# Patient Record
Sex: MALE | Race: WHITE | NOT HISPANIC OR LATINO | Employment: OTHER | URBAN - METROPOLITAN AREA
[De-identification: names, ages, dates, MRNs, and addresses within clinical notes are randomized per-mention and may not be internally consistent; named-entity substitution may affect disease eponyms.]

---

## 2017-04-12 ENCOUNTER — ANESTHESIA (OUTPATIENT)
Dept: SURGERY | Facility: HOSPITAL | Age: 62
End: 2017-04-12
Payer: OTHER GOVERNMENT

## 2017-04-12 ENCOUNTER — OFFICE VISIT (OUTPATIENT)
Dept: UROLOGY | Facility: CLINIC | Age: 62
End: 2017-04-12
Payer: OTHER GOVERNMENT

## 2017-04-12 ENCOUNTER — ANESTHESIA EVENT (OUTPATIENT)
Dept: SURGERY | Facility: HOSPITAL | Age: 62
End: 2017-04-12
Payer: OTHER GOVERNMENT

## 2017-04-12 ENCOUNTER — HOSPITAL ENCOUNTER (OUTPATIENT)
Facility: HOSPITAL | Age: 62
Discharge: HOME | End: 2017-04-12
Attending: UROLOGY | Admitting: UROLOGY
Payer: OTHER GOVERNMENT

## 2017-04-12 VITALS
HEART RATE: 53 BPM | TEMPERATURE: 97 F | RESPIRATION RATE: 18 BRPM | DIASTOLIC BLOOD PRESSURE: 84 MMHG | HEIGHT: 71 IN | BODY MASS INDEX: 31.08 KG/M2 | WEIGHT: 222 LBS | OXYGEN SATURATION: 96 % | SYSTOLIC BLOOD PRESSURE: 136 MMHG

## 2017-04-12 VITALS
DIASTOLIC BLOOD PRESSURE: 76 MMHG | BODY MASS INDEX: 31.08 KG/M2 | HEIGHT: 71 IN | WEIGHT: 222 LBS | SYSTOLIC BLOOD PRESSURE: 113 MMHG | HEART RATE: 71 BPM | TEMPERATURE: 98 F

## 2017-04-12 DIAGNOSIS — N13.2 URETERAL STONE WITH HYDRONEPHROSIS: Primary | ICD-10-CM

## 2017-04-12 DIAGNOSIS — N20.0 KIDNEY STONE: ICD-10-CM

## 2017-04-12 DIAGNOSIS — N13.2 URETERAL STONE WITH HYDRONEPHROSIS: ICD-10-CM

## 2017-04-12 LAB
BILIRUB SERPL-MCNC: ABNORMAL MG/DL
BLOOD URINE, POC: 250
COLOR, POC UA: ABNORMAL
GLUCOSE UR QL STRIP: 1000
KETONES UR QL STRIP: ABNORMAL
LEUKOCYTE ESTERASE URINE, POC: ABNORMAL
NITRITE, POC UA: ABNORMAL
PH, POC UA: 5
PROTEIN, POC: ABNORMAL
SPECIFIC GRAVITY, POC UA: 1030
UROBILINOGEN, POC UA: ABNORMAL

## 2017-04-12 PROCEDURE — 37000008 HC ANESTHESIA 1ST 15 MINUTES: Performed by: UROLOGY

## 2017-04-12 PROCEDURE — 81002 URINALYSIS NONAUTO W/O SCOPE: CPT | Mod: PBBFAC,PO | Performed by: UROLOGY

## 2017-04-12 PROCEDURE — 71000033 HC RECOVERY, INTIAL HOUR: Performed by: UROLOGY

## 2017-04-12 PROCEDURE — 71000039 HC RECOVERY, EACH ADD'L HOUR: Performed by: UROLOGY

## 2017-04-12 PROCEDURE — 99900103 DSU ONLY-NO CHARGE-INITIAL HR (STAT): Performed by: UROLOGY

## 2017-04-12 PROCEDURE — 99214 OFFICE O/P EST MOD 30 MIN: CPT | Mod: PBBFAC,PO | Performed by: UROLOGY

## 2017-04-12 PROCEDURE — C2617 STENT, NON-COR, TEM W/O DEL: HCPCS | Performed by: UROLOGY

## 2017-04-12 PROCEDURE — 74420 UROGRAPHY RTRGR +-KUB: CPT | Mod: 26,,, | Performed by: UROLOGY

## 2017-04-12 PROCEDURE — 99900104 DSU ONLY-NO CHARGE-EA ADD'L HR (STAT): Performed by: UROLOGY

## 2017-04-12 PROCEDURE — 37000009 HC ANESTHESIA EA ADD 15 MINS: Performed by: UROLOGY

## 2017-04-12 PROCEDURE — C1758 CATHETER, URETERAL: HCPCS | Performed by: UROLOGY

## 2017-04-12 PROCEDURE — 71000015 HC POSTOP RECOV 1ST HR: Performed by: UROLOGY

## 2017-04-12 PROCEDURE — 99204 OFFICE O/P NEW MOD 45 MIN: CPT | Mod: S$PBB,25,, | Performed by: UROLOGY

## 2017-04-12 PROCEDURE — 99999 PR PBB SHADOW E&M-EST. PATIENT-LVL IV: CPT | Mod: PBBFAC,,, | Performed by: UROLOGY

## 2017-04-12 PROCEDURE — 52332 CYSTOSCOPY AND TREATMENT: CPT | Mod: LT,,, | Performed by: UROLOGY

## 2017-04-12 PROCEDURE — 25000003 PHARM REV CODE 250: Performed by: ANESTHESIOLOGY

## 2017-04-12 PROCEDURE — 63600175 PHARM REV CODE 636 W HCPCS: Performed by: NURSE ANESTHETIST, CERTIFIED REGISTERED

## 2017-04-12 PROCEDURE — D9220A PRA ANESTHESIA: Mod: CRNA,,, | Performed by: NURSE ANESTHETIST, CERTIFIED REGISTERED

## 2017-04-12 PROCEDURE — 25000003 PHARM REV CODE 250: Performed by: UROLOGY

## 2017-04-12 PROCEDURE — 36000707: Performed by: UROLOGY

## 2017-04-12 PROCEDURE — C1769 GUIDE WIRE: HCPCS | Performed by: UROLOGY

## 2017-04-12 PROCEDURE — 36000706: Performed by: UROLOGY

## 2017-04-12 PROCEDURE — 25500020 PHARM REV CODE 255: Performed by: UROLOGY

## 2017-04-12 PROCEDURE — D9220A PRA ANESTHESIA: Mod: ANES,,, | Performed by: ANESTHESIOLOGY

## 2017-04-12 PROCEDURE — 25000003 PHARM REV CODE 250: Performed by: NURSE ANESTHETIST, CERTIFIED REGISTERED

## 2017-04-12 PROCEDURE — 27200651 HC AIRWAY, LMA: Performed by: NURSE ANESTHETIST, CERTIFIED REGISTERED

## 2017-04-12 DEVICE — STENT DBL PIGTAIL 5FR 22CM: Type: IMPLANTABLE DEVICE | Site: URETER | Status: FUNCTIONAL

## 2017-04-12 RX ORDER — IODIXANOL 320 MG/ML
INJECTION, SOLUTION INTRAVASCULAR
Status: DISCONTINUED | OUTPATIENT
Start: 2017-04-12 | End: 2017-04-12 | Stop reason: HOSPADM

## 2017-04-12 RX ORDER — CEPHALEXIN 500 MG/1
500 CAPSULE ORAL EVERY 12 HOURS
COMMUNITY
Start: 2017-04-12 | End: 2017-04-20

## 2017-04-12 RX ORDER — PROPOFOL 10 MG/ML
VIAL (ML) INTRAVENOUS
Status: DISCONTINUED | OUTPATIENT
Start: 2017-04-12 | End: 2017-04-12

## 2017-04-12 RX ORDER — SODIUM CHLORIDE, SODIUM LACTATE, POTASSIUM CHLORIDE, CALCIUM CHLORIDE 600; 310; 30; 20 MG/100ML; MG/100ML; MG/100ML; MG/100ML
INJECTION, SOLUTION INTRAVENOUS CONTINUOUS
Status: DISCONTINUED | OUTPATIENT
Start: 2017-04-12 | End: 2017-04-12 | Stop reason: HOSPADM

## 2017-04-12 RX ORDER — LIDOCAINE HYDROCHLORIDE 10 MG/ML
1 INJECTION, SOLUTION EPIDURAL; INFILTRATION; INTRACAUDAL; PERINEURAL ONCE
Status: COMPLETED | OUTPATIENT
Start: 2017-04-12 | End: 2017-04-12

## 2017-04-12 RX ORDER — FENTANYL CITRATE 50 UG/ML
25 INJECTION, SOLUTION INTRAMUSCULAR; INTRAVENOUS EVERY 5 MIN PRN
Status: DISCONTINUED | OUTPATIENT
Start: 2017-04-12 | End: 2017-04-12 | Stop reason: HOSPADM

## 2017-04-12 RX ORDER — PHENAZOPYRIDINE HYDROCHLORIDE 200 MG/1
200 TABLET, FILM COATED ORAL ONCE AS NEEDED
Status: COMPLETED | OUTPATIENT
Start: 2017-04-12 | End: 2017-04-12

## 2017-04-12 RX ORDER — TAMSULOSIN HYDROCHLORIDE 0.4 MG/1
0.4 CAPSULE ORAL DAILY
COMMUNITY
Start: 2017-04-12 | End: 2017-04-20

## 2017-04-12 RX ORDER — LIDOCAINE HYDROCHLORIDE 10 MG/ML
INJECTION, SOLUTION EPIDURAL; INFILTRATION; INTRACAUDAL; PERINEURAL
Status: DISCONTINUED
Start: 2017-04-12 | End: 2017-04-12 | Stop reason: HOSPADM

## 2017-04-12 RX ORDER — ONDANSETRON 2 MG/ML
4 INJECTION INTRAMUSCULAR; INTRAVENOUS DAILY PRN
Status: DISCONTINUED | OUTPATIENT
Start: 2017-04-12 | End: 2017-04-12 | Stop reason: HOSPADM

## 2017-04-12 RX ORDER — METOCLOPRAMIDE HYDROCHLORIDE 5 MG/ML
10 INJECTION INTRAMUSCULAR; INTRAVENOUS EVERY 10 MIN PRN
Status: DISCONTINUED | OUTPATIENT
Start: 2017-04-12 | End: 2017-04-12 | Stop reason: HOSPADM

## 2017-04-12 RX ORDER — LIDOCAINE HCL/PF 100 MG/5ML
SYRINGE (ML) INTRAVENOUS
Status: DISCONTINUED | OUTPATIENT
Start: 2017-04-12 | End: 2017-04-12

## 2017-04-12 RX ORDER — ONDANSETRON HYDROCHLORIDE 2 MG/ML
INJECTION, SOLUTION INTRAMUSCULAR; INTRAVENOUS
Status: DISCONTINUED | OUTPATIENT
Start: 2017-04-12 | End: 2017-04-12

## 2017-04-12 RX ORDER — PHENYLEPHRINE HYDROCHLORIDE 10 MG/ML
INJECTION INTRAVENOUS
Status: DISCONTINUED | OUTPATIENT
Start: 2017-04-12 | End: 2017-04-12

## 2017-04-12 RX ORDER — FENTANYL CITRATE 50 UG/ML
INJECTION, SOLUTION INTRAMUSCULAR; INTRAVENOUS
Status: DISCONTINUED | OUTPATIENT
Start: 2017-04-12 | End: 2017-04-12

## 2017-04-12 RX ORDER — LIDOCAINE HYDROCHLORIDE 20 MG/ML
JELLY TOPICAL
Status: DISCONTINUED | OUTPATIENT
Start: 2017-04-12 | End: 2017-04-12 | Stop reason: HOSPADM

## 2017-04-12 RX ORDER — HYDROCODONE BITARTRATE AND ACETAMINOPHEN 5; 325 MG/1; MG/1
1 TABLET ORAL
Status: DISCONTINUED | OUTPATIENT
Start: 2017-04-12 | End: 2017-04-12 | Stop reason: HOSPADM

## 2017-04-12 RX ORDER — ONDANSETRON 4 MG/1
4 TABLET, ORALLY DISINTEGRATING ORAL
COMMUNITY
Start: 2017-04-12 | End: 2017-04-19

## 2017-04-12 RX ORDER — OXYCODONE AND ACETAMINOPHEN 10; 325 MG/1; MG/1
1 TABLET ORAL
COMMUNITY
Start: 2017-04-12 | End: 2017-04-19

## 2017-04-12 RX ORDER — OXYBUTYNIN CHLORIDE 5 MG/1
10 TABLET, EXTENDED RELEASE ORAL ONCE
Status: COMPLETED | OUTPATIENT
Start: 2017-04-12 | End: 2017-04-12

## 2017-04-12 RX ORDER — KETOROLAC TROMETHAMINE 30 MG/ML
INJECTION, SOLUTION INTRAMUSCULAR; INTRAVENOUS
Status: DISCONTINUED | OUTPATIENT
Start: 2017-04-12 | End: 2017-04-12

## 2017-04-12 RX ORDER — CEFAZOLIN SODIUM 1 G/3ML
INJECTION, POWDER, FOR SOLUTION INTRAMUSCULAR; INTRAVENOUS
Status: DISCONTINUED | OUTPATIENT
Start: 2017-04-12 | End: 2017-04-12

## 2017-04-12 RX ORDER — CEFAZOLIN SODIUM 2 G/50ML
2 SOLUTION INTRAVENOUS
Status: DISCONTINUED | OUTPATIENT
Start: 2017-04-12 | End: 2017-04-12 | Stop reason: HOSPADM

## 2017-04-12 RX ORDER — MIDAZOLAM HYDROCHLORIDE 1 MG/ML
INJECTION, SOLUTION INTRAMUSCULAR; INTRAVENOUS
Status: DISCONTINUED | OUTPATIENT
Start: 2017-04-12 | End: 2017-04-12

## 2017-04-12 RX ADMIN — SODIUM CHLORIDE, SODIUM LACTATE, POTASSIUM CHLORIDE, AND CALCIUM CHLORIDE: .6; .31; .03; .02 INJECTION, SOLUTION INTRAVENOUS at 03:04

## 2017-04-12 RX ADMIN — CEFAZOLIN 2 G: 1 INJECTION, POWDER, FOR SOLUTION INTRAVENOUS at 03:04

## 2017-04-12 RX ADMIN — OXYBUTYNIN CHLORIDE 10 MG: 5 TABLET, FILM COATED, EXTENDED RELEASE ORAL at 04:04

## 2017-04-12 RX ADMIN — SODIUM CHLORIDE, SODIUM LACTATE, POTASSIUM CHLORIDE, AND CALCIUM CHLORIDE: .6; .31; .03; .02 INJECTION, SOLUTION INTRAVENOUS at 01:04

## 2017-04-12 RX ADMIN — FENTANYL CITRATE 50 MCG: 50 INJECTION INTRAMUSCULAR; INTRAVENOUS at 03:04

## 2017-04-12 RX ADMIN — PROPOFOL 150 MG: 10 INJECTION, EMULSION INTRAVENOUS at 03:04

## 2017-04-12 RX ADMIN — LIDOCAINE HYDROCHLORIDE 10 MG: 10 INJECTION, SOLUTION EPIDURAL; INFILTRATION; INTRACAUDAL; PERINEURAL at 01:04

## 2017-04-12 RX ADMIN — PHENAZOPYRIDINE HYDROCHLORIDE 200 MG: 200 TABLET ORAL at 04:04

## 2017-04-12 RX ADMIN — ONDANSETRON 4 MG: 2 INJECTION, SOLUTION INTRAMUSCULAR; INTRAVENOUS at 03:04

## 2017-04-12 RX ADMIN — MIDAZOLAM 2 MG: 1 INJECTION INTRAMUSCULAR; INTRAVENOUS at 02:04

## 2017-04-12 RX ADMIN — PHENYLEPHRINE HYDROCHLORIDE 200 MCG: 10 INJECTION INTRAVENOUS at 03:04

## 2017-04-12 RX ADMIN — KETOROLAC TROMETHAMINE 30 MG: 30 INJECTION, SOLUTION INTRAMUSCULAR; INTRAVENOUS at 03:04

## 2017-04-12 RX ADMIN — LIDOCAINE HYDROCHLORIDE 75 MG: 20 INJECTION, SOLUTION INTRAVENOUS at 03:04

## 2017-04-12 NOTE — TRANSFER OF CARE
"Anesthesia Transfer of Care Note    Patient: Toni Hyde    Procedure(s) Performed: Procedure(s) (LRB):  CYSTOSCOPY/ RETROGRADE PYELOGRAM/ STENT PLACEMENT/STENT EXCHANGE (Left)    Patient location: PACU    Anesthesia Type: general    Transport from OR: Transported from OR on room air with adequate spontaneous ventilation    Post pain: adequate analgesia    Post assessment: no apparent anesthetic complications    Post vital signs: stable    Level of consciousness: awake    Nausea/Vomiting: no nausea/vomiting    Complications: none          Last vitals:   Visit Vitals    BP (!) 148/83 (BP Location: Left arm, Patient Position: Lying, BP Method: Automatic)    Pulse 62    Temp 36.7 °C (98 °F) (Oral)    Resp 18    Ht 5' 11" (1.803 m)    Wt 100.7 kg (222 lb)    SpO2 96%    BMI 30.96 kg/m2     "

## 2017-04-12 NOTE — ANESTHESIA PREPROCEDURE EVALUATION
04/12/2017  Toni Hyde is a 61 y.o., male.    [facility] Anesthesia Evaluation    I have reviewed the Patient Summary Reports.    I have reviewed the Nursing Notes.   I have reviewed the Medications.     Review of Systems  Anesthesia Hx:  No problems with previous Anesthesia Denies Hx of Anesthetic complications    Social:  Former Smoker    Cardiovascular:   Hypertension Denies Valvular problems/Murmurs.  Denies CAD.    Denies Dysrhythmias.  hyperlipidemia    Pulmonary:   Denies COPD.  Denies Asthma.  Denies Recent URI.    Renal/:   Denies Chronic Renal Disease. renal calculi     Hepatic/GI:   Denies GERD. Denies Liver Disease.    Neurological:   Denies TIA. Denies Seizures.    Endocrine:   Diabetes, well controlled, type 2 Denies Hypothyroidism.        Physical Exam  General:  Well nourished, Obesity    Airway/Jaw/Neck:  Airway Findings: Mouth Opening: Normal Tongue: Normal  General Airway Assessment: Adult, Good  Mallampati: II  Improves to II with phonation.  TM Distance: 4-6 cm      Dental:  Dental Findings: In tact   Chest/Lungs:  Chest/Lungs Findings: Clear to auscultation, Normal Respiratory Rate     Heart/Vascular:  Heart Findings: Rate: Normal  Rhythm: Regular Rhythm  Sounds: Normal  Heart murmur: negative       Mental Status:  Mental Status Findings:  Cooperative, Alert and Oriented         Anesthesia Plan  Type of Anesthesia, risks & benefits discussed:  Anesthesia Type:  general  Patient's Preference:   Intra-op Monitoring Plan:   Intra-op Monitoring Plan Comments:   Post Op Pain Control Plan:   Post Op Pain Control Plan Comments:   Induction:   IV  Beta Blocker:  Patient is not currently on a Beta-Blocker (No further documentation required).       Informed Consent: Patient understands risks and agrees with Anesthesia plan.  Questions answered. Anesthesia consent signed with patient.  ASA  Score: 2     Day of Surgery Review of History & Physical:    H&P update referred to the surgeon.         Ready For Surgery From Anesthesia Perspective.

## 2017-04-12 NOTE — ANESTHESIA POSTPROCEDURE EVALUATION
"Anesthesia Post Evaluation    Patient: Toni Hyde    Procedure(s) Performed: Procedure(s) (LRB):  CYSTOSCOPY/ RETROGRADE PYELOGRAM/ STENT PLACEMENT/STENT EXCHANGE (Left)    Final Anesthesia Type: general  Patient location during evaluation: PACU  Patient participation: Yes- Able to Participate  Level of consciousness: awake and alert and oriented  Post-procedure vital signs: reviewed and stable  Pain management: adequate  Airway patency: patent  PONV status at discharge: No PONV  Anesthetic complications: no      Cardiovascular status: blood pressure returned to baseline  Respiratory status: unassisted, spontaneous ventilation and room air  Hydration status: euvolemic  Follow-up not needed.        Visit Vitals    /61    Pulse 62    Temp 36.7 °C (98.1 °F)    Resp 16    Ht 5' 11" (1.803 m)    Wt 100.7 kg (222 lb)    SpO2 96%    BMI 30.96 kg/m2       Pain/Mallika Score: Pain Assessment Performed: Yes (4/12/2017  3:30 PM)  Presence of Pain: non-verbal indicators absent (pt sedated, vss, no indicators of pain noted) (4/12/2017  3:30 PM)  Mallika Score: 4 (4/12/2017  4:00 PM)      "

## 2017-04-12 NOTE — BRIEF OP NOTE
Brief Operative Note     Surgery Date: 4/12/2017    Surgeon:   Cristobal Noel MD     Pre-op Diagnosis:  Kidney stone [N20.0]  Ureteral stone with hydronephrosis [N13.2]    Post-op Diagnosis:  Same    Procedure:  Procedure(s) (LRB):  CYSTOSCOPY/ RETROGRADE PYELOGRAM/ STENT PLACEMENT/STENT EXCHANGE (Left)    Anesthesia: Local MAC    Findings/Key Components:  See Op Report    Estimated Blood Loss: Minimal                                                                                                                           Discharge Summary    Admit Date: 4/12/2017     Attending Physician: Cristobal Noel MD     Discharge Physician: Cristobal Neol MD      Discharge Date: 4/12/2017    Treatments/Procedures: Procedure(s) (LRB):  CYSTOSCOPY/ RETROGRADE PYELOGRAM/ STENT PLACEMENT/STENT EXCHANGE (Left)  Final Diagnosis:left ureteral stone  Hospital Course: Cystoscopy, retrogrades and Stent placement done.  F. U. 1 week with Dr. Noel.    Disposition: Home or Self Care     Patient Instructions:     Current Discharge Medication List:         Toni Hyde   Home Medication Instructions [id number]    Printed on:04/12/17 2958   Medication Information                      amlodipine (NORVASC) 2.5 MG tablet  Take 1 tablet (2.5 mg total) by mouth once daily.             benazepril (LOTENSIN) 40 MG tablet  Take 1 tablet (40 mg total) by mouth once daily.             cephALEXin (KEFLEX) 500 MG capsule  Take 500 mg by mouth.             cetirizine (ZYRTEC) 10 MG tablet  Take 10 mg by mouth daily as needed. 1 Tablet(s) Oral PRN .             fluticasone (FLONASE) 50 mcg/actuation nasal spray  2 sprays by Each Nare route once daily.             ibuprofen (ADVIL) 200 MG tablet  Take 200 mg by mouth every 6 (six) hours as needed. 2 Tablet(s) Oral PRN Every day.             metformin (GLUCOPHAGE-XR) 750 MG 24 hr tablet  Take 1 tablet (750 mg total) by mouth daily with breakfast.             ondansetron  (ZOFRAN-ODT) 4 MG TbDL  Take 4 mg by mouth.             oxycodone-acetaminophen (PERCOCET)  mg per tablet  Take 1 tablet by mouth.             pravastatin (PRAVACHOL) 80 MG tablet  Take 1 tablet (80 mg total) by mouth once daily.             sulfacetamide sodium 10% (BLEPH-10) 10 % ophthalmic solution  Place 1 drop into the left eye 2 (two) times daily as needed.             tamsulosin (FLOMAX) 0.4 mg Cp24  Take 0.4 mg by mouth.             timolol 0.5 % ophthalmic solution  Place 1 drop into the left eye once daily.                     Current Discharge Medication List      CONTINUE these medications which have NOT CHANGED    Details   amlodipine (NORVASC) 2.5 MG tablet Take 1 tablet (2.5 mg total) by mouth once daily.  Qty: 90 tablet, Refills: 3    Associated Diagnoses: Essential hypertension      benazepril (LOTENSIN) 40 MG tablet Take 1 tablet (40 mg total) by mouth once daily.  Qty: 90 tablet, Refills: 3    Associated Diagnoses: Essential hypertension      cetirizine (ZYRTEC) 10 MG tablet Take 10 mg by mouth daily as needed. 1 Tablet(s) Oral PRN .      fluticasone (FLONASE) 50 mcg/actuation nasal spray 2 sprays by Each Nare route once daily.      ibuprofen (ADVIL) 200 MG tablet Take 200 mg by mouth every 6 (six) hours as needed. 2 Tablet(s) Oral PRN Every day.      metformin (GLUCOPHAGE-XR) 750 MG 24 hr tablet Take 1 tablet (750 mg total) by mouth daily with breakfast.  Qty: 90 tablet, Refills: 3    Associated Diagnoses: Diabetes type 2, controlled      pravastatin (PRAVACHOL) 80 MG tablet Take 1 tablet (80 mg total) by mouth once daily.  Qty: 90 tablet, Refills: 3    Associated Diagnoses: Hyperlipidemia      cephALEXin (KEFLEX) 500 MG capsule Take 500 mg by mouth.      ondansetron (ZOFRAN-ODT) 4 MG TbDL Take 4 mg by mouth.      oxycodone-acetaminophen (PERCOCET)  mg per tablet Take 1 tablet by mouth.      sulfacetamide sodium 10% (BLEPH-10) 10 % ophthalmic solution Place 1 drop into the left eye 2  (two) times daily as needed.      tamsulosin (FLOMAX) 0.4 mg Cp24 Take 0.4 mg by mouth.      timolol 0.5 % ophthalmic solution Place 1 drop into the left eye once daily.             Discharge Procedure Orders:      Discharge Procedure Orders  Diet general     Activity as tolerated

## 2017-04-12 NOTE — IP AVS SNAPSHOT
[address] [place] [address]  Phone: [phone]           Patient Discharge Instructions   Our goal is to set you up for success. This packet includes information on your condition, medications, and your home care.  It will help you care for yourself to prevent having to return to the hospital.     Please ask your nurse if you have any questions.      There are many details to remember when preparing to leave the hospital. Here is what you will need to do:    1. Take your medicine. If you are prescribed medications, review your Medication List on the following pages. You may have new medications to  at the pharmacy and others that you'll need to stop taking. Review the instructions for how and when to take your medications. Talk with your doctor or nurses if you are unsure of what to do.     2. Go to your follow-up appointments. Specific follow-up information is listed in the following pages. Your may be contacted by a nurse or clinical provider about future appointments. Be sure we have all of the phone numbers to reach you. Please contact your provider's office if you are unable to make an appointment.     3. Watch for warning signs. Your doctor or nurse will give you detailed warning signs to watch for and when to call for assistance. These instructions may also include educational information about your condition. If you experience any of warning signs to your health, call your doctor.           [facility]  Unless otherwise directed by your provider, please   contact [facility], our nurse care line   that is available for 24/7 assistance.     [phone] (toll-free)     Registered nurses in the [facility]   provide: appointment scheduling, clinical advisement, health education, and other advisory services.                  ** Verify the list of medication(s) below is accurate and up to date. Carry this with you in case of  emergency. If your medications have changed, please notify your healthcare provider.             Medication List      CONTINUE taking these medications        Additional Info                      ADVIL 200 MG tablet   Refills:  0   Dose:  200 mg   Generic drug:  ibuprofen    Instructions:  Take 200 mg by mouth every 6 (six) hours as needed. 2 Tablet(s) Oral PRN Every day.     Begin Date    AM    Noon    PM    Bedtime       amlodipine 2.5 MG tablet   Commonly known as:  NORVASC   Quantity:  90 tablet   Refills:  3   Dose:  2.5 mg   Indications:  Hypertension    Instructions:  Take 1 tablet (2.5 mg total) by mouth once daily.     Begin Date    AM    Noon    PM    Bedtime       benazepril 40 MG tablet   Commonly known as:  LOTENSIN   Quantity:  90 tablet   Refills:  3   Dose:  40 mg   Indications:  Hypertension    Instructions:  Take 1 tablet (40 mg total) by mouth once daily.     Begin Date    AM    Noon    PM    Bedtime       cephALEXin 500 MG capsule   Commonly known as:  KEFLEX   Refills:  0   Dose:  500 mg    Instructions:  Take 500 mg by mouth.     Begin Date    AM    Noon    PM    Bedtime       fluticasone 50 mcg/actuation nasal spray   Commonly known as:  FLONASE   Refills:  0   Dose:  2 spray    Instructions:  2 sprays by Each Nare route once daily.     Begin Date    AM    Noon    PM    Bedtime       metformin 750 MG 24 hr tablet   Commonly known as:  GLUCOPHAGE-XR   Quantity:  90 tablet   Refills:  3   Dose:  750 mg    Instructions:  Take 1 tablet (750 mg total) by mouth daily with breakfast.     Begin Date    AM    Noon    PM    Bedtime       ondansetron 4 MG Tbdl   Commonly known as:  ZOFRAN-ODT   Refills:  0   Dose:  4 mg    Instructions:  Take 4 mg by mouth.     Begin Date    AM    Noon    PM    Bedtime       oxycodone-acetaminophen  mg per tablet   Commonly known as:  PERCOCET   Refills:  0   Dose:  1 tablet    Instructions:  Take 1 tablet by mouth.     Begin Date    AM    Noon    PM    Bedtime        pravastatin 80 MG tablet   Commonly known as:  PRAVACHOL   Quantity:  90 tablet   Refills:  3   Dose:  80 mg    Instructions:  Take 1 tablet (80 mg total) by mouth once daily.     Begin Date    AM    Noon    PM    Bedtime       sulfacetamide sodium 10% 10 % ophthalmic solution   Commonly known as:  BLEPH-10   Refills:  0   Dose:  1 drop    Instructions:  Place 1 drop into the left eye 2 (two) times daily as needed.     Begin Date    AM    Noon    PM    Bedtime       tamsulosin 0.4 mg Cp24   Commonly known as:  FLOMAX   Refills:  0   Dose:  0.4 mg    Instructions:  Take 0.4 mg by mouth.     Begin Date    AM    Noon    PM    Bedtime       timolol 0.5 % ophthalmic solution   Refills:  0   Dose:  1 drop    Instructions:  Place 1 drop into the left eye once daily.     Begin Date    AM    Noon    PM    Bedtime       ZYRTEC 10 MG tablet   Refills:  0   Dose:  10 mg   Generic drug:  cetirizine    Instructions:  Take 10 mg by mouth daily as needed. 1 Tablet(s) Oral PRN .     Begin Date    AM    Noon    PM    Bedtime                  Please bring to all follow up appointments:    1. A copy of your discharge instructions.  2. All medicines you are currently taking in their original bottles.  3. Identification and insurance card.    Please arrive 15 minutes ahead of scheduled appointment time.    Please call 24 hours in advance if you must reschedule your appointment and/or time.        Your Scheduled Appointments     Apr 19, 2017  9:45 AM CDT   Established Patient Visit with MD Roberto Carlos Chaves - Urology ([facility])    [address]  [facility] [address]   [phone]              Follow-up Information     Follow up with Cristobal Noel MD In 1 week.    Specialty:  Urology    Contact information:    [address]  [place] [address]  [phone]          Discharge Instructions     Future Orders    Activity as tolerated     Diet general     Questions:    Total calories:       Fat restriction, if any:      Protein restriction, if any:      Na restriction, if any:      Fluid restriction:      Additional restrictions:          Discharge Instructions         Ureteral Stents  A ureteral stent is a soft plastic tube with holes in it. Its temporarily inserted into a ureter to help drain urine into the bladder. One end goes in the kidney. The other end goes in the bladder. A coil on each end holds the stent in place. The stent cant be seen from outside the body. It shouldnt interfere with your normal routine. Your stent will be put in by a urologist (doctor trained in treating the urinary tract) or another specialist. The procedure is done in a hospital or surgery center. Youll likely go home the same day.  When Is a Ureteral Stent Used?  A ureteral stent may be used:  · To bypass a blockage in a kidney or ureter.  · During kidney stone removal.  · To let a ureter heal after surgery.    Before the Procedure  Your doctor will give you instructions to prepare for the procedure. X-rays or other imaging tests of your kidneys and ureters may be done beforehand.  During the Procedure  · You receive medication to prevent pain and help you relax or sleep during the procedure. Once this takes effect, the procedure starts.  · The doctor inserts a cystoscope (lighted instrument) through the urethra and into the bladder. This shows the opening to the ureter.  · A thin wire is carefully threaded through the cystoscope, up the ureter, and into the kidney. The stent is inserted over the wire.  · A fluoroscope (special X-ray machine) is used to help position the stent. When the stent is in place, the wire and cystoscope are removed.  While You Have a Stent  · Some discomfort is normal. Certain movements may trigger pain or a feeling that you need to urinate. You may also feel mild soreness or pressure before or during urination. These symptoms will go away a few days after the stent is  removed.  · Medication to control pain or bladder spasms or to prevent infection may be prescribed. Take this as directed.  · Drink plenty of fluids to help flush out your urinary tract.  · Your urine may be slightly pink or red. This is due to bleeding caused by minor irritation from the stent. This may happen on and off while you have the stent.  · As with any synthetic device placed in the body, there is a risk of infection. The stent may have to be removed if this happens.   How Long Will You Need a Stent?  The stent is often taken out after the blockage in the ureter is treated or the ureter has healed. This may take 1 week to 2 weeks, or longer. If a stent is needed for a long time, it may need to be changed every few months.  Call Your Doctor  Contact your doctor right away if:  · Your urine contains blood clots or you see a large amount of blood-tinged urine.   · You have symptoms similar to those you had before the stent was placed.   · You constantly leak urine.  · You have a fever over 100.4°F (38°C), chills, nausea, or vomiting.  · Your pain is not relieved with medication.  · The end of the stent comes out of the urethra.   Date Last Reviewed: 11/26/2014  © 3451-3912 [employer]. [address] [place] [address], [place] [address]. All rights reserved. This information is not intended as a substitute for professional medical care. Always follow your healthcare professional's instructions.        General Information:    1.  Do not drink alcoholic beverages including beer for 24 hours or as long as you are on pain medication..  2.  Do not drive a motor vehicle, operate machinery or power tools, or signs legal papers for 24 hours or as long as you are on pain medication.   3.  You may experience light-headedness, dizziness, and sleepiness following surgery. Please do not stay alone. A responsible adult should be with you for this 24 hour period.  4.  Go home and rest.    5. Progress slowly to a normal  diet unless instructed.  Otherwise, begin with liquids such as soft drinks, then soup and crackers working up to solid foods. Drink plenty of nonalcoholic fluids.  6.  Certain anesthetics and pain medications produce nausea and vomiting in certain       individuals. If nausea becomes a problem at home, call you doctor.    7. A nurse will be calling you sometime after surgery. Do not be alarmed. This is our way of finding out how you are doing.    8. Several times every hour while you are awake, take 2-3 deep breaths and cough. If you had stomach surgery hold a pillow or rolled towel firmly against your stomach before you cough. This will help with any pain the cough might cause.  9. Several times every hour while you are awake, pump and flex your feet 5-6 times and do foot circles. This will help prevent blood clots.    10.Call your doctor for severe pain, bleeding, fever, or signs or symptoms of infection (pain, swelling, redness, foul odor, drainage).    Discharge Instructions: After Your Surgery/Procedure  Youve just had surgery. During surgery you were given medicine called anesthesia to keep you relaxed and free of pain. After surgery you may have some pain or nausea. This is common. Here are some tips for feeling better and getting well after surgery.     Stay on schedule with your medication.   Going home  Your doctor or nurse will show you how to take care of yourself when you go home. He or she will also answer your questions. Have an adult family member or friend drive you home.      For your safety we recommend these precaution for the first 24 hours after your procedure:  · Do not drive or use heavy equipment.  · Do not make important decisions or sign legal papers.  · Do not drink alcohol.  · Have someone stay with you, if needed. He or she can watch for problems and help keep you safe.  · Your concentration, balance, coordination, and judgement may be impaired for many hours after anesthesia.  Use  "caution when ambulating or standing up.     · You may feel weak and "washed out" after anesthesia and surgery.      Subtle residual effects of general anesthesia or sedation with regional / local anesthesia can last more than 24 hours.  Rest for the remainder of the day or longer if your Doctor/Surgeon has advised you to do so.  Although you may feel normal within the first 24 hours, your reflexes and mental ability may be impaired without you realizing it.  You may feel dizzy, lightheaded or sleepy for 24 hours or longer.      Be sure to go to all follow-up visits with your doctor. And rest after your surgery for as long as your doctor tells you to.  Coping with pain  If you have pain after surgery, pain medicine will help you feel better. Take it as told, before pain becomes severe. Also, ask your doctor or pharmacist about other ways to control pain. This might be with heat, ice, or relaxation. And follow any other instructions your surgeon or nurse gives you.  Tips for taking pain medicine  To get the best relief possible, remember these points:  · Pain medicines can upset your stomach. Taking them with a little food may help.  · Most pain relievers taken by mouth need at least 20 to 30 minutes to start to work.  · Taking medicine on a schedule can help you remember to take it. Try to time your medicine so that you can take it before starting an activity. This might be before you get dressed, go for a walk, or sit down for dinner.  · Constipation is a common side effect of pain medicines. Call your doctor before taking any medicines such as laxatives or stool softeners to help ease constipation. Also ask if you should skip any foods. Drinking lots of fluids and eating foods such as fruits and vegetables that are high in fiber can also help. Remember, do not take laxatives unless your surgeon has prescribed them.  · Drinking alcohol and taking pain medicine can cause dizziness and slow your breathing. It can even " be deadly. Do not drink alcohol while taking pain medicine.  · Pain medicine can make you react more slowly to things. Do not drive or run machinery while taking pain medicine.  Your health care provider may tell you to take acetaminophen to help ease your pain. Ask him or her how much you are supposed to take each day. Acetaminophen or other pain relievers may interact with your prescription medicines or other over-the-counter (OTC) drugs. Some prescription medicines have acetaminophen and other ingredients. Using both prescription and OTC acetaminophen for pain can cause you to overdose. Read the labels on your OTC medicines with care. This will help you to clearly know the list of ingredients, how much to take, and any warnings. It may also help you not take too much acetaminophen. If you have questions or do not understand the information, ask your pharmacist or health care provider to explain it to you before you take the OTC medicine.  Managing nausea  Some people have an upset stomach after surgery. This is often because of anesthesia, pain, or pain medicine, or the stress of surgery. These tips will help you handle nausea and eat healthy foods as you get better. If you were on a special food plan before surgery, ask your doctor if you should follow it while you get better. These tips may help:  · Do not push yourself to eat. Your body will tell you when to eat and how much.  · Start off with clear liquids and soup. They are easier to digest.  · Next try semi-solid foods, such as mashed potatoes, applesauce, and gelatin, as you feel ready.  · Slowly move to solid foods. Dont eat fatty, rich, or spicy foods at first.  · Do not force yourself to have 3 large meals a day. Instead eat smaller amounts more often.  · Take pain medicines with a small amount of solid food, such as crackers or toast, to avoid nausea.     Call your surgeon if  · You still have pain an hour after taking medicine. The medicine may not  be strong enough.  · You feel too sleepy, dizzy, or groggy. The medicine may be too strong.  · You have side effects like nausea, vomiting, or skin changes, such as rash, itching, or hives.       If you have obstructive sleep apnea  You were given anesthesia medicine during surgery to keep you comfortable and free of pain. After surgery, you may have more apnea spells because of this medicine and other medicines you were given. The spells may last longer than usual.   At home:  · Keep using the continuous positive airway pressure (CPAP) device when you sleep. Unless your health care provider tells you not to, use it when you sleep, day or night. CPAP is a common device used to treat obstructive sleep apnea.  · Talk with your provider before taking any pain medicine, muscle relaxants, or sedatives. Your provider will tell you about the possible dangers of taking these medicines.  © 2931-9910 [employer]. [address] [place] [address]. All rights reserved. This information is not intended as a substitute for professional medical care. Always follow your healthcare professional's instructions.  Using Opioids for Pain Management     Your doctor has given instructions for you to take an opioid.  This is a drug for bad pain.  It helps control pain without causing bleeding and kidney problems.  Common opioid names are morphine, hydromorphone, oxycodone, and methadone. These drugs are called narcotics.    There are several safety concerns you need to know.     · It is against the law to give or sell this drug to another person.  You must keep this medicine safely locked.    · You may have side effects from taking this medication.  These include nausea, itching, sweating, sleepiness, a change in your ability to breathe, and depression.  · Do not take alcohol or sleeping pills opioids.    · Long-term opoid use may no longer giver you relief from pain.  It can cause you stomach pain, mental anxiety,  and headaches.  Long-term opoid use can potentially lead to unlawful street drug abuse and reduce your ability to stay employed.    · Your body may become opioid tolerant if you need to take more to get relief.    · You must stop taking opioids if you begin having more pain as a result of the medicine.    · Opioid withdrawal occurs when you have to stop taking the drug.  It can cause you to have nausea, vomiting, diarrhea, stomach pain, anxiety, and dilated pupils in your eyes. This condition means you are opioid dependent.    · Addiction is a drug induced brain disease. It means there are changes in how your brain is working.  Children, teens, and young adults under 25 years old are more likely to get addicted to opioids.      · Addiction can happen with repeated opioid use.  It does not happen with short-term use of two weeks or less.       For more information, please speak with your doctor or pharmacist.    Cystoscopy    Cystoscopy is a procedure that lets your doctor look directly inside your urethra and bladder. It can be used to:  · Help diagnose a problem with your urethra, bladder, or kidneys.  · Take a sample (biopsy) of bladder or urethral tissue.  · Treat certain problems (such as removing kidney stones).  · Place a stent to bypass an obstruction.  · Take special X-rays of the kidneys.  Based on the findings, your doctor may recommend other tests or treatments.  What is a cystoscope?  A cystoscope is a telescope-like instrument that contains lenses and fiberoptics (small glass wires that make bright light). The cystoscope may be straight and rigid, or flexible to bend around curves in the urethra. The doctor may look directly into the cystoscope, or project the image onto a monitor.  Getting ready  · Ask your doctor if you should stop taking any medications prior to the procedure.  · Ask whether you should avoid eating or drinking anything after midnight before the procedure.  · Follow any other  instructions your doctor gives you.  Tell your doctor before the exam if you:  · Take any medications, such as aspirin or blood thinners  · Have allergies to any medications  · Are pregnant   The procedure  Cystoscopy is done in the doctors office or hospital. The doctor and a nurse are present during the procedure. It takes only a few minutes, longer if a biopsy, X-ray, or treatment needs to be done.  During the procedure:  · You lie on an exam table on your back, knees bent and legs apart. You are covered with a drape.  · Your urethra and the area around it are washed. Anesthetic jelly may be applied to numb the urethra. Other pain medication is usually not needed. In some cases, you may be offered a mild sedative to help you relax. If a more extensive procedure is to be done, such as a biopsy or kidney stone removal, general anesthesia may be needed.  · The cystoscope is inserted. A sterile fluid is put into the bladder to expand it. You may feel pressure from this fluid.  · When the procedure is done, the cystoscope is removed.  After the procedure  If you had a sedative, general anesthesia, or spinal anesthesia, you must have someone drive you home. Once youre home:  · Drink plenty of fluids.  · You may have burning or light bleeding when you urinate--this is normal.  · Medications may be prescribed to ease any discomfort or prevent infection. Take these as directed.  · Call your doctor if you have heavy bleeding or blood clots, burning that lasts more than a day, a fever over 100°F  (38° C), or trouble urinating.  Date Last Reviewed: 9/8/2014 © 2000-2016 [employer]. [address] [place] [address], [place] [address]. All rights reserved. This information is not intended as a substitute for professional medical care. Always follow your healthcare professional's instructions.            Primary Diagnosis     Your primary diagnosis was:  Ureteral Stone With Hydronephrosis      Admission Information      "Date & Time Provider Department CSN    4/12/2017 10:49 AM Cristobal Noel MD [facility] [id number]      Care Providers     Provider Role Specialty Primary office phone    Cristobal Noel MD Attending Provider Urology [phone]    Cristobal Noel MD Surgeon  Urology [phone]      Your Vitals Were     BP Pulse Temp Resp Height Weight    134/85 57 96.8 °F (36 °C) (Axillary) 16 5' 11" (1.803 m) 100.7 kg (222 lb)    SpO2 BMI             95% 30.96 kg/m2         Recent Lab Values        9/16/2011 4/28/2012 12/7/2012 4/24/2013 1/25/2014 9/5/2014 5/21/2015 6/15/2016      8:55 AM  8:24 AM  7:59 AM  4:46 PM  9:01 AM  2:19 PM  7:28 AM  9:26 AM    A1C 5.5 5.7 5.8 5.8 5.9 5.9 5.9 6.7 (H)      Allergies as of 4/12/2017        Reactions    No Known Drug Allergies       Advance Directives     An advance directive is a document which, in the event you are no longer able to make decisions for yourself, tells your healthcare team what kind of treatment you do or do not want to receive, or who you would like to make those decisions for you.  If you do not currently have an advance directive, [facility] encourages you to create one.  For more information call:  [phone]), [phone] ([phone]),  or log on to [email or web address].        Language Assistance Services     ATTENTION: Language assistance services are available, free of charge. Please call [phone].      ATENCIÓN: Si habla español, tiene a linton disposición servicios gratuitos de asistencia lingüística. Llame al [phone].     CHÚ Ý: N?u b?n nói Ti?ng Vi?t, có các d?ch v? h? tr? ngôn ng? mi?n phí dành cho b?n. G?i s? [phone].        Diabetes Discharge Instructions                                    [facility] complies with applicable Federal civil rights laws and does not discriminate on the basis of race, color, national origin, age, disability, or sex.        "

## 2017-04-12 NOTE — DISCHARGE INSTRUCTIONS
Ureteral Stents  A ureteral stent is a soft plastic tube with holes in it. Its temporarily inserted into a ureter to help drain urine into the bladder. One end goes in the kidney. The other end goes in the bladder. A coil on each end holds the stent in place. The stent cant be seen from outside the body. It shouldnt interfere with your normal routine. Your stent will be put in by a urologist (doctor trained in treating the urinary tract) or another specialist. The procedure is done in a hospital or surgery center. Youll likely go home the same day.  When Is a Ureteral Stent Used?  A ureteral stent may be used:  · To bypass a blockage in a kidney or ureter.  · During kidney stone removal.  · To let a ureter heal after surgery.    Before the Procedure  Your doctor will give you instructions to prepare for the procedure. X-rays or other imaging tests of your kidneys and ureters may be done beforehand.  During the Procedure  · You receive medication to prevent pain and help you relax or sleep during the procedure. Once this takes effect, the procedure starts.  · The doctor inserts a cystoscope (lighted instrument) through the urethra and into the bladder. This shows the opening to the ureter.  · A thin wire is carefully threaded through the cystoscope, up the ureter, and into the kidney. The stent is inserted over the wire.  · A fluoroscope (special X-ray machine) is used to help position the stent. When the stent is in place, the wire and cystoscope are removed.  While You Have a Stent  · Some discomfort is normal. Certain movements may trigger pain or a feeling that you need to urinate. You may also feel mild soreness or pressure before or during urination. These symptoms will go away a few days after the stent is removed.  · Medication to control pain or bladder spasms or to prevent infection may be prescribed. Take this as directed.  · Drink plenty of fluids to help flush out your urinary tract.  · Your urine  may be slightly pink or red. This is due to bleeding caused by minor irritation from the stent. This may happen on and off while you have the stent.  · As with any synthetic device placed in the body, there is a risk of infection. The stent may have to be removed if this happens.   How Long Will You Need a Stent?  The stent is often taken out after the blockage in the ureter is treated or the ureter has healed. This may take 1 week to 2 weeks, or longer. If a stent is needed for a long time, it may need to be changed every few months.  Call Your Doctor  Contact your doctor right away if:  · Your urine contains blood clots or you see a large amount of blood-tinged urine.   · You have symptoms similar to those you had before the stent was placed.   · You constantly leak urine.  · You have a fever over 100.4°F (38°C), chills, nausea, or vomiting.  · Your pain is not relieved with medication.  · The end of the stent comes out of the urethra.   Date Last Reviewed: 11/26/2014  © 6867-3320 [employer]. [address] [place] [address]. All rights reserved. This information is not intended as a substitute for professional medical care. Always follow your healthcare professional's instructions.        General Information:    1.  Do not drink alcoholic beverages including beer for 24 hours or as long as you are on pain medication..  2.  Do not drive a motor vehicle, operate machinery or power tools, or signs legal papers for 24 hours or as long as you are on pain medication.   3.  You may experience light-headedness, dizziness, and sleepiness following surgery. Please do not stay alone. A responsible adult should be with you for this 24 hour period.  4.  Go home and rest.    5. Progress slowly to a normal diet unless instructed.  Otherwise, begin with liquids such as soft drinks, then soup and crackers working up to solid foods. Drink plenty of nonalcoholic fluids.  6.  Certain anesthetics and pain  "medications produce nausea and vomiting in certain       individuals. If nausea becomes a problem at home, call you doctor.    7. A nurse will be calling you sometime after surgery. Do not be alarmed. This is our way of finding out how you are doing.    8. Several times every hour while you are awake, take 2-3 deep breaths and cough. If you had stomach surgery hold a pillow or rolled towel firmly against your stomach before you cough. This will help with any pain the cough might cause.  9. Several times every hour while you are awake, pump and flex your feet 5-6 times and do foot circles. This will help prevent blood clots.    10.Call your doctor for severe pain, bleeding, fever, or signs or symptoms of infection (pain, swelling, redness, foul odor, drainage).    Discharge Instructions: After Your Surgery/Procedure  Youve just had surgery. During surgery you were given medicine called anesthesia to keep you relaxed and free of pain. After surgery you may have some pain or nausea. This is common. Here are some tips for feeling better and getting well after surgery.     Stay on schedule with your medication.   Going home  Your doctor or nurse will show you how to take care of yourself when you go home. He or she will also answer your questions. Have an adult family member or friend drive you home.      For your safety we recommend these precaution for the first 24 hours after your procedure:  · Do not drive or use heavy equipment.  · Do not make important decisions or sign legal papers.  · Do not drink alcohol.  · Have someone stay with you, if needed. He or she can watch for problems and help keep you safe.  · Your concentration, balance, coordination, and judgement may be impaired for many hours after anesthesia.  Use caution when ambulating or standing up.     · You may feel weak and "washed out" after anesthesia and surgery.      Subtle residual effects of general anesthesia or sedation with regional / local " anesthesia can last more than 24 hours.  Rest for the remainder of the day or longer if your Doctor/Surgeon has advised you to do so.  Although you may feel normal within the first 24 hours, your reflexes and mental ability may be impaired without you realizing it.  You may feel dizzy, lightheaded or sleepy for 24 hours or longer.      Be sure to go to all follow-up visits with your doctor. And rest after your surgery for as long as your doctor tells you to.  Coping with pain  If you have pain after surgery, pain medicine will help you feel better. Take it as told, before pain becomes severe. Also, ask your doctor or pharmacist about other ways to control pain. This might be with heat, ice, or relaxation. And follow any other instructions your surgeon or nurse gives you.  Tips for taking pain medicine  To get the best relief possible, remember these points:  · Pain medicines can upset your stomach. Taking them with a little food may help.  · Most pain relievers taken by mouth need at least 20 to 30 minutes to start to work.  · Taking medicine on a schedule can help you remember to take it. Try to time your medicine so that you can take it before starting an activity. This might be before you get dressed, go for a walk, or sit down for dinner.  · Constipation is a common side effect of pain medicines. Call your doctor before taking any medicines such as laxatives or stool softeners to help ease constipation. Also ask if you should skip any foods. Drinking lots of fluids and eating foods such as fruits and vegetables that are high in fiber can also help. Remember, do not take laxatives unless your surgeon has prescribed them.  · Drinking alcohol and taking pain medicine can cause dizziness and slow your breathing. It can even be deadly. Do not drink alcohol while taking pain medicine.  · Pain medicine can make you react more slowly to things. Do not drive or run machinery while taking pain medicine.  Your health care  provider may tell you to take acetaminophen to help ease your pain. Ask him or her how much you are supposed to take each day. Acetaminophen or other pain relievers may interact with your prescription medicines or other over-the-counter (OTC) drugs. Some prescription medicines have acetaminophen and other ingredients. Using both prescription and OTC acetaminophen for pain can cause you to overdose. Read the labels on your OTC medicines with care. This will help you to clearly know the list of ingredients, how much to take, and any warnings. It may also help you not take too much acetaminophen. If you have questions or do not understand the information, ask your pharmacist or health care provider to explain it to you before you take the OTC medicine.  Managing nausea  Some people have an upset stomach after surgery. This is often because of anesthesia, pain, or pain medicine, or the stress of surgery. These tips will help you handle nausea and eat healthy foods as you get better. If you were on a special food plan before surgery, ask your doctor if you should follow it while you get better. These tips may help:  · Do not push yourself to eat. Your body will tell you when to eat and how much.  · Start off with clear liquids and soup. They are easier to digest.  · Next try semi-solid foods, such as mashed potatoes, applesauce, and gelatin, as you feel ready.  · Slowly move to solid foods. Dont eat fatty, rich, or spicy foods at first.  · Do not force yourself to have 3 large meals a day. Instead eat smaller amounts more often.  · Take pain medicines with a small amount of solid food, such as crackers or toast, to avoid nausea.     Call your surgeon if  · You still have pain an hour after taking medicine. The medicine may not be strong enough.  · You feel too sleepy, dizzy, or groggy. The medicine may be too strong.  · You have side effects like nausea, vomiting, or skin changes, such as rash, itching, or hives.        If you have obstructive sleep apnea  You were given anesthesia medicine during surgery to keep you comfortable and free of pain. After surgery, you may have more apnea spells because of this medicine and other medicines you were given. The spells may last longer than usual.   At home:  · Keep using the continuous positive airway pressure (CPAP) device when you sleep. Unless your health care provider tells you not to, use it when you sleep, day or night. CPAP is a common device used to treat obstructive sleep apnea.  · Talk with your provider before taking any pain medicine, muscle relaxants, or sedatives. Your provider will tell you about the possible dangers of taking these medicines.  © 0090-4098 [employer]. [address] [place] [address], [place] [address]. All rights reserved. This information is not intended as a substitute for professional medical care. Always follow your healthcare professional's instructions.  Using Opioids for Pain Management     Your doctor has given instructions for you to take an opioid.  This is a drug for bad pain.  It helps control pain without causing bleeding and kidney problems.  Common opioid names are morphine, hydromorphone, oxycodone, and methadone. These drugs are called narcotics.    There are several safety concerns you need to know.     · It is against the law to give or sell this drug to another person.  You must keep this medicine safely locked.    · You may have side effects from taking this medication.  These include nausea, itching, sweating, sleepiness, a change in your ability to breathe, and depression.  · Do not take alcohol or sleeping pills opioids.    · Long-term opoid use may no longer giver you relief from pain.  It can cause you stomach pain, mental anxiety, and headaches.  Long-term opoid use can potentially lead to unlawful street drug abuse and reduce your ability to stay employed.    · Your body may become opioid tolerant if you need to take  more to get relief.    · You must stop taking opioids if you begin having more pain as a result of the medicine.    · Opioid withdrawal occurs when you have to stop taking the drug.  It can cause you to have nausea, vomiting, diarrhea, stomach pain, anxiety, and dilated pupils in your eyes. This condition means you are opioid dependent.    · Addiction is a drug induced brain disease. It means there are changes in how your brain is working.  Children, teens, and young adults under 25 years old are more likely to get addicted to opioids.      · Addiction can happen with repeated opioid use.  It does not happen with short-term use of two weeks or less.       For more information, please speak with your doctor or pharmacist.    Cystoscopy    Cystoscopy is a procedure that lets your doctor look directly inside your urethra and bladder. It can be used to:  · Help diagnose a problem with your urethra, bladder, or kidneys.  · Take a sample (biopsy) of bladder or urethral tissue.  · Treat certain problems (such as removing kidney stones).  · Place a stent to bypass an obstruction.  · Take special X-rays of the kidneys.  Based on the findings, your doctor may recommend other tests or treatments.  What is a cystoscope?  A cystoscope is a telescope-like instrument that contains lenses and fiberoptics (small glass wires that make bright light). The cystoscope may be straight and rigid, or flexible to bend around curves in the urethra. The doctor may look directly into the cystoscope, or project the image onto a monitor.  Getting ready  · Ask your doctor if you should stop taking any medications prior to the procedure.  · Ask whether you should avoid eating or drinking anything after midnight before the procedure.  · Follow any other instructions your doctor gives you.  Tell your doctor before the exam if you:  · Take any medications, such as aspirin or blood thinners  · Have allergies to any medications  · Are pregnant   The  procedure  Cystoscopy is done in the doctors office or hospital. The doctor and a nurse are present during the procedure. It takes only a few minutes, longer if a biopsy, X-ray, or treatment needs to be done.  During the procedure:  · You lie on an exam table on your back, knees bent and legs apart. You are covered with a drape.  · Your urethra and the area around it are washed. Anesthetic jelly may be applied to numb the urethra. Other pain medication is usually not needed. In some cases, you may be offered a mild sedative to help you relax. If a more extensive procedure is to be done, such as a biopsy or kidney stone removal, general anesthesia may be needed.  · The cystoscope is inserted. A sterile fluid is put into the bladder to expand it. You may feel pressure from this fluid.  · When the procedure is done, the cystoscope is removed.  After the procedure  If you had a sedative, general anesthesia, or spinal anesthesia, you must have someone drive you home. Once youre home:  · Drink plenty of fluids.  · You may have burning or light bleeding when you urinate--this is normal.  · Medications may be prescribed to ease any discomfort or prevent infection. Take these as directed.  · Call your doctor if you have heavy bleeding or blood clots, burning that lasts more than a day, a fever over 100°F  (38° C), or trouble urinating.  Date Last Reviewed: 9/8/2014  © 1661-9423 [employer]. [address] [place] [address], [place] [address]. All rights reserved. This information is not intended as a substitute for professional medical care. Always follow your healthcare professional's instructions.

## 2017-04-12 NOTE — PROGRESS NOTES
Subjective:       Patient ID: Toni Hyde is a 61 y.o. male.    Chief Complaint:   OFFICE NOTE    CHIEF COMPLAINT:  Ureteral stone.    Mr. Hyde started experiencing pain in the left flank last night that was   associated with nausea and vomiting.  Since the pain persisted, he consulted the   Emergency Room at [facility] and made a diagnosis of 9-mm stone in   the mid left ureter.  The patient has been referred to our office for further   evaluation and possible treatment.    The patient refers that in the past, he has passed several stones, but the pain   was short-lived and this time, the pain was more severe with no relief in few   hours.  The patient also refers to have mild hematuria.    The patient is free of any other significant  symptoms.  Denies dysuria.    Denies nocturia and he refers to have mild gross hematuria.    FAMILY HISTORY:  Significant for kidney stones.  Negative for prostate cancer.    PAST MEDICAL AND SURGICAL HISTORY:  Shows that he is a diabetic, high blood   pressure and high cholesterol.    CURRENT MEDICATIONS:  Well documented in the medical record and they were   reviewed by me during this visit.    A CT scan performed at [place] was also reviewed by me and the CT scan shows   small stones in both kidneys with a large stone in the left ureter and   significant left hydronephrosis.      EOR/PN  dd: 04/12/2017 10:57:05 (CDT)  td: 04/13/2017 04:12:25 (CDT)  Doc ID   #[id number]  Job ID #[id number]    CC:       HPI  Review of Systems   Constitutional: Negative for activity change and appetite change.   HENT: Negative.    Eyes: Negative for discharge.   Respiratory: Negative for cough and shortness of breath.    Cardiovascular: Negative for chest pain and palpitations.   Gastrointestinal: Positive for nausea and vomiting. Negative for abdominal distention, abdominal pain and constipation.   Genitourinary: Positive for flank pain and hematuria. Negative for discharge,  dysuria, frequency, testicular pain and urgency.   Musculoskeletal: Negative for arthralgias.   Skin: Negative for rash.   Neurological: Negative for dizziness.   Psychiatric/Behavioral: The patient is not nervous/anxious.        Objective:      Physical Exam   Constitutional: He appears well-developed and well-nourished.   HENT:   Head: Normocephalic.   Eyes: Pupils are equal, round, and reactive to light.   Neck: Normal range of motion.   Cardiovascular: Normal rate.    Pulmonary/Chest: Effort normal.   Abdominal: Soft. He exhibits no distension and no mass. There is no tenderness.   Genitourinary: Penis normal. Rectal exam shows no external hemorrhoid, no mass and no tenderness. Prostate is not enlarged and not tender.   Musculoskeletal: Normal range of motion.   Neurological: He is alert.   Skin: Skin is warm.     Psychiatric: He has a normal mood and affect.       Assessment:       1. Ureteral stone with hydronephrosis    2. Kidney stone        Plan:       Ureteral stone with hydronephrosis    Kidney stone  -     POCT URINE DIPSTICK WITHOUT MICROSCOPE  -     Diet NPO; Standing  -     Case Request Operating Room: CYSTOSCOPY WITH STENT PLACEMENT  -     Place in Outpatient; Standing  -     Place sequential compression device; Standing    Other orders  -     Medium Risk of VTE; Standing  -     ceFAZolin (ANCEF) 2 g in dextrose 5 % 100 mL IVPB; Inject 2 g into the vein On call Procedure (Surgery).

## 2017-04-12 NOTE — MR AVS SNAPSHOT
[facility]  [address]  [place] [address]  Phone: [phone]                  Toni Hyde   2017 10:30 AM   Office Visit    Description:  Male : 1955   Provider:  Cristobal Noel MD   Department:  [facility]           Reason for Visit     Consult           Diagnoses this Visit        Comments    Kidney stone    -  Primary            To Do List           Future Appointments        Provider Department Dept Phone    2017 10:30 AM MD [facility] [phone]      Goals (5 Years of Data)     None      [facility]     [facility] On Call Nurse Care Line -  Assistance  Unless otherwise directed by your provider, please contact [facility], our nurse care line that is available for  assistance.     Registered nurses in the [facility] On Call Center provide: appointment scheduling, clinical advisement, health education, and other advisory services.  Call: [phone] (toll free)               Medications           Message regarding Medications     Verify the changes and/or additions to your medication regime listed below are the same as discussed with your clinician today.  If any of these changes or additions are incorrect, please notify your healthcare provider.             Verify that the below list of medications is an accurate representation of the medications you are currently taking.  If none reported, the list may be blank. If incorrect, please contact your healthcare provider. Carry this list with you in case of emergency.           Current Medications     amlodipine (NORVASC) 2.5 MG tablet Take 1 tablet (2.5 mg total) by mouth once daily.    benazepril (LOTENSIN) 40 MG tablet Take 1 tablet (40 mg total) by mouth once daily.    cephALEXin (KEFLEX) 500 MG capsule Take 500 mg by mouth.    metformin (GLUCOPHAGE-XR) 750 MG 24 hr tablet Take 1 tablet (750 mg total) by mouth daily with breakfast.     "ondansetron (ZOFRAN-ODT) 4 MG TbDL Take 4 mg by mouth.    oxycodone-acetaminophen (PERCOCET)  mg per tablet Take 1 tablet by mouth.    pravastatin (PRAVACHOL) 80 MG tablet Take 1 tablet (80 mg total) by mouth once daily.    tamsulosin (FLOMAX) 0.4 mg Cp24 Take 0.4 mg by mouth.    cetirizine (ZYRTEC) 10 MG tablet Take 10 mg by mouth daily as needed. 1 Tablet(s) Oral PRN .    fluticasone (FLONASE) 50 mcg/actuation nasal spray 2 sprays by Each Nare route once daily.    ibuprofen (ADVIL) 200 MG tablet Take 200 mg by mouth every 6 (six) hours as needed. 2 Tablet(s) Oral PRN Every day.    sulfacetamide sodium 10% (BLEPH-10) 10 % ophthalmic solution Place 1 drop into the left eye 2 (two) times daily as needed.    timolol 0.5 % ophthalmic solution Place 1 drop into the left eye once daily.           Clinical Reference Information           Your Vitals Were     BP Pulse Temp Height Weight BMI    113/76 71 98 °F (36.7 °C) 5' 11" (1.803 m) 100.7 kg (222 lb) 30.96 kg/m2      Blood Pressure          Most Recent Value    BP  113/76      Allergies as of 4/12/2017     No Known Drug Allergies      Immunizations Administered on Date of Encounter - 4/12/2017     None      Orders Placed During Today's Visit      Normal Orders This Visit    POCT URINE DIPSTICK WITHOUT MICROSCOPE       Language Assistance Services     ATTENTION: Language assistance services are available, free of charge. Please call [phone].      ATENCIÓN: Si habla español, tiene a linton disposición servicios gratuitos de asistencia lingüística. Llame al [phone].     [facility] Ý: N?u b?n nói Ti?ng Vi?t, có các d?ch v? h? tr? ngôn ng? mi?n phí dành cho b?n. G?i s? [phone].         [facility] complies with applicable Federal civil rights laws and does not discriminate on the basis of race, color, national origin, age, disability, or sex.        "

## 2017-04-12 NOTE — H&P
62 y/o w/m with left renal colic, [facility] work up showed a large stone in the Left middle ureter. Patient here to have a Stent placed and will receive definite treatment with a ESWL in the next 7 to 14 days. At that point I plan the stent removal.  The Past  Hx is positive for kidney stones, all previous passed spontaneously.  The PMH and PSH are well documented to be noted he is a DM on oral medicine.  The patient and wife were explained the rationale and risks of stent placement, he agree and consented.          Review of Systems   Constitutional: Negative for activity change and appetite change.   HENT: Negative.   Eyes: Negative for discharge.   Respiratory: Negative for cough and shortness of breath.   Cardiovascular: Negative for chest pain and palpitations.   Gastrointestinal: Positive for nausea and vomiting. Negative for abdominal distention, abdominal pain and constipation.   Genitourinary: Positive for flank pain and hematuria. Negative for discharge, dysuria, frequency, testicular pain and urgency.   Musculoskeletal: Negative for arthralgias.   Skin: Negative for rash.   Neurological: Negative for dizziness.   Psychiatric/Behavioral: The patient is not nervous/anxious.       Objective:      Physical Exam   Constitutional: He appears well-developed and well-nourished.   HENT:   Head: Normocephalic.   Eyes: Pupils are equal, round, and reactive to light.   Neck: Normal range of motion.   Cardiovascular: Normal rate.   Pulmonary/Chest: Effort normal.   Abdominal: Soft. He exhibits no distension and no mass. There is no tenderness.   Genitourinary: Penis normal. Rectal exam shows no external hemorrhoid, no mass and no tenderness. Prostate is not enlarged and not tender.   Musculoskeletal: Normal range of motion.   Neurological: He is alert.   Skin: Skin is warm.     Psychiatric: He has a normal mood and affect.       Assessment:       1. Ureteral stone with hydronephrosis    2. Kidney stone         Plan:       Ureteral stone with hydronephrosis

## 2017-04-13 NOTE — OP NOTE
DATE OF PROCEDURE:  04/12/2017    PREOPERATIVE DIAGNOSIS:  Upper third left ureteral stone.    POSTOPERATIVE DIAGNOSIS:  Upper third left ureteral stone.    OPERATION PERFORMED:  Cystoscopy.  Left retrograde pyelogram.  Placement of   double-J ureteral stent.    ANESTHESIA:  General.    PROCEDURE:  With the patient under satisfactory general anesthesia and placed in   lithotomy position, the genitalia were prepped and draped in the usual manner.    Fluoroscopy over the abdomen and pelvis shows that the patient has a   calcification in the true pelvis and another questionable calcification in the   upper left abdomen.    The #22 Nigerian Olympus cystoscope was placed through urethra into the bladder.    Inspection of the bladder shows a grade I trabeculation.  No lesions or stones   seen in the bladder.  Both ureteral orifices are within the trigone.  At this   point, a cone tip catheter was placed through the cystoscope into the trigone   into the left ureteral orifice.  Retrograde pyelogram was performed.  The   retrograde shows that the calcifications present in the pelvis are not   consistent with stones are outside the ureter, but at the level of the left   upper ureter, there is a filling defect with significant difficulty passing the   dye above the filling defect into the kidney.  Eventually some dye went up into   the kidney and the collecting system seems to be fairly dilated.  This is   consistent with the CT findings of a large stone in that area.    I went ahead and I placed a wire guide and tried to push the stone into the   kidney and  I am not sure that I was able to do that.  In any extent, the wire   went all the way up into the upper collecting system.  A 22 cm 5-Nigerian double-J   stent was placed over the wire guide and upon the proper placement of the   stent, the wire guide was removed and the stent was seen coiling in the renal   pelvis and also in the bladder.  A large amount of cloudy urine was  seen coming   through the stent at this point, the bladder was emptied, the cystoscope was   removed and the patient was transferred to Recovery Room in satisfactory   condition.    PLAN:  He is going to have an appointment in a week and at that point, we are   going to take a KUB film and determine what will be the best approach for this   patient if ureteroscopy or a lithotripsy.      VIJAY  dd: 04/12/2017 15:33:05 (CDT)  td: 04/12/2017 20:26:00 (CDT)  Doc ID   #[id number]  Job ID #[id number]    CC:

## 2017-04-17 DIAGNOSIS — N20.1 LEFT URETERAL STONE: Primary | ICD-10-CM

## 2017-04-19 ENCOUNTER — PATIENT MESSAGE (OUTPATIENT)
Dept: UROLOGY | Facility: CLINIC | Age: 62
End: 2017-04-19

## 2017-04-19 ENCOUNTER — HOSPITAL ENCOUNTER (OUTPATIENT)
Dept: RADIOLOGY | Facility: HOSPITAL | Age: 62
Discharge: HOME | End: 2017-04-19
Attending: UROLOGY
Payer: OTHER GOVERNMENT

## 2017-04-19 ENCOUNTER — OFFICE VISIT (OUTPATIENT)
Dept: UROLOGY | Facility: CLINIC | Age: 62
End: 2017-04-19
Payer: OTHER GOVERNMENT

## 2017-04-19 DIAGNOSIS — N20.1 LEFT URETERAL STONE: Primary | ICD-10-CM

## 2017-04-19 DIAGNOSIS — N20.1 LEFT URETERAL STONE: ICD-10-CM

## 2017-04-19 PROCEDURE — 99999 PR PBB SHADOW E&M-EST. PATIENT-LVL II: CPT | Mod: PBBFAC,,, | Performed by: UROLOGY

## 2017-04-19 PROCEDURE — 99212 OFFICE O/P EST SF 10 MIN: CPT | Mod: PBBFAC,PO | Performed by: UROLOGY

## 2017-04-19 PROCEDURE — 99214 OFFICE O/P EST MOD 30 MIN: CPT | Mod: S$PBB,,, | Performed by: UROLOGY

## 2017-04-19 PROCEDURE — 81002 URINALYSIS NONAUTO W/O SCOPE: CPT | Mod: PBBFAC,PO | Performed by: UROLOGY

## 2017-04-19 PROCEDURE — 74000 XR ABDOMEN AP 1 VIEW: CPT | Mod: 26,,, | Performed by: RADIOLOGY

## 2017-04-19 PROCEDURE — 74000 XR ABDOMEN AP 1 VIEW: CPT | Mod: TC

## 2017-04-19 NOTE — MR AVS SNAPSHOT
[facility]   [address]  [place] [address]  Phone: [phone]                  Toni Hyde   2017 9:45 AM   Appointment    Description:  Male : 1955   Provider:  Cristobal Noel MD   Department:  [facility]                To Do List           Future Appointments        Provider Department Dept Phone    5/10/2017 10:30 AM MD [facility] [phone]      Goals (5 Years of Data)     None      [facility]     [facility] -  Assistance  Unless otherwise directed by your provider, please contact [facility], our nurse care line that is available for  assistance.     Registered nurses in the [facility] provide: appointment scheduling, clinical advisement, health education, and other advisory services.  Call: [phone] (toll free)               Medications           Message regarding Medications     Verify the changes and/or additions to your medication regime listed below are the same as discussed with your clinician today.  If any of these changes or additions are incorrect, please notify your healthcare provider.        STOP taking these medications     ondansetron (ZOFRAN-ODT) 4 MG TbDL Take 4 mg by mouth.    oxycodone-acetaminophen (PERCOCET)  mg per tablet Take 1 tablet by mouth.    sulfacetamide sodium 10% (BLEPH-10) 10 % ophthalmic solution Place 1 drop into the left eye 2 (two) times daily as needed.    timolol 0.5 % ophthalmic solution Place 1 drop into the left eye once daily.           Verify that the below list of medications is an accurate representation of the medications you are currently taking.  If none reported, the list may be blank. If incorrect, please contact your healthcare provider. Carry this list with you in case of emergency.           Current Medications     amlodipine (NORVASC) 2.5 MG tablet Take 1 tablet (2.5 mg total) by mouth once  daily.    benazepril (LOTENSIN) 40 MG tablet Take 1 tablet (40 mg total) by mouth once daily.    cephALEXin (KEFLEX) 500 MG capsule Take 500 mg by mouth every 12 (twelve) hours.     cetirizine (ZYRTEC) 10 MG tablet Take 10 mg by mouth daily as needed. 1 Tablet(s) Oral PRN .    fluticasone (FLONASE) 50 mcg/actuation nasal spray 2 sprays by Each Nare route once daily.    ibuprofen (ADVIL) 200 MG tablet Take 200 mg by mouth every 6 (six) hours as needed. 2 Tablet(s) Oral PRN Every day.    metformin (GLUCOPHAGE-XR) 750 MG 24 hr tablet Take 1 tablet (750 mg total) by mouth daily with breakfast.    pravastatin (PRAVACHOL) 80 MG tablet Take 1 tablet (80 mg total) by mouth once daily.    tamsulosin (FLOMAX) 0.4 mg Cp24 Take 0.4 mg by mouth once daily.            Clinical Reference Information           Allergies as of 4/19/2017     No Known Drug Allergies      Immunizations Administered on Date of Encounter - 4/19/2017     None      Language Assistance Services     ATTENTION: Language assistance services are available, free of charge. Please call [phone].      ATENCIÓN: Si ej ann, tiene a linton disposición servicios gratuitos de asistencia lingüística. Llame al [phone].     AIDEN Ý: N?u b?n nói Ti?ng Vi?t, có các d?ch v? h? tr? ngôn ng? mi?n phí dành cho b?n. G?i s? [phone].         [facility] complies with applicable Federal civil rights laws and does not discriminate on the basis of race, color, national origin, age, disability, or sex.

## 2017-04-19 NOTE — PROGRESS NOTES
Subjective:       Patient ID: Toni Hyde is a 61 y.o. male.    Chief Complaint:   OFFICE NOTE    CHIEF COMPLAINT:  Left ureteral stone.    Mr. Hyde is a 61-year-old male who was found to have highly obstructed 1 cm   upper third left ureteral stone and underwent a cystoscopy with retrograde   pyelogram and stent placement on 04/12/2017.  The patient is here for further   disposition on how the stone will be treated.  It is to be noted that the   patient's KUB film today shows a faint calcification close to a transverse   process, just approximately an inch below the ureteropelvic junction.    I discussed with the patient the two options that we have, a ureteroscopy with   laser lithotripsy or an extracorporeal shockwave lithotripsy.  The patient after   discussing the pros and cons of each procedure, he elected to undergo   extracorporeal shockwave lithotripsy with the stent removal at the same time   with the hope that that will be the treatment needed.  I explained to the   patient that the success rate at that level is approximately 70%.  All the   questions were answered to his satisfaction.  He went ahead and scheduled   lithotripsy for May 25th on the left side with the stent removal.  The orders   were given, the consent was obtained and he left the office in satisfactory   condition.    Today, urinalysis shows a trace of leukocyte, negative nitrites, positive   protein, positive glucose and 2+ blood.  The remaining of the medical and   surgical history, the current medications and allergies are well documented in   the medical record and they were reviewed during this visit by me.      EOR/PN  dd: 04/20/2017 09:52:31 (CDT)  td: 04/20/2017 10:12:18 (CDT)  Doc ID   #[id number]  Job ID #[id number]    CC:       HPI  Review of Systems   Constitutional: Negative for activity change and appetite change.   HENT: Negative.    Eyes: Negative for discharge.   Respiratory: Negative for cough and shortness of breath.     Cardiovascular: Negative for chest pain and palpitations.   Gastrointestinal: Negative for abdominal distention, abdominal pain, constipation and vomiting.   Genitourinary: Positive for frequency and urgency. Negative for discharge, dysuria, flank pain, hematuria and testicular pain.   Musculoskeletal: Negative for arthralgias.   Skin: Negative for rash.   Neurological: Negative for dizziness.   Psychiatric/Behavioral: The patient is not nervous/anxious.        Objective:      Physical Exam   Constitutional: He appears well-developed and well-nourished.   HENT:   Head: Normocephalic.   Eyes: Pupils are equal, round, and reactive to light.   Neck: Normal range of motion.   Cardiovascular: Normal rate.    Pulmonary/Chest: Effort normal.   Abdominal: Soft. He exhibits no distension and no mass. There is no tenderness.   Genitourinary: Rectum normal, prostate normal and penis normal. Right testis shows no mass and no tenderness. Left testis shows no mass and no tenderness. No discharge found.   Musculoskeletal: Normal range of motion.   Neurological: He is alert.   Skin: Skin is warm.     Psychiatric: He has a normal mood and affect.       Assessment:       1. Left ureteral stone        Plan:       Left ureteral stone  -     Case Request Operating Room: LITHOTRIPSY-EXTRACORPOREAL SHOCK WAVE, CYSTOSCOPY WITH STENT REMOVAL  -     Place in Outpatient; Standing  -     Place sequential compression device; Standing  -     X-Ray Abdomen AP 1 View; Standing    Other orders  -     Medium Risk of VTE; Standing  -     ceFAZolin (ANCEF) 2 g in dextrose 5 % 100 mL IVPB; Inject 2 g into the vein On call Procedure (Surgery).

## 2017-04-20 ENCOUNTER — HOSPITAL ENCOUNTER (OUTPATIENT)
Dept: PREADMISSION TESTING | Facility: HOSPITAL | Age: 62
Discharge: HOME | End: 2017-04-20
Attending: UROLOGY
Payer: OTHER GOVERNMENT

## 2017-04-20 ENCOUNTER — PATIENT MESSAGE (OUTPATIENT)
Dept: UROLOGY | Facility: CLINIC | Age: 62
End: 2017-04-20

## 2017-04-20 VITALS — HEIGHT: 71 IN | BODY MASS INDEX: 30.8 KG/M2 | WEIGHT: 220 LBS

## 2017-04-20 LAB
BILIRUB SERPL-MCNC: ABNORMAL MG/DL
BLOOD URINE, POC: ABNORMAL
COLOR, POC UA: ABNORMAL
GLUCOSE UR QL STRIP: ABNORMAL
KETONES UR QL STRIP: ABNORMAL
LEUKOCYTE ESTERASE URINE, POC: ABNORMAL
NITRITE, POC UA: ABNORMAL
PH, POC UA: 5
PROTEIN, POC: ABNORMAL
SPECIFIC GRAVITY, POC UA: 1.02
UROBILINOGEN, POC UA: ABNORMAL

## 2017-04-20 PROCEDURE — 99900104 DSU ONLY-NO CHARGE-EA ADD'L HR (STAT)

## 2017-04-20 PROCEDURE — 99900103 DSU ONLY-NO CHARGE-INITIAL HR (STAT)

## 2017-04-21 DIAGNOSIS — N20.1 LEFT URETERAL STONE: Primary | ICD-10-CM

## 2017-04-24 ENCOUNTER — ANESTHESIA EVENT (OUTPATIENT)
Dept: SURGERY | Facility: HOSPITAL | Age: 62
End: 2017-04-24
Payer: OTHER GOVERNMENT

## 2017-04-25 ENCOUNTER — ANESTHESIA (OUTPATIENT)
Dept: SURGERY | Facility: HOSPITAL | Age: 62
End: 2017-04-25
Payer: OTHER GOVERNMENT

## 2017-04-25 ENCOUNTER — SURGERY (OUTPATIENT)
Age: 62
End: 2017-04-25

## 2017-04-25 ENCOUNTER — HOSPITAL ENCOUNTER (OUTPATIENT)
Facility: HOSPITAL | Age: 62
Discharge: HOME | End: 2017-04-25
Attending: UROLOGY | Admitting: UROLOGY
Payer: OTHER GOVERNMENT

## 2017-04-25 ENCOUNTER — HOSPITAL ENCOUNTER (OUTPATIENT)
Dept: RADIOLOGY | Facility: HOSPITAL | Age: 62
Discharge: HOME | End: 2017-04-25
Attending: UROLOGY | Admitting: UROLOGY
Payer: OTHER GOVERNMENT

## 2017-04-25 DIAGNOSIS — N20.1 LEFT URETERAL STONE: ICD-10-CM

## 2017-04-25 PROCEDURE — 37000008 HC ANESTHESIA 1ST 15 MINUTES: Performed by: UROLOGY

## 2017-04-25 PROCEDURE — 25500020 PHARM REV CODE 255: Performed by: UROLOGY

## 2017-04-25 PROCEDURE — 50590 FRAGMENTING OF KIDNEY STONE: CPT | Mod: LT,,, | Performed by: UROLOGY

## 2017-04-25 PROCEDURE — 25000003 PHARM REV CODE 250: Performed by: ANESTHESIOLOGY

## 2017-04-25 PROCEDURE — 99900103 DSU ONLY-NO CHARGE-INITIAL HR (STAT): Performed by: UROLOGY

## 2017-04-25 PROCEDURE — 71000015 HC POSTOP RECOV 1ST HR: Performed by: UROLOGY

## 2017-04-25 PROCEDURE — 36000706: Performed by: UROLOGY

## 2017-04-25 PROCEDURE — D9220A PRA ANESTHESIA: Mod: ANES,,, | Performed by: ANESTHESIOLOGY

## 2017-04-25 PROCEDURE — 63600175 PHARM REV CODE 636 W HCPCS: Performed by: NURSE ANESTHETIST, CERTIFIED REGISTERED

## 2017-04-25 PROCEDURE — 25000003 PHARM REV CODE 250: Performed by: NURSE ANESTHETIST, CERTIFIED REGISTERED

## 2017-04-25 PROCEDURE — 74000 XR ABDOMEN AP 1 VIEW: CPT | Mod: TC

## 2017-04-25 PROCEDURE — 63600175 PHARM REV CODE 636 W HCPCS: Performed by: UROLOGY

## 2017-04-25 PROCEDURE — 36000707: Performed by: UROLOGY

## 2017-04-25 PROCEDURE — 74000 XR ABDOMEN AP 1 VIEW: CPT | Mod: 26,,, | Performed by: RADIOLOGY

## 2017-04-25 PROCEDURE — 99900104 DSU ONLY-NO CHARGE-EA ADD'L HR (STAT): Performed by: UROLOGY

## 2017-04-25 PROCEDURE — 71000033 HC RECOVERY, INTIAL HOUR: Performed by: UROLOGY

## 2017-04-25 PROCEDURE — 52310 CYSTOSCOPY AND TREATMENT: CPT | Mod: 51,,, | Performed by: UROLOGY

## 2017-04-25 PROCEDURE — 37000009 HC ANESTHESIA EA ADD 15 MINS: Performed by: UROLOGY

## 2017-04-25 PROCEDURE — D9220A PRA ANESTHESIA: Mod: CRNA,,, | Performed by: NURSE ANESTHETIST, CERTIFIED REGISTERED

## 2017-04-25 PROCEDURE — 25000003 PHARM REV CODE 250: Performed by: UROLOGY

## 2017-04-25 RX ORDER — CEFAZOLIN SODIUM 2 G/50ML
2 SOLUTION INTRAVENOUS
Status: COMPLETED | OUTPATIENT
Start: 2017-04-25 | End: 2017-04-25

## 2017-04-25 RX ORDER — CEPHALEXIN 500 MG/1
500 CAPSULE ORAL EVERY 12 HOURS
Qty: 10 CAPSULE | Refills: 0 | Status: SHIPPED | OUTPATIENT
Start: 2017-04-25 | End: 2017-04-30

## 2017-04-25 RX ORDER — TRAMADOL HYDROCHLORIDE AND ACETAMINOPHEN 37.5; 325 MG/1; MG/1
1 TABLET, FILM COATED ORAL EVERY 6 HOURS PRN
Qty: 30 TABLET | Refills: 0 | Status: SHIPPED | OUTPATIENT
Start: 2017-04-25 | End: 2017-05-10

## 2017-04-25 RX ORDER — SODIUM CHLORIDE, SODIUM LACTATE, POTASSIUM CHLORIDE, CALCIUM CHLORIDE 600; 310; 30; 20 MG/100ML; MG/100ML; MG/100ML; MG/100ML
1000 INJECTION, SOLUTION INTRAVENOUS CONTINUOUS
Status: DISCONTINUED | OUTPATIENT
Start: 2017-04-25 | End: 2017-04-25 | Stop reason: HOSPADM

## 2017-04-25 RX ORDER — MIDAZOLAM HYDROCHLORIDE 1 MG/ML
INJECTION, SOLUTION INTRAMUSCULAR; INTRAVENOUS
Status: DISCONTINUED | OUTPATIENT
Start: 2017-04-25 | End: 2017-04-25

## 2017-04-25 RX ORDER — LIDOCAINE HYDROCHLORIDE 10 MG/ML
1 INJECTION, SOLUTION EPIDURAL; INFILTRATION; INTRACAUDAL; PERINEURAL ONCE
Status: COMPLETED | OUTPATIENT
Start: 2017-04-25 | End: 2017-04-25

## 2017-04-25 RX ORDER — ONDANSETRON HYDROCHLORIDE 2 MG/ML
INJECTION, SOLUTION INTRAMUSCULAR; INTRAVENOUS
Status: DISCONTINUED | OUTPATIENT
Start: 2017-04-25 | End: 2017-04-25

## 2017-04-25 RX ORDER — LIDOCAINE HYDROCHLORIDE 20 MG/ML
JELLY TOPICAL
Status: DISCONTINUED | OUTPATIENT
Start: 2017-04-25 | End: 2017-04-25 | Stop reason: HOSPADM

## 2017-04-25 RX ORDER — DEXAMETHASONE SODIUM PHOSPHATE 4 MG/ML
INJECTION, SOLUTION INTRA-ARTICULAR; INTRALESIONAL; INTRAMUSCULAR; INTRAVENOUS; SOFT TISSUE
Status: DISCONTINUED | OUTPATIENT
Start: 2017-04-25 | End: 2017-04-25

## 2017-04-25 RX ORDER — FENTANYL CITRATE 50 UG/ML
INJECTION, SOLUTION INTRAMUSCULAR; INTRAVENOUS
Status: DISCONTINUED | OUTPATIENT
Start: 2017-04-25 | End: 2017-04-25

## 2017-04-25 RX ORDER — FENTANYL CITRATE 50 UG/ML
25 INJECTION, SOLUTION INTRAMUSCULAR; INTRAVENOUS EVERY 5 MIN PRN
Status: DISCONTINUED | OUTPATIENT
Start: 2017-04-25 | End: 2017-04-25 | Stop reason: HOSPADM

## 2017-04-25 RX ORDER — LIDOCAINE HCL/PF 100 MG/5ML
SYRINGE (ML) INTRAVENOUS
Status: DISCONTINUED | OUTPATIENT
Start: 2017-04-25 | End: 2017-04-25

## 2017-04-25 RX ORDER — SODIUM CHLORIDE, SODIUM LACTATE, POTASSIUM CHLORIDE, CALCIUM CHLORIDE 600; 310; 30; 20 MG/100ML; MG/100ML; MG/100ML; MG/100ML
INJECTION, SOLUTION INTRAVENOUS CONTINUOUS
Status: DISCONTINUED | OUTPATIENT
Start: 2017-04-25 | End: 2017-04-25 | Stop reason: HOSPADM

## 2017-04-25 RX ORDER — PROPOFOL 10 MG/ML
VIAL (ML) INTRAVENOUS
Status: DISCONTINUED | OUTPATIENT
Start: 2017-04-25 | End: 2017-04-25

## 2017-04-25 RX ADMIN — LIDOCAINE HYDROCHLORIDE 10 ML: 20 JELLY TOPICAL at 12:04

## 2017-04-25 RX ADMIN — FENTANYL CITRATE 50 MCG: 50 INJECTION INTRAMUSCULAR; INTRAVENOUS at 12:04

## 2017-04-25 RX ADMIN — SODIUM CHLORIDE, SODIUM LACTATE, POTASSIUM CHLORIDE, AND CALCIUM CHLORIDE: .6; .31; .03; .02 INJECTION, SOLUTION INTRAVENOUS at 11:04

## 2017-04-25 RX ADMIN — CEFAZOLIN SODIUM 2 G: 2 SOLUTION INTRAVENOUS at 12:04

## 2017-04-25 RX ADMIN — PROPOFOL 120 MG: 10 INJECTION, EMULSION INTRAVENOUS at 12:04

## 2017-04-25 RX ADMIN — IOHEXOL 100 ML: 350 INJECTION, SOLUTION INTRAVENOUS at 12:04

## 2017-04-25 RX ADMIN — DEXAMETHASONE SODIUM PHOSPHATE 4 MG: 4 INJECTION, SOLUTION INTRAMUSCULAR; INTRAVENOUS at 12:04

## 2017-04-25 RX ADMIN — FENTANYL CITRATE 100 MCG: 50 INJECTION INTRAMUSCULAR; INTRAVENOUS at 12:04

## 2017-04-25 RX ADMIN — ONDANSETRON 4 MG: 2 INJECTION, SOLUTION INTRAMUSCULAR; INTRAVENOUS at 12:04

## 2017-04-25 RX ADMIN — LIDOCAINE HYDROCHLORIDE 100 MG: 20 INJECTION, SOLUTION INTRAVENOUS at 12:04

## 2017-04-25 RX ADMIN — LIDOCAINE HYDROCHLORIDE: 10 INJECTION, SOLUTION EPIDURAL; INFILTRATION; INTRACAUDAL; PERINEURAL at 11:04

## 2017-04-25 RX ADMIN — MIDAZOLAM 2 MG: 1 INJECTION INTRAMUSCULAR; INTRAVENOUS at 12:04

## 2017-04-25 NOTE — TRANSFER OF CARE
"Anesthesia Transfer of Care Note    Patient: Toni Hyde    Procedure(s) Performed: Procedure(s) (LRB):  LITHOTRIPSY-EXTRACORPOREAL SHOCK WAVE (Left)  CYSTOSCOPY WITH STENT REMOVAL (Left)    Patient location: PACU    Anesthesia Type: general    Transport from OR: Transported from OR on 2-3 L/min O2 by NC with adequate spontaneous ventilation    Post pain: adequate analgesia    Post assessment: no apparent anesthetic complications and tolerated procedure well    Post vital signs: stable    Level of consciousness: sedated    Nausea/Vomiting: no nausea/vomiting    Complications: none          Last vitals:   Visit Vitals    /88 (BP Location: Left arm, Patient Position: Lying, BP Method: Automatic)    Pulse 94    Temp 36.6 °C (97.9 °F) (Oral)    Resp 18    Ht 5' 10.5" (1.791 m)    Wt 99.8 kg (220 lb)    SpO2 96%    BMI 31.12 kg/m2     "

## 2017-04-25 NOTE — IP AVS SNAPSHOT
[address] [place] [address]  Phone: [phone]           Patient Discharge Instructions   Our goal is to set you up for success. This packet includes information on your condition, medications, and your home care.  It will help you care for yourself to prevent having to return to the hospital.     Please ask your nurse if you have any questions.      There are many details to remember when preparing to leave the hospital. Here is what you will need to do:    1. Take your medicine. If you are prescribed medications, review your Medication List on the following pages. You may have new medications to  at the pharmacy and others that you'll need to stop taking. Review the instructions for how and when to take your medications. Talk with your doctor or nurses if you are unsure of what to do.     2. Go to your follow-up appointments. Specific follow-up information is listed in the following pages. Your may be contacted by a nurse or clinical provider about future appointments. Be sure we have all of the phone numbers to reach you. Please contact your provider's office if you are unable to make an appointment.     3. Watch for warning signs. Your doctor or nurse will give you detailed warning signs to watch for and when to call for assistance. These instructions may also include educational information about your condition. If you experience any of warning signs to your health, call your doctor.           [facility]  Unless otherwise directed by your provider, please   contact [facility], our nurse care line   that is available for 24/7 assistance.     [phone] (toll-free)     Registered nurses in the [facility]   provide: appointment scheduling, clinical advisement, health education, and other advisory services.                  ** Verify the list of medication(s) below is accurate and up to date. Carry this with you in case of  emergency. If your medications have changed, please notify your healthcare provider.             Medication List      START taking these medications        Additional Info                      cephALEXin 500 MG capsule   Commonly known as:  KEFLEX   Quantity:  10 capsule   Refills:  0   Dose:  500 mg    Instructions:  Take 1 capsule (500 mg total) by mouth every 12 (twelve) hours.     Begin Date    AM    Noon    PM    Bedtime       tramadol-acetaminophen 37.5-325 mg 37.5-325 mg Tab   Commonly known as:  ULTRACET   Quantity:  30 tablet   Refills:  0   Dose:  1 tablet    Instructions:  Take 1 tablet by mouth every 6 (six) hours as needed.     Begin Date    AM    Noon    PM    Bedtime         CONTINUE taking these medications        Additional Info                      ADVIL 200 MG tablet   Refills:  0   Dose:  200 mg   Generic drug:  ibuprofen    Instructions:  Take 200 mg by mouth every 6 (six) hours as needed. 2 Tablet(s) Oral PRN Every day.     Begin Date    AM    Noon    PM    Bedtime       amlodipine 2.5 MG tablet   Commonly known as:  NORVASC   Quantity:  90 tablet   Refills:  3   Dose:  2.5 mg   Indications:  Hypertension    Instructions:  Take 1 tablet (2.5 mg total) by mouth once daily.     Begin Date    AM    Noon    PM    Bedtime       benazepril 40 MG tablet   Commonly known as:  LOTENSIN   Quantity:  90 tablet   Refills:  3   Dose:  40 mg   Indications:  Hypertension    Instructions:  Take 1 tablet (40 mg total) by mouth once daily.     Begin Date    AM    Noon    PM    Bedtime       fluticasone 50 mcg/actuation nasal spray   Commonly known as:  FLONASE   Refills:  0   Dose:  2 spray    Instructions:  2 sprays by Each Nare route once daily.     Begin Date    AM    Noon    PM    Bedtime       metformin 750 MG 24 hr tablet   Commonly known as:  GLUCOPHAGE-XR   Quantity:  90 tablet   Refills:  3   Dose:  750 mg    Instructions:  Take 1 tablet (750 mg total) by mouth daily with breakfast.     Begin Date    AM     Noon    PM    Bedtime       pravastatin 80 MG tablet   Commonly known as:  PRAVACHOL   Quantity:  90 tablet   Refills:  3   Dose:  80 mg    Instructions:  Take 1 tablet (80 mg total) by mouth once daily.     Begin Date    AM    Noon    PM    Bedtime       ZYRTEC 10 MG tablet   Refills:  0   Dose:  10 mg   Generic drug:  cetirizine    Instructions:  Take 10 mg by mouth daily as needed. 1 Tablet(s) Oral PRN .     Begin Date    AM    Noon    PM    Bedtime            Where to Get Your Medications      These medications were sent to [id number] - [place], [place] - [address] AT [facility] [address]  [address], [place] [address]     Phone:  [phone]     cephALEXin 500 MG capsule    tramadol-acetaminophen 37.5-325 mg 37.5-325 mg Tab                  Please bring to all follow up appointments:    1. A copy of your discharge instructions.  2. All medicines you are currently taking in their original bottles.  3. Identification and insurance card.    Please arrive 15 minutes ahead of scheduled appointment time.    Please call 24 hours in advance if you must reschedule your appointment and/or time.        Your Scheduled Appointments     May 10, 2017 10:30 AM CDT   Post OP with MD [facility] - Urology ([facility])    [address]  [facility] [address]   [phone]              Follow-up Information     Follow up with Cristobal Noel MD In 2 weeks.    Specialty:  Urology    Contact information:    [address]  [facility] [address]  [phone]          Discharge Instructions     Future Orders    Activity as tolerated     Diet general     Questions:    Total calories:      Fat restriction, if any:      Protein restriction, if any:      Na restriction, if any:      Fluid restriction:      Additional restrictions:          Discharge Instructions       Discharge Instructions: After Your Surgery/Procedure  Youve just had surgery.  "During surgery you were given medicine called anesthesia to keep you relaxed and free of pain. After surgery you may have some pain or nausea. This is common. Here are some tips for feeling better and getting well after surgery.     Stay on schedule with your medication.   Going home  Your doctor or nurse will show you how to take care of yourself when you go home. He or she will also answer your questions. Have an adult family member or friend drive you home.      For your safety we recommend these precaution for the first 24 hours after your procedure:  · Do not drive or use heavy equipment.  · Do not make important decisions or sign legal papers.  · Do not drink alcohol.  · Have someone stay with you, if needed. He or she can watch for problems and help keep you safe.  · Your concentration, balance, coordination, and judgement may be impaired for many hours after anesthesia.  Use caution when ambulating or standing up.     · You may feel weak and "washed out" after anesthesia and surgery.      Subtle residual effects of general anesthesia or sedation with regional / local anesthesia can last more than 24 hours.  Rest for the remainder of the day or longer if your Doctor/Surgeon has advised you to do so.  Although you may feel normal within the first 24 hours, your reflexes and mental ability may be impaired without you realizing it.  You may feel dizzy, lightheaded or sleepy for 24 hours or longer.      Be sure to go to all follow-up visits with your doctor. And rest after your surgery for as long as your doctor tells you to.  Coping with pain  If you have pain after surgery, pain medicine will help you feel better. Take it as told, before pain becomes severe. Also, ask your doctor or pharmacist about other ways to control pain. This might be with heat, ice, or relaxation. And follow any other instructions your surgeon or nurse gives you.  Tips for taking pain medicine  To get the best relief possible, remember " these points:  · Pain medicines can upset your stomach. Taking them with a little food may help.  · Most pain relievers taken by mouth need at least 20 to 30 minutes to start to work.  · Taking medicine on a schedule can help you remember to take it. Try to time your medicine so that you can take it before starting an activity. This might be before you get dressed, go for a walk, or sit down for dinner.  · Constipation is a common side effect of pain medicines. Call your doctor before taking any medicines such as laxatives or stool softeners to help ease constipation. Also ask if you should skip any foods. Drinking lots of fluids and eating foods such as fruits and vegetables that are high in fiber can also help. Remember, do not take laxatives unless your surgeon has prescribed them.  · Drinking alcohol and taking pain medicine can cause dizziness and slow your breathing. It can even be deadly. Do not drink alcohol while taking pain medicine.  · Pain medicine can make you react more slowly to things. Do not drive or run machinery while taking pain medicine.  Your health care provider may tell you to take acetaminophen to help ease your pain. Ask him or her how much you are supposed to take each day. Acetaminophen or other pain relievers may interact with your prescription medicines or other over-the-counter (OTC) drugs. Some prescription medicines have acetaminophen and other ingredients. Using both prescription and OTC acetaminophen for pain can cause you to overdose. Read the labels on your OTC medicines with care. This will help you to clearly know the list of ingredients, how much to take, and any warnings. It may also help you not take too much acetaminophen. If you have questions or do not understand the information, ask your pharmacist or health care provider to explain it to you before you take the OTC medicine.  Managing nausea  Some people have an upset stomach after surgery. This is often because of  anesthesia, pain, or pain medicine, or the stress of surgery. These tips will help you handle nausea and eat healthy foods as you get better. If you were on a special food plan before surgery, ask your doctor if you should follow it while you get better. These tips may help:  · Do not push yourself to eat. Your body will tell you when to eat and how much.  · Start off with clear liquids and soup. They are easier to digest.  · Next try semi-solid foods, such as mashed potatoes, applesauce, and gelatin, as you feel ready.  · Slowly move to solid foods. Dont eat fatty, rich, or spicy foods at first.  · Do not force yourself to have 3 large meals a day. Instead eat smaller amounts more often.  · Take pain medicines with a small amount of solid food, such as crackers or toast, to avoid nausea.     Call your surgeon if  · You still have pain an hour after taking medicine. The medicine may not be strong enough.  · You feel too sleepy, dizzy, or groggy. The medicine may be too strong.  · You have side effects like nausea, vomiting, or skin changes, such as rash, itching, or hives.       If you have obstructive sleep apnea  You were given anesthesia medicine during surgery to keep you comfortable and free of pain. After surgery, you may have more apnea spells because of this medicine and other medicines you were given. The spells may last longer than usual.   At home:  · Keep using the continuous positive airway pressure (CPAP) device when you sleep. Unless your health care provider tells you not to, use it when you sleep, day or night. CPAP is a common device used to treat obstructive sleep apnea.  · Talk with your provider before taking any pain medicine, muscle relaxants, or sedatives. Your provider will tell you about the possible dangers of taking these medicines.  © 1589-6764 [employer]. [address] [place] [address], [place] [address]. All rights reserved. This information is not intended as a substitute for  professional medical care. Always follow your healthcare professional's instructions.    General Information:    1.  Do not drink alcoholic beverages including beer for 24 hours or as long as you are on pain medication..  2.  Do not drive a motor vehicle, operate machinery or power tools, or signs legal papers for 24 hours or as long as you are on pain medication.   3.  You may experience light-headedness, dizziness, and sleepiness following surgery. Please do not stay alone. A responsible adult should be with you for this 24 hour period.  4.  Go home and rest.    5. Progress slowly to a normal diet unless instructed.  Otherwise, begin with liquids such as soft drinks, then soup and crackers working up to solid foods. Drink plenty of nonalcoholic fluids.  6.  Certain anesthetics and pain medications produce nausea and vomiting in certain       individuals. If nausea becomes a problem at home, call you doctor.    7. A nurse will be calling you sometime after surgery. Do not be alarmed. This is our way of finding out how you are doing.    8. Several times every hour while you are awake, take 2-3 deep breaths and cough. If you had stomach surgery hold a pillow or rolled towel firmly against your stomach before you cough. This will help with any pain the cough might cause.  9. Several times every hour while you are awake, pump and flex your feet 5-6 times and do foot circles. This will help prevent blood clots.    10.Call your doctor for severe pain, bleeding, fever, or signs or symptoms of infection (pain, swelling, redness, foul odor, drainage).    Using Opioids for Pain Management     Your doctor has given instructions for you to take an opioid.  This is a drug for bad pain.  It helps control pain without causing bleeding and kidney problems.  Common opioid names are morphine, hydromorphone, oxycodone, and methadone. These drugs are called narcotics.    There are several safety concerns you need to know.     · It is  against the law to give or sell this drug to another person.  You must keep this medicine safely locked.    · You may have side effects from taking this medication.  These include nausea, itching, sweating, sleepiness, a change in your ability to breathe, and depression.  · Do not take alcohol or sleeping pills opioids.    · Long-term opoid use may no longer giver you relief from pain.  It can cause you stomach pain, mental anxiety, and headaches.  Long-term opoid use can potentially lead to unlawful street drug abuse and reduce your ability to stay employed.    · Your body may become opioid tolerant if you need to take more to get relief.    · You must stop taking opioids if you begin having more pain as a result of the medicine.    · Opioid withdrawal occurs when you have to stop taking the drug.  It can cause you to have nausea, vomiting, diarrhea, stomach pain, anxiety, and dilated pupils in your eyes. This condition means you are opioid dependent.    · Addiction is a drug induced brain disease. It means there are changes in how your brain is working.  Children, teens, and young adults under 25 years old are more likely to get addicted to opioids.      · Addiction can happen with repeated opioid use.  It does not happen with short-term use of two weeks or less.       For more information, please speak with your doctor or pharmacist.      We hope your stay was comfortable as you heal now, mend and rest.    For we have enjoyed taking care of you by giving your our best.    And as you get better, by regaining your health and strength;   We count it as a privilege to have served you and hope your time at [facility] was well spent.    Thank  You!!!      Shock Wave Lithotripsy  Passing a kidney stone can be very painful. Shock wave lithotripsy is a treatment that helps by breaking the kidney stone into smaller pieces that are easier to pass. This treatment is also called extracorporeal shock wave lithotripsy (ESWL).  Lithotripsy takes about an hour. Its done in a hospital, lithotripsy center, or mobile lithotripsy van. You will likely go home the same day. This treatment is not used for all types of kidney stones. Your healthcare provider will discuss whether this is the right treatment for the type of stone you have.      Energy waves strike the stone, which begins to crack. The stone crumbles into tiny pieces.   During the procedure  · You get medicine to prevent pain and help you relax or sleep during lithotripsy. Once this takes effect, the procedure will start.  · A stent (flexible tube with holes in it) may be placed into your ureter (the tube that connects the kidney and the bladder). This helps keep urine flowing from the kidney.  · Your healthcare provider then uses X-ray or ultrasound to find the exact location of the kidney stone.  · Sound waves are aimed at the stone and sent at high speed. If youre awake, you may feel a tapping as they pass through your body.  After the procedure  · Youll be monitored in a recovery room for about 1 hour to 3 hours. Antibiotics and pain medicine may be prescribed before you leave.  · Youll have a follow-up visit in a few weeks. If you received a stent, it will be removed. Your doctor will also check for pieces of stone. If large pieces remain, you may need a second lithotripsy or another procedure.  Possible risks and complications  · Infection  · Bleeding in the kidney  · Bruising of the kidney or skin  · Obstruction (blockage) of the ureter  · Failure to break up the stone (other procedures may be needed)   Passing the stone  It can take a day to several weeks for the pieces of stone to leave your body. Drink plenty of liquids to help flush your system. During this time:  · Your urine may be cloudy or slightly bloody. You may even see small pieces of stone.  · You may have a slight fever and some pain. Take prescribed or over-the-counter pain medication as instructed by your  "healthcare provider.  · You may be asked to strain your urine to collect some stone particles. These will be studied in the lab.  When to call your healthcare provider   Contact your healthcare provider right away if you have any of the following:  · Fever of 100.4°F (38°C) or higher, or as directed by your healthcare provider  · Heavy bleeding  · Pain that doesnt go away with medication  · Upset stomach and vomiting  · Problems urinating   Date Last Reviewed: 11/26/2014 © 2000-2016 [employer]. [address] [place] [address]. All rights reserved. This information is not intended as a substitute for professional medical care. Always follow your healthcare professional's instructions.            Primary Diagnosis     Your primary diagnosis was:  Urinary Tract Stone      Admission Information     Date & Time Provider Department CSN    4/25/2017 10:50 AM Cristobal Noel MD [facility] [id number]      Care Providers     Provider Role Specialty Primary office phone    Cristobal Noel MD Attending Provider Urology [phone]    Cristobal Noel MD Surgeon  Urology [phone]      Your Vitals Were     BP Pulse Temp Resp Height Weight    128/86 60 97.9 °F (36.6 °C) (Temporal) 18 5' 10.5" (1.791 m) 99.8 kg (220 lb)    SpO2 BMI             95% 31.12 kg/m2         Recent Lab Values        9/16/2011 4/28/2012 12/7/2012 4/24/2013 1/25/2014 9/5/2014 5/21/2015 6/15/2016      8:55 AM  8:24 AM  7:59 AM  4:46 PM  9:01 AM  2:19 PM  7:28 AM  9:26 AM    A1C 5.5 5.7 5.8 5.8 5.9 5.9 5.9 6.7 (H)      Allergies as of 4/25/2017        Reactions    No Known Drug Allergies       Advance Directives     An advance directive is a document which, in the event you are no longer able to make decisions for yourself, tells your healthcare team what kind of treatment you do or do not want to receive, or who you would like to make those decisions for you.  If you do not currently have an advance " directive, [facility] encourages you to create one.  For more information call:  [phone]), [phone] ([phone]),  or log on to [email or web address].        Smoking Cessation     If you would like to quit smoking:   You may be eligible for free services if you are a [place] resident and started smoking cigarettes before September 1, 1988.  Call the Smoking Cessation Trust (SCT) toll free at (384) 112-9989 or (921) 067-0028.   Call 6-694-QUIT-NOW if you do not meet the above criteria.   Contact us via email: [email or web address]   View our website for more information: [email or web address]        Language Assistance Services     ATTENTION: Language assistance services are available, free of charge. Please call [phone].      ATENCIÓN: Si habla español, tiene a linton disposición servicios gratuitos de asistencia lingüística. Llame al [phone].     CHÚ Ý: N?u b?n nói Ti?ng Vi?t, có các d?ch v? h? tr? ngôn ng? mi?n phí dành cho b?n. G?i s? [phone].        Diabetes Discharge Instructions                                    [facility] complies with applicable Federal civil rights laws and does not discriminate on the basis of race, color, national origin, age, disability, or sex.

## 2017-04-25 NOTE — BRIEF OP NOTE
Brief Operative Note     Surgery Date: 4/25/2017    Surgeon:   Cristobal Noel MD     Pre-op Diagnosis:  Left ureteral stone [N20.1]  Left ureteral stone [N20.1]    Post-op Diagnosis:  Same    Procedure:  Procedure(s) (LRB):  LITHOTRIPSY-EXTRACORPOREAL SHOCK WAVE (Left)  CYSTOSCOPY WITH STENT REMOVAL (Left)    Anesthesia: Local MAC    Findings/Key Components:  See Op Report    Estimated Blood Loss: Minimal                                                                                                                           Discharge Summary    Admit Date: 4/25/2017     Attending Physician: Cristobal Noel MD     Discharge Physician: Cristobal Noel MD      Discharge Date: 4/25/2017    Treatments/Procedures: Procedure(s) (LRB):  LITHOTRIPSY-EXTRACORPOREAL SHOCK WAVE (Left)  CYSTOSCOPY WITH STENT REMOVAL (Left)  Final Diagnosis:Left ureteral/kidney stone  Hospital Course: Left ESWL and removal of Left JJ stent done  F. U. Dr. Noel 2 weeks    Disposition: Home or Self Care     Patient Instructions:     Current Discharge Medication List:         Toni Hyde   Home Medication Instructions NIESHA:[id number]    Printed on:04/25/17 8453   Medication Information                      amlodipine (NORVASC) 2.5 MG tablet  Take 1 tablet (2.5 mg total) by mouth once daily.             benazepril (LOTENSIN) 40 MG tablet  Take 1 tablet (40 mg total) by mouth once daily.             cephALEXin (KEFLEX) 500 MG capsule  Take 1 capsule (500 mg total) by mouth every 12 (twelve) hours.             cetirizine (ZYRTEC) 10 MG tablet  Take 10 mg by mouth daily as needed. 1 Tablet(s) Oral PRN .             fluticasone (FLONASE) 50 mcg/actuation nasal spray  2 sprays by Each Nare route once daily.             ibuprofen (ADVIL) 200 MG tablet  Take 200 mg by mouth every 6 (six) hours as needed. 2 Tablet(s) Oral PRN Every day.             metformin (GLUCOPHAGE-XR) 750 MG 24 hr tablet  Take 1 tablet (750 mg total) by  mouth daily with breakfast.             pravastatin (PRAVACHOL) 80 MG tablet  Take 1 tablet (80 mg total) by mouth once daily.             tramadol-acetaminophen 37.5-325 mg (ULTRACET) 37.5-325 mg Tab  Take 1 tablet by mouth every 6 (six) hours as needed.                     Current Discharge Medication List      START taking these medications    Details   cephALEXin (KEFLEX) 500 MG capsule Take 1 capsule (500 mg total) by mouth every 12 (twelve) hours.  Qty: 10 capsule, Refills: 0      tramadol-acetaminophen 37.5-325 mg (ULTRACET) 37.5-325 mg Tab Take 1 tablet by mouth every 6 (six) hours as needed.  Qty: 30 tablet, Refills: 0         CONTINUE these medications which have NOT CHANGED    Details   amlodipine (NORVASC) 2.5 MG tablet Take 1 tablet (2.5 mg total) by mouth once daily.  Qty: 90 tablet, Refills: 3    Associated Diagnoses: Essential hypertension      benazepril (LOTENSIN) 40 MG tablet Take 1 tablet (40 mg total) by mouth once daily.  Qty: 90 tablet, Refills: 3    Associated Diagnoses: Essential hypertension      cetirizine (ZYRTEC) 10 MG tablet Take 10 mg by mouth daily as needed. 1 Tablet(s) Oral PRN .      fluticasone (FLONASE) 50 mcg/actuation nasal spray 2 sprays by Each Nare route once daily.      metformin (GLUCOPHAGE-XR) 750 MG 24 hr tablet Take 1 tablet (750 mg total) by mouth daily with breakfast.  Qty: 90 tablet, Refills: 3    Associated Diagnoses: Diabetes type 2, controlled      pravastatin (PRAVACHOL) 80 MG tablet Take 1 tablet (80 mg total) by mouth once daily.  Qty: 90 tablet, Refills: 3    Associated Diagnoses: Hyperlipidemia      ibuprofen (ADVIL) 200 MG tablet Take 200 mg by mouth every 6 (six) hours as needed. 2 Tablet(s) Oral PRN Every day.             Discharge Procedure Orders:      Discharge Procedure Orders  Diet general     Activity as tolerated

## 2017-04-25 NOTE — PLAN OF CARE
Patient voided 100 cc of cranberry urine with large stone retrieved and saved in container, all  Valuables returned, IV removed, and both stated understanding of all instructions and signs and symptoms to report. Both stated understanding of all instructions and stated readiness for discharge

## 2017-04-25 NOTE — H&P
CHIEF COMPLAINT: Left ureteral stone.     Mr. Hyde is a 61-year-old male who was found to have highly obstructed 1 cm   upper third left ureteral stone and underwent a cystoscopy with retrograde   pyelogram and stent placement on 04/12/2017. The patient is here for further   disposition on how the stone will be treated. It is to be noted that the   patient's KUB film today shows a faint calcification close to a transverse   process, just approximately an inch below the ureteropelvic junction.     I discussed with the patient the two options that we have, a ureteroscopy with   laser lithotripsy or an extracorporeal shockwave lithotripsy. The patient after  discussing the pros and cons of each procedure, he elected to undergo   extracorporeal shockwave lithotripsy with the stent removal at the same time   with the hope that that will be the treatment needed. I explained to the   patient that the success rate at that level is approximately 70%. All the   questions were answered to his satisfaction. He went ahead and scheduled   lithotripsy for May 25th on the left side with the stent removal. The orders   were given, the consent was obtained and he left the office in satisfactory   condition.     Today, urinalysis shows a trace of leukocyte, negative nitrites, positive   protein, positive glucose and 2+ blood. The remaining of the medical and   surgical history, the current medications and allergies are well documented in   the medical record and they were reviewed during this visit by me.          Review of Systems   Constitutional: Negative for activity change and appetite change.   HENT: Negative.   Eyes: Negative for discharge.   Respiratory: Negative for cough and shortness of breath.   Cardiovascular: Negative for chest pain and palpitations.   Gastrointestinal: Negative for abdominal distention, abdominal pain, constipation and vomiting.   Genitourinary: Positive for frequency and urgency. Negative for  discharge, dysuria, flank pain, hematuria and testicular pain.   Musculoskeletal: Negative for arthralgias.   Skin: Negative for rash.   Neurological: Negative for dizziness.   Psychiatric/Behavioral: The patient is not nervous/anxious.       Objective:      Physical Exam   Constitutional: He appears well-developed and well-nourished.   HENT:   Head: Normocephalic.   Eyes: Pupils are equal, round, and reactive to light.   Neck: Normal range of motion.   Cardiovascular: Normal rate.   Pulmonary/Chest: Effort normal.   Abdominal: Soft. He exhibits no distension and no mass. There is no tenderness.   Genitourinary: Rectum normal, prostate normal and penis normal. Right testis shows no mass and no tenderness. Left testis shows no mass and no tenderness. No discharge found.   Musculoskeletal: Normal range of motion.   Neurological: He is alert.   Skin: Skin is warm.     Psychiatric: He has a normal mood and affect.       Assessment:       1. Left ureteral stone        Plan:       Left ureteral stone  LEFT ESWL and stent removal

## 2017-04-25 NOTE — ANESTHESIA POSTPROCEDURE EVALUATION
"Anesthesia Post Evaluation    Patient: Toni Hyde    Procedure(s) Performed: Procedure(s) (LRB):  LITHOTRIPSY-EXTRACORPOREAL SHOCK WAVE (Left)  CYSTOSCOPY WITH STENT REMOVAL (Left)    Final Anesthesia Type: general  Patient location during evaluation: PACU  Patient participation: Yes- Able to Participate  Level of consciousness: awake and alert  Post-procedure vital signs: reviewed and stable  Pain management: adequate  Airway patency: patent  PONV status at discharge: No PONV  Anesthetic complications: no      Cardiovascular status: hemodynamically stable  Respiratory status: unassisted and room air  Hydration status: euvolemic  Follow-up not needed.        Visit Vitals    /82    Pulse (!) 56    Temp 36.6 °C (97.9 °F) (Temporal)    Resp 20    Ht 5' 10.5" (1.791 m)    Wt 99.8 kg (220 lb)    SpO2 96%    BMI 31.12 kg/m2       Pain/Mallika Score: Pain Assessment Performed: Yes (4/25/2017  1:35 PM)  Presence of Pain: denies (4/25/2017  1:35 PM)  Mallika Score: 10 (4/25/2017  1:35 PM)      "

## 2017-04-25 NOTE — OP NOTE
Operative  Note    DATE OF PROCEDURE: 4/25/2017    SURGEON: Cristobal Noel M.D.     PREOPERATIVE DIAGNOSIS: Urolithiasis    POSTOPERATIVE DIAGNOSIS: same     PROCEDURE: Extracorporeal shockwave lithotripsy    ANESTHESIA: General     COMPLICATIONS: None.     ESTIMATED BLOOD LOSS: Minimal.     SPECIMENS: None.     DRAINS: None.     INDICATIONS:  Toni Hyde is a  61 y.o.  male with urolithiasis and he is here for treatment of that condition. A decision was made to go ahead and   proceed with operative intervention. Risks and complications including rate of   recurrence discussed in advance.     PROCEDURE IN DETAIL:    The patient was brought to the lithotripsy treatment room and placed on the g4interactive Leigh treatment table where the patient'sLeftflank was over the water bag. After adequate induction of general anesthesia, plain and AP fluoroscopic images were obtained of the Leftrenal area and 11 mm calculus was visualized over the level of the Left kidney.The calculus was positioned at the F2 point. Shock waves were administered at the rate of 120 shocks per minute using the automatic pacing mechanism. This was initially started at 16 kV and then increased to 26 kV and this was tolerated well throughout the procedure. Periodic repeat fluoroscopic images both AP and oblique were obtained of the Left kidney and when necessary, repositioning was carried out. The repeat fluoroscopic images did reveal satisfactory fragmentation of the calculus. A total of 3000 shocks were administered. Having tolerated the procedure well he was transferred to the recovery room in stable condition.  After this procedure was completed a cystoscopy and stent removal was done    CYSTOSCOPY REPORT    Surgery Date:  4/25/2017  Surgeon(s) and Role:     * Cristobal Noel MD - Primary      Toni Quinn  Barrett  : 1955    Pre Procedure Diagnosis:Left ureteral/kidney stone. SP JJ stent placement    OPERATION: CYSTOSCOPY     ANESTHESIA: 10 cc 2% lidocaine jelly applied per urethra.      PROCEDURE:  The patient was taken to the cystoscopy suite and placed in supine position.  The genitalia was prepped and draped  in the usual sterile fashion.  Two percent lidocaine jelly was inserted in the urethra.  After sufficent time had passed to allow good local anesthesia, the cystoscope was inserted in the urethra and passed into the bladder visualizing the urethra along its entire course.  The dome, anterior, posterior and lateral walls were examined systematically.  The ureteral orifices were in their usual position and configuration. The cystoscope was then brought to the level of the bladder neck, the water was turned on and the prostate was visualized.  The cystoscope was removed and the patient was instructed to urinate prior to leaving the office.  SPECIMEN:none    FINDINGS:  URETHRA: open with no strictures  PROSTATE: 4 cm with mild PEREIRA   TRABEC: grade 2  BLADDER: no lesions or stones , stent seen from left ureter.  URETERAL ORIFICES : Left with stent: grasped and removed. Right in nssp.   OTHER FINDINGS: none    Procedure medication: Lidocaine gel in the urethra  Post Procedure Diagnosis: Left ureteral/kidney stone     ASSESSMENT/PLAN:  Status post  cystoscopy.  1. Push fluids for 24 hours.  2. May see blood in the urine, this should gradually improve over the next 2-3 days.  3. The patient was instructed to return to the office or go to the emergency should fever, chills, cloudy urine, or inability to urinate develop.  4.  PLAN: follow up in 32 weeks

## 2017-04-25 NOTE — ANESTHESIA PREPROCEDURE EVALUATION
04/25/2017  Toni Hyde is a 61 y.o., male.    Anesthesia Evaluation    I have reviewed the Patient Summary Reports.    I have reviewed the Nursing Notes.      Review of Systems  Anesthesia Hx:  No problems with previous Anesthesia    Social:  Former Smoker    Hematology/Oncology:  Hematology Normal   Oncology Normal     EENT/Dental:EENT/Dental Normal   Cardiovascular:   Hypertension, well controlled hyperlipidemia    Pulmonary:  Pulmonary Normal    Renal/:   Chronic Renal Disease renal calculi    Endocrine:   Diabetes, type 2        Physical Exam  General:  Obesity    Airway/Jaw/Neck:  Airway Findings: Mouth Opening: Normal Tongue: Normal  General Airway Assessment: Adult, Possible difficult intubation  Mallampati: III  Jaw/Neck Findings:  Micrognathia  Neck Findings: Normal    Eyes/Ears/Nose:  EYES/EARS/NOSE FINDINGS: Normal   Dental:  DENTAL FINDINGS: Normal   Chest/Lungs:  Chest/Lungs Findings: Clear to auscultation, Normal Respiratory Rate     Heart/Vascular:  Heart Findings: Rate: Normal  Rhythm: Regular Rhythm  Sounds: Normal  Heart murmur: negative Vascular Findings: Normal    Abdomen:  Abdomen Findings: Normal    Musculoskeletal:  Musculoskeletal Findings: Normal   Skin:  Skin Findings: Normal    Mental Status:  Mental Status Findings: Normal        Anesthesia Plan  Type of Anesthesia, risks & benefits discussed:  Anesthesia Type:  general  Patient's Preference:   Intra-op Monitoring Plan:   Intra-op Monitoring Plan Comments:   Post Op Pain Control Plan:   Post Op Pain Control Plan Comments:   Induction:   IV  Beta Blocker:  Patient is not currently on a Beta-Blocker (No further documentation required).       Informed Consent: Patient understands risks and agrees with Anesthesia plan.  Questions answered. Anesthesia consent signed with patient.  ASA Score: 2     Day of Surgery Review of  History & Physical:    H&P update referred to the surgeon.         Ready For Surgery From Anesthesia Perspective.

## 2017-04-26 VITALS
WEIGHT: 220 LBS | DIASTOLIC BLOOD PRESSURE: 87 MMHG | OXYGEN SATURATION: 98 % | TEMPERATURE: 98 F | BODY MASS INDEX: 30.8 KG/M2 | HEIGHT: 71 IN | HEART RATE: 57 BPM | RESPIRATION RATE: 20 BRPM | SYSTOLIC BLOOD PRESSURE: 134 MMHG

## 2017-05-04 ENCOUNTER — TELEPHONE (OUTPATIENT)
Dept: UROLOGY | Facility: CLINIC | Age: 62
End: 2017-05-04

## 2017-05-04 DIAGNOSIS — N20.0 KIDNEY STONES: Primary | ICD-10-CM

## 2017-05-04 NOTE — TELEPHONE ENCOUNTER
----- Message from Emmy Garcia sent at 5/4/2017  8:03 AM CDT -----  Patient is returning Genet's call in regards to a xray please shereen [phone] (home)

## 2017-05-04 NOTE — TELEPHONE ENCOUNTER
Patient advised that he will need to get a KUB prior to his follow up.  He will bring films with him to appt.

## 2017-05-10 ENCOUNTER — HOSPITAL ENCOUNTER (OUTPATIENT)
Dept: RADIOLOGY | Facility: HOSPITAL | Age: 62
Discharge: HOME | End: 2017-05-10
Attending: UROLOGY
Payer: OTHER GOVERNMENT

## 2017-05-10 ENCOUNTER — OFFICE VISIT (OUTPATIENT)
Dept: UROLOGY | Facility: CLINIC | Age: 62
End: 2017-05-10
Payer: OTHER GOVERNMENT

## 2017-05-10 VITALS
HEIGHT: 71 IN | DIASTOLIC BLOOD PRESSURE: 83 MMHG | WEIGHT: 218 LBS | BODY MASS INDEX: 30.52 KG/M2 | TEMPERATURE: 98 F | HEART RATE: 71 BPM | SYSTOLIC BLOOD PRESSURE: 117 MMHG

## 2017-05-10 DIAGNOSIS — N20.0 KIDNEY STONE: Primary | ICD-10-CM

## 2017-05-10 DIAGNOSIS — N20.0 KIDNEY STONES: ICD-10-CM

## 2017-05-10 DIAGNOSIS — R35.1 NOCTURIA: ICD-10-CM

## 2017-05-10 LAB
BILIRUB SERPL-MCNC: ABNORMAL MG/DL
BLOOD URINE, POC: ABNORMAL
COLOR, POC UA: ABNORMAL
GLUCOSE UR QL STRIP: ABNORMAL
KETONES UR QL STRIP: ABNORMAL
LEUKOCYTE ESTERASE URINE, POC: ABNORMAL
NITRITE, POC UA: ABNORMAL
PH, POC UA: 6
POC RESIDUAL URINE VOLUME: 81 ML (ref 0–100)
PROTEIN, POC: ABNORMAL
SPECIFIC GRAVITY, POC UA: 1.01
UROBILINOGEN, POC UA: ABNORMAL

## 2017-05-10 PROCEDURE — 81002 URINALYSIS NONAUTO W/O SCOPE: CPT | Mod: PBBFAC,PO | Performed by: UROLOGY

## 2017-05-10 PROCEDURE — 99999 PR PBB SHADOW E&M-EST. PATIENT-LVL III: CPT | Mod: PBBFAC,,, | Performed by: UROLOGY

## 2017-05-10 PROCEDURE — 74000 XR ABDOMEN AP 1 VIEW: CPT | Mod: 26,,, | Performed by: RADIOLOGY

## 2017-05-10 PROCEDURE — 74000 XR ABDOMEN AP 1 VIEW: CPT | Mod: TC

## 2017-05-10 PROCEDURE — 51798 US URINE CAPACITY MEASURE: CPT | Mod: PBBFAC,PO | Performed by: UROLOGY

## 2017-05-10 PROCEDURE — 82370 X-RAY ASSAY CALCULUS: CPT

## 2017-05-10 PROCEDURE — 99024 POSTOP FOLLOW-UP VISIT: CPT | Mod: ,,, | Performed by: UROLOGY

## 2017-05-10 PROCEDURE — 99213 OFFICE O/P EST LOW 20 MIN: CPT | Mod: PBBFAC,25,PO | Performed by: UROLOGY

## 2017-05-10 NOTE — MR AVS SNAPSHOT
[facility]  [address]  [place] [address]  Phone: [phone]                  Toni Hyde   5/10/2017 10:30 AM   Office Visit    Description:  Male : 1955   Provider:  Cristobal Noel MD   Department:  [facility]           Reason for Visit     Post-op Evaluation           Diagnoses this Visit        Comments    Kidney stone    -  Primary     Nocturia                To Do List           Future Appointments        Provider Department Dept Phone    5/10/2017 11:00 AM [facility] XR2 [facility] [phone]    2017 8:00 AM MD [place] [facility] [phone]      Goals (5 Years of Data)     None      [facility]     [facility] Nurse Care Line -  Assistance  Unless otherwise directed by your provider, please contact [facility], our nurse care line that is available for  assistance.     Registered nurses in the [facility] provide: appointment scheduling, clinical advisement, health education, and other advisory services.  Call: [phone] (toll free)               Medications           Message regarding Medications     Verify the changes and/or additions to your medication regime listed below are the same as discussed with your clinician today.  If any of these changes or additions are incorrect, please notify your healthcare provider.        STOP taking these medications     tramadol-acetaminophen 37.5-325 mg (ULTRACET) 37.5-325 mg Tab Take 1 tablet by mouth every 6 (six) hours as needed.           Verify that the below list of medications is an accurate representation of the medications you are currently taking.  If none reported, the list may be blank. If incorrect, please contact your healthcare provider. Carry this list with you in case of emergency.           Current Medications     amlodipine (NORVASC) 2.5 MG tablet Take 1 tablet (2.5 mg total) by mouth once daily.     "benazepril (LOTENSIN) 40 MG tablet Take 1 tablet (40 mg total) by mouth once daily.    cetirizine (ZYRTEC) 10 MG tablet Take 10 mg by mouth daily as needed. 1 Tablet(s) Oral PRN .    fluticasone (FLONASE) 50 mcg/actuation nasal spray 2 sprays by Each Nare route once daily.    ibuprofen (ADVIL) 200 MG tablet Take 200 mg by mouth every 6 (six) hours as needed. 2 Tablet(s) Oral PRN Every day.    metformin (GLUCOPHAGE-XR) 750 MG 24 hr tablet Take 1 tablet (750 mg total) by mouth daily with breakfast.    pravastatin (PRAVACHOL) 80 MG tablet Take 1 tablet (80 mg total) by mouth once daily.           Clinical Reference Information           Your Vitals Were     BP Pulse Temp Height Weight BMI    117/83 (BP Location: Right arm, Patient Position: Sitting, BP Method: Automatic) 71 98.3 °F (36.8 °C) (Oral) 5' 11" (1.803 m) 98.9 kg (218 lb) 30.4 kg/m2      Blood Pressure          Most Recent Value    BP  117/83      Allergies as of 5/10/2017     No Known Drug Allergies      Immunizations Administered on Date of Encounter - 5/10/2017     None      Orders Placed During Today's Visit      Normal Orders This Visit    POCT Bladder Scan     POCT URINE DIPSTICK WITHOUT MICROSCOPE     Future Labs/Procedures Expected by Expires    Prostate Specific Antigen, Diagnostic  5/31/2017 5/11/2018         5/10/2017 10:39 AM - Genet Delgado MA      Component Results     Component    Color, UA    yellow clear    Spec Grav UA    1.010    pH, UA    6    WBC, UA    n    Nitrite, UA    n    Protein    n    Glucose, UA    4+    Ketones, UA    n    Urobilinogen, UA    n    Bilirubin    n    Blood, UA    trace         5/10/2017 10:50 AM - Genet Delgado MA      Component Results     Component Value Flag Ref Range Units Status    POC Residual Urine Volume 81  0 - 100 mL Final            Language Assistance Services     ATTENTION: Language assistance services are available, free of charge. Please call [phone].      ATENCIÓN: lucero Jensen " linton disposición servicios gratuitos de asistencia lingüística. Llkevin al [phone].     AIDEN Ý: N?u b?n nói Ti?ng Vi?t, có các d?ch v? h? tr? ngôn ng? mi?n phí dành cho b?n. G?i s? [phone].         [facility] complies with applicable Federal civil rights laws and does not discriminate on the basis of race, color, national origin, age, disability, or sex.

## 2017-05-10 NOTE — PROGRESS NOTES
OFFICE NOTE    CHIEF COMPLAINT:  Postoperative evaluation after left ESWL on 04/25/2017.    Mr. Hyde is a 61-year-old male who underwent a left ESWL with cystoscopy and   stent removal on 04/25/2017.  The patient refers that he did very well after   surgery and he passed large fragment stones with other multiple smaller stones.    Since then, the flank pain has resolved.  He is urinating with a good flow and   a good control.  The patient refers to have mild low urinary tract symptoms   after the stone passage with nocturia x3 to 4.  Before the procedure, he has   mild symptoms, but the symptoms have become a little worse since passage of   these stone fragments.  Denies dysuria and denies gross hematuria.  Denies any   fever or flank pain.    The urinalysis today shows positive glucose, slight trace of blood, otherwise is   negative.  It is to be noted that on 06/15/2016, his PSA was 0.62.    I explained to the patient that what we need to do is send the stone fragments   for analysis and also order a 24-hour urine [facility] laboratory chemistry   analysis and he agreed to proceed with that.  We are going to meet here in six   weeks, he is going to repeat his PSA before the next appointment at that point,   we are going to determine what stone prevention measures we can take with the   hope to prevent further kidney stone formation.  All the questions were answered   at his satisfaction and he left the office in satisfactory condition.      YARED  dd: 05/10/2017 12:56:02 (CDT)  td: 05/10/2017 23:53:58 (CDT)  Doc ID   #[id number]  Job ID #[id number]    CC:

## 2017-05-12 ENCOUNTER — PATIENT MESSAGE (OUTPATIENT)
Dept: FAMILY MEDICINE | Facility: CLINIC | Age: 62
End: 2017-05-12

## 2017-05-12 DIAGNOSIS — E11.9 TYPE 2 DIABETES MELLITUS WITHOUT COMPLICATION, WITHOUT LONG-TERM CURRENT USE OF INSULIN: ICD-10-CM

## 2017-05-12 DIAGNOSIS — E78.5 HYPERLIPIDEMIA, UNSPECIFIED HYPERLIPIDEMIA TYPE: ICD-10-CM

## 2017-05-12 DIAGNOSIS — I10 ESSENTIAL HYPERTENSION: Primary | ICD-10-CM

## 2017-05-18 ENCOUNTER — PATIENT MESSAGE (OUTPATIENT)
Dept: FAMILY MEDICINE | Facility: CLINIC | Age: 62
End: 2017-05-18

## 2017-05-18 ENCOUNTER — LAB VISIT (OUTPATIENT)
Dept: LAB | Facility: HOSPITAL | Age: 62
End: 2017-05-18
Attending: FAMILY MEDICINE
Payer: OTHER GOVERNMENT

## 2017-05-18 DIAGNOSIS — E78.5 HYPERLIPIDEMIA, UNSPECIFIED HYPERLIPIDEMIA TYPE: ICD-10-CM

## 2017-05-18 DIAGNOSIS — I10 ESSENTIAL HYPERTENSION: ICD-10-CM

## 2017-05-18 DIAGNOSIS — R35.1 NOCTURIA: ICD-10-CM

## 2017-05-18 DIAGNOSIS — E11.9 TYPE 2 DIABETES MELLITUS WITHOUT COMPLICATION, WITHOUT LONG-TERM CURRENT USE OF INSULIN: ICD-10-CM

## 2017-05-18 LAB
ALBUMIN SERPL BCP-MCNC: 3.9 G/DL
ALP SERPL-CCNC: 63 U/L
ALT SERPL W/O P-5'-P-CCNC: 58 U/L
ANION GAP SERPL CALC-SCNC: 7 MMOL/L
AST SERPL-CCNC: 37 U/L
BASOPHILS # BLD AUTO: 0.06 K/UL
BASOPHILS NFR BLD: 0.8 %
BILIRUB SERPL-MCNC: 0.9 MG/DL
BUN SERPL-MCNC: 18 MG/DL
CALCIUM SERPL-MCNC: 9.3 MG/DL
CHLORIDE SERPL-SCNC: 101 MMOL/L
CHOLEST/HDLC SERPL: 4.2 {RATIO}
CO2 SERPL-SCNC: 29 MMOL/L
COMPLEXED PSA SERPL-MCNC: 0.54 NG/ML
CREAT SERPL-MCNC: 1.4 MG/DL
DIFFERENTIAL METHOD: NORMAL
EOSINOPHIL # BLD AUTO: 0.3 K/UL
EOSINOPHIL NFR BLD: 3.6 %
ERYTHROCYTE [DISTWIDTH] IN BLOOD BY AUTOMATED COUNT: 12.4 %
EST. GFR  (AFRICAN AMERICAN): >60 ML/MIN/1.73 M^2
EST. GFR  (NON AFRICAN AMERICAN): 53.5 ML/MIN/1.73 M^2
GLUCOSE SERPL-MCNC: 275 MG/DL
HCT VFR BLD AUTO: 49.6 %
HDL/CHOLESTEROL RATIO: 23.9 %
HDLC SERPL-MCNC: 163 MG/DL
HDLC SERPL-MCNC: 39 MG/DL
HGB BLD-MCNC: 16.6 G/DL
LDLC SERPL CALC-MCNC: 104.4 MG/DL
LYMPHOCYTES # BLD AUTO: 1.9 K/UL
LYMPHOCYTES NFR BLD: 25.4 %
MCH RBC QN AUTO: 30.7 PG
MCHC RBC AUTO-ENTMCNC: 33.5 %
MCV RBC AUTO: 92 FL
MONOCYTES # BLD AUTO: 0.7 K/UL
MONOCYTES NFR BLD: 10 %
NEUTROPHILS # BLD AUTO: 4.4 K/UL
NEUTROPHILS NFR BLD: 59.8 %
NONHDLC SERPL-MCNC: 124 MG/DL
PLATELET # BLD AUTO: 176 K/UL
PMV BLD AUTO: 11.9 FL
POTASSIUM SERPL-SCNC: 4.1 MMOL/L
PROT SERPL-MCNC: 7.2 G/DL
RBC # BLD AUTO: 5.41 M/UL
SODIUM SERPL-SCNC: 137 MMOL/L
TRIGL SERPL-MCNC: 98 MG/DL
WBC # BLD AUTO: 7.41 K/UL

## 2017-05-18 PROCEDURE — 80061 LIPID PANEL: CPT

## 2017-05-18 PROCEDURE — 84153 ASSAY OF PSA TOTAL: CPT

## 2017-05-18 PROCEDURE — 85025 COMPLETE CBC W/AUTO DIFF WBC: CPT

## 2017-05-18 PROCEDURE — 83036 HEMOGLOBIN GLYCOSYLATED A1C: CPT

## 2017-05-18 PROCEDURE — 80053 COMPREHEN METABOLIC PANEL: CPT

## 2017-05-18 PROCEDURE — 36415 COLL VENOUS BLD VENIPUNCTURE: CPT | Mod: PO

## 2017-05-19 LAB
ESTIMATED AVG GLUCOSE: 255 MG/DL
HBA1C MFR BLD HPLC: 10.5 %
URINARY STONE ANALYSIS: NORMAL

## 2017-05-23 DIAGNOSIS — E78.5 HYPERLIPIDEMIA: ICD-10-CM

## 2017-05-23 RX ORDER — PRAVASTATIN SODIUM 80 MG/1
TABLET ORAL
Qty: 90 TABLET | Refills: 0 | Status: SHIPPED | OUTPATIENT
Start: 2017-05-23 | End: 2017-06-01 | Stop reason: SDUPTHER

## 2017-05-25 ENCOUNTER — DOCUMENTATION ONLY (OUTPATIENT)
Dept: FAMILY MEDICINE | Facility: CLINIC | Age: 62
End: 2017-05-25

## 2017-05-25 NOTE — PROGRESS NOTES
Pre-Visit Chart Review  For Appointment Scheduled on 6-1-17    Health Maintenance Due   Topic Date Due    TETANUS VACCINE  04/28/1973    Pneumococcal PPSV23 (Medium Risk) (1) 04/28/1973    Eye Exam  09/24/2014    Foot Exam  06/15/2017

## 2017-05-30 DIAGNOSIS — E11.9 TYPE 2 DIABETES, HBA1C GOAL < 7%: Primary | ICD-10-CM

## 2017-06-01 ENCOUNTER — OFFICE VISIT (OUTPATIENT)
Dept: FAMILY MEDICINE | Facility: CLINIC | Age: 62
End: 2017-06-01
Payer: OTHER GOVERNMENT

## 2017-06-01 VITALS
SYSTOLIC BLOOD PRESSURE: 107 MMHG | RESPIRATION RATE: 24 BRPM | BODY MASS INDEX: 30.47 KG/M2 | WEIGHT: 217.63 LBS | HEIGHT: 71 IN | DIASTOLIC BLOOD PRESSURE: 74 MMHG | TEMPERATURE: 98 F | HEART RATE: 78 BPM | OXYGEN SATURATION: 92 %

## 2017-06-01 DIAGNOSIS — I15.2 HYPERTENSION ASSOCIATED WITH DIABETES: ICD-10-CM

## 2017-06-01 DIAGNOSIS — Z23 IMMUNIZATION DUE: ICD-10-CM

## 2017-06-01 DIAGNOSIS — E11.9 CONTROLLED TYPE 2 DIABETES MELLITUS WITHOUT COMPLICATION, WITHOUT LONG-TERM CURRENT USE OF INSULIN: ICD-10-CM

## 2017-06-01 DIAGNOSIS — E11.69 HYPERLIPIDEMIA ASSOCIATED WITH TYPE 2 DIABETES MELLITUS: ICD-10-CM

## 2017-06-01 DIAGNOSIS — E11.59 HYPERTENSION ASSOCIATED WITH DIABETES: ICD-10-CM

## 2017-06-01 DIAGNOSIS — I10 ESSENTIAL HYPERTENSION: ICD-10-CM

## 2017-06-01 DIAGNOSIS — E78.5 HYPERLIPIDEMIA, UNSPECIFIED HYPERLIPIDEMIA TYPE: ICD-10-CM

## 2017-06-01 DIAGNOSIS — E78.5 HYPERLIPIDEMIA ASSOCIATED WITH TYPE 2 DIABETES MELLITUS: ICD-10-CM

## 2017-06-01 PROCEDURE — 99999 PR PBB SHADOW E&M-EST. PATIENT-LVL III: CPT | Mod: PBBFAC,,, | Performed by: FAMILY MEDICINE

## 2017-06-01 PROCEDURE — 99213 OFFICE O/P EST LOW 20 MIN: CPT | Mod: PBBFAC,PO | Performed by: FAMILY MEDICINE

## 2017-06-01 PROCEDURE — 99214 OFFICE O/P EST MOD 30 MIN: CPT | Mod: S$PBB,,, | Performed by: FAMILY MEDICINE

## 2017-06-01 PROCEDURE — 90732 PPSV23 VACC 2 YRS+ SUBQ/IM: CPT | Mod: PBBFAC,PO

## 2017-06-01 PROCEDURE — 90714 TD VACC NO PRESV 7 YRS+ IM: CPT | Mod: PBBFAC,PO

## 2017-06-01 RX ORDER — AMLODIPINE BESYLATE 2.5 MG/1
2.5 TABLET ORAL DAILY
Qty: 90 TABLET | Refills: 3 | Status: SHIPPED | OUTPATIENT
Start: 2017-06-01 | End: 2018-06-01 | Stop reason: SDUPTHER

## 2017-06-01 RX ORDER — PRAVASTATIN SODIUM 80 MG/1
80 TABLET ORAL DAILY
Qty: 90 TABLET | Refills: 3 | Status: SHIPPED | OUTPATIENT
Start: 2017-06-01 | End: 2018-05-18 | Stop reason: SDUPTHER

## 2017-06-01 RX ORDER — BENAZEPRIL HYDROCHLORIDE 40 MG/1
40 TABLET ORAL DAILY
Qty: 90 TABLET | Refills: 3 | Status: SHIPPED | OUTPATIENT
Start: 2017-06-01 | End: 2018-06-01 | Stop reason: SDUPTHER

## 2017-06-01 RX ORDER — METFORMIN HYDROCHLORIDE 750 MG/1
750 TABLET, EXTENDED RELEASE ORAL
Qty: 90 TABLET | Refills: 1 | Status: SHIPPED | OUTPATIENT
Start: 2017-06-01 | End: 2017-12-01 | Stop reason: SDUPTHER

## 2017-06-01 NOTE — PROGRESS NOTES
Subjective:       Patient ID: Toni Hyde is a 62 y.o. male.    Chief Complaint: Diabetes    62 year old male diabetic formerly well controlled and lost for follow up in the last year reappeared with an a1c over 10 and in for discussion.  He had gotten off his diet and exercise program and was drinking soft drinks regularly.  He was having polyuria, polydipsia, and fatigue.  Since getting the a1c results he is back on a good diet and exercise program, gave up the soft drinks, and his glucoses are down in the 140-150's from over 300.      Past Medical History:  No date: Cataract  No date: Cataract of left eye  No date: Diabetes mellitus type II      Comment: on po meds  No date: Hyperlipidemia      Comment: not on meds at present  No date: Hypertension  04/2017: Kidney stones  No date: Left ureteral stone  No date: Wears glasses    Past Surgical History:  No date: ADENOIDECTOMY  No date: CATARACT EXTRACTION  3/23/2010: COLONOSCOPY      Comment: Dr. Blackburn, hyperplastic polyps, otherwise                negative, 10 year recheck  04/12/2017: CYSTOSCOPY Left      Comment: with ureteral stent-Dr. Noel  05/06/2013: EYE SURGERY      Comment: Dr Nielsen  04/2017: kidney stent   No date: KNEE ARTHROSCOPY W/ DEBRIDEMENT      Comment: right with lateral release   04/19/2017: LITHOTRIPSY  No date: NASAL SINUS SURGERY  5/2013: retna surgery       Comment: Dr Peralta  No date: TONSILLECTOMY      Current Outpatient Prescriptions:     amlodipine (NORVASC) 2.5 MG tablet, Take 1 tablet (2.5 mg total) by mouth once daily., Disp: 90 tablet, Rfl: 3    benazepril (LOTENSIN) 40 MG tablet, Take 1 tablet (40 mg total) by mouth once daily., Disp: 90 tablet, Rfl: 3    cetirizine (ZYRTEC) 10 MG tablet, Take 10 mg by mouth daily as needed. 1 Tablet(s) Oral PRN ., Disp: , Rfl:     fluticasone (FLONASE) 50 mcg/actuation nasal spray, 2 sprays by Each Nare route once daily., Disp: , Rfl:     ibuprofen (ADVIL) 200 MG tablet, Take 200 mg by  mouth every 6 (six) hours as needed. 2 Tablet(s) Oral PRN Every day., Disp: , Rfl:     metformin (GLUCOPHAGE-XR) 750 MG 24 hr tablet, Take 1 tablet (750 mg total) by mouth daily with breakfast., Disp: 90 tablet, Rfl: 1    pravastatin (PRAVACHOL) 80 MG tablet, Take 1 tablet (80 mg total) by mouth once daily., Disp: 90 tablet, Rfl: 3    TETANUS VACCINE due on 04/28/1973  Pneumococcal PPSV23 (Medium Risk)(1) due on 04/28/1973  Eye Exam due on 09/24/2014  Foot Exam due on 06/15/2017        Diabetes   He has type 2 diabetes mellitus. No MedicAlert identification noted. The initial diagnosis of diabetes was made 7 years ago. Pertinent negatives for hypoglycemia include no confusion, dizziness, headaches, hunger, mood changes, nervousness/anxiousness, pallor, seizures, sleepiness, speech difficulty, sweats or tremors. Associated symptoms include foot paresthesias and polyuria. Pertinent negatives for diabetes include no blurred vision, no chest pain, no fatigue, no foot ulcerations, no polydipsia, no polyphagia, no visual change, no weakness and no weight loss. Pertinent negatives for hypoglycemia complications include no blackouts, no hospitalization, no nocturnal hypoglycemia, no required assistance and no required glucagon injection. Symptoms are improving. Pertinent negatives for diabetic complications include no autonomic neuropathy, CVA, heart disease, impotence, nephropathy, peripheral neuropathy, PVD or retinopathy. Risk factors for coronary artery disease include obesity, diabetes mellitus and male sex. Current diabetic treatment includes oral agent (monotherapy). He is compliant with treatment some of the time. His weight is increasing steadily. He is following a high fat/cholesterol diet. Meal planning includes calorie counting. He has not had a previous visit with a dietitian. He participates in exercise every other day. He monitors blood glucose at home 1-2 x per day. His home blood glucose trend is  increasing rapidly. He does not see a podiatrist.Eye exam is current.     Review of Systems   Constitutional: Negative for fatigue and weight loss.   Eyes: Negative for blurred vision.   Cardiovascular: Negative for chest pain.   Endocrine: Positive for polyuria. Negative for polydipsia and polyphagia.   Genitourinary: Negative for impotence.   Skin: Negative for pallor.   Neurological: Negative for dizziness, tremors, seizures, speech difficulty, weakness and headaches.   Psychiatric/Behavioral: Negative for confusion. The patient is not nervous/anxious.        Objective:      Physical Exam   Constitutional: He is oriented to person, place, and time. He appears well-developed. No distress.   Good blood pressure control  Patient is obese with bmi of 30.4.   Weight is increased by 1.6lb since last visit of 6/15/16.     Cardiovascular:   Pulses:       Dorsalis pedis pulses are 2+ on the right side, and 2+ on the left side.        Posterior tibial pulses are 2+ on the right side, and 2+ on the left side.   Musculoskeletal:        Right foot: There is normal range of motion and no deformity.        Left foot: There is normal range of motion and no deformity.   Feet:   Right Foot:   Protective Sensation: 10 sites tested. 10 sites sensed.   Skin Integrity: Negative for ulcer, blister, skin breakdown, erythema, warmth, callus or dry skin.   Left Foot:   Protective Sensation: 10 sites tested. 10 sites sensed.   Skin Integrity: Negative for ulcer, blister, skin breakdown, erythema, warmth, callus or dry skin.   Neurological: He is alert and oriented to person, place, and time.   Skin: He is not diaphoretic.   Psychiatric: He has a normal mood and affect. His behavior is normal. Judgment and thought content normal.   Nursing note and vitals reviewed.      Assessment:       1. Uncontrolled type 2 diabetes mellitus without complication, without long-term current use of insulin    2. Essential hypertension    3. Controlled type  2 diabetes mellitus without complication, without long-term current use of insulin    4. Hyperlipidemia, unspecified hyperlipidemia type    5. Immunization due    6. Hypertension associated with diabetes    7. Hyperlipidemia associated with type 2 diabetes mellitus    8. BMI 30.0-30.9,adult        Plan:       1. Uncontrolled type 2 diabetes mellitus without complication, without long-term current use of insulin  Continue diet and exercise program, recheck a1c in three months.  Add glipizide or glimepiride if needed then.  He declined diabetic educator  - Hemoglobin A1c; Future    2. Essential hypertension  No changes needed  - amlodipine (NORVASC) 2.5 MG tablet; Take 1 tablet (2.5 mg total) by mouth once daily.  Dispense: 90 tablet; Refill: 3  - benazepril (LOTENSIN) 40 MG tablet; Take 1 tablet (40 mg total) by mouth once daily.  Dispense: 90 tablet; Refill: 3    3. Controlled type 2 diabetes mellitus without complication, without long-term current use of insulin  - metformin (GLUCOPHAGE-XR) 750 MG 24 hr tablet; Take 1 tablet (750 mg total) by mouth daily with breakfast.  Dispense: 90 tablet; Refill: 1    4. Hyperlipidemia, unspecified hyperlipidemia type  Lab Results   Component Value Date    CHOL 163 05/18/2017    CHOL 172 06/15/2016    CHOL 188 05/21/2015     Lab Results   Component Value Date    HDL 39 (L) 05/18/2017    HDL 44 06/15/2016    HDL 48 05/21/2015     Lab Results   Component Value Date    LDLCALC 104.4 05/18/2017    LDLCALC 114.0 06/15/2016    LDLCALC 123.0 05/21/2015     Lab Results   Component Value Date    TRIG 98 05/18/2017    TRIG 70 06/15/2016    TRIG 85 05/21/2015     Lab Results   Component Value Date    CHOLHDL 23.9 05/18/2017    CHOLHDL 25.6 06/15/2016    CHOLHDL 25.5 05/21/2015       - pravastatin (PRAVACHOL) 80 MG tablet; Take 1 tablet (80 mg total) by mouth once daily.  Dispense: 90 tablet; Refill: 3    5. Immunization due  - Pneumococcal Polysaccharide Vaccine (23 Valent) (SQ/IM)  - Td  Vaccine- Preservative Free    6. Hypertension associated with diabetes      7. Hyperlipidemia associated with type 2 diabetes mellitus    8. BMI 30.0-30.9,adult         Patient readiness: acceptance and barriers:none    During the course of the visit the patient was educated and counseled about the following:     Diabetes:  Discussed general issues about diabetes pathophysiology and management.  Addressed ADA diet.  Encouraged aerobic exercise.  Discussed foot care.  Reminded to get yearly retinal exam.  Hypertension:   Medication: no change.  Dietary sodium restriction.  Regular aerobic exercise.  Obesity:   General weight loss/lifestyle modification strategies discussed (elicit support from others; identify saboteurs; non-food rewards, etc).  Informal exercise measures discussed, e.g. taking stairs instead of elevator.  Regular aerobic exercise program discussed.    Goals: Diabetes: Maintain Hemoglobin A1C below 7, Hypertension: Reduce Blood Pressure and Obesity: Reduce calorie intake and BMI    Did patient meet goals/outcomes: No    The following self management tools provided: blood pressure log  blood glucose log  excercise log    Patient Instructions (the written plan) was given to the patient/family.     Time spent with patient: 45 minutes

## 2017-06-01 NOTE — PATIENT INSTRUCTIONS
Walking for Fitness  Fitness walking has something for everyone, even people who are already fit. Walking is one of the safest ways to condition your body aerobically. It can boost energy, help you lose weight, and reduce stress.    Physical benefits  · Walking strengthens your heart and lungs, and tones your muscles.  · When walking, your feet land with less impact than in other sports. This reduces chances of muscle, bone, and joint injury.  · Regular walking improves your cholesterol levels and lowers your risk of heart disease. And it helps you control your blood sugar if you have diabetes.  · Walking is a weight-bearing activity, which helps maintain bone density. This can help prevent osteoporosis.  Personal rewards  · Taking walks can help you relax and manage stress. And fitness walking may make you feel better about yourself.  · Walking can help you sleep better at night and make you less likely to be depressed.  · Regular walking may help maintain your memory as you get older.  · Walking is a great way to spend extra time with friends and family members. Be sure to invite your dog along!  Q&A about fitness walking  Q: Will walking keep me fit?  A: Yes. Regular walking at the right pace gives you all the benefits of other aerobic activities, such as jogging and swimming.  Q: Will walking help me lose weight and keep it off?  A: Yes. Per mile, walking can burn as many calories as jogging. Your health care provider can help work walking into your weight-loss plan.  Q: Is walking safe for my health?  A: Yes. Walking is safe if you have high blood pressure, diabetes, heart disease, or other conditions. Talk to your health care provider before you start.  Date Last Reviewed: 5/9/2015  © 5882-6557 [employer]. [address] [place] [address], [place] [address]. All rights reserved. This information is not intended as a substitute for professional medical care. Always follow your healthcare professional's  instructions.        Weight Management: Getting Started  Healthy bodies come in all shapes and sizes. Not all bodies are made to be thin. For some people, a healthy weight is higher than the average weight listed on weight charts. Your healthcare provider can help you decide on a healthy weight for you.    Reasons to lose weight  Losing weight can help with some health problems, such as high blood pressure, heart disease, diabetes, sleep apnea, and arthritis. You may also feel more energy.  Set your long-term goal  Your goal doesn't even have to be a specific weight. You may decide on a fitness goal (such as being able to walk 10 miles a week), or a health goal (such as lowering your blood pressure). Choose a goal that is measurable and reasonable, so you know when you've reached it. A goal of reaching a BMI of less than 25 is not always reasonable (or possible).   Make an action plan  Habits dont change overnight. Setting your goals too high can leave you feeling discouraged if you cant reach them. Be realistic. Choose one or two small changes you can make now. Set an action plan for how you are going to make these changes. When you can stick to this plan, keep making a few more small changes. Taking small steps will help you stay on the path to success.  Track your progress  Write down your goals. Then, keep a daily record of your progress. Write down what you eat and how active you are. This record lets you look back on how much youve done. It may also help when youre feeling frustrated. Reward yourself for success. Even if you dont reach every goal, give yourself credit for what you do get done.  Get support  Encouragement from others can help make losing weight easier. Ask your family members and friends for support. They may even want to join you. Also look to your healthcare provider, registered dietitian, and  for help. Your local hospital can give you more information about  nutrition, exercise, and weight loss.  Date Last Reviewed: 1/31/2016 © 2000-2016 [employer]. [address] [place] [address], [place] [address]. All rights reserved. This information is not intended as a substitute for professional medical care. Always follow your healthcare professional's instructions.        Diabetes (General Information)  Diabetes is a long-term health problem. It means your body does not make enough insulin. Or it may mean that your body cannot use the insulin it makes. Insulin is a hormone in your body. It lets blood sugar (glucose) reach the cells in your body. All of your cells need glucose for fuel.  When you have diabetes, the glucose in your blood builds up because it cannot get into the cells. This buildup is called high blood sugar (hyperglycemia).  Your blood sugar level depends on several things. It depends on what kind of food you eat and how much of it you eat. It also depends on how much exercise you get, and how much insulin you have in your body. Eating too much of the wrong kinds of food or not taking diabetes medicine on time can cause high blood sugar. Infections can cause high blood sugar even if you are taking medicines correctly.  These things can also cause low blood sugar:  · Missing meals  · Not eating enough food  · Taking too much diabetes medicine  Diabetes can cause serious problems over time if you do not get treated. These problems include heart disease, stroke, kidney failure, and blindness. They also include nerve pain or loss of feeling in your legs and feet, and gangrene of the feet. By keeping your blood sugar under control you can prevent or delay these problems.  Normal blood sugar levels are 80 to 100 before a meal and less than 180 in the 1 to 2 hours after a meal.  Home care  Follow these guidelines when caring for yourself at home:  · Follow the diet your healthcare provider gives you. Take insulin or other diabetes medicine exactly as told  to.  · Watch your blood sugar as you are told to. Keep a log of your results. This will help your provider change your medicines to keep your blood sugar under control.  · Try to reach your ideal weight. You may be able to cut back on or not have to take diabetes medicine if you eat the right foods and get exercise.  · Do not smoke. Smoking worsens the effects of diabetes on your circulation. You are much more likely to have a heart attack if you have diabetes and you smoke.  · Take good care of your feet. If you have lost feeling in your feet, you may not see an injury or infection. Check your feet and between your toes at least once a week.  · Wear a medical alert bracelet or necklace, or carry a card in your wallet that says you have diabetes. This will help healthcare providers give you the right care if you get very ill and cannot tell them that you have diabetes.  Sick day plan  If you get a cold, the flu, or a bacterial or viral infection, take these steps:  · Look at your diabetes sick plan and call your healthcare provider as you were told to. You may need to call your provider right away if:  ¨ Your blood sugar is above 240 while taking your diabetes medicine  ¨ Your urine ketone levels are above normal or high  ¨ You have been vomiting more than 6 hours  ¨ You have trouble breathing or your breath ha s a fruity smell  ¨ You have a high fever  ¨ You have a fever for several days and you are not getting better  ¨ You get light-headed and are sleepier than usual  · Keep taking your diabetes pills (oral medicine) even if you have been vomiting and are feeling sick. Call your provider right away because you may need insulin to lower your blood sugar until you recover from your illness.  · Keep taking your insulin even if you have been vomiting and are feeling sick. Call your provider right away to ask if you need to change your insulin dose. This will depend on your blood sugar results.  · Check your blood  sugar every 2 to 4 hours, or at least 4 times a day.  · Check your ketones often. If you are vomiting and having diarrhea, watch them more often.  · Do not skip meals. Try to eat small meals on a regular schedule. Do this even if you do not feel like eating.  · Drink water or other liquids that do not have caffeine or calories. This will keep you from getting dehydrated. If you are nauseated or vomiting, takes small sips every 5 minutes. To prevent dehydration try to drink a cup (8 ounces) of fluids every hour while you are awake.  General care  Always bring a source of fast-acting sugar with you in case you have symptoms of low blood sugar (below 70). At the first sign of low blood sugar, eat or drink 15 to 20 grams of fast-acting sugar to raise your blood sugar. Examples are:  · 3 to 4 glucose tablets. You can buy these at most [employer].  · 4 ounces (1/2 cup) of regular (not diet) soft drinks  · 4 ounces (1/2 cup) of any fruit juice  · 8 ounces (1 cup) of milk  · 5 to 6 pieces of hard candy  · 1 tablespoon of honey  Check your blood sugar 15 minutes after treating yourself. If it is still below 70, take 15 to 20 more grams of fast-acting sugar. Test again in 15 minutes. If it returns to normal (70 or above), eat a snack or meal to keep your blood sugar in a safe range. If it stays low, call your doctor or go to an emergency room.  Follow-up care  Follow-up with your healthcare provider, or as advised. For more information about diabetes, visit the American Diabetes Association website at www.diabetes.org or call 380-380-6022.  When to seek medical advice  Call your healthcare provider right away if you have any of these symptoms of high blood sugar:  · Frequent urination  · Dizziness  · Drowsiness  · Thirst  · Headache  · Nausea or vomiting  · Abdominal pain  · Eyesight changes  · Fast breathing  · Confusion or loss of consciousness  Also call your provider right away if you have any of these signs of low blood  sugar:  · Fatigue  · Headache  · Shakes  · Excess sweating  · Hunger  · Feeling anxious or restless  · Eyesight changes  · Drowsiness  · Weakness  · Confusion or loss of consciousness  Call 911  Call for emergency help right away if any of these occur:  · Chest pain or shortness of breath  · Dizziness or fainting  · Weakness of an arm or leg or one side of the face  · Trouble speaking or seeing   Date Last Reviewed: 6/1/2016  © 4751-7558 [employer]. [address] [place] [address]. All rights reserved. This information is not intended as a substitute for professional medical care. Always follow your healthcare professional's instructions.

## 2017-06-20 ENCOUNTER — DOCUMENTATION ONLY (OUTPATIENT)
Dept: FAMILY MEDICINE | Facility: CLINIC | Age: 62
End: 2017-06-20

## 2017-06-20 NOTE — PROGRESS NOTES
Pre-Visit Chart Review  For Appointment Scheduled on 7-14-17    There are no preventive care reminders to display for this patient.

## 2017-06-22 ENCOUNTER — OFFICE VISIT (OUTPATIENT)
Dept: UROLOGY | Facility: CLINIC | Age: 62
End: 2017-06-22
Payer: OTHER GOVERNMENT

## 2017-06-22 ENCOUNTER — PATIENT MESSAGE (OUTPATIENT)
Dept: UROLOGY | Facility: CLINIC | Age: 62
End: 2017-06-22

## 2017-06-22 VITALS
BODY MASS INDEX: 29.82 KG/M2 | WEIGHT: 213 LBS | HEART RATE: 61 BPM | HEIGHT: 71 IN | TEMPERATURE: 98 F | SYSTOLIC BLOOD PRESSURE: 114 MMHG | DIASTOLIC BLOOD PRESSURE: 72 MMHG

## 2017-06-22 DIAGNOSIS — N20.0 KIDNEY STONES: Primary | ICD-10-CM

## 2017-06-22 PROCEDURE — 99999 PR PBB SHADOW E&M-EST. PATIENT-LVL III: CPT | Mod: PBBFAC,,, | Performed by: UROLOGY

## 2017-06-22 PROCEDURE — 99213 OFFICE O/P EST LOW 20 MIN: CPT | Mod: PBBFAC,PO | Performed by: UROLOGY

## 2017-06-22 PROCEDURE — 99024 POSTOP FOLLOW-UP VISIT: CPT | Mod: S$PBB,,, | Performed by: UROLOGY

## 2017-06-22 RX ORDER — POTASSIUM CITRATE 15 MEQ/1
1 TABLET, EXTENDED RELEASE ORAL DAILY
Qty: 90 TABLET | Refills: 3 | Status: SHIPPED | OUTPATIENT
Start: 2017-06-22 | End: 2017-06-23 | Stop reason: SDUPTHER

## 2017-06-22 RX ORDER — ALLOPURINOL 100 MG/1
100 TABLET ORAL DAILY
Qty: 90 TABLET | Refills: 3 | Status: SHIPPED | OUTPATIENT
Start: 2017-06-22 | End: 2017-09-20

## 2017-06-26 RX ORDER — POTASSIUM CITRATE 15 MEQ/1
1 TABLET, EXTENDED RELEASE ORAL DAILY
Qty: 100 TABLET | Refills: 3 | Status: SHIPPED | OUTPATIENT
Start: 2017-06-26 | End: 2018-06-03 | Stop reason: SDUPTHER

## 2017-06-29 ENCOUNTER — PATIENT MESSAGE (OUTPATIENT)
Dept: UROLOGY | Facility: CLINIC | Age: 62
End: 2017-06-29

## 2017-06-30 ENCOUNTER — OFFICE VISIT (OUTPATIENT)
Dept: UROLOGY | Facility: CLINIC | Age: 62
End: 2017-06-30
Payer: OTHER GOVERNMENT

## 2017-06-30 ENCOUNTER — HOSPITAL ENCOUNTER (OUTPATIENT)
Dept: RADIOLOGY | Facility: HOSPITAL | Age: 62
Discharge: HOME | End: 2017-06-30
Attending: NURSE PRACTITIONER
Payer: OTHER GOVERNMENT

## 2017-06-30 VITALS
HEART RATE: 61 BPM | TEMPERATURE: 98 F | HEIGHT: 71 IN | WEIGHT: 216 LBS | DIASTOLIC BLOOD PRESSURE: 76 MMHG | BODY MASS INDEX: 30.24 KG/M2 | SYSTOLIC BLOOD PRESSURE: 114 MMHG

## 2017-06-30 DIAGNOSIS — Z87.442 PERSONAL HISTORY OF KIDNEY STONES: ICD-10-CM

## 2017-06-30 DIAGNOSIS — R10.9 RIGHT FLANK PAIN: Primary | ICD-10-CM

## 2017-06-30 DIAGNOSIS — R10.9 RIGHT FLANK PAIN: ICD-10-CM

## 2017-06-30 DIAGNOSIS — N20.0 URIC ACID RENAL CALCULUS: ICD-10-CM

## 2017-06-30 DIAGNOSIS — R31.9 HEMATURIA: ICD-10-CM

## 2017-06-30 PROCEDURE — 99214 OFFICE O/P EST MOD 30 MIN: CPT | Mod: S$PBB,24,, | Performed by: NURSE PRACTITIONER

## 2017-06-30 PROCEDURE — 74176 CT ABD & PELVIS W/O CONTRAST: CPT | Mod: TC

## 2017-06-30 PROCEDURE — 74176 CT ABD & PELVIS W/O CONTRAST: CPT | Mod: 26,,, | Performed by: RADIOLOGY

## 2017-06-30 PROCEDURE — 99999 PR PBB SHADOW E&M-EST. PATIENT-LVL IV: CPT | Mod: PBBFAC,,, | Performed by: NURSE PRACTITIONER

## 2017-06-30 PROCEDURE — 87086 URINE CULTURE/COLONY COUNT: CPT

## 2017-06-30 PROCEDURE — 99214 OFFICE O/P EST MOD 30 MIN: CPT | Mod: PBBFAC,25,PO | Performed by: NURSE PRACTITIONER

## 2017-06-30 RX ORDER — TAMSULOSIN HYDROCHLORIDE 0.4 MG/1
0.4 CAPSULE ORAL DAILY
Qty: 30 CAPSULE | Refills: 3 | Status: SHIPPED | OUTPATIENT
Start: 2017-06-30 | End: 2017-07-14 | Stop reason: ALTCHOICE

## 2017-06-30 RX ORDER — CIPROFLOXACIN 500 MG/1
500 TABLET ORAL 2 TIMES DAILY
Qty: 28 TABLET | Refills: 0 | Status: SHIPPED | OUTPATIENT
Start: 2017-06-30 | End: 2017-07-14

## 2017-06-30 NOTE — PROGRESS NOTES
[facility] Note  Staff: MECCA Quiñones      Chief Complaint: Right flank pain, hematuria, hx of kidney stones    Subjective:        HPI: Toni Hyde is a 62 y.o. male presents with complaints of right flank pain and hematuria which began four days ago.  Pt has a history of kidney stones and was last seen by Dr. Noel on 06/22/2017.    The patient underwent a LEFT ESWL on 04/25/2017.  The patient underwent a stent   placement and removal.  The patient's stone fragments were sent to lab for   diagnosis and they were found to be 100% uric acid.  The 24-hour urine chemistry   analysis was done in the past and reviewed with pt at last ov.     Last PSA Screening:   Lab Results   Component Value Date    PSA 0.62 06/15/2016    PSA 0.65 05/21/2015    PSA 0.62 12/07/2012    PSADIAG 0.54 05/18/2017     REVIEW OF SYSTEMS:  Review of Systems   Constitutional: Negative for chills, diaphoresis, fever and weight loss.   HENT: Negative for congestion, hearing loss, nosebleeds and sore throat.    Eyes: Negative for blurred vision and pain.   Respiratory: Negative for cough and wheezing.    Cardiovascular: Negative for chest pain, palpitations and leg swelling.   Gastrointestinal: Negative for abdominal pain, heartburn, nausea and vomiting.   Genitourinary: Positive for flank pain and hematuria. Negative for dysuria, frequency and urgency.   Musculoskeletal: Negative for back pain, joint pain, myalgias and neck pain.   Skin: Negative for itching and rash.   Neurological: Negative for dizziness, tremors, sensory change, seizures, loss of consciousness, weakness and headaches.   Endo/Heme/Allergies: Does not bruise/bleed easily.   Psychiatric/Behavioral: Negative for depression and suicidal ideas. The patient is not nervous/anxious.      Physical Exam    PMHx:  Past Medical History:   Diagnosis Date    Cataract     Cataract of left eye     Diabetes mellitus type II     on po meds     Hyperlipidemia     not on meds at present    Hypertension     Kidney stones 04/2017    Left ureteral stone     Wears glasses      PSHx:  Past Surgical History:   Procedure Laterality Date    ADENOIDECTOMY      CATARACT EXTRACTION      COLONOSCOPY  3/23/2010    Dr. Blackburn, hyperplastic polyps, otherwise negative, 10 year recheck    CYSTOSCOPY Left 04/12/2017    with ureteral stent-Dr. Noel    EYE SURGERY  05/06/2013    Dr Nielsen    kidney stent   04/2017    KNEE ARTHROSCOPY W/ DEBRIDEMENT      right with lateral release     LITHOTRIPSY  04/19/2017    NASAL SINUS SURGERY      retna surgery   5/2013    Dr Peralta    TONSILLECTOMY       Allergies:  No known drug allergies    Medications: reviewed   Anticoagulation: No    Objective:     Vitals:    06/30/17 0842   BP: 114/76   Pulse: 61   Temp: 98.4 °F (36.9 °C)     General:WDWN in NAD  Eyes: PERRLA, normal conjunctiva  Respiratory: no increased work on breathing, clear to auscultation  Cardiovascular: regular rate and rhythm. No obvious extremity edema.  GI: palpation of masses. No tenderness. No hepatosplenomegaly to palpation.  Musculoskeletal: normal range of motion of bilateral upper extremities. Normal muscle strength and tone.  Skin: no obvious rashes or lesions. No tightening of skin noted.  Neurologic: CN grossly normal. Normal sensation.   Psychiatric: awake, alert and oriented x 3. Mood and affect normal. Cooperative.    LABS REVIEW:  UA today:  Yellow, Clear urine  1.010 Spec. Gravity  5.0 ph  +Leukocytes  Trace of Protein  100 Glucose  1 Urobili  +bilirubin  2++ Blood    Assessment:       1. Right flank pain    2. Hematuria    3. Personal history of kidney stones    4. Uric acid renal calculus          Plan:     Kidney stone vs. Kidney infection:    1.  Urine culture to be performed.  2.  CT RSS to be done due to hx of uric acid stones.  3.  In the meantime Cipro 500 mg 1 po BID x 14 days prescribed to the patient.  4.  Encouraged pt to continue  straining his urine.    F/u TBD    [facility]: Active    Yulia Andrade, ROBER-C

## 2017-07-01 LAB — BACTERIA UR CULT: NO GROWTH

## 2017-07-03 ENCOUNTER — PATIENT MESSAGE (OUTPATIENT)
Dept: UROLOGY | Facility: CLINIC | Age: 62
End: 2017-07-03

## 2017-07-14 ENCOUNTER — OFFICE VISIT (OUTPATIENT)
Dept: FAMILY MEDICINE | Facility: CLINIC | Age: 62
End: 2017-07-14
Payer: OTHER GOVERNMENT

## 2017-07-14 VITALS
RESPIRATION RATE: 20 BRPM | OXYGEN SATURATION: 96 % | HEART RATE: 54 BPM | BODY MASS INDEX: 29.35 KG/M2 | TEMPERATURE: 98 F | HEIGHT: 71 IN | WEIGHT: 209.69 LBS | SYSTOLIC BLOOD PRESSURE: 103 MMHG | DIASTOLIC BLOOD PRESSURE: 66 MMHG

## 2017-07-14 DIAGNOSIS — E11.59 HYPERTENSION ASSOCIATED WITH DIABETES: ICD-10-CM

## 2017-07-14 DIAGNOSIS — I10 GOOD HYPERTENSION CONTROL: ICD-10-CM

## 2017-07-14 DIAGNOSIS — E11.69 HYPERLIPIDEMIA ASSOCIATED WITH TYPE 2 DIABETES MELLITUS: ICD-10-CM

## 2017-07-14 DIAGNOSIS — E78.5 HYPERLIPIDEMIA ASSOCIATED WITH TYPE 2 DIABETES MELLITUS: ICD-10-CM

## 2017-07-14 DIAGNOSIS — E78.5 HYPERLIPIDEMIA, UNSPECIFIED HYPERLIPIDEMIA TYPE: ICD-10-CM

## 2017-07-14 DIAGNOSIS — I15.2 HYPERTENSION ASSOCIATED WITH DIABETES: ICD-10-CM

## 2017-07-14 PROCEDURE — 3046F HEMOGLOBIN A1C LEVEL >9.0%: CPT | Mod: ,,, | Performed by: FAMILY MEDICINE

## 2017-07-14 PROCEDURE — 4010F ACE/ARB THERAPY RXD/TAKEN: CPT | Mod: ,,, | Performed by: FAMILY MEDICINE

## 2017-07-14 PROCEDURE — 99999 PR PBB SHADOW E&M-EST. PATIENT-LVL III: CPT | Mod: PBBFAC,,, | Performed by: FAMILY MEDICINE

## 2017-07-14 PROCEDURE — 99213 OFFICE O/P EST LOW 20 MIN: CPT | Mod: PBBFAC,PO | Performed by: FAMILY MEDICINE

## 2017-07-14 PROCEDURE — 99214 OFFICE O/P EST MOD 30 MIN: CPT | Mod: S$PBB,,, | Performed by: FAMILY MEDICINE

## 2017-07-14 NOTE — PATIENT INSTRUCTIONS
Diabetes (General Information)  Diabetes is a long-term health problem. It means your body does not make enough insulin. Or it may mean that your body cannot use the insulin it makes. Insulin is a hormone in your body. It lets blood sugar (glucose) reach the cells in your body. All of your cells need glucose for fuel.  When you have diabetes, the glucose in your blood builds up because it cannot get into the cells. This buildup is called high blood sugar (hyperglycemia).  Your blood sugar level depends on several things. It depends on what kind of food you eat and how much of it you eat. It also depends on how much exercise you get, and how much insulin you have in your body. Eating too much of the wrong kinds of food or not taking diabetes medicine on time can cause high blood sugar. Infections can cause high blood sugar even if you are taking medicines correctly.  These things can also cause low blood sugar:  · Missing meals  · Not eating enough food  · Taking too much diabetes medicine  Diabetes can cause serious problems over time if you do not get treated. These problems include heart disease, stroke, kidney failure, and blindness. They also include nerve pain or loss of feeling in your legs and feet, and gangrene of the feet. By keeping your blood sugar under control you can prevent or delay these problems.  Normal blood sugar levels are 80 to 100 before a meal and less than 180 in the 1 to 2 hours after a meal.  Home care  Follow these guidelines when caring for yourself at home:  · Follow the diet your healthcare provider gives you. Take insulin or other diabetes medicine exactly as told to.  · Watch your blood sugar as you are told to. Keep a log of your results. This will help your provider change your medicines to keep your blood sugar under control.  · Try to reach your ideal weight. You may be able to cut back on or not have to take diabetes medicine if you eat the right foods and get exercise.  · Do  not smoke. Smoking worsens the effects of diabetes on your circulation. You are much more likely to have a heart attack if you have diabetes and you smoke.  · Take good care of your feet. If you have lost feeling in your feet, you may not see an injury or infection. Check your feet and between your toes at least once a week.  · Wear a medical alert bracelet or necklace, or carry a card in your wallet that says you have diabetes. This will help healthcare providers give you the right care if you get very ill and cannot tell them that you have diabetes.  Sick day plan  If you get a cold, the flu, or a bacterial or viral infection, take these steps:  · Look at your diabetes sick plan and call your healthcare provider as you were told to. You may need to call your provider right away if:  ¨ Your blood sugar is above 240 while taking your diabetes medicine  ¨ Your urine ketone levels are above normal or high  ¨ You have been vomiting more than 6 hours  ¨ You have trouble breathing or your breath ha s a fruity smell  ¨ You have a high fever  ¨ You have a fever for several days and you are not getting better  ¨ You get light-headed and are sleepier than usual  · Keep taking your diabetes pills (oral medicine) even if you have been vomiting and are feeling sick. Call your provider right away because you may need insulin to lower your blood sugar until you recover from your illness.  · Keep taking your insulin even if you have been vomiting and are feeling sick. Call your provider right away to ask if you need to change your insulin dose. This will depend on your blood sugar results.  · Check your blood sugar every 2 to 4 hours, or at least 4 times a day.  · Check your ketones often. If you are vomiting and having diarrhea, watch them more often.  · Do not skip meals. Try to eat small meals on a regular schedule. Do this even if you do not feel like eating.  · Drink water or other liquids that do not have caffeine or  calories. This will keep you from getting dehydrated. If you are nauseated or vomiting, takes small sips every 5 minutes. To prevent dehydration try to drink a cup (8 ounces) of fluids every hour while you are awake.  General care  Always bring a source of fast-acting sugar with you in case you have symptoms of low blood sugar (below 70). At the first sign of low blood sugar, eat or drink 15 to 20 grams of fast-acting sugar to raise your blood sugar. Examples are:  · 3 to 4 glucose tablets. You can buy these at most [employer].  · 4 ounces (1/2 cup) of regular (not diet) soft drinks  · 4 ounces (1/2 cup) of any fruit juice  · 8 ounces (1 cup) of milk  · 5 to 6 pieces of hard candy  · 1 tablespoon of honey  Check your blood sugar 15 minutes after treating yourself. If it is still below 70, take 15 to 20 more grams of fast-acting sugar. Test again in 15 minutes. If it returns to normal (70 or above), eat a snack or meal to keep your blood sugar in a safe range. If it stays low, call your doctor or go to an emergency room.  Follow-up care  Follow-up with your healthcare provider, or as advised. For more information about diabetes, visit the American Diabetes Association website at www.diabetes.org or call 871-902-1740.  When to seek medical advice  Call your healthcare provider right away if you have any of these symptoms of high blood sugar:  · Frequent urination  · Dizziness  · Drowsiness  · Thirst  · Headache  · Nausea or vomiting  · Abdominal pain  · Eyesight changes  · Fast breathing  · Confusion or loss of consciousness  Also call your provider right away if you have any of these signs of low blood sugar:  · Fatigue  · Headache  · Shakes  · Excess sweating  · Hunger  · Feeling anxious or restless  · Eyesight changes  · Drowsiness  · Weakness  · Confusion or loss of consciousness  Call 911  Call for emergency help right away if any of these occur:  · Chest pain or shortness of breath  · Dizziness or  fainting  · Weakness of an arm or leg or one side of the face  · Trouble speaking or seeing   Date Last Reviewed: 6/1/2016  © 9938-3383 [employer]. [address] [place] [address], [place] [address]. All rights reserved. This information is not intended as a substitute for professional medical care. Always follow your healthcare professional's instructions.

## 2017-07-14 NOTE — PROGRESS NOTES
Subjective:       Patient ID: Toni Hyde is a 62 y.o. male.    Chief Complaint: Annual Exam    62-year-old male coming in for general checkup and follow-up of previous visit June 1.  At that time he had poorly controlled diabetes with an A1c of 10.5.  He was still taking his metformin at that time but had gotten off of his diet next her size program and declined to see the diabetic educator or make other changes in favor of getting back on diet and exercise.  He is doing 30-40 minutes on an elliptical 5-6 days a week and is going back to his [employer] point system counting calories.  He has dropped 8 pounds since June 1.  He brings in his blood sugar log which on his previous visit in June had been in the 2-300 range frequently and within the last week on his current log his highest glucose reading was 126.  He denies any episodes of hypoglycemia and feels much better.    Past Medical History:  No date: Cataract  No date: Cataract of left eye  No date: Diabetes mellitus type II      Comment: on po meds  No date: Hyperlipidemia      Comment: not on meds at present  No date: Hypertension  04/2017: Kidney stones  No date: Left ureteral stone  No date: Wears glasses    Past Surgical History:  No date: ADENOIDECTOMY  No date: CATARACT EXTRACTION  3/23/2010: COLONOSCOPY      Comment: Dr. Blackburn, hyperplastic polyps, otherwise                negative, 10 year recheck  04/12/2017: CYSTOSCOPY Left      Comment: with ureteral stent-Dr. Noel  05/06/2013: EYE SURGERY      Comment: Dr Nielsen  04/2017: kidney stent   No date: KNEE ARTHROSCOPY W/ DEBRIDEMENT      Comment: right with lateral release   04/19/2017: LITHOTRIPSY  No date: NASAL SINUS SURGERY  5/2013: retna surgery       Comment: Dr Peralta  No date: TONSILLECTOMY    Review of patient's family history indicates:    Hypertension                   Father                    Stroke                         Paternal Grandmother      Lung cancer                     Paternal Grandfather      Cancer                         Paternal Grandfather      Cataracts                      Mother                    Diabetes                       Maternal Grandmother      No Known Problems              Sister                    No Known Problems              Maternal Aunt             Social History    Marital status:              Spouse name:                       Years of education:                 Number of children:               Social History Main Topics    Smoking status: Former Smoker                                                                Packs/day: 0.00      Years: 0.00           Types: Cigarettes       Quit date: 4/11/1978    Smokeless tobacco: Never Used                        Alcohol use: Yes           1.5 oz/week       Standard drinks or equivalent: 3 per week       Comment: 3 drinks per week    Drug use: No                Current Outpatient Prescriptions:     allopurinol (ZYLOPRIM) 100 MG tablet, Take 1 tablet (100 mg total) by mouth once daily., Disp: 90 tablet, Rfl: 3    amlodipine (NORVASC) 2.5 MG tablet, Take 1 tablet (2.5 mg total) by mouth once daily., Disp: 90 tablet, Rfl: 3    benazepril (LOTENSIN) 40 MG tablet, Take 1 tablet (40 mg total) by mouth once daily., Disp: 90 tablet, Rfl: 3    cetirizine (ZYRTEC) 10 MG tablet, Take 10 mg by mouth daily as needed. 1 Tablet(s) Oral PRN ., Disp: , Rfl:     ciprofloxacin HCl (CIPRO) 500 MG tablet, Take 1 tablet (500 mg total) by mouth 2 (two) times daily., Disp: 28 tablet, Rfl: 0    fluticasone (FLONASE) 50 mcg/actuation nasal spray, 2 sprays by Each Nare route daily as needed. , Disp: , Rfl:     ibuprofen (ADVIL) 200 MG tablet, Take 200 mg by mouth every 6 (six) hours as needed. 2 Tablet(s) Oral PRN Every day., Disp: , Rfl:     metformin (GLUCOPHAGE-XR) 750 MG 24 hr tablet, Take 1 tablet (750 mg total) by mouth daily with breakfast., Disp: 90 tablet, Rfl: 1    potassium citrate 15 mEq TbSR, Take 1 tablet  by mouth once daily at 6am., Disp: 100 tablet, Rfl: 3    pravastatin (PRAVACHOL) 80 MG tablet, Take 1 tablet (80 mg total) by mouth once daily., Disp: 90 tablet, Rfl: 3    There are no preventive care reminders to display for this patient.  He has a follow-up A1c scheduled September 1          Review of Systems   Constitutional: Negative for activity change, chills, fatigue and fever.   HENT: Negative for congestion, ear pain, rhinorrhea and sore throat.    Eyes: Negative for visual disturbance.   Respiratory: Negative for cough, chest tightness and shortness of breath.    Cardiovascular: Negative for chest pain and palpitations.   Gastrointestinal: Negative for abdominal pain (He gets an occasional pain in the right upper quadrant associated with certain movements but it is not persistent or concern and is not present at this time), blood in stool, constipation, diarrhea, nausea and vomiting.   Genitourinary: Negative for difficulty urinating, dysuria and hematuria.   Musculoskeletal: Negative for arthralgias and neck pain.   Skin: Negative for rash.   Neurological: Negative for dizziness and headaches.   Psychiatric/Behavioral: Negative for sleep disturbance.       Objective:      Physical Exam   Constitutional: He is oriented to person, place, and time. He appears well-developed. No distress.   Good blood pressure control  Patient is overweight with bmi of 29.7.   Weight is decreased by 8lb since last visit of 6/1/17.     HENT:   Head: Normocephalic and atraumatic.   Right Ear: External ear normal.   Left Ear: External ear normal.   Nose: Nose normal.   Mouth/Throat: Oropharynx is clear and moist. No oropharyngeal exudate.   Eyes: EOM are normal. Pupils are equal, round, and reactive to light. No scleral icterus.   Neck: Normal range of motion. Neck supple. No JVD present. No tracheal deviation present. No thyromegaly present.   Cardiovascular: Normal rate, regular rhythm and normal heart sounds.  Exam reveals  no gallop and no friction rub.    No murmur heard.  Pulmonary/Chest: Effort normal and breath sounds normal. No stridor. No respiratory distress. He has no wheezes. He has no rales. He exhibits no tenderness.   Abdominal: Soft. Bowel sounds are normal. He exhibits no distension and no mass. There is no hepatosplenomegaly. There is no tenderness. There is no rebound, no guarding, no tenderness at McBurney's point and negative Vega's sign. No hernia.   Musculoskeletal: Normal range of motion. He exhibits no edema or deformity.   Lymphadenopathy:     He has no cervical adenopathy.   Neurological: He is alert and oriented to person, place, and time. He has normal reflexes. He displays normal reflexes. No cranial nerve deficit. He exhibits normal muscle tone.   Skin: Skin is warm. No rash noted. He is not diaphoretic. No erythema.   Psychiatric: He has a normal mood and affect. His behavior is normal. Judgment and thought content normal.   Nursing note and vitals reviewed.      Assessment:       1. Uncontrolled type 2 diabetes mellitus without complication, without long-term current use of insulin    2. Good hypertension control    3. Hyperlipidemia, unspecified hyperlipidemia type    4. Hypertension associated with diabetes    5. Hyperlipidemia associated with type 2 diabetes mellitus    6. BMI 29.0-29.9,adult        Plan:       1. Uncontrolled type 2 diabetes mellitus without complication, without long-term current use of insulin  Marked improvement, continue program, followup with a1c as scheduled    2. Good hypertension control  No changes needed    3. Hyperlipidemia, unspecified hyperlipidemia type  No changes needed    4. Hypertension associated with diabetes    5. Hyperlipidemia associated with type 2 diabetes mellitus    6. BMI 29.0-29.9,adult  Doing well with weight loss         Patient readiness: acceptance and barriers:none    During the course of the visit the patient was educated and counseled about the  following:     Diabetes:  Discussed general issues about diabetes pathophysiology and management.  Addressed ADA diet.  Encouraged aerobic exercise.  Hypertension:   Medication: no change.  Dietary sodium restriction.  Regular aerobic exercise.    Goals: Diabetes: Maintain Hemoglobin A1C below 7 and Hypertension: Reduce Blood Pressure    Did patient meet goals/outcomes: Yes    The following self management tools provided: blood pressure log  blood glucose log    Patient Instructions (the written plan) was given to the patient/family.     Time spent with patient: 45 minutes

## 2017-09-01 ENCOUNTER — LAB VISIT (OUTPATIENT)
Dept: LAB | Facility: HOSPITAL | Age: 62
End: 2017-09-01
Attending: FAMILY MEDICINE
Payer: OTHER GOVERNMENT

## 2017-09-01 LAB
ESTIMATED AVG GLUCOSE: 140 MG/DL
HBA1C MFR BLD HPLC: 6.5 %

## 2017-09-01 PROCEDURE — 83036 HEMOGLOBIN GLYCOSYLATED A1C: CPT

## 2017-09-01 PROCEDURE — 36415 COLL VENOUS BLD VENIPUNCTURE: CPT | Mod: PO

## 2017-09-05 ENCOUNTER — PATIENT MESSAGE (OUTPATIENT)
Dept: FAMILY MEDICINE | Facility: CLINIC | Age: 62
End: 2017-09-05

## 2017-09-22 ENCOUNTER — TELEPHONE (OUTPATIENT)
Dept: FAMILY MEDICINE | Facility: CLINIC | Age: 62
End: 2017-09-22

## 2017-09-22 DIAGNOSIS — E11.9 CONTROLLED TYPE 2 DIABETES MELLITUS WITHOUT COMPLICATION, WITHOUT LONG-TERM CURRENT USE OF INSULIN: Primary | ICD-10-CM

## 2017-09-22 NOTE — TELEPHONE ENCOUNTER
Patient was called per work order WQ's asked if he wanted to set up diabetic ed. He declined stating he has done that in the past. Did not want to do it again. Knows what to do, just doesn't do it.   Patient stated on 9-1-17 a1c Dr Mon wants to repeat a1c to tianna.   The A1c is much improved with a 4-point drop from May.  No changes are needed.  I would recommend repeating the test in 3-4 months and if it remains stable we'll go to 4-6 months after that.     Need order.

## 2017-10-17 DIAGNOSIS — M79.671 RIGHT FOOT PAIN: Primary | ICD-10-CM

## 2017-10-18 ENCOUNTER — HOSPITAL ENCOUNTER (OUTPATIENT)
Dept: RADIOLOGY | Facility: HOSPITAL | Age: 62
Discharge: HOME | End: 2017-10-18
Attending: ORTHOPAEDIC SURGERY
Payer: OTHER GOVERNMENT

## 2017-10-18 ENCOUNTER — OFFICE VISIT (OUTPATIENT)
Dept: ORTHOPEDICS | Facility: CLINIC | Age: 62
End: 2017-10-18
Payer: OTHER GOVERNMENT

## 2017-10-18 VITALS
DIASTOLIC BLOOD PRESSURE: 91 MMHG | WEIGHT: 209.69 LBS | BODY MASS INDEX: 29.35 KG/M2 | HEIGHT: 71 IN | HEART RATE: 68 BPM | SYSTOLIC BLOOD PRESSURE: 147 MMHG

## 2017-10-18 DIAGNOSIS — M72.2 PLANTAR FASCIITIS: Primary | ICD-10-CM

## 2017-10-18 DIAGNOSIS — M79.671 RIGHT FOOT PAIN: ICD-10-CM

## 2017-10-18 PROCEDURE — 73630 X-RAY EXAM OF FOOT: CPT | Mod: TC,PN,RT

## 2017-10-18 PROCEDURE — 73630 X-RAY EXAM OF FOOT: CPT | Mod: 26,RT,, | Performed by: RADIOLOGY

## 2017-10-18 PROCEDURE — 99999 PR PBB SHADOW E&M-EST. PATIENT-LVL III: CPT | Mod: PBBFAC,,, | Performed by: ORTHOPAEDIC SURGERY

## 2017-10-18 PROCEDURE — 99203 OFFICE O/P NEW LOW 30 MIN: CPT | Mod: S$PBB,,, | Performed by: ORTHOPAEDIC SURGERY

## 2017-10-18 PROCEDURE — 99213 OFFICE O/P EST LOW 20 MIN: CPT | Mod: PBBFAC,25,PN | Performed by: ORTHOPAEDIC SURGERY

## 2017-10-23 NOTE — PROGRESS NOTES
Past Medical History:   Diagnosis Date    Cataract     Cataract of left eye     Diabetes mellitus type II     on po meds    Hyperlipidemia     not on meds at present    Hypertension     Kidney stones 04/2017    Left ureteral stone     Wears glasses        Past Surgical History:   Procedure Laterality Date    ADENOIDECTOMY      CATARACT EXTRACTION      COLONOSCOPY  3/23/2010    Dr. Blackburn, hyperplastic polyps, otherwise negative, 10 year recheck    CYSTOSCOPY Left 04/12/2017    with ureteral stent-Dr. Noel    EYE SURGERY  05/06/2013    Dr Nielsen    kidney stent   04/2017    KNEE ARTHROSCOPY W/ DEBRIDEMENT      right with lateral release     LITHOTRIPSY  04/19/2017    NASAL SINUS SURGERY      retna surgery   5/2013    Dr Peralta    TONSILLECTOMY         Current Outpatient Prescriptions   Medication Sig    amlodipine (NORVASC) 2.5 MG tablet Take 1 tablet (2.5 mg total) by mouth once daily.    benazepril (LOTENSIN) 40 MG tablet Take 1 tablet (40 mg total) by mouth once daily.    cetirizine (ZYRTEC) 10 MG tablet Take 10 mg by mouth daily as needed. 1 Tablet(s) Oral PRN .    fluticasone (FLONASE) 50 mcg/actuation nasal spray 2 sprays by Each Nare route daily as needed.     ibuprofen (ADVIL) 200 MG tablet Take 200 mg by mouth every 6 (six) hours as needed. 2 Tablet(s) Oral PRN Every day.    metformin (GLUCOPHAGE-XR) 750 MG 24 hr tablet Take 1 tablet (750 mg total) by mouth daily with breakfast.    potassium citrate 15 mEq TbSR Take 1 tablet by mouth once daily at 6am.    pravastatin (PRAVACHOL) 80 MG tablet Take 1 tablet (80 mg total) by mouth once daily.     No current facility-administered medications for this visit.        Review of patient's allergies indicates:   Allergen Reactions    No known drug allergies        Family History   Problem Relation Age of Onset    Hypertension Father     Stroke Paternal Grandmother     Lung cancer Paternal Grandfather     Cancer Paternal Grandfather      "Cataracts Mother     Diabetes Maternal Grandmother     No Known Problems Sister     No Known Problems Maternal Aunt        Social History     Social History    Marital status:      Spouse name: N/A    Number of children: N/A    Years of education: N/A     Occupational History    Not on file.     Social History Main Topics    Smoking status: Former Smoker     Types: Cigarettes     Quit date: 4/11/1978    Smokeless tobacco: Never Used    Alcohol use 1.5 oz/week     3 Standard drinks or equivalent per week      Comment: 3 drinks per week    Drug use: No    Sexual activity: Not on file     Other Topics Concern    Not on file     Social History Narrative    No narrative on file       Chief Complaint:   Chief Complaint   Patient presents with    Right Foot - Pain       Consulting Physician: Self, Aaareferral    History of present illness:    This is a 62 y.o. year old male who complains of right foot pain for about 6 months.  He denies any injury.  He states that it bothers him at the first step in the morning.  The pain is in the heel.  He is unable to run.  He is been using over-the-counter anti-inflammatories with no success.  He puts his pain ranging from a 2-7 out of 10.    Review of Systems:    Constitution: Denies chills, fever, and sweats.  HENT: Denies headaches or blurry vision.  Cardiovascular: Denies chest pain or irregular heart beat.  Respiratory: Denies cough or shortness of breath.  Gastrointestinal: Denies abdominal pain, nausea, or vomiting.  Musculoskeletal:  Denies muscle cramps.  Neurological: Denies dizziness or focal weakness.  Psychiatric/Behavioral: Normal mental status.  Hematologic/Lymphatic: Denies bleeding problem or easy bruising/bleeding.  Skin: Denies rash or suspicious lesions.    Examination:    Vital Signs:    Vitals:    10/18/17 1310   BP: (!) 147/91   Pulse: 68   Weight: 95.1 kg (209 lb 10.5 oz)   Height: 5' 10.5" (1.791 m)   PainSc:   2   PainLoc: Foot       Body " mass index is 29.66 kg/m².    This a well-developed, well nourished patient in no acute distress.    Alert and oriented x 3 and cooperative to examination.       Physical Exam: Right Foot Exam     Gait:   Normal    Skin  Rash:   None  Scars:   None    Inspection   Deformity:   None  Erythema:   None  Bruising:   None  Swelling:   None  Masses:  None  Lymphadenopathy: None    Range of Motion   Ankle Joint   Normal  Subtalar Joint   Normal  Toes:   Normal    Muscle Strength   Ankle:   Normal  Toes:   Normal    Other   Tenderness:  Calcaneal spur  Instability:  None  Ankle Crepitus:  None  Sensation:   Normal  Achilles:  Normal  Forefoot:  Normal    Vascular Exam   Dorsalis Pedis:       Palpable  Capillary refill:     Normal          Imaging: X-rays ordered and reviewed today personally of the right foot show some spurring of the heel.        Assessment: Plantar fasciitis        Plan:  He has plantar fasciitis.  We discussed treatment options with him including injections and surgery.  At this point he would like to do a home exercise program and anti-inflammatories.  We'll see him back in 4 weeks' time.      DISCLAIMER: This note may have been dictated using voice recognition software and may contain grammatical errors.     NOTE: Consult report sent to referring provider via vushaper.

## 2017-12-01 DIAGNOSIS — E11.9 CONTROLLED TYPE 2 DIABETES MELLITUS WITHOUT COMPLICATION, WITHOUT LONG-TERM CURRENT USE OF INSULIN: ICD-10-CM

## 2017-12-01 RX ORDER — METFORMIN HYDROCHLORIDE 750 MG/1
TABLET, EXTENDED RELEASE ORAL
Qty: 90 TABLET | Refills: 1 | Status: SHIPPED | OUTPATIENT
Start: 2017-12-01 | End: 2018-03-03 | Stop reason: SDUPTHER

## 2018-01-12 ENCOUNTER — OFFICE VISIT (OUTPATIENT)
Dept: ORTHOPEDICS | Facility: CLINIC | Age: 63
End: 2018-01-12
Payer: OTHER GOVERNMENT

## 2018-01-12 VITALS
DIASTOLIC BLOOD PRESSURE: 74 MMHG | BODY MASS INDEX: 29.92 KG/M2 | HEART RATE: 69 BPM | SYSTOLIC BLOOD PRESSURE: 132 MMHG | WEIGHT: 209 LBS | HEIGHT: 70 IN

## 2018-01-12 DIAGNOSIS — M79.671 RIGHT FOOT PAIN: ICD-10-CM

## 2018-01-12 DIAGNOSIS — M72.2 PLANTAR FASCIITIS: Primary | ICD-10-CM

## 2018-01-12 PROCEDURE — 99999 PR PBB SHADOW E&M-EST. PATIENT-LVL III: CPT | Mod: PBBFAC,,, | Performed by: ORTHOPAEDIC SURGERY

## 2018-01-12 PROCEDURE — 99213 OFFICE O/P EST LOW 20 MIN: CPT | Mod: PBBFAC,PN | Performed by: ORTHOPAEDIC SURGERY

## 2018-01-12 PROCEDURE — 99213 OFFICE O/P EST LOW 20 MIN: CPT | Mod: S$PBB,,, | Performed by: ORTHOPAEDIC SURGERY

## 2018-01-17 NOTE — PROGRESS NOTES
Past Medical History:   Diagnosis Date    Cataract     Cataract of left eye     Diabetes mellitus type II     on po meds    Hyperlipidemia     not on meds at present    Hypertension     Kidney stones 04/2017    Left ureteral stone     Wears glasses        Past Surgical History:   Procedure Laterality Date    ADENOIDECTOMY      CATARACT EXTRACTION      COLONOSCOPY  3/23/2010    Dr. Blackburn, hyperplastic polyps, otherwise negative, 10 year recheck    CYSTOSCOPY Left 04/12/2017    with ureteral stent-Dr. Noel    EYE SURGERY  05/06/2013    Dr Nielsen    kidney stent   04/2017    KNEE ARTHROSCOPY W/ DEBRIDEMENT      right with lateral release     LITHOTRIPSY  04/19/2017    NASAL SINUS SURGERY      retna surgery   5/2013    Dr Peralta    TONSILLECTOMY         Current Outpatient Prescriptions   Medication Sig    amlodipine (NORVASC) 2.5 MG tablet Take 1 tablet (2.5 mg total) by mouth once daily.    benazepril (LOTENSIN) 40 MG tablet Take 1 tablet (40 mg total) by mouth once daily.    cetirizine (ZYRTEC) 10 MG tablet Take 10 mg by mouth daily as needed. 1 Tablet(s) Oral PRN .    fluticasone (FLONASE) 50 mcg/actuation nasal spray 2 sprays by Each Nare route daily as needed.     ibuprofen (ADVIL) 200 MG tablet Take 200 mg by mouth every 6 (six) hours as needed. 2 Tablet(s) Oral PRN Every day.    metFORMIN (GLUCOPHAGE-XR) 750 MG 24 hr tablet TAKE 1 TABLET DAILY WITH BREAKFAST    potassium citrate 15 mEq TbSR Take 1 tablet by mouth once daily at 6am.    pravastatin (PRAVACHOL) 80 MG tablet Take 1 tablet (80 mg total) by mouth once daily.     No current facility-administered medications for this visit.        Review of patient's allergies indicates:   Allergen Reactions    No known drug allergies        Family History   Problem Relation Age of Onset    Hypertension Father     Stroke Paternal Grandmother     Lung cancer Paternal Grandfather     Cancer Paternal Grandfather     Cataracts Mother      "Diabetes Maternal Grandmother     No Known Problems Sister     No Known Problems Maternal Aunt        Social History     Social History    Marital status:      Spouse name: N/A    Number of children: N/A    Years of education: N/A     Occupational History    Not on file.     Social History Main Topics    Smoking status: Former Smoker     Types: Cigarettes     Quit date: 4/11/1978    Smokeless tobacco: Never Used    Alcohol use 1.5 oz/week     3 Standard drinks or equivalent per week      Comment: 3 drinks per week    Drug use: No    Sexual activity: Not on file     Other Topics Concern    Not on file     Social History Narrative    No narrative on file       Chief Complaint:   Chief Complaint   Patient presents with    Foot Pain     R foot 4wk f/u       Consulting Physician: No ref. provider found    History of present illness:    This is a 62 y.o. year old male who complains of right foot pain for many months.  He denies any injury.  He states that it bothers him at the first step in the morning.  The pain is in the heel.  He is unable to run.  He is been using over-the-counter anti-inflammatories with no success.  He puts his pain at 4/10.    Review of Systems:    Constitution: Denies chills, fever, and sweats.  HENT: Denies headaches or blurry vision.  Cardiovascular: Denies chest pain or irregular heart beat.  Respiratory: Denies cough or shortness of breath.  Gastrointestinal: Denies abdominal pain, nausea, or vomiting.  Musculoskeletal:  Denies muscle cramps.  Neurological: Denies dizziness or focal weakness.  Psychiatric/Behavioral: Normal mental status.  Hematologic/Lymphatic: Denies bleeding problem or easy bruising/bleeding.  Skin: Denies rash or suspicious lesions.    Examination:    Vital Signs:    Vitals:    01/12/18 1312   BP: 132/74   Pulse: 69   Weight: 94.8 kg (209 lb)   Height: 5' 10" (1.778 m)   PainSc:   4   PainLoc: Foot       Body mass index is 29.99 kg/m².    This a " well-developed, well nourished patient in no acute distress.    Alert and oriented x 3 and cooperative to examination.       Physical Exam: Right Foot Exam     Gait:   Normal    Skin  Rash:   None  Scars:   None    Inspection   Deformity:   None  Erythema:   None  Bruising:   None  Swelling:   None  Masses:  None  Lymphadenopathy: None    Range of Motion   Ankle Joint   Normal  Subtalar Joint   Normal  Toes:   Normal    Muscle Strength   Ankle:   Normal  Toes:   Normal    Other   Tenderness:  Calcaneal spur and base of 5th MT medially  Instability:  None  Ankle Crepitus:  None  Sensation:   Normal  Achilles:  Normal  Forefoot:  Normal    Vascular Exam   Dorsalis Pedis:       Palpable  Capillary refill:     Normal          Imaging: X-rays of the right foot show some spurring of the heel.        Assessment: Plantar fasciitis    Right foot pain        Plan:  He has plantar fasciitis and peroneal tendonitis.  We discussed treatment options with him including injections and surgery.  At this point he would like to do a PT  program and a medrol dosepack. PRN.    DISCLAIMER: This note may have been dictated using voice recognition software and may contain grammatical errors.     NOTE: Consult report sent to referring provider via [employer]

## 2018-02-27 ENCOUNTER — LAB VISIT (OUTPATIENT)
Dept: LAB | Facility: HOSPITAL | Age: 63
End: 2018-02-27
Attending: FAMILY MEDICINE
Payer: OTHER GOVERNMENT

## 2018-02-27 DIAGNOSIS — E11.9 CONTROLLED TYPE 2 DIABETES MELLITUS WITHOUT COMPLICATION, WITHOUT LONG-TERM CURRENT USE OF INSULIN: ICD-10-CM

## 2018-02-27 LAB
ANION GAP SERPL CALC-SCNC: 9 MMOL/L
BUN SERPL-MCNC: 19 MG/DL
CALCIUM SERPL-MCNC: 9.5 MG/DL
CHLORIDE SERPL-SCNC: 102 MMOL/L
CO2 SERPL-SCNC: 26 MMOL/L
CREAT SERPL-MCNC: 1.3 MG/DL
EST. GFR  (AFRICAN AMERICAN): >60 ML/MIN/1.73 M^2
EST. GFR  (NON AFRICAN AMERICAN): 58.5 ML/MIN/1.73 M^2
ESTIMATED AVG GLUCOSE: 200 MG/DL
GLUCOSE SERPL-MCNC: 241 MG/DL
HBA1C MFR BLD HPLC: 8.6 %
POTASSIUM SERPL-SCNC: 4.2 MMOL/L
SODIUM SERPL-SCNC: 137 MMOL/L

## 2018-02-27 PROCEDURE — 80048 BASIC METABOLIC PNL TOTAL CA: CPT

## 2018-02-27 PROCEDURE — 36415 COLL VENOUS BLD VENIPUNCTURE: CPT | Mod: PO

## 2018-02-27 PROCEDURE — 83036 HEMOGLOBIN GLYCOSYLATED A1C: CPT

## 2018-02-28 ENCOUNTER — DOCUMENTATION ONLY (OUTPATIENT)
Dept: FAMILY MEDICINE | Facility: CLINIC | Age: 63
End: 2018-02-28

## 2018-02-28 NOTE — PROGRESS NOTES
Pre-Visit Chart Review  For Appointment Scheduled on 3-3-18    Health Maintenance Due   Topic Date Due    Influenza Vaccine  08/01/2017

## 2018-03-03 ENCOUNTER — OFFICE VISIT (OUTPATIENT)
Dept: FAMILY MEDICINE | Facility: CLINIC | Age: 63
End: 2018-03-03
Attending: FAMILY MEDICINE
Payer: OTHER GOVERNMENT

## 2018-03-03 VITALS
HEART RATE: 72 BPM | DIASTOLIC BLOOD PRESSURE: 72 MMHG | TEMPERATURE: 98 F | HEIGHT: 70 IN | BODY MASS INDEX: 30.71 KG/M2 | OXYGEN SATURATION: 94 % | SYSTOLIC BLOOD PRESSURE: 114 MMHG | RESPIRATION RATE: 24 BRPM | WEIGHT: 214.5 LBS

## 2018-03-03 DIAGNOSIS — M72.2 PLANTAR FASCIITIS: Primary | ICD-10-CM

## 2018-03-03 PROCEDURE — 99214 OFFICE O/P EST MOD 30 MIN: CPT | Mod: PBBFAC,PO | Performed by: FAMILY MEDICINE

## 2018-03-03 PROCEDURE — 99999 PR PBB SHADOW E&M-EST. PATIENT-LVL IV: CPT | Mod: PBBFAC,,, | Performed by: FAMILY MEDICINE

## 2018-03-03 PROCEDURE — 99213 OFFICE O/P EST LOW 20 MIN: CPT | Mod: S$PBB,,, | Performed by: FAMILY MEDICINE

## 2018-03-03 RX ORDER — ALLOPURINOL 100 MG/1
100 TABLET ORAL DAILY
COMMUNITY
Start: 2018-03-01 | End: 2018-06-01 | Stop reason: SDUPTHER

## 2018-03-03 RX ORDER — DIPHENHYDRAMINE HCL 25 MG
25 CAPSULE ORAL NIGHTLY PRN
COMMUNITY

## 2018-03-03 RX ORDER — METHYLPREDNISOLONE 4 MG/1
TABLET ORAL
Refills: 0 | COMMUNITY
Start: 2018-01-12 | End: 2018-03-03 | Stop reason: ALTCHOICE

## 2018-03-03 RX ORDER — METFORMIN HYDROCHLORIDE 750 MG/1
750 TABLET, EXTENDED RELEASE ORAL 2 TIMES DAILY WITH MEALS
Qty: 180 TABLET | Refills: 1 | Status: SHIPPED | OUTPATIENT
Start: 2018-03-03 | End: 2018-08-23 | Stop reason: DRUGHIGH

## 2018-03-03 NOTE — PATIENT INSTRUCTIONS
Diabetes (General Information)  Diabetes is a long-term health problem. It means your body does not make enough insulin. Or it may mean that your body cannot use the insulin it makes. Insulin is a hormone in your body. It lets blood sugar (glucose) reach the cells in your body. All of your cells need glucose for fuel.  When you have diabetes, the glucose in your blood builds up because it cannot get into the cells. This buildup is called high blood sugar (hyperglycemia).  Your blood sugar level depends on several things. It depends on what kind of food you eat and how much of it you eat. It also depends on how much exercise you get, and how much insulin you have in your body. Eating too much of the wrong kinds of food or not taking diabetes medicine on time can cause high blood sugar. Infections can cause high blood sugar even if you are taking medicines correctly.  These things can also cause low blood sugar:  · Missing meals  · Not eating enough food  · Taking too much diabetes medicine  Diabetes can cause serious problems over time if you do not get treated. These problems include heart disease, stroke, kidney failure, and blindness. They also include nerve pain or loss of feeling in your legs and feet, and gangrene of the feet. By keeping your blood sugar under control you can prevent or delay these problems.  Normal blood sugar levels are 80 to 100 before a meal and less than 180 in the 1 to 2 hours after a meal.  Home care  Follow these guidelines when caring for yourself at home:  · Follow the diet your healthcare provider gives you. Take insulin or other diabetes medicine exactly as told to.  · Watch your blood sugar as you are told to. Keep a log of your results. This will help your provider change your medicines to keep your blood sugar under control.  · Try to reach your ideal weight. You may be able to cut back on or not have to take diabetes medicine if you eat the right foods and get exercise.  · Do  not smoke. Smoking worsens the effects of diabetes on your circulation. You are much more likely to have a heart attack if you have diabetes and you smoke.  · Take good care of your feet. If you have lost feeling in your feet, you may not see an injury or infection. Check your feet and between your toes at least once a week.  · Wear a medical alert bracelet or necklace, or carry a card in your wallet that says you have diabetes. This will help healthcare providers give you the right care if you get very ill and cannot tell them that you have diabetes.  Sick day plan  If you get a cold, the flu, or a bacterial or viral infection, take these steps:  · Look at your diabetes sick plan and call your healthcare provider as you were told to. You may need to call your provider right away if:  ¨ Your blood sugar is above 240 while taking your diabetes medicine  ¨ Your urine ketone levels are above normal or high  ¨ You have been vomiting more than 6 hours  ¨ You have trouble breathing or your breath ha s a fruity smell  ¨ You have a high fever  ¨ You have a fever for several days and you are not getting better  ¨ You get light-headed and are sleepier than usual  · Keep taking your diabetes pills (oral medicine) even if you have been vomiting and are feeling sick. Call your provider right away because you may need insulin to lower your blood sugar until you recover from your illness.  · Keep taking your insulin even if you have been vomiting and are feeling sick. Call your provider right away to ask if you need to change your insulin dose. This will depend on your blood sugar results.  · Check your blood sugar every 2 to 4 hours, or at least 4 times a day.  · Check your ketones often. If you are vomiting and having diarrhea, watch them more often.  · Do not skip meals. Try to eat small meals on a regular schedule. Do this even if you do not feel like eating.  · Drink water or other liquids that do not have caffeine or  calories. This will keep you from getting dehydrated. If you are nauseated or vomiting, takes small sips every 5 minutes. To prevent dehydration try to drink a cup (8 ounces) of fluids every hour while you are awake.  General care  Always bring a source of fast-acting sugar with you in case you have symptoms of low blood sugar (below 70). At the first sign of low blood sugar, eat or drink 15 to 20 grams of fast-acting sugar to raise your blood sugar. Examples are:  · 3 to 4 glucose tablets. You can buy these at most [employer].  · 4 ounces (1/2 cup) of regular (not diet) soft drinks  · 4 ounces (1/2 cup) of any fruit juice  · 8 ounces (1 cup) of milk  · 5 to 6 pieces of hard candy  · 1 tablespoon of honey  Check your blood sugar 15 minutes after treating yourself. If it is still below 70, take 15 to 20 more grams of fast-acting sugar. Test again in 15 minutes. If it returns to normal (70 or above), eat a snack or meal to keep your blood sugar in a safe range. If it stays low, call your doctor or go to an emergency room.  Follow-up care  Follow-up with your healthcare provider, or as advised. For more information about diabetes, visit the American Diabetes Association website at www.diabetes.org or call 261-860-2974.  When to seek medical advice  Call your healthcare provider right away if you have any of these symptoms of high blood sugar:  · Frequent urination  · Dizziness  · Drowsiness  · Thirst  · Headache  · Nausea or vomiting  · Abdominal pain  · Eyesight changes  · Fast breathing  · Confusion or loss of consciousness  Also call your provider right away if you have any of these signs of low blood sugar:  · Fatigue  · Headache  · Shakes  · Excess sweating  · Hunger  · Feeling anxious or restless  · Eyesight changes  · Drowsiness  · Weakness  · Confusion or loss of consciousness  Call 911  Call for emergency help right away if any of these occur:  · Chest pain or shortness of breath  · Dizziness or  fainting  · Weakness of an arm or leg or one side of the face  · Trouble speaking or seeing   Date Last Reviewed: 6/1/2016  © 8597-6178 [employer]. [address] [place] [address], [place] [address]. All rights reserved. This information is not intended as a substitute for professional medical care. Always follow your healthcare professional's instructions.        Walking for Fitness  Fitness walking has something for everyone, even people who are already fit. Walking is one of the safest ways to condition your body aerobically. It can boost energy, help you lose weight, and reduce stress.    Physical benefits  · Walking strengthens your heart and lungs, and tones your muscles.  · When walking, your feet land with less impact than in other sports. This reduces chances of muscle, bone, and joint injury.  · Regular walking improves your cholesterol levels and lowers your risk of heart disease. And it helps you control your blood sugar if you have diabetes.  · Walking is a weight-bearing activity, which helps maintain bone density. This can help prevent osteoporosis.  Personal rewards  · Taking walks can help you relax and manage stress. And fitness walking may make you feel better about yourself.  · Walking can help you sleep better at night and make you less likely to be depressed.  · Regular walking may help maintain your memory as you get older.  · Walking is a great way to spend extra time with friends and family members. Be sure to invite your dog along!  Q&A about fitness walking  Q: Will walking keep me fit?  A: Yes. Regular walking at the right pace gives you all the benefits of other aerobic activities, such as jogging and swimming.  Q: Will walking help me lose weight and keep it off?  A: Yes. Per mile, walking can burn as many calories as jogging. Your health care provider can help work walking into your weight-loss plan.  Q: Is walking safe for my health?  A: Yes. Walking is safe if you have high  blood pressure, diabetes, heart disease, or other conditions. Talk to your healthcare provider before you start.  Date Last Reviewed: 4/1/2017 © 2000-2017 [employer]. [address] [place] [address], [place] [address]. All rights reserved. This information is not intended as a substitute for professional medical care. Always follow your healthcare professional's instructions.        Weight Management: Getting Started  Healthy bodies come in all shapes and sizes. Not all bodies are made to be thin. For some people, a healthy weight is higher than the average weight listed on weight charts. Your healthcare provider can help you decide on a healthy weight for you.    Reasons to lose weight  Losing weight can help with some health problems, such as high blood pressure, heart disease, diabetes, sleep apnea, and arthritis. You may also feel more energy.  Set your long-term goal  Your goal doesn't even have to be a specific weight. You may decide on a fitness goal (such as being able to walk 10 miles a week), or a health goal (such as lowering your blood pressure). Choose a goal that is measurable and reasonable, so you know when you've reached it. A goal of reaching a BMI of less than 25 is not always reasonable (or possible).   Make an action plan  Habits dont change overnight. Setting your goals too high can leave you feeling discouraged if you cant reach them. Be realistic. Choose one or two small changes you can make now. Set an action plan for how you are going to make these changes. When you can stick to this plan, keep making a few more small changes. Taking small steps will help you stay on the path to success.  Track your progress  Write down your goals. Then, keep a daily record of your progress. Write down what you eat and how active you are. This record lets you look back on how much youve done. It may also help when youre feeling frustrated. Reward yourself for success. Even if you dont reach  every goal, give yourself credit for what you do get done.  Get support  Encouragement from others can help make losing weight easier. Ask your family members and friends for support. They may even want to join you. Also look to your healthcare provider, registered dietitian, and  for help. Your local hospital can give you more information about nutrition, exercise, and weight loss.  Date Last Reviewed: 1/31/2016  © 7935-7774 [employer]. [address] [place] [address], [place] [address]. All rights reserved. This information is not intended as a substitute for professional medical care. Always follow your healthcare professional's instructions.

## 2018-03-03 NOTE — PROGRESS NOTES
Subjective:       Patient ID: Toni Hyde is a 62 y.o. male.    Chief Complaint: Diabetes    62-year-old male coming in to discuss poorly controlled diabetes with his A1c going from 6.5-8.6 recently.  He had injured his plantar fascia on the right foot and is been out of his exercise program for much of the last 6 months.  He's been working with orthopedics and physical therapy and is now back to the point where he can start using an elliptical and hopes to begin running again soon after.  His diet hasn't changed but he is noted to have picked up 4.8 pounds since he was last seen in July.    Past Medical History:  No date: Cataract  No date: Cataract of left eye  No date: Diabetes mellitus type II      Comment: on po meds  No date: Hyperlipidemia      Comment: not on meds at present  No date: Hypertension  04/2017: Kidney stones  No date: Left ureteral stone  10/2017: Plantar fasciitis of right foot      Comment: Dr Colorado   01/2018: Tendonitis      Comment: right foot Dr Colorado  No date: Wears glasses    Past Surgical History:  No date: ADENOIDECTOMY  No date: CATARACT EXTRACTION  3/23/2010: COLONOSCOPY      Comment: Dr. Blackburn, hyperplastic polyps, otherwise                negative, 10 year recheck  04/12/2017: CYSTOSCOPY Left      Comment: with ureteral stent-Dr. Noel  05/06/2013: EYE SURGERY      Comment: Dr Nielsen  04/2017: kidney stent   No date: KNEE ARTHROSCOPY W/ DEBRIDEMENT      Comment: right with lateral release   04/19/2017: LITHOTRIPSY  No date: NASAL SINUS SURGERY  5/2013: retna surgery       Comment: Dr Peralta  No date: TONSILLECTOMY      Current Outpatient Prescriptions:     allopurinol (ZYLOPRIM) 100 MG tablet, Take 100 mg by mouth once daily. , Disp: , Rfl:     amlodipine (NORVASC) 2.5 MG tablet, Take 1 tablet (2.5 mg total) by mouth once daily., Disp: 90 tablet, Rfl: 3    benazepril (LOTENSIN) 40 MG tablet, Take 1 tablet (40 mg total) by mouth once daily., Disp: 90 tablet, Rfl: 3     cetirizine (ZYRTEC) 10 MG tablet, Take 10 mg by mouth daily as needed. 1 Tablet(s) Oral PRN ., Disp: , Rfl:     diphenhydrAMINE (BENADRYL) 25 mg capsule, Take 25 mg by mouth nightly as needed for Itching., Disp: , Rfl:     fluticasone (FLONASE) 50 mcg/actuation nasal spray, 2 sprays by Each Nare route daily as needed. , Disp: , Rfl:     ibuprofen (ADVIL) 200 MG tablet, Take 400 mg by mouth every 6 (six) hours as needed. 2 Tablet(s) Oral PRN Every day., Disp: , Rfl:     metFORMIN (GLUCOPHAGE-XR) 750 MG 24 hr tablet, Take 1 tablet (750 mg total) by mouth 1 (one) times daily with meals., Disp: 90 tablet, Rfl: 1    potassium citrate 15 mEq TbSR, Take 1 tablet by mouth once daily at 6am., Disp: 100 tablet, Rfl: 3    pravastatin (PRAVACHOL) 80 MG tablet, Take 1 tablet (80 mg total) by mouth once daily., Disp: 90 tablet, Rfl: 3    There are no preventive care reminders to display for this patient.        Diabetes   He has type 2 diabetes mellitus. No MedicAlert identification noted. The initial diagnosis of diabetes was made 8 years ago. Pertinent negatives for hypoglycemia include no confusion, dizziness, headaches, hunger, mood changes, nervousness/anxiousness, pallor, seizures, sleepiness, speech difficulty, sweats or tremors. Associated symptoms include foot paresthesias. Pertinent negatives for diabetes include no blurred vision, no chest pain, no fatigue, no foot ulcerations, no polydipsia, no polyphagia, no polyuria, no visual change, no weakness and no weight loss. Pertinent negatives for hypoglycemia complications include no blackouts, no hospitalization, no nocturnal hypoglycemia, no required assistance and no required glucagon injection. Symptoms are stable. Diabetic complications include retinopathy. Pertinent negatives for diabetic complications include no autonomic neuropathy, CVA, heart disease, impotence, nephropathy, peripheral neuropathy or PVD. Risk factors for coronary artery disease include  dyslipidemia, hypertension, obesity, diabetes mellitus and male sex. Current diabetic treatment includes oral agent (monotherapy). He is compliant with treatment most of the time. His weight is increasing steadily. He is following a generally unhealthy diet. Meal planning includes calorie counting. He has not had a previous visit with a dietitian. He participates in exercise intermittently. He monitors blood glucose at home 1-2 x per week. Blood glucose monitoring compliance is adequate. His home blood glucose trend is increasing steadily. He does not see a podiatrist.Eye exam is current.     Review of Systems   Constitutional: Negative for fatigue and weight loss.   Eyes: Negative for blurred vision.   Cardiovascular: Negative for chest pain.   Endocrine: Negative for polydipsia, polyphagia and polyuria.   Genitourinary: Negative for impotence.   Musculoskeletal: Positive for arthralgias and gait problem.   Skin: Negative for pallor.   Neurological: Negative for dizziness, tremors, seizures, speech difficulty, weakness and headaches.   Psychiatric/Behavioral: Negative for confusion. The patient is not nervous/anxious.        Objective:      Physical Exam   Constitutional: He is oriented to person, place, and time. He appears well-developed. No distress.   Good blood pressure control  Obese with BMI 30.8 his weight is up 4.8 pounds from his last visit July 14, 2017   Cardiovascular: Normal rate and regular rhythm.    Neurological: He is alert and oriented to person, place, and time.   Skin: He is not diaphoretic.   Nursing note and vitals reviewed.      Assessment:       1. Plantar fasciitis    2. Uncontrolled type 2 diabetes mellitus without complication, without long-term current use of insulin        Plan:       1. Uncontrolled type 2 diabetes mellitus without complication, without long-term current use of insulin  Patient will resume his exercise program, encouraged adequate stretching and slow increase in  intensity and duration.  He will increase metformin to 750 mg twice a day and will reassess A1c and his lipid panel in 3 months  - metFORMIN (GLUCOPHAGE-XR) 750 MG 24 hr tablet; Take 1 tablet (750 mg total) by mouth 2 (two) times daily with meals.  Dispense: 180 tablet; Refill: 1  - Hemoglobin A1c; Future  - Lipid panel; Future    2. Plantar fasciitis  Improved         Patient readiness: acceptance and barriers:readiness    During the course of the visit the patient was educated and counseled about the following:     Diabetes:  Discussed general issues about diabetes pathophysiology and management.  Encouraged aerobic exercise.  Discussed foot care.  Increased dose of metformin; see  medication orders.  Hypertension:   Medication: no change.  Dietary sodium restriction.  Regular aerobic exercise.  Obesity:   General weight loss/lifestyle modification strategies discussed (elicit support from others; identify saboteurs; non-food rewards, etc).  Informal exercise measures discussed, e.g. taking stairs instead of elevator.  Regular aerobic exercise program discussed.    Goals: Diabetes: Maintain Hemoglobin A1C below 7, Hypertension: Reduce Blood Pressure and Obesity: Reduce calorie intake and BMI    Did patient meet goals/outcomes: No    The following self management tools provided: blood pressure log  blood glucose log  excercise log    Patient Instructions (the written plan) was given to the patient/family.     Time spent with patient: 30 minutes

## 2018-05-04 ENCOUNTER — LAB VISIT (OUTPATIENT)
Dept: LAB | Facility: HOSPITAL | Age: 63
End: 2018-05-04
Attending: FAMILY MEDICINE
Payer: OTHER GOVERNMENT

## 2018-05-04 LAB
CHOLEST SERPL-MCNC: 134 MG/DL
CHOLEST/HDLC SERPL: 3.6 {RATIO}
ESTIMATED AVG GLUCOSE: 203 MG/DL
HBA1C MFR BLD HPLC: 8.7 %
HDLC SERPL-MCNC: 37 MG/DL
HDLC SERPL: 27.6 %
LDLC SERPL CALC-MCNC: 78 MG/DL
NONHDLC SERPL-MCNC: 97 MG/DL
TRIGL SERPL-MCNC: 95 MG/DL

## 2018-05-04 PROCEDURE — 80061 LIPID PANEL: CPT

## 2018-05-04 PROCEDURE — 36415 COLL VENOUS BLD VENIPUNCTURE: CPT | Mod: PO

## 2018-05-04 PROCEDURE — 83036 HEMOGLOBIN GLYCOSYLATED A1C: CPT

## 2018-05-14 ENCOUNTER — TELEPHONE (OUTPATIENT)
Dept: UROLOGY | Facility: CLINIC | Age: 63
End: 2018-05-14

## 2018-05-14 NOTE — TELEPHONE ENCOUNTER
----- Message from Radha Jacobs sent at 5/14/2018  7:51 AM CDT -----  Contact: self  Patient [phone] is calling/received his letter to call and schedule his annual appt for June 2018 but the first available appt that I show is 09 13 18/patient does not want to wait that long/please call patient to schedule

## 2018-05-16 DIAGNOSIS — E11.9 TYPE 2 DIABETES MELLITUS WITHOUT COMPLICATION, WITHOUT LONG-TERM CURRENT USE OF INSULIN: Primary | ICD-10-CM

## 2018-05-18 ENCOUNTER — OFFICE VISIT (OUTPATIENT)
Dept: FAMILY MEDICINE | Facility: CLINIC | Age: 63
End: 2018-05-18
Attending: FAMILY MEDICINE
Payer: OTHER GOVERNMENT

## 2018-05-18 VITALS
SYSTOLIC BLOOD PRESSURE: 124 MMHG | RESPIRATION RATE: 16 BRPM | OXYGEN SATURATION: 94 % | BODY MASS INDEX: 31.06 KG/M2 | HEIGHT: 70 IN | DIASTOLIC BLOOD PRESSURE: 70 MMHG | TEMPERATURE: 98 F | HEART RATE: 68 BPM | WEIGHT: 216.94 LBS

## 2018-05-18 DIAGNOSIS — E11.59 HYPERTENSION ASSOCIATED WITH DIABETES: ICD-10-CM

## 2018-05-18 DIAGNOSIS — E78.5 HYPERLIPIDEMIA ASSOCIATED WITH TYPE 2 DIABETES MELLITUS: ICD-10-CM

## 2018-05-18 DIAGNOSIS — I10 GOOD HYPERTENSION CONTROL: ICD-10-CM

## 2018-05-18 DIAGNOSIS — E11.69 HYPERLIPIDEMIA ASSOCIATED WITH TYPE 2 DIABETES MELLITUS: ICD-10-CM

## 2018-05-18 DIAGNOSIS — I15.2 HYPERTENSION ASSOCIATED WITH DIABETES: ICD-10-CM

## 2018-05-18 PROCEDURE — 99213 OFFICE O/P EST LOW 20 MIN: CPT | Mod: S$PBB,,, | Performed by: FAMILY MEDICINE

## 2018-05-18 PROCEDURE — 99999 PR PBB SHADOW E&M-EST. PATIENT-LVL III: CPT | Mod: PBBFAC,,, | Performed by: FAMILY MEDICINE

## 2018-05-18 PROCEDURE — 99213 OFFICE O/P EST LOW 20 MIN: CPT | Mod: PBBFAC,PO | Performed by: FAMILY MEDICINE

## 2018-05-18 RX ORDER — PRAVASTATIN SODIUM 80 MG/1
80 TABLET ORAL DAILY
Qty: 90 TABLET | Refills: 3 | Status: SHIPPED | OUTPATIENT
Start: 2018-05-18 | End: 2019-07-04 | Stop reason: SDUPTHER

## 2018-05-18 NOTE — PROGRESS NOTES
Subjective:       Patient ID: Toni Hyde is a 63 y.o. male.    Chief Complaint: Follow-up    63-year-old male coming in to follow-up on diabetes with an A1c increasing to 8.7 in spite of an increase in metformin.  He has not been able to start his exercise program as he had intended due to the problems with his plantar fasciitis.  It is now starting to improve and he is cautiously using an elliptical but has not been able to resume his full exercise program.  He recently had a root canal and is on amoxicillin and metronidazole.  He has been cautioned about the alcohol connection with metronidazole.    Past Medical History:  No date: Cataract  No date: Cataract of left eye  No date: Diabetes mellitus type II      Comment: on po meds  No date: Hyperlipidemia      Comment: not on meds at present  No date: Hypertension  04/2017: Kidney stones  No date: Left ureteral stone  10/2017: Plantar fasciitis of right foot      Comment: Dr Colorado   01/2018: Tendonitis      Comment: right foot Dr Colorado  No date: Wears glasses    Past Surgical History:  No date: ADENOIDECTOMY  No date: CATARACT EXTRACTION  3/23/2010: COLONOSCOPY      Comment: Dr. Blackburn, hyperplastic polyps, otherwise                negative, 10 year recheck  04/12/2017: CYSTOSCOPY Left      Comment: with ureteral stent-Dr. Noel  05/06/2013: EYE SURGERY      Comment: Dr Nielsen  04/2017: kidney stent   No date: KNEE ARTHROSCOPY W/ DEBRIDEMENT      Comment: right with lateral release   04/19/2017: LITHOTRIPSY  No date: NASAL SINUS SURGERY  5/2013: retna surgery       Comment: Dr Peralta  No date: TONSILLECTOMY      Current Outpatient Prescriptions:     allopurinol (ZYLOPRIM) 100 MG tablet, Take 100 mg by mouth once daily. , Disp: , Rfl:     amlodipine (NORVASC) 2.5 MG tablet, Take 1 tablet (2.5 mg total) by mouth once daily., Disp: 90 tablet, Rfl: 3    benazepril (LOTENSIN) 40 MG tablet, Take 1 tablet (40 mg total) by mouth once daily., Disp: 90 tablet,  Rfl: 3    cetirizine (ZYRTEC) 10 MG tablet, Take 10 mg by mouth daily as needed. 1 Tablet(s) Oral PRN ., Disp: , Rfl:     diphenhydrAMINE (BENADRYL) 25 mg capsule, Take 25 mg by mouth nightly as needed for Itching., Disp: , Rfl:     fluticasone (FLONASE) 50 mcg/actuation nasal spray, 2 sprays by Each Nare route daily as needed. , Disp: , Rfl:     ibuprofen (ADVIL) 200 MG tablet, Take 400 mg by mouth every 6 (six) hours as needed. 2 Tablet(s) Oral PRN Every day., Disp: , Rfl:     metFORMIN (GLUCOPHAGE-XR) 750 MG 24 hr tablet, Take 1 tablet (750 mg total) by mouth 2 (two) times daily with meals., Disp: 180 tablet, Rfl: 1    potassium citrate 15 mEq TbSR, Take 1 tablet by mouth once daily at 6am., Disp: 100 tablet, Rfl: 3    pravastatin (PRAVACHOL) 80 MG tablet, Take 1 tablet (80 mg total) by mouth once daily., Disp: 90 tablet, Rfl: 3        Diabetes   He has type 2 diabetes mellitus. No MedicAlert identification noted. The initial diagnosis of diabetes was made 9 years ago. Pertinent negatives for hypoglycemia include no confusion, dizziness, headaches, hunger, mood changes, pallor, seizures, sleepiness, speech difficulty, sweats or tremors. Pertinent negatives for diabetes include no blurred vision, no chest pain, no fatigue, no foot paresthesias, no foot ulcerations, no polydipsia, no polyphagia, no polyuria, no visual change, no weakness and no weight loss. Pertinent negatives for hypoglycemia complications include no blackouts, no hospitalization, no nocturnal hypoglycemia, no required assistance and no required glucagon injection. Symptoms are stable. Diabetic complications include retinopathy. Pertinent negatives for diabetic complications include no autonomic neuropathy, CVA, impotence, nephropathy or PVD. Risk factors for coronary artery disease include dyslipidemia, hypertension and obesity. Current diabetic treatment includes oral agent (monotherapy). He is compliant with treatment some of the time.  His weight is stable. He is following a generally healthy diet. When asked about meal planning, he reported none. He has not had a previous visit with a dietitian. He participates in exercise every other day. Blood glucose monitoring compliance is adequate. His home blood glucose trend is increasing steadily. He does not see a podiatrist.Eye exam is current.     Review of Systems   Constitutional: Positive for activity change. Negative for fatigue and weight loss.   Eyes: Negative for blurred vision.   Cardiovascular: Negative for chest pain.   Endocrine: Negative for polydipsia, polyphagia and polyuria.   Genitourinary: Negative for impotence.   Skin: Negative for pallor.   Neurological: Negative for dizziness, tremors, seizures, speech difficulty, weakness and headaches.   Psychiatric/Behavioral: Negative for confusion.       Objective:      Physical Exam   Constitutional: He is oriented to person, place, and time. He appears well-developed. No distress.   Good blood pressure control  Obese with a BMI of 31.1 use up 2.4 pounds from his last visit March 3, 2018   Neurological: He is alert and oriented to person, place, and time.   Skin: He is not diaphoretic.   Psychiatric: He has a normal mood and affect. His behavior is normal. Thought content normal.   Nursing note and vitals reviewed.      Assessment:       1. Uncontrolled type 2 diabetes mellitus without complication, without long-term current use of insulin    2. Good hypertension control    3. Hypertension associated with diabetes    4. Hyperlipidemia associated with type 2 diabetes mellitus        Plan:       1. Uncontrolled type 2 diabetes mellitus without complication, without long-term current use of insulin  Discussed options including adding Januvia, Trulicity or Tanzeum, or using a sulfonylurea.  Drawbacks to sulfonylurea would include potential for weight gain, increased risk of hypoglycemia, and tendency to lose effect over time.  He is willing to  try the Tanzeum which appears to be covered on his insurance.  Will schedule an A1c in about 10 weeks before he needs to do a refill and we can increase from 30-50 mg if needed.  Will schedule an office visit to follow the lab and do his foot exam at that time.  - albiglutide (TANZEUM) 30 mg/0.5 mL PnIj; Inject 30 mg into the skin every 7 days.  Dispense: 12 each; Refill: 1  - Hemoglobin A1c; Future    2. Good hypertension control  No changes needed    3. Hypertension associated with diabetes    4. Hyperlipidemia associated with type 2 diabetes mellitus  Lipid control is good, no changes needed, refills for one year sent to mail-order pharmacy  - pravastatin (PRAVACHOL) 80 MG tablet; Take 1 tablet (80 mg total) by mouth once daily.  Dispense: 90 tablet; Refill: 3         Patient readiness: acceptance and barriers:none    During the course of the visit the patient was educated and counseled about the following:     Diabetes:  Discussed general issues about diabetes pathophysiology and management.  Hypertension:   Medication: no change.  Dietary sodium restriction.  Regular aerobic exercise.  Obesity:   General weight loss/lifestyle modification strategies discussed (elicit support from others; identify saboteurs; non-food rewards, etc).  Informal exercise measures discussed, e.g. taking stairs instead of elevator.  Regular aerobic exercise program discussed.    Goals: Diabetes: Maintain Hemoglobin A1C below 7, Hypertension: Reduce Blood Pressure and Obesity: Reduce calorie intake and BMI    Did patient meet goals/outcomes: No    The following self management tools provided: blood pressure log  blood glucose log  excercise log    Patient Instructions (the written plan) was given to the patient/family.     Time spent with patient: 30 minutes

## 2018-05-24 ENCOUNTER — TELEPHONE (OUTPATIENT)
Dept: FAMILY MEDICINE | Facility: CLINIC | Age: 63
End: 2018-05-24

## 2018-06-01 DIAGNOSIS — I10 ESSENTIAL HYPERTENSION: ICD-10-CM

## 2018-06-01 RX ORDER — AMLODIPINE BESYLATE 2.5 MG/1
TABLET ORAL
Qty: 90 TABLET | Refills: 3 | Status: SHIPPED | OUTPATIENT
Start: 2018-06-01 | End: 2019-05-25 | Stop reason: SDUPTHER

## 2018-06-01 RX ORDER — BENAZEPRIL HYDROCHLORIDE 40 MG/1
TABLET ORAL
Qty: 90 TABLET | Refills: 3 | Status: SHIPPED | OUTPATIENT
Start: 2018-06-01 | End: 2019-05-27 | Stop reason: ALTCHOICE

## 2018-06-04 RX ORDER — ALLOPURINOL 100 MG/1
TABLET ORAL
Qty: 90 TABLET | Refills: 3 | Status: SHIPPED | OUTPATIENT
Start: 2018-06-04 | End: 2019-05-25 | Stop reason: SDUPTHER

## 2018-06-04 RX ORDER — POTASSIUM CITRATE 15 MEQ/1
TABLET, EXTENDED RELEASE ORAL
Qty: 100 TABLET | Refills: 3 | Status: SHIPPED | OUTPATIENT
Start: 2018-06-04 | End: 2019-05-29 | Stop reason: SDUPTHER

## 2018-06-15 ENCOUNTER — OFFICE VISIT (OUTPATIENT)
Dept: UROLOGY | Facility: CLINIC | Age: 63
End: 2018-06-15
Payer: OTHER GOVERNMENT

## 2018-06-15 ENCOUNTER — HOSPITAL ENCOUNTER (OUTPATIENT)
Dept: RADIOLOGY | Facility: HOSPITAL | Age: 63
Discharge: HOME | End: 2018-06-15
Attending: UROLOGY
Payer: OTHER GOVERNMENT

## 2018-06-15 VITALS
HEART RATE: 73 BPM | BODY MASS INDEX: 30.24 KG/M2 | WEIGHT: 216 LBS | SYSTOLIC BLOOD PRESSURE: 115 MMHG | HEIGHT: 71 IN | DIASTOLIC BLOOD PRESSURE: 76 MMHG

## 2018-06-15 DIAGNOSIS — N40.0 BENIGN PROSTATIC HYPERPLASIA WITHOUT LOWER URINARY TRACT SYMPTOMS: ICD-10-CM

## 2018-06-15 DIAGNOSIS — N20.0 KIDNEY STONES: ICD-10-CM

## 2018-06-15 DIAGNOSIS — N20.0 KIDNEY STONES: Primary | ICD-10-CM

## 2018-06-15 LAB
BILIRUB SERPL-MCNC: NEGATIVE MG/DL
BLOOD URINE, POC: NEGATIVE
COLOR, POC UA: ABNORMAL
GLUCOSE UR QL STRIP: 250
KETONES UR QL STRIP: NEGATIVE
LEUKOCYTE ESTERASE URINE, POC: NEGATIVE
NITRITE, POC UA: NEGATIVE
PH, POC UA: 5
PROTEIN, POC: NEGATIVE
SPECIFIC GRAVITY, POC UA: 1020
UROBILINOGEN, POC UA: NEGATIVE

## 2018-06-15 PROCEDURE — 99213 OFFICE O/P EST LOW 20 MIN: CPT | Mod: 25,PBBFAC | Performed by: UROLOGY

## 2018-06-15 PROCEDURE — 99214 OFFICE O/P EST MOD 30 MIN: CPT | Mod: S$PBB,,, | Performed by: UROLOGY

## 2018-06-15 PROCEDURE — 99999 PR PBB SHADOW E&M-EST. PATIENT-LVL III: CPT | Mod: 25,PBBFAC,, | Performed by: UROLOGY

## 2018-06-15 PROCEDURE — 81002 URINALYSIS NONAUTO W/O SCOPE: CPT | Mod: PBBFAC | Performed by: UROLOGY

## 2018-06-15 PROCEDURE — 74176 CT ABD & PELVIS W/O CONTRAST: CPT | Mod: 26,,, | Performed by: RADIOLOGY

## 2018-06-15 PROCEDURE — 74176 CT ABD & PELVIS W/O CONTRAST: CPT | Mod: TC

## 2018-06-15 NOTE — PROGRESS NOTES
Subjective:       Patient ID: Toni Hyde is a 63 y.o. male.    Chief Complaint:   Dictation #1  MRN:[id number]  CSN:[id number]       HPI  Review of Systems   Constitutional: Negative for activity change and appetite change.   HENT: Negative.    Eyes: Negative for discharge.   Respiratory: Negative for cough and shortness of breath.    Cardiovascular: Negative for chest pain and palpitations.   Gastrointestinal: Negative for abdominal distention, abdominal pain, constipation and vomiting.   Genitourinary: Positive for flank pain and hematuria. Negative for discharge, dysuria, frequency, testicular pain and urgency.   Musculoskeletal: Negative for arthralgias.   Skin: Negative for rash.   Neurological: Negative for dizziness.   Psychiatric/Behavioral: The patient is not nervous/anxious.        Objective:      Physical Exam   Constitutional: He appears well-developed and well-nourished.   HENT:   Head: Normocephalic.   Eyes: Pupils are equal, round, and reactive to light.   Neck: Normal range of motion.   Cardiovascular: Normal rate.    Pulmonary/Chest: Effort normal.   Abdominal: Soft. He exhibits no distension and no mass. There is no tenderness.   Genitourinary: Rectum normal, prostate normal and penis normal. Rectal exam shows no external hemorrhoid, no mass and no tenderness. Prostate is not enlarged and not tender. Right testis shows no mass and no tenderness. Left testis shows no mass and no tenderness. No discharge found.       Musculoskeletal: Normal range of motion.   Neurological: He is alert.   Skin: Skin is warm.     Psychiatric: He has a normal mood and affect.       Assessment:       1. Kidney stones    2. Benign prostatic hyperplasia without lower urinary tract symptoms        Plan:       Kidney stones  -     POCT URINE DIPSTICK WITHOUT MICROSCOPE  -     CT Abdomen Pelvis  Without Contrast; Future; Expected date: 06/27/2018    Benign prostatic hyperplasia without lower urinary tract symptoms  -      Prostate Specific Antigen, Diagnostic; Future; Expected date: 06/15/2018    Patient to be informed of the results. RTC in 1 yr if above tests are normal.

## 2018-06-16 NOTE — H&P
OFFICE NOTE    DATE OF VISIT:  06/15/2018    CHIEF COMPLAINT:  History of uric acid urolithiasis.  Mild BPH.    Mr. Hyde is a 63-year-old male last seen in our office by Ms. Yulia Pitts on 06/30/2017.  The patient did develop right flank pain and  hematuria at that time.  Since then, those symptoms have completely  resolved.  The patient has been referred to my office for further  evaluation and possible treatments.  The patient referred that in 2017, he  underwent ESWL.  He did have some episode of renal colic with gross  hematuria.  The patient have not noticed passage of any significant stone.   We did a CAT scan a year ago that shows that the patient has a 5-mm stone  in the right ureter and since then, the patient referred to have not passed  a stone.  The pain is intermittent on his right side.    He is referred to have nocturia x1.  No dysuria, hematuria on and off.   Denies any nausea or vomiting.  Denies any fever.  Denies any difficulty  emptying the bladder.    FAMILY HISTORY:  Negative for prostate cancer and is also negative for  urolithiasis.    The PSA on 05/19/2017 was 0.54.  The PSA today is 0.51.  The urinalysis  today is clear, but has glucose in it.  The patient is a known diabetic.        EOR/IN dd: 06/15/2018 13:22:10 (CDT)   td: 06/15/2018 22:04:01 (CDT)  Doc ID #[id number]   Job ID #[id number]    CC:

## 2018-06-18 DIAGNOSIS — N20.0 KIDNEY STONES: Primary | ICD-10-CM

## 2018-06-19 ENCOUNTER — HOSPITAL ENCOUNTER (OUTPATIENT)
Dept: RADIOLOGY | Facility: CLINIC | Age: 63
Discharge: HOME | End: 2018-06-19
Attending: UROLOGY
Payer: OTHER GOVERNMENT

## 2018-06-19 ENCOUNTER — OFFICE VISIT (OUTPATIENT)
Dept: FAMILY MEDICINE | Facility: CLINIC | Age: 63
End: 2018-06-19
Payer: OTHER GOVERNMENT

## 2018-06-19 VITALS
WEIGHT: 216 LBS | TEMPERATURE: 99 F | HEIGHT: 71 IN | DIASTOLIC BLOOD PRESSURE: 83 MMHG | BODY MASS INDEX: 30.24 KG/M2 | HEART RATE: 75 BPM | SYSTOLIC BLOOD PRESSURE: 120 MMHG

## 2018-06-19 DIAGNOSIS — J02.9 SORE THROAT: ICD-10-CM

## 2018-06-19 DIAGNOSIS — J01.00 ACUTE NON-RECURRENT MAXILLARY SINUSITIS: Primary | ICD-10-CM

## 2018-06-19 DIAGNOSIS — N20.0 KIDNEY STONES: ICD-10-CM

## 2018-06-19 DIAGNOSIS — I10 ESSENTIAL HYPERTENSION: ICD-10-CM

## 2018-06-19 DIAGNOSIS — R05.9 COUGH: ICD-10-CM

## 2018-06-19 DIAGNOSIS — E11.65 TYPE 2 DIABETES MELLITUS WITH HYPERGLYCEMIA, WITHOUT LONG-TERM CURRENT USE OF INSULIN: ICD-10-CM

## 2018-06-19 PROCEDURE — 74018 RADEX ABDOMEN 1 VIEW: CPT | Mod: 26,,, | Performed by: RADIOLOGY

## 2018-06-19 PROCEDURE — 99999 PR PBB SHADOW E&M-EST. PATIENT-LVL IV: CPT | Mod: PBBFAC,,, | Performed by: NURSE PRACTITIONER

## 2018-06-19 PROCEDURE — 74018 RADEX ABDOMEN 1 VIEW: CPT | Mod: TC,FY,PO

## 2018-06-19 PROCEDURE — 99214 OFFICE O/P EST MOD 30 MIN: CPT | Mod: S$PBB,,, | Performed by: NURSE PRACTITIONER

## 2018-06-19 PROCEDURE — 99214 OFFICE O/P EST MOD 30 MIN: CPT | Mod: PBBFAC,25,PO | Performed by: NURSE PRACTITIONER

## 2018-06-19 RX ORDER — AZITHROMYCIN 250 MG/1
TABLET, FILM COATED ORAL
Qty: 6 TABLET | Refills: 0 | Status: SHIPPED | OUTPATIENT
Start: 2018-06-19 | End: 2018-06-24

## 2018-06-19 RX ORDER — PROMETHAZINE HYDROCHLORIDE AND DEXTROMETHORPHAN HYDROBROMIDE 6.25; 15 MG/5ML; MG/5ML
5 SYRUP ORAL
Qty: 240 ML | Refills: 0 | Status: SHIPPED | OUTPATIENT
Start: 2018-06-19 | End: 2018-08-29

## 2018-06-19 NOTE — PROGRESS NOTES
Subjective:       Patient ID: Toni Hyde is a 63 y.o. male.    Chief Complaint: Sore Throat    HPI   Chief Complaint  Chief Complaint   Patient presents with    Sore Throat       HPI  Toni Hyde is a 63 y.o. male with medical diagnoses as listed in the medical history and problem list that presents with c/o sore throat for 3 days.  States sore throat started 3 days ago and is not improving, worse in morning.  Also complains of post nasal drip, congestion nasal and sinus, runny nose, slight sinus pressure, ears feel blocked, cough that is productive of phlegm.  Patient has taken some mucinex and advil OTC without relief.  Has felt feverish and chills at night x 2 nights.  Symptoms are constant.  Patient is regularly treated for hypertension that is well controlled today with blood pressure 120/83.  Also has poorly controlled type 2 diabetes with A1c 8.7% on 5/4/18.  BMI today is 30.13 and weight is unchanged at 216 pounds since last visit.   Established patient with last clinic appointment 5/18/2018.        PAST MEDICAL HISTORY:  Past Medical History:   Diagnosis Date    Cataract     Cataract of left eye     Diabetes mellitus type II     on po meds    Hyperlipidemia     not on meds at present    Hypertension     Kidney stones 04/2017    Left ureteral stone     Plantar fasciitis of right foot 10/2017    Dr Colorado     Tendonitis 01/2018    right foot Dr Colorado    Wears glasses        PAST SURGICAL HISTORY:  Past Surgical History:   Procedure Laterality Date    ADENOIDECTOMY      CATARACT EXTRACTION      COLONOSCOPY  3/23/2010    Dr. Blackburn, hyperplastic polyps, otherwise negative, 10 year recheck    CYSTOSCOPY Left 04/12/2017    with ureteral stent-Dr. Noel    EYE SURGERY  05/06/2013    Dr Nielsen    kidney stent   04/2017    KNEE ARTHROSCOPY W/ DEBRIDEMENT      right with lateral release     LITHOTRIPSY  04/19/2017    NASAL SINUS SURGERY      retna surgery   5/2013    Dr Peralta     TONSILLECTOMY         SOCIAL HISTORY:  Social History     Social History    Marital status:      Spouse name: N/A    Number of children: N/A    Years of education: N/A     Occupational History    Not on file.     Social History Main Topics    Smoking status: Former Smoker     Types: Cigarettes     Quit date: 4/11/1978    Smokeless tobacco: Never Used    Alcohol use 1.5 oz/week     3 Standard drinks or equivalent per week      Comment: 3 drinks per week    Drug use: No    Sexual activity: Not on file     Other Topics Concern    Not on file     Social History Narrative    No narrative on file       FAMILY HISTORY:  Family History   Problem Relation Age of Onset    Hypertension Father     Stroke Paternal Grandmother     Lung cancer Paternal Grandfather     Cancer Paternal Grandfather     Cataracts Mother     Diabetes Maternal Grandmother     No Known Problems Sister     No Known Problems Maternal Aunt        ALLERGIES AND MEDICATIONS: updated and reviewed.  Review of patient's allergies indicates:   Allergen Reactions    No known drug allergies      Current Outpatient Prescriptions   Medication Sig Dispense Refill    albiglutide (TANZEUM) 30 mg/0.5 mL PnIj Inject 30 mg into the skin every 7 days. 12 each 1    allopurinol (ZYLOPRIM) 100 MG tablet TAKE 1 TABLET DAILY 90 tablet 3    amLODIPine (NORVASC) 2.5 MG tablet TAKE 1 TABLET DAILY 90 tablet 3    benazepril (LOTENSIN) 40 MG tablet TAKE 1 TABLET DAILY 90 tablet 3    cetirizine (ZYRTEC) 10 MG tablet Take 10 mg by mouth daily as needed. 1 Tablet(s) Oral PRN .      diphenhydrAMINE (BENADRYL) 25 mg capsule Take 25 mg by mouth nightly as needed for Itching.      fluticasone (FLONASE) 50 mcg/actuation nasal spray 2 sprays by Each Nare route daily as needed.       ibuprofen (ADVIL) 200 MG tablet Take 400 mg by mouth every 6 (six) hours as needed. 2 Tablet(s) Oral PRN Every day.      metFORMIN (GLUCOPHAGE-XR) 750 MG 24 hr tablet Take 1  "tablet (750 mg total) by mouth 2 (two) times daily with meals. 180 tablet 1    potassium citrate 15 mEq TbSR TAKE 1 TABLET DAILY AT 6 A.M. 100 tablet 3    pravastatin (PRAVACHOL) 80 MG tablet Take 1 tablet (80 mg total) by mouth once daily. 90 tablet 3    azithromycin (Z-KAVITHA) 250 MG tablet Take 2 tablets by mouth on day 1; Take 1 tablet by mouth on days 2-5 6 tablet 0    promethazine-dextromethorphan (PROMETHAZINE-DM) 6.25-15 mg/5 mL Syrp Take 5 mLs by mouth every 4 to 6 hours as needed (cough). 240 mL 0     No current facility-administered medications for this visit.            Review of Systems   Constitutional: Positive for appetite change, chills, fatigue and fever.        Decreased appetite, fatigued, some fever with chills at night, did not measure temperature.   HENT: Positive for congestion, postnasal drip, rhinorrhea, sinus pain, sinus pressure, sore throat and voice change. Negative for ear pain.         Nasal and sinus congestion, sore throat, post nasal drip, sinus pressure, slight laryngitis   Eyes: Negative for visual disturbance.   Respiratory: Positive for cough. Negative for shortness of breath and wheezing.         Cough productive of phlegm   Cardiovascular: Negative for chest pain and palpitations.   Gastrointestinal: Negative for abdominal pain, constipation, diarrhea, nausea and vomiting.   Genitourinary: Negative for difficulty urinating.   Skin: Negative for rash and wound.   Neurological: Negative for headaches.   Psychiatric/Behavioral: Positive for sleep disturbance.        Cough is disturbing sleep.        Objective:       Vitals:    06/19/18 1549   BP: 120/83   BP Location: Right arm   Patient Position: Sitting   BP Method: Large (Automatic)   Pulse: 75   Temp: 98.8 °F (37.1 °C)   TempSrc: Oral   Weight: 98 kg (216 lb)   Height: 5' 11" (1.803 m)     Physical Exam   Constitutional: He is oriented to person, place, and time. Vital signs are normal. He appears well-developed and " well-nourished. No distress.   HENT:   Head: Normocephalic and atraumatic.   Right Ear: Tympanic membrane, external ear and ear canal normal.   Left Ear: Tympanic membrane, external ear and ear canal normal.   Nose: Mucosal edema and rhinorrhea present. Right sinus exhibits maxillary sinus tenderness. Right sinus exhibits no frontal sinus tenderness. Left sinus exhibits maxillary sinus tenderness. Left sinus exhibits no frontal sinus tenderness.   Mouth/Throat: Mucous membranes are normal. Oropharyngeal exudate present.   Bilateral nares with erythema and edema and clear drainage noted, throat streaked with clear PND noted, bilateral maxillary sinuses painful to palpation   Eyes: Conjunctivae and lids are normal. Pupils are equal, round, and reactive to light. Right conjunctiva is not injected. Left conjunctiva is not injected.   Neck: Normal carotid pulses present. Carotid bruit is not present.   Cardiovascular: Normal rate, regular rhythm, S1 normal, S2 normal, normal heart sounds, intact distal pulses and normal pulses.    No murmur heard.  Pulses:       Carotid pulses are 2+ on the right side, and 2+ on the left side.       Radial pulses are 2+ on the right side, and 2+ on the left side.   Pulmonary/Chest: Effort normal and breath sounds normal. No respiratory distress. He has no decreased breath sounds. He has no wheezes. He has no rhonchi. He has no rales.   Musculoskeletal: Normal range of motion.   Lymphadenopathy:        Head (right side): No submental, no submandibular, no tonsillar, no preauricular, no posterior auricular and no occipital adenopathy present.        Head (left side): No submental, no submandibular, no tonsillar, no preauricular, no posterior auricular and no occipital adenopathy present.     He has no cervical adenopathy.   Neurological: He is alert and oriented to person, place, and time. He has normal strength.   Skin: Skin is warm, dry and intact. He is not diaphoretic. No pallor.    Psychiatric: He has a normal mood and affect. His speech is normal and behavior is normal. Judgment and thought content normal. Cognition and memory are normal.   Nursing note and vitals reviewed.      Assessment:       1. Acute non-recurrent maxillary sinusitis    2. Cough    3. Sore throat    4. Essential hypertension    5. Type 2 diabetes mellitus with hyperglycemia, without long-term current use of insulin    6. BMI 30.0-30.9,adult        Plan:   Toni was seen today for sore throat.    Diagnoses and all orders for this visit:    Acute non-recurrent maxillary sinusitis  -     azithromycin (Z-KAVITHA) 250 MG tablet; Take 2 tablets by mouth on day 1; Take 1 tablet by mouth on days 2-5    Cough  -     promethazine-dextromethorphan (PROMETHAZINE-DM) 6.25-15 mg/5 mL Syrp; Take 5 mLs by mouth every 4 to 6 hours as needed (cough).    Sore throat   Advised warm salt water gargles, throat lozenges, tylenol 500 mg or advil 400 mg OTC as needed for pain   Essential hypertension   Blood pressure 120/83 today, controlled, no medication changes needed  Type 2 diabetes mellitus with hyperglycemia, without long-term current use of insulin     Lab Results   Component Value Date    HGBA1C 8.7 (H) 05/04/2018      Diabetes poorly controlled, tanzeum added to regimen at last visit 5/18/18 with repeat A1c ordered for 3 months    BMI 30.0-30.9,adult   Obese with BMI 30.13 today, weight unchanged from last visit at 216 pounds   Weight loss recommended with well balanced diet and portion controlled meals and increased activity.       Follow-up if symptoms worsen or fail to improve.     Patient readiness: acceptance and barriers:none and readiness    During the course of the visit the patient was educated and counseled about the following:     Diabetes:  Discussed general issues about diabetes pathophysiology and management.  Reminded to bring in blood sugar diary at next visit.  Discussed tanzeum use, no difficulties with administration or  side effects reported  Hypertension:   Medication: no change.  Dietary sodium restriction.  Regular aerobic exercise.  Obesity:   General weight loss/lifestyle modification strategies discussed (elicit support from others; identify saboteurs; non-food rewards, etc).  Informal exercise measures discussed, e.g. taking stairs instead of elevator.  Regular aerobic exercise program discussed.    Goals: Diabetes: Maintain Hemoglobin A1C below 7, Hypertension: Reduce Blood Pressure and Obesity: Reduce calorie intake and BMI    Did patient meet goals/outcomes: No for diabetes and obesity; yes for hypertension    The following self management tools provided: declined    Patient Instructions (the written plan) was given to the patient/family.     Time spent with patient: 30 minutes    Barriers to medications present (no )    Adverse reactions to current medications (no)    Over the counter medications reviewed (Yes)

## 2018-08-14 ENCOUNTER — TELEPHONE (OUTPATIENT)
Dept: FAMILY MEDICINE | Facility: CLINIC | Age: 63
End: 2018-08-14

## 2018-08-14 ENCOUNTER — DOCUMENTATION ONLY (OUTPATIENT)
Dept: FAMILY MEDICINE | Facility: CLINIC | Age: 63
End: 2018-08-14

## 2018-08-14 NOTE — PROGRESS NOTES
Pre-Visit Chart Review  For Appointment Scheduled on 8-16-18    Health Maintenance Due   Topic Date Due    Colonoscopy  03/23/2015    Foot Exam  06/01/2018    Influenza Vaccine  08/01/2018    Hemoglobin A1c  08/04/2018

## 2018-08-15 ENCOUNTER — DOCUMENTATION ONLY (OUTPATIENT)
Dept: FAMILY MEDICINE | Facility: CLINIC | Age: 63
End: 2018-08-15

## 2018-08-15 NOTE — PROGRESS NOTES
Pre-Visit Chart Review  For Appointment Scheduled on 8-29-18    Health Maintenance Due   Topic Date Due    Colonoscopy  03/23/2015    Foot Exam  06/01/2018    Influenza Vaccine  08/01/2018    Hemoglobin A1c  08/04/2018

## 2018-08-22 ENCOUNTER — LAB VISIT (OUTPATIENT)
Dept: LAB | Facility: HOSPITAL | Age: 63
End: 2018-08-22
Attending: FAMILY MEDICINE
Payer: OTHER GOVERNMENT

## 2018-08-22 LAB
ESTIMATED AVG GLUCOSE: 177 MG/DL
HBA1C MFR BLD HPLC: 7.8 %

## 2018-08-22 PROCEDURE — 36415 COLL VENOUS BLD VENIPUNCTURE: CPT | Mod: PO

## 2018-08-22 PROCEDURE — 83036 HEMOGLOBIN GLYCOSYLATED A1C: CPT

## 2018-08-23 ENCOUNTER — TELEPHONE (OUTPATIENT)
Dept: FAMILY MEDICINE | Facility: CLINIC | Age: 63
End: 2018-08-23

## 2018-08-23 RX ORDER — METFORMIN HYDROCHLORIDE 500 MG/1
1000 TABLET, EXTENDED RELEASE ORAL 2 TIMES DAILY WITH MEALS
Qty: 360 TABLET | Refills: 1 | Status: SHIPPED | OUTPATIENT
Start: 2018-08-23 | End: 2019-02-23 | Stop reason: SDUPTHER

## 2018-08-23 NOTE — TELEPHONE ENCOUNTER
----- Message from Candido Mon MD sent at 8/23/2018  8:21 AM CDT -----  The blood sugar control is improved but still not good.  We could try increasing the metformin to full dose of 2000 mg a day which would require going to the 500 mg and taking 2 twice a day.  This would help a little bit but probably not enough.  We could add Invokana to the metformin which would probably do the job.  I understand they are going to quit making the Tanzeum in a few months, if so we will need to change it to trulicity or Bydureon.    I would recommend we increase the metformin and add the Invokana and then recheck the A1c in 3 months    Result sent by e-mail

## 2018-08-23 NOTE — TELEPHONE ENCOUNTER
Patient informed. [facility] Patient has a current apt with Dr Mon. Can book lab in 3 months need order.

## 2018-08-23 NOTE — TELEPHONE ENCOUNTER
I changed the invokana to Jardiance because the computer says invokana is not covered on his insurance and jardiance is preferred level 1, then when I signed the order epic says Jardiance is not covered either.  I sent it in.    Order in for a1c in three months

## 2018-08-29 ENCOUNTER — OFFICE VISIT (OUTPATIENT)
Dept: FAMILY MEDICINE | Facility: CLINIC | Age: 63
End: 2018-08-29
Attending: FAMILY MEDICINE
Payer: OTHER GOVERNMENT

## 2018-08-29 VITALS
TEMPERATURE: 98 F | RESPIRATION RATE: 12 BRPM | DIASTOLIC BLOOD PRESSURE: 86 MMHG | HEIGHT: 71 IN | BODY MASS INDEX: 30.62 KG/M2 | WEIGHT: 218.69 LBS | OXYGEN SATURATION: 95 % | SYSTOLIC BLOOD PRESSURE: 118 MMHG | HEART RATE: 76 BPM

## 2018-08-29 DIAGNOSIS — E11.9 CONTROLLED TYPE 2 DIABETES MELLITUS WITHOUT COMPLICATION, WITHOUT LONG-TERM CURRENT USE OF INSULIN: Primary | ICD-10-CM

## 2018-08-29 DIAGNOSIS — I15.2 HYPERTENSION ASSOCIATED WITH DIABETES: ICD-10-CM

## 2018-08-29 DIAGNOSIS — E11.59 HYPERTENSION ASSOCIATED WITH DIABETES: ICD-10-CM

## 2018-08-29 DIAGNOSIS — E11.69 HYPERLIPIDEMIA ASSOCIATED WITH TYPE 2 DIABETES MELLITUS: ICD-10-CM

## 2018-08-29 DIAGNOSIS — N20.0 KIDNEY STONES: ICD-10-CM

## 2018-08-29 DIAGNOSIS — R04.0 EPISTAXIS: ICD-10-CM

## 2018-08-29 DIAGNOSIS — E78.5 HYPERLIPIDEMIA ASSOCIATED WITH TYPE 2 DIABETES MELLITUS: ICD-10-CM

## 2018-08-29 PROCEDURE — 99214 OFFICE O/P EST MOD 30 MIN: CPT | Mod: S$PBB,,, | Performed by: FAMILY MEDICINE

## 2018-08-29 PROCEDURE — 99999 PR PBB SHADOW E&M-EST. PATIENT-LVL III: CPT | Mod: PBBFAC,,, | Performed by: FAMILY MEDICINE

## 2018-08-29 PROCEDURE — 99213 OFFICE O/P EST LOW 20 MIN: CPT | Mod: PBBFAC,PO | Performed by: FAMILY MEDICINE

## 2018-08-29 RX ORDER — OXYMETAZOLINE HCL 0.05 %
2 SPRAY, NON-AEROSOL (ML) NASAL 2 TIMES DAILY
COMMUNITY
End: 2019-03-04

## 2018-08-29 RX ORDER — MECLIZINE HYDROCHLORIDE 25 MG/1
TABLET ORAL
Refills: 0 | COMMUNITY
Start: 2018-08-14 | End: 2019-03-04

## 2018-08-29 RX ORDER — METFORMIN HYDROCHLORIDE 750 MG/1
750 TABLET, EXTENDED RELEASE ORAL 2 TIMES DAILY WITH MEALS
COMMUNITY
Start: 2016-08-29 | End: 2018-08-30

## 2018-08-29 NOTE — PROGRESS NOTES
Subjective:       Patient ID: Toni Hyde is a 63 y.o. male.    Chief Complaint: Annual Exam    63-year-old male coming in for follow-up on multiple medical problems including diabetes hypertension and hyperlipidemia.  He has been having some problems with nose bleeds on the right side and has been cauterized twice once in the emergency room and once by Dr. Gunnar Yates.  Currently is not having any bleeding problems.  He has had some intermittent vertigo but no active problems at this time.  His A1c was elevated at 7.8 and has Tanzeum is no longer being made.  We have increased his dose of metformin to the maximum 2000 mg a day and replace the Tanzeum with Jardiance.  He just got the new medications in today and has not yet started on them.  An A1c has already been scheduled for late November to follow up.      Past Medical History:  No date: Cataract  No date: Cataract of left eye  No date: Diabetes mellitus type II      Comment:  on po meds  No date: Hyperlipidemia      Comment:  not on meds at present  No date: Hypertension  04/2017: Kidney stones  No date: Left ureteral stone  10/2017: Plantar fasciitis of right foot      Comment:  Dr Colorado   01/2018: Tendonitis      Comment:  right foot Dr Colorado  No date: Wears glasses    Past Surgical History:  No date: ADENOIDECTOMY  No date: CATARACT EXTRACTION  3/23/2010: COLONOSCOPY      Comment:  Dr. Blackburn, hyperplastic polyps, otherwise negative, 10                year recheck  04/12/2017: CYSTOSCOPY; Left      Comment:  with ureteral stent-Dr. Noel  05/06/2013: EYE SURGERY      Comment:  Dr Nielsen  04/2017: kidney stent   No date: KNEE ARTHROSCOPY W/ DEBRIDEMENT      Comment:  right with lateral release   04/19/2017: LITHOTRIPSY  No date: NASAL SINUS SURGERY  5/2013: retna surgery       Comment:  Dr Peralta  No date: TONSILLECTOMY    Review of patient's family history indicates:  Problem: Hypertension      Relation: Father          Age of Onset: (Not  Specified)  Problem: Stroke      Relation: Paternal Grandmother          Age of Onset: (Not Specified)  Problem: Lung cancer      Relation: Paternal Grandfather          Age of Onset: (Not Specified)  Problem: Cancer      Relation: Paternal Grandfather          Age of Onset: (Not Specified)  Problem: Cataracts      Relation: Mother          Age of Onset: (Not Specified)  Problem: Diabetes      Relation: Maternal Grandmother          Age of Onset: (Not Specified)  Problem: No Known Problems      Relation: Sister          Age of Onset: (Not Specified)  Problem: No Known Problems      Relation: Maternal Aunt          Age of Onset: (Not Specified)    Social History    Tobacco Use      Smoking status: Former Smoker        Types: Cigarettes        Quit date: 1978        Years since quittin.4      Smokeless tobacco: Never Used    Alcohol use: Yes      Alcohol/week: 1.5 oz      Types: 3 Standard drinks or equivalent per week      Comment: 3 drinks per week    Drug use: No    Current Outpatient Medications on File Prior to Visit:  albiglutide (TANZEUM) 30 mg/0.5 mL PnIj, Inject 30 mg into the skin every 7 days., Disp: 12 each, Rfl: 1  allopurinol (ZYLOPRIM) 100 MG tablet, TAKE 1 TABLET DAILY, Disp: 90 tablet, Rfl: 3  amLODIPine (NORVASC) 2.5 MG tablet, TAKE 1 TABLET DAILY, Disp: 90 tablet, Rfl: 3  benazepril (LOTENSIN) 40 MG tablet, TAKE 1 TABLET DAILY, Disp: 90 tablet, Rfl: 3  cetirizine (ZYRTEC) 10 MG tablet, Take 10 mg by mouth daily as needed. 1 Tablet(s) Oral PRN ., Disp: , Rfl:   diphenhydrAMINE (BENADRYL) 25 mg capsule, Take 25 mg by mouth nightly as needed for Itching., Disp: , Rfl:   fluticasone (FLONASE) 50 mcg/actuation nasal spray, 2 sprays by Each Nare route daily as needed. , Disp: , Rfl:   ibuprofen (ADVIL) 200 MG tablet, Take 400 mg by mouth every 6 (six) hours as needed. 2 Tablet(s) Oral PRN Every day., Disp: , Rfl:   meclizine (ANTIVERT) 25 mg tablet, TK 1 TO 2 TS PO Q 6 H PRF DIZZINESS, Disp: ,  Rfl: 0  metFORMIN (GLUCOPHAGE-XR) 500 MG 24 hr tablet, Take 2 tablets (1,000 mg total) by mouth 2 (two) times daily with meals. (Patient taking differently: Take 1,000 mg by mouth 2 (two) times daily with meals. ), Disp: 360 tablet, Rfl: 1  metFORMIN (GLUCOPHAGE-XR) 750 MG 24 hr tablet, Take 750 mg by mouth 2 (two) times daily with meals. , Disp: , Rfl:   oxymetazoline (AFRIN) 0.05 % nasal spray, 2 sprays by Nasal route 2 (two) times daily., Disp: , Rfl:   potassium citrate 15 mEq TbSR, TAKE 1 TABLET DAILY AT 6 A.M., Disp: 100 tablet, Rfl: 3  pravastatin (PRAVACHOL) 80 MG tablet, Take 1 tablet (80 mg total) by mouth once daily., Disp: 90 tablet, Rfl: 3  (DISCONTINUED) albiglutide (TANZEUM) 30 mg/0.5 mL PnIj, Inject 30 mg into the skin once a week., Disp: , Rfl:   empagliflozin (JARDIANCE) 10 mg Tab, Take 10 mg by mouth every morning., Disp: 90 tablet, Rfl: 1  (DISCONTINUED) promethazine-dextromethorphan (PROMETHAZINE-DM) 6.25-15 mg/5 mL Syrp, Take 5 mLs by mouth every 4 to 6 hours as needed (cough)., Disp: 240 mL, Rfl: 0    No current facility-administered medications on file prior to visit.     Foot Exam due on 06/01/2018  Influenza Vaccine due on 08/01/2018        Review of Systems   Constitutional: Negative for activity change and unexpected weight change.   HENT: Negative for hearing loss, rhinorrhea and trouble swallowing.    Eyes: Negative for discharge and visual disturbance.   Respiratory: Negative for chest tightness and wheezing.    Cardiovascular: Negative for chest pain and palpitations.   Gastrointestinal: Negative for blood in stool, constipation, diarrhea and vomiting.   Endocrine: Negative for polydipsia and polyuria.   Genitourinary: Positive for urgency. Negative for difficulty urinating and hematuria.   Musculoskeletal: Negative for arthralgias, joint swelling and neck pain.   Neurological: Negative for weakness and headaches.   Psychiatric/Behavioral: Negative for confusion and dysphoric mood.        Objective:      Physical Exam   Constitutional: He is oriented to person, place, and time. He appears well-developed. No distress.   Good blood pressure control  Obese with a BMI of 30.5 he is up 1.8 lb from his May 18, 2018 visit   HENT:   Head: Normocephalic and atraumatic.   Right Ear: External ear normal.   Left Ear: External ear normal.   Mouth/Throat: Oropharynx is clear and moist. No oropharyngeal exudate.   Clot adherent to distal septal wall on right side no active bleeding   Eyes: Conjunctivae and EOM are normal. Pupils are equal, round, and reactive to light. No scleral icterus.   Neck: Normal range of motion. Neck supple. No JVD present. No tracheal deviation present. No thyromegaly present.   Cardiovascular: Normal rate, regular rhythm and normal heart sounds. Exam reveals no gallop and no friction rub.   No murmur heard.  Pulses:       Dorsalis pedis pulses are 2+ on the right side, and 2+ on the left side.        Posterior tibial pulses are 2+ on the right side, and 2+ on the left side.   Carotid pulses 2+ no bruit   Pulmonary/Chest: Effort normal and breath sounds normal. No stridor. No respiratory distress. He has no wheezes. He has no rales. He exhibits no tenderness.   Abdominal: Soft. Bowel sounds are normal. He exhibits no distension and no mass. There is no tenderness. There is no rebound and no guarding.   Musculoskeletal: Normal range of motion. He exhibits no edema.        Right foot: There is normal range of motion and no deformity.        Left foot: There is normal range of motion and no deformity.   Feet:   Right Foot:   Protective Sensation: 8 sites tested. 8 sites sensed.   Skin Integrity: Negative for ulcer, blister, skin breakdown, erythema, warmth, callus or dry skin.   Left Foot:   Protective Sensation: 8 sites tested. 8 sites sensed.   Skin Integrity: Negative for ulcer, blister, skin breakdown, erythema, warmth, callus or dry skin.   Lymphadenopathy:     He has no cervical  adenopathy.   Neurological: He is alert and oriented to person, place, and time. He has normal reflexes. He displays normal reflexes. No cranial nerve deficit or sensory deficit. He exhibits normal muscle tone.   Proprioception intact all 10 toes   Skin: Skin is warm and dry. Capillary refill takes less than 2 seconds. No rash noted. He is not diaphoretic. No erythema.   Psychiatric: He has a normal mood and affect. His behavior is normal. Judgment and thought content normal.   Nursing note and vitals reviewed.      Assessment:       1. Controlled type 2 diabetes mellitus without complication, without long-term current use of insulin    2. Hypertension associated with diabetes    3. Hyperlipidemia associated with type 2 diabetes mellitus    4. Kidney stones    5. Epistaxis    6. BMI 30.0-30.9,adult        Plan:       1. Controlled type 2 diabetes mellitus without complication, without long-term current use of insulin  Changes as noted, increase metformin to 2000 mg a day, add Jardiance daily and recheck A1c in 3 months.  Tanzeum discontinued due to it is no longer being made but if we have problems with control we could add trulicity if covered on insurance    2. Hypertension associated with diabetes  Good control, no changes needed    3. Hyperlipidemia associated with type 2 diabetes mellitus  Lab Results   Component Value Date    CHOL 134 05/04/2018    CHOL 163 05/18/2017    CHOL 172 06/15/2016     Lab Results   Component Value Date    HDL 37 (L) 05/04/2018    HDL 39 (L) 05/18/2017    HDL 44 06/15/2016     Lab Results   Component Value Date    LDLCALC 78.0 05/04/2018    LDLCALC 104.4 05/18/2017    LDLCALC 114.0 06/15/2016     Lab Results   Component Value Date    TRIG 95 05/04/2018    TRIG 98 05/18/2017    TRIG 70 06/15/2016     Lab Results   Component Value Date    CHOLHDL 27.6 05/04/2018    CHOLHDL 23.9 05/18/2017    CHOLHDL 25.6 06/15/2016         4. Kidney stones  No active symptoms at this time, on diuretics  to assist    5. Epistaxis  Improved    6. BMI 30.0-30.9,adult           Patient readiness: acceptance and barriers:none    During the course of the visit the patient was educated and counseled about the following:     Diabetes:  Discussed general issues about diabetes pathophysiology and management.  Addressed ADA diet.  Encouraged aerobic exercise.  Discussed foot care.  Reminded to get yearly retinal exam.  Hypertension:   Medication: no change.  Dietary sodium restriction.  Regular aerobic exercise.  Obesity:   General weight loss/lifestyle modification strategies discussed (elicit support from others; identify saboteurs; non-food rewards, etc).  Informal exercise measures discussed, e.g. taking stairs instead of elevator.  Regular aerobic exercise program discussed.    Goals: Diabetes: Maintain Hemoglobin A1C below 7, Hypertension: Reduce Blood Pressure and Obesity: Reduce calorie intake and BMI    Did patient meet goals/outcomes: Yes    The following self management tools provided: blood pressure log  blood glucose log  excercise log    Patient Instructions (the written plan) was given to the patient/family.     Time spent with patient: 45 minutes

## 2018-08-29 NOTE — PATIENT INSTRUCTIONS
Walking for Fitness  Fitness walking has something for everyone, even people who are already fit. Walking is one of the safest ways to condition your body aerobically. It can boost energy, help you lose weight, and reduce stress.    Physical benefits  · Walking strengthens your heart and lungs, and tones your muscles.  · When walking, your feet land with less impact than in other sports. This reduces chances of muscle, bone, and joint injury.  · Regular walking improves your cholesterol levels and lowers your risk of heart disease. And it helps you control your blood sugar if you have diabetes.  · Walking is a weight-bearing activity, which helps maintain bone density. This can help prevent osteoporosis.  Personal rewards  · Taking walks can help you relax and manage stress. And fitness walking may make you feel better about yourself.  · Walking can help you sleep better at night and make you less likely to be depressed.  · Regular walking may help maintain your memory as you get older.  · Walking is a great way to spend extra time with friends and family members. Be sure to invite your dog along!  Q&A about fitness walking  Q: Will walking keep me fit?  A: Yes. Regular walking at the right pace gives you all the benefits of other aerobic activities, such as jogging and swimming.  Q: Will walking help me lose weight and keep it off?  A: Yes. Per mile, walking can burn as many calories as jogging. Your health care provider can help work walking into your weight-loss plan.  Q: Is walking safe for my health?  A: Yes. Walking is safe if you have high blood pressure, diabetes, heart disease, or other conditions. Talk to your healthcare provider before you start.  Date Last Reviewed: 4/1/2017 © 2000-2017 [employer]. [address] [place] [address], [place] [address]. All rights reserved. This information is not intended as a substitute for professional medical care. Always follow your healthcare professional's  instructions.        Weight Management: Getting Started  Healthy bodies come in all shapes and sizes. Not all bodies are made to be thin. For some people, a healthy weight is higher than the average weight listed on weight charts. Your healthcare provider can help you decide on a healthy weight for you.    Reasons to lose weight  Losing weight can help with some health problems, such as high blood pressure, heart disease, diabetes, sleep apnea, and arthritis. You may also feel more energy.  Set your long-term goal  Your goal doesn't even have to be a specific weight. You may decide on a fitness goal (such as being able to walk 10 miles a week), or a health goal (such as lowering your blood pressure). Choose a goal that is measurable and reasonable, so you know when you've reached it. A goal of reaching a BMI of less than 25 is not always reasonable (or possible).   Make an action plan  Habits dont change overnight. Setting your goals too high can leave you feeling discouraged if you cant reach them. Be realistic. Choose one or two small changes you can make now. Set an action plan for how you are going to make these changes. When you can stick to this plan, keep making a few more small changes. Taking small steps will help you stay on the path to success.  Track your progress  Write down your goals. Then, keep a daily record of your progress. Write down what you eat and how active you are. This record lets you look back on how much youve done. It may also help when youre feeling frustrated. Reward yourself for success. Even if you dont reach every goal, give yourself credit for what you do get done.  Get support  Encouragement from others can help make losing weight easier. Ask your family members and friends for support. They may even want to join you. Also look to your healthcare provider, registered dietitian, and  for help. Your local hospital can give you more information about  nutrition, exercise, and weight loss.  Date Last Reviewed: 1/31/2016 © 2000-2017 [employer]. [address] [place] [address], [place] [address]. All rights reserved. This information is not intended as a substitute for professional medical care. Always follow your healthcare professional's instructions.        Diabetes (General Information)  Diabetes is a long-term health problem. It means your body does not make enough insulin. Or it may mean that your body cannot use the insulin it makes. Insulin is a hormone in your body. It lets blood sugar (glucose) reach the cells in your body. All of your cells need glucose for fuel.  When you have diabetes, the glucose in your blood builds up because it cannot get into the cells. This buildup is called high blood sugar (hyperglycemia).  Your blood sugar level depends on several things. It depends on what kind of food you eat and how much of it you eat. It also depends on how much exercise you get, and how much insulin you have in your body. Eating too much of the wrong kinds of food or not taking diabetes medicine on time can cause high blood sugar. Infections can cause high blood sugar even if you are taking medicines correctly.  These things can also cause low blood sugar:  · Missing meals  · Not eating enough food  · Taking too much diabetes medicine  Diabetes can cause serious problems over time if you do not get treated. These problems include heart disease, stroke, kidney failure, and blindness. They also include nerve pain or loss of feeling in your legs and feet, and gangrene of the feet. By keeping your blood sugar under control you can prevent or delay these problems.  Normal blood sugar levels are 80 to 100 before a meal and less than 180 in the 1 to 2 hours after a meal.  Home care  Follow these guidelines when caring for yourself at home:  · Follow the diet your healthcare provider gives you. Take insulin or other diabetes medicine exactly as told  to.  · Watch your blood sugar as you are told to. Keep a log of your results. This will help your provider change your medicines to keep your blood sugar under control.  · Try to reach your ideal weight. You may be able to cut back on or not have to take diabetes medicine if you eat the right foods and get exercise.  · Do not smoke. Smoking worsens the effects of diabetes on your circulation. You are much more likely to have a heart attack if you have diabetes and you smoke.  · Take good care of your feet. If you have lost feeling in your feet, you may not see an injury or infection. Check your feet and between your toes at least once a week.  · Wear a medical alert bracelet or necklace, or carry a card in your wallet that says you have diabetes. This will help healthcare providers give you the right care if you get very ill and cannot tell them that you have diabetes.  Sick day plan  If you get a cold, the flu, or a bacterial or viral infection, take these steps:  · Look at your diabetes sick plan and call your healthcare provider as you were told to. You may need to call your provider right away if:  ¨ Your blood sugar is above 240 while taking your diabetes medicine  ¨ Your urine ketone levels are above normal or high  ¨ You have been vomiting more than 6 hours  ¨ You have trouble breathing or your breath ha s a fruity smell  ¨ You have a high fever  ¨ You have a fever for several days and you are not getting better  ¨ You get light-headed and are sleepier than usual  · Keep taking your diabetes pills (oral medicine) even if you have been vomiting and are feeling sick. Call your provider right away because you may need insulin to lower your blood sugar until you recover from your illness.  · Keep taking your insulin even if you have been vomiting and are feeling sick. Call your provider right away to ask if you need to change your insulin dose. This will depend on your blood sugar results.  · Check your blood  sugar every 2 to 4 hours, or at least 4 times a day.  · Check your ketones often. If you are vomiting and having diarrhea, watch them more often.  · Do not skip meals. Try to eat small meals on a regular schedule. Do this even if you do not feel like eating.  · Drink water or other liquids that do not have caffeine or calories. This will keep you from getting dehydrated. If you are nauseated or vomiting, takes small sips every 5 minutes. To prevent dehydration try to drink a cup (8 ounces) of fluids every hour while you are awake.  General care  Always bring a source of fast-acting sugar with you in case you have symptoms of low blood sugar (below 70). At the first sign of low blood sugar, eat or drink 15 to 20 grams of fast-acting sugar to raise your blood sugar. Examples are:  · 3 to 4 glucose tablets. You can buy these at most [employer].  · 4 ounces (1/2 cup) of regular (not diet) soft drinks  · 4 ounces (1/2 cup) of any fruit juice  · 8 ounces (1 cup) of milk  · 5 to 6 pieces of hard candy  · 1 tablespoon of honey  Check your blood sugar 15 minutes after treating yourself. If it is still below 70, take 15 to 20 more grams of fast-acting sugar. Test again in 15 minutes. If it returns to normal (70 or above), eat a snack or meal to keep your blood sugar in a safe range. If it stays low, call your doctor or go to an emergency room.  Follow-up care  Follow-up with your healthcare provider, or as advised. For more information about diabetes, visit the American Diabetes Association website at www.diabetes.org or call 149-304-4428.  When to seek medical advice  Call your healthcare provider right away if you have any of these symptoms of high blood sugar:  · Frequent urination  · Dizziness  · Drowsiness  · Thirst  · Headache  · Nausea or vomiting  · Abdominal pain  · Eyesight changes  · Fast breathing  · Confusion or loss of consciousness  Also call your provider right away if you have any of these signs of low blood  sugar:  · Fatigue  · Headache  · Shakes  · Excess sweating  · Hunger  · Feeling anxious or restless  · Eyesight changes  · Drowsiness  · Weakness  · Confusion or loss of consciousness  Call 911  Call for emergency help right away if any of these occur:  · Chest pain or shortness of breath  · Dizziness or fainting  · Weakness of an arm or leg or one side of the face  · Trouble speaking or seeing   Date Last Reviewed: 6/1/2016  © 3450-2812 [employer]. [address] [place] [address]. All rights reserved. This information is not intended as a substitute for professional medical care. Always follow your healthcare professional's instructions.

## 2018-11-28 ENCOUNTER — LAB VISIT (OUTPATIENT)
Dept: LAB | Facility: HOSPITAL | Age: 63
End: 2018-11-28
Attending: FAMILY MEDICINE
Payer: OTHER GOVERNMENT

## 2018-11-28 LAB
ESTIMATED AVG GLUCOSE: 174 MG/DL
HBA1C MFR BLD HPLC: 7.7 %

## 2018-11-28 PROCEDURE — 36415 COLL VENOUS BLD VENIPUNCTURE: CPT | Mod: PO

## 2018-11-28 PROCEDURE — 83036 HEMOGLOBIN GLYCOSYLATED A1C: CPT

## 2018-11-30 ENCOUNTER — TELEPHONE (OUTPATIENT)
Dept: FAMILY MEDICINE | Facility: CLINIC | Age: 63
End: 2018-11-30

## 2018-11-30 DIAGNOSIS — E11.65 TYPE 2 DIABETES MELLITUS WITH HYPERGLYCEMIA, WITHOUT LONG-TERM CURRENT USE OF INSULIN: Primary | ICD-10-CM

## 2018-11-30 NOTE — TELEPHONE ENCOUNTER
Patient was read report. He has some 750 mg metformin he is taking to use them up. 750 am and 1250 mg pm. He will then go to the 1000 mg bid. He is willing to start trulicity. Please send to [facility].  He is no longer on the tanzeum. Med list corrected.

## 2018-11-30 NOTE — TELEPHONE ENCOUNTER
----- Message from Sandra Moreno NP sent at 11/29/2018  5:55 PM CST -----  ## based on Dr. Mon's last note, the patient was only going to be taking metformin 1000 mg twice a day with jardiance 10 mg daily.  The tanzeum is still on the patient's medication list but in his note Dr. Mon stated that it was no longer available and that the jardiance was replacing the tanzeum. Please confirm which medications the patient is taking and update the med list. If he is only taking the metformin and jardiance, I recommend adding trulicity or lantus insulin. If he is taking metformin, jardiance, and tanzeum, the next step is insulin.  Thanks.##    Dear Mr. Hyde,   I have reviewed the recent results of your hemoglobin A1c.  The hemoglobin A1c remains elevated and basically unchanged from three months ago at 7.7%.  The diabetes is still not well controlled.  It appears that you are currently taking the maximum dose of metformin, along with jardiance 10 mg daily.  Tanzeum is still on your medication list but based on Dr. Mon's office note, that medication was no longer available and you had stopped it.  At this time, I would recommend adding a trulicity which is a once a week injection for diabetes that works the same way that tanzeum does.  Adding basal insulin at bedtime is also an acceptable alternative.  Please let me know how you would like to proceed.  If you have any questions, please call or email the office.  Thank you.  Sandra Moreno [facility] (for Dr. Mon who is out of the office until December)

## 2018-12-03 NOTE — TELEPHONE ENCOUNTER
I have sent the trulicity to [facility]. This may require a PA. He was on Tanzeum which is no longer available and failed Bydureon.   It looks like Dr. Mon changed the rx for metformin in August and sent it to [facility], so he should be all set with that.   He needs to schedule a 3 month diabetes follow up and an A1c can be ordered at that visit.   Ask him to keep blood sugar readings for his visit.   Thanks.  Sandra

## 2018-12-12 ENCOUNTER — DOCUMENTATION ONLY (OUTPATIENT)
Dept: FAMILY MEDICINE | Facility: CLINIC | Age: 63
End: 2018-12-12

## 2018-12-12 NOTE — PROGRESS NOTES
Pre-Visit Chart Review  For Appointment Scheduled on 3-4-19    Health Maintenance Due   Topic Date Due    Colonoscopy  03/23/2015    Influenza Vaccine  08/01/2018

## 2019-01-07 ENCOUNTER — PATIENT MESSAGE (OUTPATIENT)
Dept: FAMILY MEDICINE | Facility: CLINIC | Age: 64
End: 2019-01-07

## 2019-01-07 NOTE — TELEPHONE ENCOUNTER
Frequent urination can be due to uncontrolled diabetes.  Is he checking sugars at home?  Hold the jardiance for two weeks and see if symptoms resolve.  If so we will hold it until next a1c and see how he does with trulicity.

## 2019-01-07 NOTE — TELEPHONE ENCOUNTER
No diuretics seen on med list. I see Allopurinol and potassium per Dr. Noel. Should he stop Jardiance to see if symptoms resolve?

## 2019-01-11 ENCOUNTER — LAB VISIT (OUTPATIENT)
Dept: LAB | Facility: HOSPITAL | Age: 64
End: 2019-01-11
Attending: FAMILY MEDICINE
Payer: OTHER GOVERNMENT

## 2019-01-11 ENCOUNTER — PATIENT MESSAGE (OUTPATIENT)
Dept: FAMILY MEDICINE | Facility: CLINIC | Age: 64
End: 2019-01-11

## 2019-01-11 DIAGNOSIS — R11.10 VOMITING, INTRACTABILITY OF VOMITING NOT SPECIFIED, PRESENCE OF NAUSEA NOT SPECIFIED, UNSPECIFIED VOMITING TYPE: ICD-10-CM

## 2019-01-11 DIAGNOSIS — R11.10 VOMITING, INTRACTABILITY OF VOMITING NOT SPECIFIED, PRESENCE OF NAUSEA NOT SPECIFIED, UNSPECIFIED VOMITING TYPE: Primary | ICD-10-CM

## 2019-01-11 DIAGNOSIS — R10.9 ABDOMINAL PAIN, UNSPECIFIED ABDOMINAL LOCATION: ICD-10-CM

## 2019-01-11 LAB
AMYLASE SERPL-CCNC: 88 U/L
LIPASE SERPL-CCNC: 68 U/L

## 2019-01-11 PROCEDURE — 36415 COLL VENOUS BLD VENIPUNCTURE: CPT | Mod: PO

## 2019-01-11 PROCEDURE — 82150 ASSAY OF AMYLASE: CPT

## 2019-01-11 PROCEDURE — 83690 ASSAY OF LIPASE: CPT

## 2019-01-11 NOTE — TELEPHONE ENCOUNTER
Verbal report given to Dr. Mon - patient notified to stop the Trulicity- needs amylase and lipase to rule out pancreatitis. Patient coming in now for lab.

## 2019-02-04 RX ORDER — EMPAGLIFLOZIN 10 MG/1
TABLET, FILM COATED ORAL
Qty: 90 TABLET | Refills: 1 | Status: SHIPPED | OUTPATIENT
Start: 2019-02-04 | End: 2019-03-04

## 2019-02-19 ENCOUNTER — PATIENT OUTREACH (OUTPATIENT)
Dept: ADMINISTRATIVE | Facility: HOSPITAL | Age: 64
End: 2019-02-19

## 2019-02-19 ENCOUNTER — TELEPHONE (OUTPATIENT)
Dept: ADMINISTRATIVE | Facility: HOSPITAL | Age: 64
End: 2019-02-19

## 2019-02-19 NOTE — LETTER
February 19, 2019    DR WILLIAM CLOUD             [facility]  [address]  [facility] [address]  Phone: [phone] February 19, 2019     Patient: Toni Hyde    YOB: 1955   Date of Visit: 2/19/2019       To Whom It May Concern:    We are seeing Toni Hyde in the clinic today at [facility].  Candido Mon MD is their PCP.  He has an outstanding lab/procedure at this time when reviewing their chart.  To help with our Health Maintenance records will you please supply the following:      []  Mammogram                                                []  Colonoscopy   []  Pap Smear                                                    []  Outside Lab Results   []  Dexa scans                                                   [x]  Eye Exam   []  Foot Exam                                                     [] Other___________   []  Outside Immunizations                                               Please Fax to [facility] at [phone]         Sincerely      [facility]  [address] [facility] [address]  Phone [phone]  Fax [phone]

## 2019-02-25 RX ORDER — METFORMIN HYDROCHLORIDE 500 MG/1
TABLET, EXTENDED RELEASE ORAL
Qty: 360 TABLET | Refills: 0 | Status: SHIPPED | OUTPATIENT
Start: 2019-02-25 | End: 2019-05-26 | Stop reason: SDUPTHER

## 2019-03-03 ENCOUNTER — PATIENT MESSAGE (OUTPATIENT)
Dept: ORTHOPEDICS | Facility: CLINIC | Age: 64
End: 2019-03-03

## 2019-03-04 ENCOUNTER — OFFICE VISIT (OUTPATIENT)
Dept: FAMILY MEDICINE | Facility: CLINIC | Age: 64
End: 2019-03-04
Attending: FAMILY MEDICINE
Payer: OTHER GOVERNMENT

## 2019-03-04 ENCOUNTER — LAB VISIT (OUTPATIENT)
Dept: LAB | Facility: HOSPITAL | Age: 64
End: 2019-03-04
Attending: FAMILY MEDICINE
Payer: OTHER GOVERNMENT

## 2019-03-04 VITALS
TEMPERATURE: 98 F | HEIGHT: 71 IN | WEIGHT: 200.63 LBS | DIASTOLIC BLOOD PRESSURE: 62 MMHG | SYSTOLIC BLOOD PRESSURE: 105 MMHG | BODY MASS INDEX: 28.09 KG/M2 | HEART RATE: 66 BPM

## 2019-03-04 DIAGNOSIS — E78.5 HYPERLIPIDEMIA ASSOCIATED WITH TYPE 2 DIABETES MELLITUS: ICD-10-CM

## 2019-03-04 DIAGNOSIS — E11.69 HYPERLIPIDEMIA ASSOCIATED WITH TYPE 2 DIABETES MELLITUS: ICD-10-CM

## 2019-03-04 DIAGNOSIS — E11.59 HYPERTENSION ASSOCIATED WITH DIABETES: ICD-10-CM

## 2019-03-04 DIAGNOSIS — Z91.013 ALLERGY HISTORY, SEAFOOD: ICD-10-CM

## 2019-03-04 DIAGNOSIS — I15.2 HYPERTENSION ASSOCIATED WITH DIABETES: ICD-10-CM

## 2019-03-04 LAB
ANION GAP SERPL CALC-SCNC: 9 MMOL/L
BUN SERPL-MCNC: 24 MG/DL
CALCIUM SERPL-MCNC: 10.1 MG/DL
CHLORIDE SERPL-SCNC: 105 MMOL/L
CHOLEST SERPL-MCNC: 110 MG/DL
CHOLEST/HDLC SERPL: 2.5 {RATIO}
CO2 SERPL-SCNC: 28 MMOL/L
CREAT SERPL-MCNC: 1.3 MG/DL
EST. GFR  (AFRICAN AMERICAN): >60 ML/MIN/1.73 M^2
EST. GFR  (NON AFRICAN AMERICAN): 58.1 ML/MIN/1.73 M^2
ESTIMATED AVG GLUCOSE: 146 MG/DL
GLUCOSE SERPL-MCNC: 101 MG/DL
HBA1C MFR BLD HPLC: 6.7 %
HDLC SERPL-MCNC: 44 MG/DL
HDLC SERPL: 40 %
LDLC SERPL CALC-MCNC: 56 MG/DL
NONHDLC SERPL-MCNC: 66 MG/DL
POTASSIUM SERPL-SCNC: 4.9 MMOL/L
SODIUM SERPL-SCNC: 142 MMOL/L
TRIGL SERPL-MCNC: 50 MG/DL

## 2019-03-04 PROCEDURE — 99999 PR PBB SHADOW E&M-EST. PATIENT-LVL III: ICD-10-PCS | Mod: PBBFAC,,, | Performed by: FAMILY MEDICINE

## 2019-03-04 PROCEDURE — 99999 PR PBB SHADOW E&M-EST. PATIENT-LVL III: CPT | Mod: PBBFAC,,, | Performed by: FAMILY MEDICINE

## 2019-03-04 PROCEDURE — 99214 PR OFFICE/OUTPT VISIT, EST, LEVL IV, 30-39 MIN: ICD-10-PCS | Mod: S$PBB,,, | Performed by: FAMILY MEDICINE

## 2019-03-04 PROCEDURE — 99214 OFFICE O/P EST MOD 30 MIN: CPT | Mod: S$PBB,,, | Performed by: FAMILY MEDICINE

## 2019-03-04 PROCEDURE — 80048 BASIC METABOLIC PNL TOTAL CA: CPT

## 2019-03-04 PROCEDURE — 99213 OFFICE O/P EST LOW 20 MIN: CPT | Mod: PBBFAC,PO | Performed by: FAMILY MEDICINE

## 2019-03-04 PROCEDURE — 83036 HEMOGLOBIN GLYCOSYLATED A1C: CPT

## 2019-03-04 PROCEDURE — 80061 LIPID PANEL: CPT

## 2019-03-04 PROCEDURE — 86003 ALLG SPEC IGE CRUDE XTRC EA: CPT

## 2019-03-04 RX ORDER — TAMSULOSIN HYDROCHLORIDE 0.4 MG/1
0.4 CAPSULE ORAL
COMMUNITY
Start: 2017-04-12 | End: 2019-03-04 | Stop reason: ALTCHOICE

## 2019-03-04 RX ORDER — METFORMIN HYDROCHLORIDE 750 MG/1
750 TABLET, EXTENDED RELEASE ORAL
COMMUNITY
End: 2019-03-04 | Stop reason: ALTCHOICE

## 2019-03-04 RX ORDER — PNEUMOCOCCAL 13-VALENT CONJUGATE VACCINE 2.2; 2.2; 2.2; 2.2; 2.2; 4.4; 2.2; 2.2; 2.2; 2.2; 2.2; 2.2; 2.2 UG/.5ML; UG/.5ML; UG/.5ML; UG/.5ML; UG/.5ML; UG/.5ML; UG/.5ML; UG/.5ML; UG/.5ML; UG/.5ML; UG/.5ML; UG/.5ML; UG/.5ML
INJECTION, SUSPENSION INTRAMUSCULAR
Refills: 0 | COMMUNITY
Start: 2018-12-31 | End: 2019-06-11 | Stop reason: ALTCHOICE

## 2019-03-04 RX ORDER — OXYCODONE AND ACETAMINOPHEN 5; 325 MG/1; MG/1
TABLET ORAL
Refills: 0 | COMMUNITY
Start: 2018-12-10 | End: 2020-04-06

## 2019-03-04 NOTE — PROGRESS NOTES
Subjective:       Patient ID: Toni Hyde is a 63 y.o. male.    Chief Complaint: Follow-up    63-year-old male with diabetes hypertension hyperlipidemia comes in for three month recheck.  He has started on a new diet and exercise program chiefly calorie counting and has dropped 18 lb since I last saw him in August.  In the interval he had been tried on Jardiance and trulicity for his diabetes and did not tolerate them secondary to abdominal pain that may have been pancreatitis.  He did have an elevated amylase level the and they were discontinued.  Currently he is taking only metformin.  He brings in his blood glucose log with the majority of his readings early on in the 150 and above range with frequent readings over 200.  His most recent readings are all below 125 for the most part.    The patient reports he has been having some strange reactions sometimes when he eats shrimp.  He will occasionally break out on his hands and feet and occasionally the face at times but not at other times.  The preparation does not seem to make a difference.  He usually gets them at sea food restaurants and does not know if they were frozen and imported or local the.  He has broken out at home when he knows he had fresh local.  Usually will treat a reaction with Benadryl and get relief after a few minutes.    Past Medical History:  No date: Cataract  No date: Cataract of left eye  No date: Diabetes mellitus type II      Comment:  on po meds  No date: Hyperlipidemia      Comment:  not on meds at present  No date: Hypertension  04/2017: Kidney stones  No date: Left ureteral stone  10/2017: Plantar fasciitis of right foot      Comment:  Dr Colorado   01/2018: Tendonitis      Comment:  right foot Dr Colorado  No date: Wears glasses    Past Surgical History:  No date: ADENOIDECTOMY  No date: CATARACT EXTRACTION  3/23/2010: COLONOSCOPY      Comment:  Dr. Blackburn, hyperplastic polyps, otherwise negative, 10                year  recheck  04/12/2017: CYSTOSCOPY; Left      Comment:  with ureteral stent-Dr. Noel  4/25/2017: CYSTOSCOPY WITH STENT REMOVAL; Left      Comment:  Performed by Cristobal Noel MD at [facility] OR  4/12/2017: CYSTOSCOPY/ RETROGRADE PYELOGRAM/ STENT PLACEMENT/STENT   EXCHANGE; Left      Comment:  Performed by Cristobal Noel MD at [facility] OR  7/22/2014: EXTRACTION-CATARACT; Left      Comment:  Performed by Jasbir Kwan MD at [facility] OR  05/06/2013: EYE SURGERY      Comment:  Dr Nielsen  04/2017: kidney stent   No date: KNEE ARTHROSCOPY W/ DEBRIDEMENT      Comment:  right with lateral release   04/19/2017: LITHOTRIPSY  4/25/2017: LITHOTRIPSY-EXTRACORPOREAL SHOCK WAVE; Left      Comment:  Performed by Cristobal Noel MD at [facility] OR  No date: NASAL SINUS SURGERY  5/2013: retna surgery       Comment:  Dr Peralta  No date: TONSILLECTOMY    Current Outpatient Medications on File Prior to Visit:  AFLURIA QUAD 4928-4811, PF, 60 mcg (15 mcg x 4)/0.5 mL Syrg vaccine, ADM 0.5ML IM UTD, Disp: , Rfl: 0  allopurinol (ZYLOPRIM) 100 MG tablet, TAKE 1 TABLET DAILY, Disp: 90 tablet, Rfl: 3  amLODIPine (NORVASC) 2.5 MG tablet, TAKE 1 TABLET DAILY, Disp: 90 tablet, Rfl: 3  benazepril (LOTENSIN) 40 MG tablet, TAKE 1 TABLET DAILY, Disp: 90 tablet, Rfl: 3  cetirizine (ZYRTEC) 10 MG tablet, Take 10 mg by mouth daily as needed. 1 Tablet(s) Oral PRN ., Disp: , Rfl:   diphenhydrAMINE (BENADRYL) 25 mg capsule, Take 25 mg by mouth nightly as needed for Itching., Disp: , Rfl:   fluticasone (FLONASE) 50 mcg/actuation nasal spray, 2 sprays by Each Nare route daily as needed. , Disp: , Rfl:   ibuprofen (ADVIL) 200 MG tablet, Take 400 mg by mouth every 6 (six) hours as needed. 2 Tablet(s) Oral PRN Every day., Disp: , Rfl:   metFORMIN (GLUCOPHAGE-XR) 500 MG 24 hr tablet, TAKE 2 TABLETS TWICE A DAY WITH MEALS (REPLACES METFORMIN 750 MG TWICE A DAY), Disp: 360 tablet, Rfl: 0  potassium citrate 15 mEq TbSR, TAKE 1 TABLET DAILY AT 6 A.M., Disp: 100 tablet,  Rfl: 3  pravastatin (PRAVACHOL) 80 MG tablet, Take 1 tablet (80 mg total) by mouth once daily., Disp: 90 tablet, Rfl: 3  PREVNAR 13, PF, 0.5 mL Syrg, ADM 0.5ML IM UTD, Disp: , Rfl: 0  oxyCODONE-acetaminophen (PERCOCET) 5-325 mg per tablet, TK 1 T PO Q T 4 TO 6 H PRN, Disp: , Rfl: 0  (DISCONTINUED) dulaglutide (TRULICITY) 1.5 mg/0.5 mL PnIj, Inject 1.5 mg into the skin every 7 days., Disp: 12 Syringe, Rfl: 3  (DISCONTINUED) JARDIANCE 10 mg Tab, TAKE 1 TABLET EVERY MORNING, Disp: 90 tablet, Rfl: 1  (DISCONTINUED) meclizine (ANTIVERT) 25 mg tablet, TK 1 TO 2 TS PO Q 6 H PRF DIZZINESS, Disp: , Rfl: 0  (DISCONTINUED) metFORMIN (GLUCOPHAGE-XR) 750 MG 24 hr tablet, Take 750 mg by mouth., Disp: , Rfl:   (DISCONTINUED) oxymetazoline (AFRIN) 0.05 % nasal spray, 2 sprays by Nasal route 2 (two) times daily., Disp: , Rfl:   (DISCONTINUED) tamsulosin (FLOMAX) 0.4 mg Cap, Take 0.4 mg by mouth., Disp: , Rfl:     No current facility-administered medications on file prior to visit.           Diabetes   He has type 2 diabetes mellitus. No MedicAlert identification noted. The initial diagnosis of diabetes was made 10 years ago. Pertinent negatives for hypoglycemia include no confusion, dizziness, headaches, hunger, mood changes, nervousness/anxiousness, pallor, seizures, sleepiness, speech difficulty, sweats or tremors. Pertinent negatives for diabetes include no blurred vision, no chest pain, no fatigue, no foot paresthesias, no foot ulcerations, no polydipsia, no polyphagia, no polyuria, no visual change, no weakness and no weight loss. Pertinent negatives for hypoglycemia complications include no blackouts, no hospitalization, no nocturnal hypoglycemia, no required assistance and no required glucagon injection. Symptoms are stable. Diabetic complications include retinopathy. Pertinent negatives for diabetic complications include no autonomic neuropathy, CVA, heart disease, impotence, nephropathy, peripheral neuropathy or PVD. Risk  factors for coronary artery disease include obesity, diabetes mellitus and male sex. Current diabetic treatment includes oral agent (monotherapy). He is compliant with treatment most of the time. His weight is decreasing steadily. He is following a generally healthy diet. Meal planning includes calorie counting. He has not had a previous visit with a dietitian. He participates in exercise daily. He monitors blood glucose at home 1-2 x per day. He monitors urine at home <1 x per month. Blood glucose monitoring compliance is good. His home blood glucose trend is decreasing steadily. He does not see a podiatrist.Eye exam is current.     Review of Systems   Constitutional: Negative for fatigue and weight loss.   Eyes: Negative for blurred vision.   Cardiovascular: Negative for chest pain.   Endocrine: Negative for polydipsia, polyphagia and polyuria.   Genitourinary: Negative for impotence.   Skin: Negative for pallor.   Neurological: Negative for dizziness, tremors, seizures, speech difficulty, weakness and headaches.   Psychiatric/Behavioral: Negative for confusion. The patient is not nervous/anxious.        Objective:      Physical Exam   Constitutional: He appears well-developed. No distress.   Good blood pressure control  Overweight with a BMI of 27.9 is down 18.1 lb from his August 29, 2018 visit with me   HENT:   Head: Normocephalic and atraumatic.   Right Ear: External ear normal.   Left Ear: External ear normal.   Nose: Nose normal.   Mouth/Throat: Oropharynx is clear and moist. No oropharyngeal exudate.   Eyes: EOM are normal. Pupils are equal, round, and reactive to light. No scleral icterus.   Neck: Normal range of motion. Neck supple. No JVD present. No tracheal deviation present. No thyromegaly present.   Cardiovascular: Normal rate, regular rhythm and normal heart sounds. Exam reveals no gallop and no friction rub.   No murmur heard.  Pulses:       Dorsalis pedis pulses are 2+ on the right side, and 2+ on  the left side.        Posterior tibial pulses are 2+ on the right side, and 2+ on the left side.   Pulmonary/Chest: Effort normal and breath sounds normal. No stridor. No respiratory distress. He has no wheezes. He has no rales. He exhibits no tenderness.   Abdominal: Soft. Bowel sounds are normal. He exhibits no distension and no mass. There is no tenderness. There is no rebound and no guarding. No hernia.   Musculoskeletal: Normal range of motion. He exhibits no edema or deformity.        Right foot: There is normal range of motion and no deformity.        Left foot: There is normal range of motion and no deformity.   Feet:   Right Foot:   Protective Sensation: 8 sites tested. 8 sites sensed.   Skin Integrity: Negative for ulcer, blister, skin breakdown, erythema, warmth, callus or dry skin.   Left Foot:   Protective Sensation: 8 sites tested. 8 sites sensed.   Skin Integrity: Negative for ulcer, blister, skin breakdown, erythema, warmth, callus or dry skin.   Lymphadenopathy:     He has no cervical adenopathy.   Neurological:   Proprioception intact all 10 toes   Skin: Skin is warm and dry. Capillary refill takes less than 2 seconds. No rash noted. He is not diaphoretic. No erythema.   Psychiatric: He has a normal mood and affect. His behavior is normal. Judgment and thought content normal.   Nursing note and vitals reviewed.      Assessment:       1. Diabetes mellitus type 2, uncontrolled, without complications    2. Hypertension associated with diabetes    3. Hyperlipidemia associated with type 2 diabetes mellitus    4. Allergy history, seafood    5. BMI 27.0-27.9,adult        Plan:       1. Diabetes mellitus type 2, uncontrolled, without complications  - Basic metabolic panel; Future  - Lipid panel; Future  - Hemoglobin A1c; Future    2. Hypertension associated with diabetes  Good control with no changes needed  - Basic metabolic panel; Future    3. Hyperlipidemia associated with type 2 diabetes  mellitus  Await labs  - Lipid panel; Future    4. Allergy history, seafood  - ALLERGEN SHRIMP; Future    5. BMI 27.0-27.9,adult

## 2019-03-07 ENCOUNTER — TELEPHONE (OUTPATIENT)
Dept: FAMILY MEDICINE | Facility: CLINIC | Age: 64
End: 2019-03-07

## 2019-03-07 DIAGNOSIS — E11.65 TYPE 2 DIABETES MELLITUS WITH HYPERGLYCEMIA, WITHOUT LONG-TERM CURRENT USE OF INSULIN: Primary | ICD-10-CM

## 2019-03-07 NOTE — TELEPHONE ENCOUNTER
----- Message from Candido Mon MD sent at 3/6/2019  8:05 PM CST -----  His chemistry panel looks good with the blood sugar down to 101 and a stable GFR.  His A1c is down into the normal range for the 1st time since September 2017.  His cholesterol levels have much improved with the HDL increasing, probably due to his exercise program    I would recommend checking the A1c in three months and if he remains stable will go to six months after that.    Result sent by e-mail

## 2019-03-07 NOTE — TELEPHONE ENCOUNTER
Patient has read report in [facility]. Called patient to get what day of the week would be good for him in 3 months. Left message for patient to call back if date is not good for him.   Need orders for A1c in 3 months.

## 2019-03-08 DIAGNOSIS — Z91.013 SHRIMP ALLERGY: Primary | ICD-10-CM

## 2019-03-08 LAB
DEPRECATED SHRIMP IGE RAST QL: ABNORMAL
SHRIMP IGE QN: 9.52 KU/L

## 2019-05-25 DIAGNOSIS — I10 ESSENTIAL HYPERTENSION: ICD-10-CM

## 2019-05-27 ENCOUNTER — TELEPHONE (OUTPATIENT)
Dept: FAMILY MEDICINE | Facility: CLINIC | Age: 64
End: 2019-05-27

## 2019-05-27 DIAGNOSIS — Z91.013 SHRIMP ALLERGY: Primary | ICD-10-CM

## 2019-05-27 RX ORDER — METFORMIN HYDROCHLORIDE 500 MG/1
TABLET, EXTENDED RELEASE ORAL
Qty: 360 TABLET | Refills: 0 | Status: SHIPPED | OUTPATIENT
Start: 2019-05-27 | End: 2019-08-24 | Stop reason: SDUPTHER

## 2019-05-27 RX ORDER — BENAZEPRIL HYDROCHLORIDE 40 MG/1
TABLET ORAL
Qty: 90 TABLET | Refills: 3 | OUTPATIENT
Start: 2019-05-27

## 2019-05-27 RX ORDER — VALSARTAN 320 MG/1
320 TABLET ORAL DAILY
Qty: 90 TABLET | Refills: 3 | Status: SHIPPED | OUTPATIENT
Start: 2019-05-27 | End: 2020-04-06 | Stop reason: SDUPTHER

## 2019-05-27 RX ORDER — AMLODIPINE BESYLATE 2.5 MG/1
TABLET ORAL
Qty: 90 TABLET | Refills: 3 | Status: SHIPPED | OUTPATIENT
Start: 2019-05-27 | End: 2020-05-22

## 2019-05-27 NOTE — TELEPHONE ENCOUNTER
A recent [employer] research project has indicated a possible link between long term ACE inhibitor use and increased risk of lung cancer.  I would suggest switching benazepril 40mg to an ARB agent such as valsartan 320mg  that would avoid the possible risk.

## 2019-05-27 NOTE — TELEPHONE ENCOUNTER
Patient was recently diagnosed with a shrimp allergy. Should he be tested for any other related allergies? What other seafood should he avoid?     He has lost weight, he is now down to 177.   His blood pressures at visits has been low.   Should he reduce his blood pressure medication?

## 2019-05-27 NOTE — TELEPHONE ENCOUNTER
I offered a referral to an allergist when we got the test back for the shrimp allergy in March.  Order is in.    He can stop the amlodipine but will need bp check in four weeks to make sure he does not need to resume it.

## 2019-05-28 RX ORDER — ALLOPURINOL 100 MG/1
TABLET ORAL
Qty: 90 TABLET | Refills: 0 | Status: SHIPPED | OUTPATIENT
Start: 2019-05-28 | End: 2019-08-23 | Stop reason: SDUPTHER

## 2019-05-29 RX ORDER — POTASSIUM CITRATE 15 MEQ/1
TABLET, EXTENDED RELEASE ORAL
Qty: 100 TABLET | Refills: 0 | Status: SHIPPED | OUTPATIENT
Start: 2019-05-29 | End: 2019-08-27 | Stop reason: SDUPTHER

## 2019-06-11 ENCOUNTER — LAB VISIT (OUTPATIENT)
Dept: LAB | Facility: HOSPITAL | Age: 64
End: 2019-06-11
Attending: FAMILY MEDICINE
Payer: OTHER GOVERNMENT

## 2019-06-11 ENCOUNTER — OFFICE VISIT (OUTPATIENT)
Dept: ALLERGY | Facility: CLINIC | Age: 64
End: 2019-06-11
Attending: FAMILY MEDICINE
Payer: OTHER GOVERNMENT

## 2019-06-11 VITALS — HEIGHT: 70 IN | OXYGEN SATURATION: 97 % | BODY MASS INDEX: 25.81 KG/M2 | WEIGHT: 180.31 LBS | HEART RATE: 62 BPM

## 2019-06-11 DIAGNOSIS — E11.65 TYPE 2 DIABETES MELLITUS WITH HYPERGLYCEMIA, WITHOUT LONG-TERM CURRENT USE OF INSULIN: ICD-10-CM

## 2019-06-11 DIAGNOSIS — Z91.013 SHELLFISH ALLERGY: Primary | ICD-10-CM

## 2019-06-11 DIAGNOSIS — Z91.013 SHELLFISH ALLERGY: ICD-10-CM

## 2019-06-11 LAB
ESTIMATED AVG GLUCOSE: 123 MG/DL (ref 68–131)
HBA1C MFR BLD HPLC: 5.9 % (ref 4–5.6)

## 2019-06-11 PROCEDURE — 99213 OFFICE O/P EST LOW 20 MIN: CPT | Mod: PBBFAC,PO | Performed by: ALLERGY & IMMUNOLOGY

## 2019-06-11 PROCEDURE — 99203 OFFICE O/P NEW LOW 30 MIN: CPT | Mod: S$PBB,,, | Performed by: ALLERGY & IMMUNOLOGY

## 2019-06-11 PROCEDURE — 36415 COLL VENOUS BLD VENIPUNCTURE: CPT | Mod: PO

## 2019-06-11 PROCEDURE — 86003 ALLG SPEC IGE CRUDE XTRC EA: CPT

## 2019-06-11 PROCEDURE — 83036 HEMOGLOBIN GLYCOSYLATED A1C: CPT

## 2019-06-11 PROCEDURE — 99999 PR PBB SHADOW E&M-EST. PATIENT-LVL III: CPT | Mod: PBBFAC,,, | Performed by: ALLERGY & IMMUNOLOGY

## 2019-06-11 PROCEDURE — 99999 PR PBB SHADOW E&M-EST. PATIENT-LVL III: ICD-10-PCS | Mod: PBBFAC,,, | Performed by: ALLERGY & IMMUNOLOGY

## 2019-06-11 PROCEDURE — 99203 PR OFFICE/OUTPT VISIT, NEW, LEVL III, 30-44 MIN: ICD-10-PCS | Mod: S$PBB,,, | Performed by: ALLERGY & IMMUNOLOGY

## 2019-06-11 NOTE — LETTER
June 11, 2019      Candido Mon MD  [address]  [place] [address]           [facility] [facility]  [address]  [place] [address]  Phone: [phone]          Patient: Toni Hyde   MR Number: [id number]   YOB: 1955   Date of Visit: 6/11/2019       Dear Dr. Candido Mon:    Thank you for referring Toni Hyde to me for evaluation. Attached you will find relevant portions of my assessment and plan of care.    If you have questions, please do not hesitate to call me. I look forward to following Toni Hyde along with you.    Sincerely,    Balbina Araya MD    Enclosure  CC:  No Recipients    If you would like to receive this communication electronically, please contact [email or web address] or [phone] to request more information on [facility] access.    For providers and/or their staff who would like to refer a patient to [facility], please contact us through our one-stop-shop provider referral line, [facility], at [phone].    If you feel you have received this communication in error or would no longer like to receive these types of communications, please e-mail [email or web address]

## 2019-06-13 LAB
DEPRECATED OYSTER IGE RAST QL: NORMAL
OYSTER IGE QN: <0.35 KU/L

## 2019-06-18 ENCOUNTER — PATIENT MESSAGE (OUTPATIENT)
Dept: ALLERGY | Facility: CLINIC | Age: 64
End: 2019-06-18

## 2019-06-28 ENCOUNTER — CLINICAL SUPPORT (OUTPATIENT)
Dept: FAMILY MEDICINE | Facility: CLINIC | Age: 64
End: 2019-06-28
Payer: OTHER GOVERNMENT

## 2019-06-28 ENCOUNTER — TELEPHONE (OUTPATIENT)
Dept: FAMILY MEDICINE | Facility: CLINIC | Age: 64
End: 2019-06-28

## 2019-06-28 VITALS — SYSTOLIC BLOOD PRESSURE: 124 MMHG | DIASTOLIC BLOOD PRESSURE: 86 MMHG | HEART RATE: 60 BPM

## 2019-06-28 DIAGNOSIS — I10 HYPERTENSION, UNSPECIFIED TYPE: Primary | ICD-10-CM

## 2019-06-28 PROCEDURE — 99211 OFF/OP EST MAY X REQ PHY/QHP: CPT | Mod: PBBFAC,PO

## 2019-06-28 PROCEDURE — 99999 PR PBB SHADOW E&M-EST. PATIENT-LVL I: ICD-10-PCS | Mod: PBBFAC,,,

## 2019-06-28 PROCEDURE — 99999 PR PBB SHADOW E&M-EST. PATIENT-LVL I: CPT | Mod: PBBFAC,,,

## 2019-06-28 NOTE — TELEPHONE ENCOUNTER
Patient came in today for blood pressure check.    122/90 left arm manual- his machine 132/92-after several minutes 124/86 p60

## 2019-06-28 NOTE — TELEPHONE ENCOUNTER
Would prefer that he would be a little lower.  We can try doubling up on the amlodipine going from 2.5-5 mg.  He can take two of the 2.5 mg to use them and test the dose to make sure he does not go too low and get dizzy.

## 2019-07-01 NOTE — TELEPHONE ENCOUNTER
Patient does not want to increase Amlodipine- he feels his bp at home is low enough 120/70-he will monitor it at home and if it goes higher he will take 2 of the Amlodipine. He was asked to keep in touch and let Dr. Mon know what dose he is using and send in bp log.

## 2019-07-03 LAB
LEFT EYE DM RETINOPATHY: NEGATIVE
RIGHT EYE DM RETINOPATHY: NEGATIVE

## 2019-07-04 DIAGNOSIS — E78.5 HYPERLIPIDEMIA ASSOCIATED WITH TYPE 2 DIABETES MELLITUS: ICD-10-CM

## 2019-07-04 DIAGNOSIS — E11.69 HYPERLIPIDEMIA ASSOCIATED WITH TYPE 2 DIABETES MELLITUS: ICD-10-CM

## 2019-07-05 ENCOUNTER — PATIENT MESSAGE (OUTPATIENT)
Dept: FAMILY MEDICINE | Facility: CLINIC | Age: 64
End: 2019-07-05

## 2019-07-05 RX ORDER — PRAVASTATIN SODIUM 80 MG/1
TABLET ORAL
Qty: 90 TABLET | Refills: 2 | Status: SHIPPED | OUTPATIENT
Start: 2019-07-05 | End: 2020-04-06 | Stop reason: SDUPTHER

## 2019-07-05 NOTE — TELEPHONE ENCOUNTER
Those readings look good.  It does not look like we need to increase the amlodipine.  We will stay at the 2.5.

## 2019-08-25 RX ORDER — METFORMIN HYDROCHLORIDE 500 MG/1
TABLET, EXTENDED RELEASE ORAL
Qty: 360 TABLET | Refills: 0 | Status: SHIPPED | OUTPATIENT
Start: 2019-08-25 | End: 2019-11-24 | Stop reason: SDUPTHER

## 2019-08-28 RX ORDER — POTASSIUM CITRATE 15 MEQ/1
TABLET, EXTENDED RELEASE ORAL
Qty: 100 TABLET | Refills: 3 | Status: SHIPPED | OUTPATIENT
Start: 2019-08-28 | End: 2021-01-05 | Stop reason: SDUPTHER

## 2019-08-28 RX ORDER — ALLOPURINOL 100 MG/1
TABLET ORAL
Qty: 90 TABLET | Refills: 4 | Status: SHIPPED | OUTPATIENT
Start: 2019-08-28 | End: 2021-01-05 | Stop reason: SDUPTHER

## 2019-10-04 DIAGNOSIS — E11.9 TYPE 2 DIABETES MELLITUS WITHOUT COMPLICATION: ICD-10-CM

## 2019-10-31 ENCOUNTER — LAB VISIT (OUTPATIENT)
Dept: LAB | Facility: HOSPITAL | Age: 64
End: 2019-10-31
Attending: FAMILY MEDICINE
Payer: OTHER GOVERNMENT

## 2019-10-31 DIAGNOSIS — E11.9 TYPE 2 DIABETES MELLITUS WITHOUT COMPLICATION: ICD-10-CM

## 2019-10-31 PROCEDURE — 36415 COLL VENOUS BLD VENIPUNCTURE: CPT | Mod: PO

## 2019-10-31 PROCEDURE — 83036 HEMOGLOBIN GLYCOSYLATED A1C: CPT

## 2019-11-01 LAB
ESTIMATED AVG GLUCOSE: 120 MG/DL (ref 68–131)
HBA1C MFR BLD HPLC: 5.8 % (ref 4–5.6)

## 2019-11-25 RX ORDER — METFORMIN HYDROCHLORIDE 500 MG/1
TABLET, EXTENDED RELEASE ORAL
Qty: 360 TABLET | Refills: 0 | Status: SHIPPED | OUTPATIENT
Start: 2019-11-25 | End: 2020-04-06 | Stop reason: SDUPTHER

## 2019-12-09 ENCOUNTER — TELEPHONE (OUTPATIENT)
Dept: FAMILY MEDICINE | Facility: CLINIC | Age: 64
End: 2019-12-09

## 2019-12-09 NOTE — TELEPHONE ENCOUNTER
----- Message from Corinna De Leon sent at 12/9/2019 11:27 AM CST -----      Caller is requesting a sooner appointment.  Caller declined first available appointment listed below.  Caller will not accept being placed on the waitlist and is requesting a message be sent to doctor.    Name of Caller: beatriz moreno   When is the first available appointment?  6/18/2020  Symptoms: rechecking labs would like around march   Best Call Back Number: [phone]  Additional Information: please contact pt directly to schedule

## 2020-02-21 DIAGNOSIS — E11.9 TYPE 2 DIABETES MELLITUS WITHOUT COMPLICATION: ICD-10-CM

## 2020-04-01 ENCOUNTER — DOCUMENTATION ONLY (OUTPATIENT)
Dept: FAMILY MEDICINE | Facility: CLINIC | Age: 65
End: 2020-04-01

## 2020-04-01 NOTE — PROGRESS NOTES
Pre-Visit Chart Review  For Appointment Scheduled on 4-6-20    Health Maintenance Due   Topic Date Due    Eye Exam  07/03/2019    Hemoglobin A1c  01/31/2020    Foot Exam  03/04/2020    Lipid Panel  03/04/2020    Colonoscopy  03/23/2020

## 2020-04-02 ENCOUNTER — PATIENT OUTREACH (OUTPATIENT)
Dept: ADMINISTRATIVE | Facility: HOSPITAL | Age: 65
End: 2020-04-02

## 2020-04-02 NOTE — PROGRESS NOTES
Chart review completed 04/02/2020.  Care Everywhere updates requested and reviewed.  Immunizations reconciled. Media reviewed.

## 2020-04-04 DIAGNOSIS — E11.69 HYPERLIPIDEMIA ASSOCIATED WITH TYPE 2 DIABETES MELLITUS: ICD-10-CM

## 2020-04-04 DIAGNOSIS — E78.5 HYPERLIPIDEMIA ASSOCIATED WITH TYPE 2 DIABETES MELLITUS: ICD-10-CM

## 2020-04-06 ENCOUNTER — PATIENT OUTREACH (OUTPATIENT)
Dept: ADMINISTRATIVE | Facility: HOSPITAL | Age: 65
End: 2020-04-06

## 2020-04-06 ENCOUNTER — LAB VISIT (OUTPATIENT)
Dept: LAB | Facility: HOSPITAL | Age: 65
End: 2020-04-06
Attending: FAMILY MEDICINE
Payer: MEDICARE

## 2020-04-06 ENCOUNTER — OFFICE VISIT (OUTPATIENT)
Dept: FAMILY MEDICINE | Facility: CLINIC | Age: 65
End: 2020-04-06
Attending: FAMILY MEDICINE
Payer: MEDICARE

## 2020-04-06 ENCOUNTER — PATIENT MESSAGE (OUTPATIENT)
Dept: FAMILY MEDICINE | Facility: CLINIC | Age: 65
End: 2020-04-06

## 2020-04-06 VITALS
HEIGHT: 70 IN | HEART RATE: 59 BPM | DIASTOLIC BLOOD PRESSURE: 70 MMHG | BODY MASS INDEX: 27.3 KG/M2 | TEMPERATURE: 98 F | RESPIRATION RATE: 16 BRPM | OXYGEN SATURATION: 96 % | SYSTOLIC BLOOD PRESSURE: 112 MMHG | WEIGHT: 190.69 LBS

## 2020-04-06 DIAGNOSIS — E11.69 HYPERLIPIDEMIA ASSOCIATED WITH TYPE 2 DIABETES MELLITUS: Primary | ICD-10-CM

## 2020-04-06 DIAGNOSIS — E11.65 TYPE 2 DIABETES MELLITUS WITH HYPERGLYCEMIA, WITHOUT LONG-TERM CURRENT USE OF INSULIN: ICD-10-CM

## 2020-04-06 DIAGNOSIS — I15.2 HYPERTENSION ASSOCIATED WITH DIABETES: ICD-10-CM

## 2020-04-06 DIAGNOSIS — E11.69 HYPERLIPIDEMIA ASSOCIATED WITH TYPE 2 DIABETES MELLITUS: ICD-10-CM

## 2020-04-06 DIAGNOSIS — N40.0 BENIGN PROSTATIC HYPERPLASIA WITHOUT LOWER URINARY TRACT SYMPTOMS: ICD-10-CM

## 2020-04-06 DIAGNOSIS — E11.59 HYPERTENSION ASSOCIATED WITH DIABETES: ICD-10-CM

## 2020-04-06 DIAGNOSIS — Z12.11 COLON CANCER SCREENING: ICD-10-CM

## 2020-04-06 DIAGNOSIS — E78.5 HYPERLIPIDEMIA ASSOCIATED WITH TYPE 2 DIABETES MELLITUS: ICD-10-CM

## 2020-04-06 DIAGNOSIS — N20.0 KIDNEY STONES: ICD-10-CM

## 2020-04-06 DIAGNOSIS — E11.65 TYPE 2 DIABETES MELLITUS WITH HYPERGLYCEMIA, WITHOUT LONG-TERM CURRENT USE OF INSULIN: Primary | ICD-10-CM

## 2020-04-06 DIAGNOSIS — Z12.5 PROSTATE CANCER SCREENING: ICD-10-CM

## 2020-04-06 DIAGNOSIS — E78.5 HYPERLIPIDEMIA ASSOCIATED WITH TYPE 2 DIABETES MELLITUS: Primary | ICD-10-CM

## 2020-04-06 LAB
ALBUMIN SERPL BCP-MCNC: 4.2 G/DL (ref 3.5–5.2)
ALP SERPL-CCNC: 54 U/L (ref 55–135)
ALT SERPL W/O P-5'-P-CCNC: 22 U/L (ref 10–44)
ANION GAP SERPL CALC-SCNC: 9 MMOL/L (ref 8–16)
AST SERPL-CCNC: 26 U/L (ref 10–40)
BASOPHILS # BLD AUTO: 0.07 K/UL (ref 0–0.2)
BASOPHILS NFR BLD: 1 % (ref 0–1.9)
BILIRUB SERPL-MCNC: 0.7 MG/DL (ref 0.1–1)
BUN SERPL-MCNC: 21 MG/DL (ref 8–23)
CALCIUM SERPL-MCNC: 9.7 MG/DL (ref 8.7–10.5)
CHLORIDE SERPL-SCNC: 101 MMOL/L (ref 95–110)
CHOLEST SERPL-MCNC: 140 MG/DL (ref 120–199)
CHOLEST/HDLC SERPL: 3.1 {RATIO} (ref 2–5)
CO2 SERPL-SCNC: 30 MMOL/L (ref 23–29)
COMPLEXED PSA SERPL-MCNC: 0.59 NG/ML (ref 0–4)
CREAT SERPL-MCNC: 1.2 MG/DL (ref 0.5–1.4)
DIFFERENTIAL METHOD: ABNORMAL
EOSINOPHIL # BLD AUTO: 0.3 K/UL (ref 0–0.5)
EOSINOPHIL NFR BLD: 3.7 % (ref 0–8)
ERYTHROCYTE [DISTWIDTH] IN BLOOD BY AUTOMATED COUNT: 12.5 % (ref 11.5–14.5)
EST. GFR  (AFRICAN AMERICAN): >60 ML/MIN/1.73 M^2
EST. GFR  (NON AFRICAN AMERICAN): >60 ML/MIN/1.73 M^2
ESTIMATED AVG GLUCOSE: 131 MG/DL (ref 68–131)
GLUCOSE SERPL-MCNC: 111 MG/DL (ref 70–110)
HBA1C MFR BLD HPLC: 6.2 % (ref 4–5.6)
HCT VFR BLD AUTO: 54.3 % (ref 40–54)
HDLC SERPL-MCNC: 45 MG/DL (ref 40–75)
HDLC SERPL: 32.1 % (ref 20–50)
HGB BLD-MCNC: 17.1 G/DL (ref 14–18)
IMM GRANULOCYTES # BLD AUTO: 0.03 K/UL (ref 0–0.04)
IMM GRANULOCYTES NFR BLD AUTO: 0.4 % (ref 0–0.5)
LDLC SERPL CALC-MCNC: 79.4 MG/DL (ref 63–159)
LYMPHOCYTES # BLD AUTO: 1.7 K/UL (ref 1–4.8)
LYMPHOCYTES NFR BLD: 23.5 % (ref 18–48)
MCH RBC QN AUTO: 30.5 PG (ref 27–31)
MCHC RBC AUTO-ENTMCNC: 31.5 G/DL (ref 32–36)
MCV RBC AUTO: 97 FL (ref 82–98)
MONOCYTES # BLD AUTO: 0.8 K/UL (ref 0.3–1)
MONOCYTES NFR BLD: 11 % (ref 4–15)
NEUTROPHILS # BLD AUTO: 4.4 K/UL (ref 1.8–7.7)
NEUTROPHILS NFR BLD: 60.4 % (ref 38–73)
NONHDLC SERPL-MCNC: 95 MG/DL
NRBC BLD-RTO: 0 /100 WBC
PLATELET # BLD AUTO: 181 K/UL (ref 150–350)
PMV BLD AUTO: 11.3 FL (ref 9.2–12.9)
POTASSIUM SERPL-SCNC: 4.3 MMOL/L (ref 3.5–5.1)
PROT SERPL-MCNC: 7.4 G/DL (ref 6–8.4)
RBC # BLD AUTO: 5.6 M/UL (ref 4.6–6.2)
SODIUM SERPL-SCNC: 140 MMOL/L (ref 136–145)
TRIGL SERPL-MCNC: 78 MG/DL (ref 30–150)
WBC # BLD AUTO: 7.28 K/UL (ref 3.9–12.7)

## 2020-04-06 PROCEDURE — 99214 OFFICE O/P EST MOD 30 MIN: CPT | Mod: PBBFAC,PO | Performed by: FAMILY MEDICINE

## 2020-04-06 PROCEDURE — 99214 OFFICE O/P EST MOD 30 MIN: CPT | Mod: S$PBB,,, | Performed by: FAMILY MEDICINE

## 2020-04-06 PROCEDURE — 99214 PR OFFICE/OUTPT VISIT, EST, LEVL IV, 30-39 MIN: ICD-10-PCS | Mod: S$PBB,,, | Performed by: FAMILY MEDICINE

## 2020-04-06 PROCEDURE — 80053 COMPREHEN METABOLIC PANEL: CPT

## 2020-04-06 PROCEDURE — 80061 LIPID PANEL: CPT

## 2020-04-06 PROCEDURE — 84153 ASSAY OF PSA TOTAL: CPT

## 2020-04-06 PROCEDURE — 85025 COMPLETE CBC W/AUTO DIFF WBC: CPT

## 2020-04-06 PROCEDURE — 83036 HEMOGLOBIN GLYCOSYLATED A1C: CPT

## 2020-04-06 PROCEDURE — 99999 PR PBB SHADOW E&M-EST. PATIENT-LVL IV: ICD-10-PCS | Mod: PBBFAC,,, | Performed by: FAMILY MEDICINE

## 2020-04-06 PROCEDURE — 36415 COLL VENOUS BLD VENIPUNCTURE: CPT | Mod: PO

## 2020-04-06 PROCEDURE — 99999 PR PBB SHADOW E&M-EST. PATIENT-LVL IV: CPT | Mod: PBBFAC,,, | Performed by: FAMILY MEDICINE

## 2020-04-06 RX ORDER — VALSARTAN 320 MG/1
320 TABLET ORAL DAILY
Qty: 90 TABLET | Refills: 3 | Status: SHIPPED | OUTPATIENT
Start: 2020-04-06 | End: 2020-05-11

## 2020-04-06 RX ORDER — METFORMIN HYDROCHLORIDE 500 MG/1
TABLET, EXTENDED RELEASE ORAL
Qty: 360 TABLET | Refills: 1 | Status: SHIPPED | OUTPATIENT
Start: 2020-04-06 | End: 2020-09-11

## 2020-04-06 RX ORDER — PRAVASTATIN SODIUM 80 MG/1
TABLET ORAL
Qty: 90 TABLET | Refills: 3 | OUTPATIENT
Start: 2020-04-06

## 2020-04-06 RX ORDER — PRAVASTATIN SODIUM 80 MG/1
80 TABLET ORAL DAILY
Qty: 90 TABLET | Refills: 3 | Status: SHIPPED | OUTPATIENT
Start: 2020-04-06 | End: 2020-04-07 | Stop reason: ALTCHOICE

## 2020-04-06 RX ORDER — METFORMIN HYDROCHLORIDE 500 MG/1
TABLET, EXTENDED RELEASE ORAL
Qty: 360 TABLET | Refills: 3 | OUTPATIENT
Start: 2020-04-06

## 2020-04-06 NOTE — PROGRESS NOTES
Subjective:       Patient ID: Toni Hyde is a 64 y.o. male.    Chief Complaint: Annual Exam    64-year-old male with a history of diabetes, hypertension, hyperlipidemia, and kidney stones with BPH comes in for six month follow-up on all of his problems.  He has no active complaints at this time and he is fasting for lab.  He had gone on a diet and exercise program and dropped about 20 lb but has since regained about half of that.  He has had no significant episodes of hypoglycemia.  He is due for a colonoscopy which will have to be deferred due to the ban on elective procedures.    Past Medical History:  No date: Cataract  No date: Cataract of left eye  No date: Diabetes mellitus type II      Comment:  on po meds  No date: Hyperlipidemia      Comment:  not on meds at present  No date: Hypertension  04/2017: Kidney stones  No date: Left ureteral stone  10/2017: Plantar fasciitis of right foot      Comment:  Dr Colorado   01/2018: Tendonitis      Comment:  right foot Dr Colorado  No date: Wears glasses    Past Surgical History:  No date: ADENOIDECTOMY  No date: CATARACT EXTRACTION  3/23/2010: COLONOSCOPY      Comment:  Dr. Blackburn, hyperplastic polyps, otherwise negative, 10                year recheck  04/12/2017: CYSTOSCOPY; Left      Comment:  with ureteral stent-Dr. Noel  05/06/2013: EYE SURGERY      Comment:  Dr Nielsen  04/2017: kidney stent   No date: KNEE ARTHROSCOPY W/ DEBRIDEMENT      Comment:  right with lateral release   04/19/2017: LITHOTRIPSY  No date: NASAL SINUS SURGERY  5/2013: retna surgery       Comment:  Dr Peralta  No date: TONSILLECTOMY    Review of patient's family history indicates:  Problem: Hypertension      Relation: Father          Age of Onset: (Not Specified)  Problem: Neuropathy      Relation: Father          Age of Onset: (Not Specified)  Problem: Stroke      Relation: Paternal Grandmother          Age of Onset: (Not Specified)  Problem: Lung cancer      Relation: Paternal Grandfather           Age of Onset: (Not Specified)  Problem: Cancer      Relation: Paternal Grandfather          Age of Onset: (Not Specified)  Problem: Cataracts      Relation: Mother          Age of Onset: (Not Specified)  Problem: Diabetes      Relation: Maternal Grandmother          Age of Onset: (Not Specified)  Problem: No Known Problems      Relation: Sister          Age of Onset: (Not Specified)  Problem: No Known Problems      Relation: Maternal Aunt          Age of Onset: (Not Specified)    Social History    Tobacco Use      Smoking status: Former Smoker        Types: Cigarettes        Quit date: 1978        Years since quittin.0      Smokeless tobacco: Never Used    Alcohol use: Yes      Alcohol/week: 2.5 standard drinks      Types: 3 Standard drinks or equivalent per week      Frequency: 2-4 times a month      Drinks per session: 1 or 2      Binge frequency: Never      Comment: 3 drinks per week    Drug use: No    Current Outpatient Medications on File Prior to Visit:  allopurinol (ZYLOPRIM) 100 MG tablet, TAKE 1 TABLET DAILY, Disp: 90 tablet, Rfl: 4  amLODIPine (NORVASC) 2.5 MG tablet, TAKE 1 TABLET DAILY, Disp: 90 tablet, Rfl: 3  cetirizine (ZYRTEC) 10 MG tablet, Take 10 mg by mouth daily as needed. 1 Tablet(s) Oral PRN ., Disp: , Rfl:   diphenhydrAMINE (BENADRYL) 25 mg capsule, Take 25 mg by mouth nightly as needed for Itching., Disp: , Rfl:   fluticasone (FLONASE) 50 mcg/actuation nasal spray, 2 sprays by Each Nare route daily as needed. , Disp: , Rfl:   ibuprofen (ADVIL) 200 MG tablet, Take 400 mg by mouth every 6 (six) hours as needed. 2 Tablet(s) Oral PRN Every day., Disp: , Rfl:   potassium citrate (UROCIT-K 15) 15 mEq TbSR, TAKE 1 TABLET DAILY AT 6 A.M., Disp: 100 tablet, Rfl: 3  (DISCONTINUED) metFORMIN (GLUCOPHAGE-XR) 500 MG 24 hr tablet, TAKE 2 TABLETS TWICE A DAY WITH MEALS (REPLACES METFORMIN 750 MG TWICE A DAY), Disp: 360 tablet, Rfl: 0  (DISCONTINUED) oxyCODONE-acetaminophen (PERCOCET) 5-325  mg per tablet, TK 1 T PO Q T 4 TO 6 H PRN, Disp: , Rfl: 0  (DISCONTINUED) pravastatin (PRAVACHOL) 80 MG tablet, TAKE 1 TABLET DAILY, Disp: 90 tablet, Rfl: 2  (DISCONTINUED) valsartan (DIOVAN) 320 MG tablet, Take 1 tablet (320 mg total) by mouth once daily., Disp: 90 tablet, Rfl: 3    No current facility-administered medications on file prior to visit.     HIV Screening due on 04[birth year]28/1970  Eye Exam due on 07/03/2019  Hemoglobin A1c due on 01/31/2020  Foot Exam due on 03/04/2020  Lipid Panel due on 03/04/2020  Colonoscopy due on 03/23/2020      Review of Systems   Constitutional: Negative for activity change and unexpected weight change.   HENT: Negative for hearing loss, rhinorrhea and trouble swallowing.    Eyes: Negative for discharge and visual disturbance.   Respiratory: Negative for chest tightness and wheezing.    Cardiovascular: Negative for chest pain and palpitations.   Gastrointestinal: Negative for blood in stool, constipation, diarrhea and vomiting.   Endocrine: Negative for polydipsia and polyuria.   Genitourinary: Negative for difficulty urinating, hematuria and urgency.   Musculoskeletal: Negative for arthralgias, joint swelling and neck pain.   Skin: Negative for color change and rash.   Neurological: Negative for weakness and headaches.   Psychiatric/Behavioral: Negative for confusion and dysphoric mood.       Objective:      Physical Exam   Constitutional: He is oriented to person, place, and time. He appears well-developed. No distress.   Good blood pressure control  Overweight with a BMI of 27.4 he is down 10 lb from his March 4, 2019 visit   HENT:   Head: Normocephalic and atraumatic.   Right Ear: External ear normal.   Left Ear: External ear normal.   Nose: Nose normal.   Mouth/Throat: Oropharynx is clear and moist. No oropharyngeal exudate.   Eyes: Pupils are equal, round, and reactive to light. Conjunctivae and EOM are normal. No scleral icterus.   Neck: Normal range of motion. Neck supple. No  JVD present. No tracheal deviation present. No thyromegaly present.   Cardiovascular: Normal rate, regular rhythm and normal heart sounds. Exam reveals no gallop and no friction rub.   No murmur heard.  Pulses:       Dorsalis pedis pulses are 2+ on the right side, and 2+ on the left side.        Posterior tibial pulses are 2+ on the right side, and 2+ on the left side.   Pulmonary/Chest: Effort normal and breath sounds normal. No respiratory distress. He has no wheezes. He has no rales. He exhibits no tenderness.   Abdominal: Soft. Bowel sounds are normal. He exhibits no distension and no mass. There is no tenderness. There is no rebound and no guarding.   Genitourinary:   Genitourinary Comments: Deferred JUAN FRANCISCO, he plans to see Urology in the near future   Musculoskeletal: Normal range of motion. He exhibits no edema or deformity.        Right foot: There is normal range of motion and no deformity.        Left foot: There is normal range of motion and no deformity.   Feet:   Right Foot:   Protective Sensation: 8 sites tested. 8 sites sensed.   Skin Integrity: Negative for ulcer, blister, skin breakdown, erythema, warmth, callus or dry skin.   Left Foot:   Protective Sensation: 8 sites tested. 8 sites sensed.   Skin Integrity: Negative for ulcer, blister, skin breakdown, erythema, warmth, callus or dry skin.   Lymphadenopathy:     He has no cervical adenopathy.   Neurological: He is alert and oriented to person, place, and time. He has normal reflexes. He displays normal reflexes. No cranial nerve deficit or sensory deficit. He exhibits normal muscle tone.   Proprioception intact all 10 toes   Skin: Skin is warm and dry. Capillary refill takes less than 2 seconds. No rash noted. He is not diaphoretic. No erythema.   Psychiatric: He has a normal mood and affect. His behavior is normal. Judgment and thought content normal.   Nursing note and vitals reviewed.      Assessment:       1. Type 2 diabetes mellitus with  hyperglycemia, without long-term current use of insulin    2. Hypertension associated with diabetes    3. Hyperlipidemia associated with type 2 diabetes mellitus    4. Benign prostatic hyperplasia without lower urinary tract symptoms    5. Kidney stones    6. Prostate cancer screening    7. Colon cancer screening    8. BMI 27.0-27.9,adult        Plan:       1. Type 2 diabetes mellitus with hyperglycemia, without long-term current use of insulin  Await lab results  - Hemoglobin A1c; Future  - Lipid panel; Future  - Comprehensive metabolic panel; Future  - Microalbumin/creatinine urine ratio; Future  - metFORMIN (GLUCOPHAGE-XR) 500 MG XR 24hr tablet; TAKE 2 TABLETS TWICE A DAY WITH MEALS (REPLACES METFORMIN 750 MG TWICE A DAY)  Dispense: 360 tablet; Refill: 1    2. Hypertension associated with diabetes  Good control with no medication changes needed  - Lipid panel; Future  - Comprehensive metabolic panel; Future  - CBC auto differential; Future  - valsartan (DIOVAN) 320 MG tablet; Take 1 tablet (320 mg total) by mouth once daily.  Dispense: 90 tablet; Refill: 3    3. Hyperlipidemia associated with type 2 diabetes mellitus  Await lab results  - Lipid panel; Future  - Comprehensive metabolic panel; Future  - pravastatin (PRAVACHOL) 80 MG tablet; Take 1 tablet (80 mg total) by mouth once daily.  Dispense: 90 tablet; Refill: 3    4. Benign prostatic hyperplasia without lower urinary tract symptoms  Relatively asymptomatic will be following up with urology in the near future    5. Kidney stones  Uric acid stones by history no active symptoms    6. Prostate cancer screening  Minimal if any symptoms  - PSA, Screening; Future    7. Colon cancer screening  Will probably have to wait several months before this can be scheduled due to the corona virus epidemic  - Ambulatory referral/consult to Gastroenterology; Future    8. BMI 27.0-27.9,adult  Encouraged continued weight loss and exercise

## 2020-04-07 RX ORDER — ROSUVASTATIN CALCIUM 20 MG/1
20 TABLET, COATED ORAL DAILY
Qty: 90 TABLET | Refills: 3 | Status: SHIPPED | OUTPATIENT
Start: 2020-04-07 | End: 2021-03-17

## 2020-04-07 NOTE — TELEPHONE ENCOUNTER
Crestor 20 mg sent to [facility], discontinue pravastatin    We can check the cholesterol again when we do his next A1c in six months.  Orders are in for A1c, CMP, lipid panel  
Patient was contacted.   Please send rx for Crestor to [facility].  He can do lab follow up any day.   
See patient response to lab review.  
The pravastatin 80 mg is not quite getting his LDL to the diabetic goal of less than 70.  I would recommend that we switch to Crestor which is much stronger.  A 20 mg dose would probably do the job.  
12-Dec-2019 14:37

## 2020-05-05 ENCOUNTER — PATIENT MESSAGE (OUTPATIENT)
Dept: ADMINISTRATIVE | Facility: HOSPITAL | Age: 65
End: 2020-05-05

## 2020-05-10 DIAGNOSIS — E11.59 HYPERTENSION ASSOCIATED WITH DIABETES: ICD-10-CM

## 2020-05-10 DIAGNOSIS — I15.2 HYPERTENSION ASSOCIATED WITH DIABETES: ICD-10-CM

## 2020-05-11 RX ORDER — VALSARTAN 320 MG
TABLET ORAL
Qty: 90 TABLET | Refills: 3 | Status: SHIPPED | OUTPATIENT
Start: 2020-05-11 | End: 2021-05-03

## 2020-05-22 DIAGNOSIS — I10 ESSENTIAL HYPERTENSION: ICD-10-CM

## 2020-05-22 RX ORDER — AMLODIPINE BESYLATE 2.5 MG/1
TABLET ORAL
Qty: 90 TABLET | Refills: 3 | Status: SHIPPED | OUTPATIENT
Start: 2020-05-22 | End: 2021-05-19

## 2020-07-08 ENCOUNTER — TELEPHONE (OUTPATIENT)
Dept: GASTROENTEROLOGY | Facility: CLINIC | Age: 65
End: 2020-07-08

## 2020-07-08 NOTE — TELEPHONE ENCOUNTER
Call placed to Mr. Hyde in regards to referral received. No answer, left detailed message to return call.

## 2020-07-09 ENCOUNTER — TELEPHONE (OUTPATIENT)
Dept: GASTROENTEROLOGY | Facility: CLINIC | Age: 65
End: 2020-07-09

## 2020-07-09 NOTE — TELEPHONE ENCOUNTER
----- Message from Hailee Ayers MA sent at 7/9/2020  9:31 AM CDT -----  Type:  Patient Returning Call    Who Called:  Toni  Who Left Message for Patient:  Mallorie  Does the patient know what this is regarding?:  appointment  Best Call Back Number:    Additional Information:

## 2020-07-09 NOTE — TELEPHONE ENCOUNTER
Call placed to Mr. Hyde in regards to message received. I advised him we received a referral for him to have a screening colonoscopy. I advised if he wished to schedule he would have to have a COVID screening done 72 hours prior to his procedure. He stated he would like to wait a few months. I advised him that I would send him a letter in the mail in 3 months as a reminder. He verbalized understanding. No further issues noted.

## 2020-08-27 LAB
LEFT EYE DM RETINOPATHY: NEGATIVE
RIGHT EYE DM RETINOPATHY: NEGATIVE

## 2020-08-28 ENCOUNTER — PATIENT OUTREACH (OUTPATIENT)
Dept: ADMINISTRATIVE | Facility: HOSPITAL | Age: 65
End: 2020-08-28

## 2020-09-10 DIAGNOSIS — E11.65 TYPE 2 DIABETES MELLITUS WITH HYPERGLYCEMIA, WITHOUT LONG-TERM CURRENT USE OF INSULIN: ICD-10-CM

## 2020-09-10 NOTE — TELEPHONE ENCOUNTER
Care Due:                  Date            Visit Type   Department     Provider  --------------------------------------------------------------------------------                                ESTABLISHED                              PATIENT      [facility]  Last Visit: 04-      EXTENDED     Medicine       Candido oMn                              ESTABLISHED                              PATIENT      [facility]  Next Visit: 10-      EXTENDED     Medicine       Candido Mon                                                            Last  Test          Frequency    Reason                     Performed    Due Date  --------------------------------------------------------------------------------    HBA1C.......  6 months...  metFORMIN................  04-   10-    Powered by [employer]amba[employer]. Reference number: [id number]. 9/10/2020 2:23:12 AM CDT

## 2020-09-11 RX ORDER — METFORMIN HYDROCHLORIDE 500 MG/1
TABLET, EXTENDED RELEASE ORAL
Qty: 360 TABLET | Refills: 0 | Status: SHIPPED | OUTPATIENT
Start: 2020-09-11 | End: 2020-12-09

## 2020-09-12 NOTE — PROGRESS NOTES
Refill Authorization Note     is requesting a refill authorization.    Brief assessment and rationale for refill: APPROVE: prr          Medication Therapy Plan: CDMR. FLOS    Medication reconciliation completed: No                         Comments:   Automatic Epic Protocol Generated Data:    Requested Prescriptions   Signed Prescriptions Disp Refills    metFORMIN (GLUCOPHAGE-XR) 500 MG ER 24hr tablet 360 tablet 0     Sig: TAKE 2 TABLETS TWICE A DAY WITH MEALS (REPLACES METFORMIN 750 MG TWICE A DAY)       Endocrinology:  Diabetes - Biguanides Passed - 9/10/2020  2:22 AM        Passed - Patient is at least 18 years old        Passed - Office visit in past 12 months or future 90 days.     Recent Outpatient Visits            5 months ago Type 2 diabetes mellitus with hyperglycemia, without long-term current use of insulin    [facility] Candido Mon MD    1 year ago Shellfish allergy    [place] - All[facility] Balbina Araya MD    1 year ago Diabetes mellitus type 2, uncontrolled, without complications    [facility] Candido Mon MD    2 years ago Controlled type 2 diabetes mellitus without complication, without long-term current use of insulin    [facility] Candido oMn MD    2 years ago Acute non-recurrent maxillary sinusitis    [facility] Sandra Moreno, [place]          Future Appointments              In 3 weeks LAB, [facility], [facility]    In 3 weeks Candido Mon MD [facility], [facility]                Passed - Cr is 1.3 or below and within 360 days     Creatinine   Date Value Ref Range Status   04/06/2020 1.2 0.5 - 1.4 mg/dL Final   03/04/2019 1.3 0.5 - 1.4 mg/dL Final   02/27/2018 1.3 0.5 - 1.4 mg/dL Final              Passed - HBA1C is 7.9 or below and within 180 days     Hemoglobin A1C   Date Value Ref Range Status   04/06/2020 6.2 (H) 4.0 - 5.6 % Final     Comment:     ADA Screening  Guidelines:  5.7-6.4%  Consistent with prediabetes  >or=6.5%  Consistent with diabetes  High levels of fetal hemoglobin interfere with the HbA1C  assay. Heterozygous hemoglobin variants (HbS, HgC, etc)do  not significantly interfere with this assay.   However, presence of multiple variants may affect accuracy.     10/31/2019 5.8 (H) 4.0 - 5.6 % Final     Comment:     ADA Screening Guidelines:  5.7-6.4%  Consistent with prediabetes  >or=6.5%  Consistent with diabetes  High levels of fetal hemoglobin interfere with the HbA1C  assay. Heterozygous hemoglobin variants (HbS, HgC, etc)do  not significantly interfere with this assay.   However, presence of multiple variants may affect accuracy.     06/11/2019 5.9 (H) 4.0 - 5.6 % Final     Comment:     ADA Screening Guidelines:  5.7-6.4%  Consistent with prediabetes  >or=6.5%  Consistent with diabetes  High levels of fetal hemoglobin interfere with the HbA1C  assay. Heterozygous hemoglobin variants (HbS, HgC, etc)do  not significantly interfere with this assay.   However, presence of multiple variants may affect accuracy.                Passed - eGFR is 30 or above and within 360 days     eGFR if non    Date Value Ref Range Status   04/06/2020 >60.0 >60 mL/min/1.73 m^2 Final     Comment:     Calculation used to obtain the estimated glomerular filtration  rate (eGFR) is the CKD-EPI equation.      03/04/2019 58.1 (A) >60 mL/min/1.73 m^2 Final     Comment:     Calculation used to obtain the estimated glomerular filtration  rate (eGFR) is the CKD-EPI equation.      02/27/2018 58.5 (A) >60 mL/min/1.73 m^2 Final     Comment:     Calculation used to obtain the estimated glomerular filtration  rate (eGFR) is the CKD-EPI equation.        eGFR if    Date Value Ref Range Status   04/06/2020 >60.0 >60 mL/min/1.73 m^2 Final   03/04/2019 >60.0 >60 mL/min/1.73 m^2 Final   02/27/2018 >60.0 >60 mL/min/1.73 m^2 Final                    Appointments  past 12m or  future 3m with PCP    Date Provider   Last Visit   4/6/2020 Candido Mon MD   Next Visit   10/6/2020 Candido Mon MD   ED visits in past 90 days: 0     Note composed:8:53 PM 09/11/2020

## 2020-10-01 ENCOUNTER — PATIENT MESSAGE (OUTPATIENT)
Dept: OTHER | Facility: OTHER | Age: 65
End: 2020-10-01

## 2020-10-02 ENCOUNTER — LAB VISIT (OUTPATIENT)
Dept: LAB | Facility: HOSPITAL | Age: 65
End: 2020-10-02
Attending: FAMILY MEDICINE
Payer: MEDICARE

## 2020-10-02 DIAGNOSIS — E11.65 TYPE 2 DIABETES MELLITUS WITH HYPERGLYCEMIA, WITHOUT LONG-TERM CURRENT USE OF INSULIN: ICD-10-CM

## 2020-10-02 DIAGNOSIS — E78.5 HYPERLIPIDEMIA ASSOCIATED WITH TYPE 2 DIABETES MELLITUS: ICD-10-CM

## 2020-10-02 DIAGNOSIS — E11.69 HYPERLIPIDEMIA ASSOCIATED WITH TYPE 2 DIABETES MELLITUS: ICD-10-CM

## 2020-10-02 LAB
ALBUMIN SERPL BCP-MCNC: 4.2 G/DL (ref 3.5–5.2)
ALP SERPL-CCNC: 46 U/L (ref 55–135)
ALT SERPL W/O P-5'-P-CCNC: 22 U/L (ref 10–44)
ANION GAP SERPL CALC-SCNC: 10 MMOL/L (ref 8–16)
AST SERPL-CCNC: 25 U/L (ref 10–40)
BILIRUB SERPL-MCNC: 1 MG/DL (ref 0.1–1)
BUN SERPL-MCNC: 22 MG/DL (ref 8–23)
CALCIUM SERPL-MCNC: 9.3 MG/DL (ref 8.7–10.5)
CHLORIDE SERPL-SCNC: 103 MMOL/L (ref 95–110)
CHOLEST SERPL-MCNC: 99 MG/DL (ref 120–199)
CHOLEST/HDLC SERPL: 2.5 {RATIO} (ref 2–5)
CO2 SERPL-SCNC: 28 MMOL/L (ref 23–29)
CREAT SERPL-MCNC: 1.1 MG/DL (ref 0.5–1.4)
EST. GFR  (AFRICAN AMERICAN): >60 ML/MIN/1.73 M^2
EST. GFR  (NON AFRICAN AMERICAN): >60 ML/MIN/1.73 M^2
ESTIMATED AVG GLUCOSE: 134 MG/DL (ref 68–131)
GLUCOSE SERPL-MCNC: 128 MG/DL (ref 70–110)
HBA1C MFR BLD HPLC: 6.3 % (ref 4–5.6)
HDLC SERPL-MCNC: 39 MG/DL (ref 40–75)
HDLC SERPL: 39.4 % (ref 20–50)
LDLC SERPL CALC-MCNC: 47.6 MG/DL (ref 63–159)
NONHDLC SERPL-MCNC: 60 MG/DL
POTASSIUM SERPL-SCNC: 4.2 MMOL/L (ref 3.5–5.1)
PROT SERPL-MCNC: 7.1 G/DL (ref 6–8.4)
SODIUM SERPL-SCNC: 141 MMOL/L (ref 136–145)
TRIGL SERPL-MCNC: 62 MG/DL (ref 30–150)

## 2020-10-02 PROCEDURE — 83036 HEMOGLOBIN GLYCOSYLATED A1C: CPT

## 2020-10-02 PROCEDURE — 80053 COMPREHEN METABOLIC PANEL: CPT

## 2020-10-02 PROCEDURE — 36415 COLL VENOUS BLD VENIPUNCTURE: CPT | Mod: PO

## 2020-10-02 PROCEDURE — 80061 LIPID PANEL: CPT

## 2020-10-06 ENCOUNTER — OFFICE VISIT (OUTPATIENT)
Dept: FAMILY MEDICINE | Facility: CLINIC | Age: 65
End: 2020-10-06
Attending: FAMILY MEDICINE
Payer: MEDICARE

## 2020-10-06 VITALS
DIASTOLIC BLOOD PRESSURE: 80 MMHG | WEIGHT: 199.31 LBS | SYSTOLIC BLOOD PRESSURE: 118 MMHG | HEART RATE: 67 BPM | TEMPERATURE: 97 F | HEIGHT: 70 IN | BODY MASS INDEX: 28.53 KG/M2 | OXYGEN SATURATION: 96 %

## 2020-10-06 DIAGNOSIS — M25.551 PAIN IN RIGHT HIP: ICD-10-CM

## 2020-10-06 DIAGNOSIS — E11.65 TYPE 2 DIABETES MELLITUS WITH HYPERGLYCEMIA, WITHOUT LONG-TERM CURRENT USE OF INSULIN: Primary | ICD-10-CM

## 2020-10-06 DIAGNOSIS — I15.2 HYPERTENSION ASSOCIATED WITH DIABETES: ICD-10-CM

## 2020-10-06 DIAGNOSIS — M79.651 PAIN IN RIGHT THIGH: ICD-10-CM

## 2020-10-06 DIAGNOSIS — E11.59 HYPERTENSION ASSOCIATED WITH DIABETES: ICD-10-CM

## 2020-10-06 DIAGNOSIS — Z12.11 COLON CANCER SCREENING: ICD-10-CM

## 2020-10-06 DIAGNOSIS — E78.5 HYPERLIPIDEMIA ASSOCIATED WITH TYPE 2 DIABETES MELLITUS: ICD-10-CM

## 2020-10-06 DIAGNOSIS — M79.604 PAIN IN RIGHT LEG: ICD-10-CM

## 2020-10-06 DIAGNOSIS — Z13.6 ENCOUNTER FOR ABDOMINAL AORTIC ANEURYSM (AAA) SCREENING: ICD-10-CM

## 2020-10-06 DIAGNOSIS — E11.69 HYPERLIPIDEMIA ASSOCIATED WITH TYPE 2 DIABETES MELLITUS: ICD-10-CM

## 2020-10-06 PROCEDURE — 99215 OFFICE O/P EST HI 40 MIN: CPT | Mod: PBBFAC,PO,25 | Performed by: FAMILY MEDICINE

## 2020-10-06 PROCEDURE — 99999 PR PBB SHADOW E&M-EST. PATIENT-LVL V: CPT | Mod: PBBFAC,,, | Performed by: FAMILY MEDICINE

## 2020-10-06 PROCEDURE — 99214 PR OFFICE/OUTPT VISIT, EST, LEVL IV, 30-39 MIN: ICD-10-PCS | Mod: S$PBB,,, | Performed by: FAMILY MEDICINE

## 2020-10-06 PROCEDURE — 99214 OFFICE O/P EST MOD 30 MIN: CPT | Mod: S$PBB,,, | Performed by: FAMILY MEDICINE

## 2020-10-06 PROCEDURE — 90662 IIV NO PRSV INCREASED AG IM: CPT | Mod: PBBFAC,PO

## 2020-10-06 PROCEDURE — 99999 PR PBB SHADOW E&M-EST. PATIENT-LVL V: ICD-10-PCS | Mod: PBBFAC,,, | Performed by: FAMILY MEDICINE

## 2020-10-06 NOTE — PROGRESS NOTES
Subjective:       Patient ID: Toni Hyde is a 65 y.o. male.    Chief Complaint: Follow-up    65-year-old male coming in for six month follow-up on diabetes hypertension and hyperlipidemia.  His lab was done recently and he had good results with an A1c of 6.3 and an LDL of 47.  He has a new complaint with pain in the right buttock and hip, right lateral thigh, and right lateral lower leg.  He had surgery on his knee and lateral thigh sometime back with orthopedist and was told that he should not run again, he continued to run.  The pain he is having is not sciatic and there is no neuropathy symptoms present.  It is somewhat intermittent but made worse by increased activity.  He has had no kidney stones recently and has no other major complaints.  He is due for a colonoscopy, due for an abdominal aortic aneurysm screen, due for flu shot and due for a foot exam.    Past Medical History:  No date: Cataract  No date: Cataract of left eye  No date: Diabetes mellitus type II      Comment:  on po meds  No date: Hyperlipidemia      Comment:  not on meds at present  No date: Hypertension  04/2017: Kidney stones  No date: Left ureteral stone  10/2017: Plantar fasciitis of right foot      Comment:  Dr Colorado   01/2018: Tendonitis      Comment:  right foot Dr Colorado  No date: Wears glasses    Past Surgical History:  No date: ADENOIDECTOMY  No date: CATARACT EXTRACTION  3/23/2010: COLONOSCOPY      Comment:  Dr. Blackburn, hyperplastic polyps, otherwise negative, 10                year recheck  04/12/2017: CYSTOSCOPY; Left      Comment:  with ureteral stent-Dr. Noel  05/06/2013: EYE SURGERY      Comment:  Dr Nielsen  04/2017: kidney stent   No date: KNEE ARTHROSCOPY W/ DEBRIDEMENT      Comment:  right with lateral release   04/19/2017: LITHOTRIPSY  No date: NASAL SINUS SURGERY  5/2013: retna surgery       Comment:  Dr Peralta  No date: TONSILLECTOMY    Current Outpatient Medications on File Prior to Visit:  allopurinol  (ZYLOPRIM) 100 MG tablet, TAKE 1 TABLET DAILY, Disp: 90 tablet, Rfl: 4  amLODIPine (NORVASC) 2.5 MG tablet, TAKE 1 TABLET DAILY, Disp: 90 tablet, Rfl: 3  cetirizine (ZYRTEC) 10 MG tablet, Take 10 mg by mouth daily as needed. 1 Tablet(s) Oral PRN ., Disp: , Rfl:   DIOVAN 320 mg tablet, TAKE 1 TABLET DAILY (REPLACES BENAZEPRIL 40 MG), Disp: 90 tablet, Rfl: 3  diphenhydrAMINE (BENADRYL) 25 mg capsule, Take 25 mg by mouth nightly as needed for Itching., Disp: , Rfl:   fluticasone (FLONASE) 50 mcg/actuation nasal spray, 2 sprays by Each Nare route daily as needed. , Disp: , Rfl:   ibuprofen (ADVIL) 200 MG tablet, Take 400 mg by mouth every 6 (six) hours as needed. 2 Tablet(s) Oral PRN Every day., Disp: , Rfl:   metFORMIN (GLUCOPHAGE-XR) 500 MG ER 24hr tablet, TAKE 2 TABLETS TWICE A DAY WITH MEALS (REPLACES METFORMIN 750 MG TWICE A DAY), Disp: 360 tablet, Rfl: 0  potassium citrate (UROCIT-K 15) 15 mEq TbSR, TAKE 1 TABLET DAILY AT 6 A.M., Disp: 100 tablet, Rfl: 3  rosuvastatin (CRESTOR) 20 MG tablet, Take 1 tablet (20 mg total) by mouth once daily., Disp: 90 tablet, Rfl: 3    No current facility-administered medications on file prior to visit.         Review of Systems   Constitutional: Negative for activity change, chills, diaphoresis, fever and unexpected weight change.   HENT: Negative for hearing loss, rhinorrhea and trouble swallowing.    Eyes: Negative for discharge and visual disturbance.   Respiratory: Negative for chest tightness and wheezing.    Cardiovascular: Negative for chest pain and palpitations.   Gastrointestinal: Negative for blood in stool, constipation, diarrhea and vomiting.   Endocrine: Negative for polydipsia and polyuria.   Genitourinary: Negative for difficulty urinating, hematuria and urgency.   Musculoskeletal: Positive for arthralgias and myalgias. Negative for joint swelling and neck pain.   Neurological: Negative for weakness and headaches.   Psychiatric/Behavioral: Negative for confusion and  dysphoric mood.       Objective:      Physical Exam  Vitals signs and nursing note reviewed.   Constitutional:       General: He is not in acute distress.     Appearance: Normal appearance. He is not ill-appearing, toxic-appearing or diaphoretic.      Comments: Good blood pressure control  Mildly overweight with a BMI of 28.6 he is up 8.6 lb since his April 6, 2020 visit   HENT:      Head: Normocephalic and atraumatic.      Right Ear: Tympanic membrane, ear canal and external ear normal. There is no impacted cerumen.      Left Ear: Tympanic membrane, ear canal and external ear normal. There is no impacted cerumen.      Nose: Nose normal. No congestion or rhinorrhea.      Mouth/Throat:      Mouth: Mucous membranes are moist.      Pharynx: Oropharynx is clear. No oropharyngeal exudate or posterior oropharyngeal erythema.   Eyes:      General: No scleral icterus.     Extraocular Movements: Extraocular movements intact.      Pupils: Pupils are equal, round, and reactive to light.   Neck:      Musculoskeletal: Normal range of motion and neck supple. No neck rigidity or muscular tenderness.      Vascular: No carotid bruit.   Cardiovascular:      Rate and Rhythm: Normal rate and regular rhythm.      Pulses:           Dorsalis pedis pulses are 2+ on the right side and 2+ on the left side.        Posterior tibial pulses are 2+ on the right side and 2+ on the left side.      Heart sounds: Normal heart sounds. No murmur. No friction rub. No gallop.    Pulmonary:      Effort: Pulmonary effort is normal. No respiratory distress.      Breath sounds: Normal breath sounds. No stridor. No wheezing, rhonchi or rales.   Chest:      Chest wall: No tenderness.   Abdominal:      General: Abdomen is flat. Bowel sounds are normal. There is no distension.      Palpations: Abdomen is soft. There is no mass.      Tenderness: There is no abdominal tenderness. There is no guarding or rebound.      Hernia: No hernia is present.    Musculoskeletal:      Right hip: He exhibits normal range of motion, normal strength, no tenderness, no bony tenderness, no swelling, no crepitus, no deformity and no laceration.      Right knee: Normal.      Right foot: Normal range of motion. No deformity, bunion, Charcot foot, foot drop or prominent metatarsal heads.      Left foot: Normal range of motion. No deformity, bunion, Charcot foot, foot drop or prominent metatarsal heads.   Feet:      Right foot:      Protective Sensation: 8 sites tested. 8 sites sensed.      Skin integrity: Skin integrity normal. No ulcer, blister, skin breakdown, erythema, warmth, callus, dry skin or fissure.      Toenail Condition: Fungal disease present.     Left foot:      Protective Sensation: 8 sites tested. 8 sites sensed.      Skin integrity: Skin integrity normal. No ulcer, blister, skin breakdown, erythema, warmth, callus, dry skin or fissure.      Toenail Condition: Fungal disease present.  Lymphadenopathy:      Cervical: No cervical adenopathy.   Skin:     General: Skin is warm and dry.      Capillary Refill: Capillary refill takes less than 2 seconds.      Coloration: Skin is not jaundiced or pale.      Findings: No bruising, erythema, lesion or rash.   Neurological:      General: No focal deficit present.      Mental Status: He is alert and oriented to person, place, and time. Mental status is at baseline.      Cranial Nerves: No cranial nerve deficit.      Sensory: No sensory deficit.      Motor: No weakness.      Coordination: Coordination normal.      Gait: Gait normal.      Deep Tendon Reflexes: Reflexes normal.      Comments: Proprioception intact all 10 toes, straight leg raise negative bilaterally but flexibility is decreased on the right side   Psychiatric:         Mood and Affect: Mood normal.         Behavior: Behavior normal.         Thought Content: Thought content normal.         Judgment: Judgment normal.         Assessment:       1. Type 2 diabetes  mellitus with hyperglycemia, without long-term current use of insulin    2. Hypertension associated with diabetes    3. Hyperlipidemia associated with type 2 diabetes mellitus    4. Encounter for abdominal aortic aneurysm (AAA) screening    5. Pain in right hip    6. Pain in right thigh    7. Pain in right leg    8. Colon cancer screening    9. BMI 28.0-28.9,adult        Plan:       1. Type 2 diabetes mellitus with hyperglycemia, without long-term current use of insulin  Lab Results   Component Value Date    HGBA1C 6.3 (H) 10/02/2020     Good control, schedule next lab six months  - Hemoglobin A1C; Future  - Basic Metabolic Panel; Future  - Microalbumin/Creatinine Ratio, Urine; Future    2. Hypertension associated with diabetes  Good blood pressure control with no medication changes needed  - Basic Metabolic Panel; Future    3. Hyperlipidemia associated with type 2 diabetes mellitus  Lab Results   Component Value Date    CHOL 99 (L) 10/02/2020    CHOL 140 04/06/2020    CHOL 110 (L) 03/04/2019     Lab Results   Component Value Date    HDL 39 (L) 10/02/2020    HDL 45 04/06/2020    HDL 44 03/04/2019     Lab Results   Component Value Date    LDLCALC 47.6 (L) 10/02/2020    LDLCALC 79.4 04/06/2020    LDLCALC 56.0 (L) 03/04/2019     Lab Results   Component Value Date    TRIG 62 10/02/2020    TRIG 78 04/06/2020    TRIG 50 03/04/2019     Lab Results   Component Value Date    CHOLHDL 39.4 10/02/2020    CHOLHDL 32.1 04/06/2020    CHOLHDL 40.0 03/04/2019     Good cholesterol control with no medication changes needed    4. Encounter for abdominal aortic aneurysm (AAA) screening  Screening exam ordered  - US Abdominal Aorta; Future    5. Pain in right hip  Unclear etiology of pain, does not appear to be sciatic in nature.  Piriformis stretch elicits minimal symptom complaints  - Ambulatory referral/consult to Physical Medicine Rehab; Future    6. Pain in right thigh  Uncertain etiology  - Ambulatory referral/consult to Physical  Medicine Rehab; Future    7. Pain in right leg  Uncertain etiology  - Ambulatory referral/consult to Physical Medicine Rehab; Future    8. Colon cancer screening  Colonoscopy due  - Ambulatory referral/consult to Gastroenterology; Future    9. BMI 28.0-28.9,adult

## 2020-10-07 ENCOUNTER — PATIENT MESSAGE (OUTPATIENT)
Dept: GASTROENTEROLOGY | Facility: CLINIC | Age: 65
End: 2020-10-07

## 2020-10-14 ENCOUNTER — TELEPHONE (OUTPATIENT)
Dept: GASTROENTEROLOGY | Facility: CLINIC | Age: 65
End: 2020-10-14

## 2020-10-18 ENCOUNTER — PATIENT OUTREACH (OUTPATIENT)
Dept: ADMINISTRATIVE | Facility: OTHER | Age: 65
End: 2020-10-18

## 2020-10-18 NOTE — PROGRESS NOTES
Chart was reviewed for overdue Proactive [facility] Encounters ([facility])  topics  Updates were requested from care everywhere  Health Maintenance was unable to be updated  [facility] immunization registry triggered

## 2020-10-30 ENCOUNTER — OFFICE VISIT (OUTPATIENT)
Dept: PHYSICAL MEDICINE AND REHAB | Facility: CLINIC | Age: 65
End: 2020-10-30
Attending: FAMILY MEDICINE
Payer: MEDICARE

## 2020-10-30 VITALS
HEIGHT: 70 IN | SYSTOLIC BLOOD PRESSURE: 120 MMHG | BODY MASS INDEX: 28.49 KG/M2 | WEIGHT: 199 LBS | DIASTOLIC BLOOD PRESSURE: 80 MMHG | HEART RATE: 69 BPM

## 2020-10-30 DIAGNOSIS — G62.9 PERIPHERAL POLYNEUROPATHY: ICD-10-CM

## 2020-10-30 DIAGNOSIS — M79.604 PAIN IN RIGHT LEG: ICD-10-CM

## 2020-10-30 DIAGNOSIS — Z86.39 HISTORY OF DIABETES MELLITUS, TYPE II: ICD-10-CM

## 2020-10-30 DIAGNOSIS — M54.41 CHRONIC RIGHT-SIDED LOW BACK PAIN WITH RIGHT-SIDED SCIATICA: ICD-10-CM

## 2020-10-30 DIAGNOSIS — M25.551 PAIN IN RIGHT HIP: ICD-10-CM

## 2020-10-30 DIAGNOSIS — M54.16 LUMBAR RADICULOPATHY: Primary | ICD-10-CM

## 2020-10-30 DIAGNOSIS — G89.29 CHRONIC RIGHT-SIDED LOW BACK PAIN WITH RIGHT-SIDED SCIATICA: ICD-10-CM

## 2020-10-30 DIAGNOSIS — M79.651 PAIN IN RIGHT THIGH: ICD-10-CM

## 2020-10-30 PROCEDURE — 99999 PR PBB SHADOW E&M-EST. PATIENT-LVL III: ICD-10-PCS | Mod: PBBFAC,,, | Performed by: PHYSICAL MEDICINE & REHABILITATION

## 2020-10-30 PROCEDURE — 99213 OFFICE O/P EST LOW 20 MIN: CPT | Mod: PBBFAC,PN | Performed by: PHYSICAL MEDICINE & REHABILITATION

## 2020-10-30 PROCEDURE — 99203 PR OFFICE/OUTPT VISIT, NEW, LEVL III, 30-44 MIN: ICD-10-PCS | Mod: S$PBB,,, | Performed by: PHYSICAL MEDICINE & REHABILITATION

## 2020-10-30 PROCEDURE — 99999 PR PBB SHADOW E&M-EST. PATIENT-LVL III: CPT | Mod: PBBFAC,,, | Performed by: PHYSICAL MEDICINE & REHABILITATION

## 2020-10-30 PROCEDURE — 99203 OFFICE O/P NEW LOW 30 MIN: CPT | Mod: S$PBB,,, | Performed by: PHYSICAL MEDICINE & REHABILITATION

## 2020-10-30 NOTE — PROGRESS NOTES
Today's Date: 10/30/2020     Referring Provider: Dr. Candido Mon     Chief Complaint:   Chief Complaint   Patient presents with    Hip Pain     right hip pain       HPI: Toni Hyde is a 65 y.o. male  who presents today for evaluation treatment of right lower back pain and right leg pain.  He states that his pain is been present for many years.  His pain has been unchanged and waxing and waning.  Pain is mainly across the right lateral calf.  He describes it as a deep dull achy pain with intermittent sharp stabbing pain.  The pain can radiate into the plantar aspect of the foot as a numbness tingling pain.  The pain can also radiate proximally into the posterior aspect of the knee.  He does have a history of meniscal surgery and lateral release in 2002.  He does admit to intermittent pain in the right side of his low back which can radiate down the lateral thigh lateral calf and into the foot.  He describes his back pain as a deep dull achy pain.  Pain is worst with going up and down a ladder, yd work, running, prolonged sitting such as driving his car or riding on his lawnmower.  He denies any bowel or bladder dysfunction, saddle anesthesia, or noticeable weakness of the lower extremities.  Pain on average is a 4/ 10      Review of Systems   Constitutional: Negative for chills and fever.   HENT: Negative for congestion and tinnitus.    Eyes: Negative for blurred vision and photophobia.   Respiratory: Negative for shortness of breath and wheezing.    Cardiovascular: Negative for chest pain and palpitations.   Gastrointestinal: Negative for nausea and vomiting.   Genitourinary: Negative for dysuria and frequency.   Musculoskeletal: Positive for back pain, joint pain and myalgias.   Skin: Negative for itching and rash.   Neurological: Positive for tingling and sensory change. Negative for speech change.   Endo/Heme/Allergies: Negative for environmental allergies. Does not bruise/bleed easily.    Psychiatric/Behavioral: Negative for depression. The patient is not nervous/anxious.         Social History     Socioeconomic History    Marital status:      Spouse name: Not on file    Number of children: Not on file    Years of education: Not on file    Highest education level: Not on file   Occupational History    Not on file   Social Needs    Financial resource strain: Not hard at all    Food insecurity     Worry: Never true     Inability: Never true    Transportation needs     Medical: No     Non-medical: No   Tobacco Use    Smoking status: Former Smoker     Types: Cigarettes     Quit date: 1978     Years since quittin.5    Smokeless tobacco: Never Used   Substance and Sexual Activity    Alcohol use: Yes     Alcohol/week: 2.5 standard drinks     Types: 3 Standard drinks or equivalent per week     Frequency: 2-4 times a month     Drinks per session: 1 or 2     Binge frequency: Never     Comment: 3 drinks per week    Drug use: No    Sexual activity: Not on file   Lifestyle    Physical activity     Days per week: 1 day     Minutes per session: 30 min    Stress: Only a little   Relationships    Social connections     Talks on phone: More than three times a week     Gets together: Once a week     Attends Zoroastrianism service: Not on file     Active member of club or organization: No     Attends meetings of clubs or organizations: Never     Relationship status:    Other Topics Concern    Not on file   Social History Narrative    Not on file       Family History   Problem Relation Age of Onset    Hypertension Father     Neuropathy Father     Stroke Paternal Grandmother     Lung cancer Paternal Grandfather     Cancer Paternal Grandfather     Cataracts Mother     Diabetes Maternal Grandmother     No Known Problems Sister     No Known Problems Maternal Aunt        Current Outpatient Medications on File Prior to Visit   Medication Sig Dispense Refill    allopurinol  (ZYLOPRIM) 100 MG tablet TAKE 1 TABLET DAILY 90 tablet 4    amLODIPine (NORVASC) 2.5 MG tablet TAKE 1 TABLET DAILY 90 tablet 3    cetirizine (ZYRTEC) 10 MG tablet Take 10 mg by mouth daily as needed. 1 Tablet(s) Oral PRN .      DIOVAN 320 mg tablet TAKE 1 TABLET DAILY (REPLACES BENAZEPRIL 40 MG) 90 tablet 3    diphenhydrAMINE (BENADRYL) 25 mg capsule Take 25 mg by mouth nightly as needed for Itching.      fluticasone (FLONASE) 50 mcg/actuation nasal spray 2 sprays by Each Nare route daily as needed.       ibuprofen (ADVIL) 200 MG tablet Take 400 mg by mouth every 6 (six) hours as needed. 2 Tablet(s) Oral PRN Every day.      metFORMIN (GLUCOPHAGE-XR) 500 MG ER 24hr tablet TAKE 2 TABLETS TWICE A DAY WITH MEALS (REPLACES METFORMIN 750 MG TWICE A DAY) 360 tablet 0    potassium citrate (UROCIT-K 15) 15 mEq TbSR TAKE 1 TABLET DAILY AT 6 A.M. 100 tablet 3    rosuvastatin (CRESTOR) 20 MG tablet Take 1 tablet (20 mg total) by mouth once daily. 90 tablet 3     No current facility-administered medications on file prior to visit.         Review of patient's allergies indicates:   Allergen Reactions    Shrimp      Per testing        Past Surgical History:   Procedure Laterality Date    ADENOIDECTOMY      CATARACT EXTRACTION      COLONOSCOPY  3/23/2010    Dr. Blackburn, hyperplastic polyps, otherwise negative, 10 year recheck    CYSTOSCOPY Left 04/12/2017    with ureteral stent-Dr. Noel    EYE SURGERY  05/06/2013    Dr Nielsen    kidney stent   04/2017    KNEE ARTHROSCOPY W/ DEBRIDEMENT      right with lateral release     LITHOTRIPSY  04/19/2017    NASAL SINUS SURGERY      retna surgery   5/2013    Dr Peralta    TONSILLECTOMY         Past Medical History:   Diagnosis Date    Cataract     Cataract of left eye     Diabetes mellitus type II     on po meds    Hyperlipidemia     not on meds at present    Hypertension     Kidney stones 04/2017    Left ureteral stone     Plantar fasciitis of right foot 10/2017      Stanislav     Tendonitis 01/2018    right foot Dr Colorado    Wears glasses        Vitals:    10/30/20 0826   BP: 120/80   Pulse: 69       Physical Exam  Constitutional:       General: He is not in acute distress.     Appearance: Normal appearance. He is normal weight.   HENT:      Head: Normocephalic and atraumatic.      Nose: Nose normal. No congestion.      Mouth/Throat:      Mouth: Mucous membranes are moist.      Pharynx: Oropharynx is clear.   Eyes:      Extraocular Movements: Extraocular movements intact.      Conjunctiva/sclera: Conjunctivae normal.      Pupils: Pupils are equal, round, and reactive to light.   Pulmonary:      Effort: Pulmonary effort is normal. No respiratory distress.   Abdominal:      General: Abdomen is flat. There is no distension.   Musculoskeletal:      Lumbar back: He exhibits decreased range of motion (Pain with extension rotation to the right.  Range of motion with flexion essentially normal without pain), tenderness (Right lower lumbar paraspinals) and pain.        Back:    Skin:     General: Skin is warm and dry.      Nails: There is no clubbing.     Neurological:      Mental Status: He is alert and oriented to person, place, and time.      Cranial Nerves: Cranial nerves are intact.      Sensory: Sensation is intact.      Motor: Motor function is intact. No weakness (Patient is able to toe-walk and heel-walk without any noticeable weakness).      Gait: Gait is intact.      Deep Tendon Reflexes:      Reflex Scores:       Patellar reflexes are 1+ on the right side and 1+ on the left side.       Achilles reflexes are 1+ on the right side and 1+ on the left side.  Psychiatric:         Mood and Affect: Mood and affect normal.         Behavior: Behavior normal.        Ortho Exam     Lumbar radiculopathy  -     EMG W/ ULTRASOUND AND NERVE CONDUCTION TEST 2 Extremities; Future    Pain in right hip  -     Ambulatory referral/consult to Physical Medicine Rehab    Pain in right thigh  -      Ambulatory referral/consult to Physical Medicine Rehab    Pain in right leg  -     Ambulatory referral/consult to Physical Medicine Rehab    Peripheral polyneuropathy  -     EMG W/ ULTRASOUND AND NERVE CONDUCTION TEST 2 Extremities; Future    History of diabetes mellitus, type II    Chronic right-sided low back pain with right-sided sciatica           PLAN:   Toni Hyde is a 65 y.o. male with right lateral calf pain with intermittent radiation as a numbness tingling pain into the plantar aspect of the right foot.  He also has pain in the right side of the low back.  History and physical examination is likely consistent with an L5 and or S1 radiculopathy.  I cannot rule out mechanical pain secondary to tendinopathy of the peroneus longus/brevis tendons.  I will schedule him for an EMG/NCS of the right lower extremity to assess for peripheral neuropathy as well as a radiculopathy into localized potential radiculopathy.  Depending on results, I do have a low threshold to order an MRI of the lumbar spine.      Kermit Davalos D.O.  Physical Medicine and Rehabilitation          CC: Patients PCP: Candido Mon MD  CC: Referring Provider: Dr. Candido Mon

## 2020-10-30 NOTE — LETTER
October 30, 2020      Candido Mon MD  [address]  [place] [address]           [place] [facility]  [address] [place] [address] [address]  [place] [address]  Phone: [phone]  Fax: [phone]          Patient: Toni Hyde   MR Number: [id number]   YOB: 1955   Date of Visit: 10/30/2020       Dear Dr. Candido Mon:    Thank you for referring Toni Hyde to me for evaluation. Attached you will find relevant portions of my assessment and plan of care.    If you have questions, please do not hesitate to call me. I look forward to following Toni Hyde along with you.    Sincerely,    Kermit Davalos MD    Enclosure  CC:  No Recipients    If you would like to receive this communication electronically, please contact [email or web address] or [phone] to request more information on [facility] [employer] Link access.    For providers and/or their staff who would like to refer a patient to [facility], please contact us through our one-stop-shop provider referral line, [facility], at [phone].    If you feel you have received this communication in error or would no longer like to receive these types of communications, please e-mail [email or web address]

## 2020-11-02 ENCOUNTER — HOSPITAL ENCOUNTER (OUTPATIENT)
Dept: RADIOLOGY | Facility: HOSPITAL | Age: 65
Discharge: HOME | End: 2020-11-02
Attending: FAMILY MEDICINE
Payer: MEDICARE

## 2020-11-02 DIAGNOSIS — Z13.6 ENCOUNTER FOR ABDOMINAL AORTIC ANEURYSM (AAA) SCREENING: ICD-10-CM

## 2020-11-02 PROCEDURE — 76775 US EXAM ABDO BACK WALL LIM: CPT | Mod: TC

## 2020-11-02 PROCEDURE — 76775 US ABDOMINAL AORTA: ICD-10-PCS | Mod: 26,,, | Performed by: RADIOLOGY

## 2020-11-02 PROCEDURE — 76775 US EXAM ABDO BACK WALL LIM: CPT | Mod: 26,,, | Performed by: RADIOLOGY

## 2020-11-10 ENCOUNTER — OFFICE VISIT (OUTPATIENT)
Dept: PHYSICAL MEDICINE AND REHAB | Facility: CLINIC | Age: 65
End: 2020-11-10
Payer: MEDICARE

## 2020-11-10 DIAGNOSIS — M54.9 DORSALGIA, UNSPECIFIED: ICD-10-CM

## 2020-11-10 DIAGNOSIS — M54.16 LUMBAR RADICULOPATHY: ICD-10-CM

## 2020-11-10 DIAGNOSIS — G62.9 PERIPHERAL POLYNEUROPATHY: ICD-10-CM

## 2020-11-10 DIAGNOSIS — M54.17 LUMBOSACRAL RADICULOPATHY AT S1: Primary | ICD-10-CM

## 2020-11-10 PROCEDURE — 99999 PR PBB SHADOW E&M-EST. PATIENT-LVL II: ICD-10-PCS | Mod: PBBFAC,,, | Performed by: PHYSICAL MEDICINE & REHABILITATION

## 2020-11-10 PROCEDURE — 99499 UNLISTED E&M SERVICE: CPT | Mod: S$PBB,,, | Performed by: PHYSICAL MEDICINE & REHABILITATION

## 2020-11-10 PROCEDURE — 99499 NO LOS: ICD-10-PCS | Mod: S$PBB,,, | Performed by: PHYSICAL MEDICINE & REHABILITATION

## 2020-11-10 PROCEDURE — 95908 NRV CNDJ TST 3-4 STUDIES: CPT | Mod: 26,S$PBB,, | Performed by: PHYSICAL MEDICINE & REHABILITATION

## 2020-11-10 PROCEDURE — 99999 PR PBB SHADOW E&M-EST. PATIENT-LVL II: CPT | Mod: PBBFAC,,, | Performed by: PHYSICAL MEDICINE & REHABILITATION

## 2020-11-10 PROCEDURE — 95908 NRV CNDJ TST 3-4 STUDIES: CPT | Mod: PBBFAC,PN | Performed by: PHYSICAL MEDICINE & REHABILITATION

## 2020-11-10 PROCEDURE — 99212 OFFICE O/P EST SF 10 MIN: CPT | Mod: PBBFAC,PN | Performed by: PHYSICAL MEDICINE & REHABILITATION

## 2020-11-10 PROCEDURE — 95886 MUSC TEST DONE W/N TEST COMP: CPT | Mod: PBBFAC,PN | Performed by: PHYSICAL MEDICINE & REHABILITATION

## 2020-11-10 PROCEDURE — 95886 PR EMG COMPLETE, W/ NERVE CONDUCTION STUDIES, 5+ MUSCLES: ICD-10-PCS | Mod: 26,S$PBB,, | Performed by: PHYSICAL MEDICINE & REHABILITATION

## 2020-11-10 PROCEDURE — 95886 MUSC TEST DONE W/N TEST COMP: CPT | Mod: 26,S$PBB,, | Performed by: PHYSICAL MEDICINE & REHABILITATION

## 2020-11-10 PROCEDURE — 95908 PR NERVE CONDUCTION STUDY; 3-4 STUDIES: ICD-10-PCS | Mod: 26,S$PBB,, | Performed by: PHYSICAL MEDICINE & REHABILITATION

## 2020-11-10 NOTE — PROCEDURES
Procedures         [facility]  Physical Medicine and Rehabilitation   [address] [place] [address], [address]  [place] [address]             Full Name: CALI MAIN Gender: Male  Patient ID: [id number] YOB: 1955      Visit Date: 11/10/2020 08:54  Age: 65 Years 6 Months Old  Examining Physician: MARTI ROMERO DO      Sensory NCS      Nerve / Sites Rec. Site Onset Lat Peak Lat NP Amp Segments Distance Velocity     ms ms µV  cm m/s   R Sural - Ankle (Calf)      Calf Ankle 3.59 4.38 7.2 Calf - Ankle 14 39       Motor NCS      Nerve / Sites Muscle Latency Amplitude Amp % Duration Segments Distance Lat Diff Velocity     ms mV % ms  cm ms m/s   R Peroneal - EDB      Ankle EDB 4.22 7.2 100 7.19 Ankle - EDB 8        Fib head EDB 12.81 5.4 75.2 7.97 Fib head - Ankle 33 8.59 38   R Tibial - AH      Ankle AH 4.79 4.6 100 5.36 Ankle - AH 8        Pop fossa AH 14.38 4.3 92 6.82 Pop fossa - Ankle 40 9.58 42       EMG Summary Table     Spontaneous MUAP Recruitment   Muscle IA Fib PSW Fasc Other Amp Dur. PPP Pattern   R. Vastus medialis N None None None . N N N N   R. Tibialis anterior N None None None . N N N N   R. Peroneus longus N None 1+ None . N 1+ 1+ Reduced   R. Gastrocnemius (Medial head) N 3+ 3+ None . N 1+ 1+ Reduced   R. Extensor hallucis longus N 1+ 1+ None . N 1+ 1+ Reduced   R. Lumbar paraspinals (low) N 1+ 1+ None .           Summary    The motor conduction test had results outside of the specified normal range in all 2 of the tested nerves:   In the R Peroneal - EDB study  o the take off velocity result was reduced for Fib head - Ankle segment   In the R Tibial - AH study  o the peak amplitude result was reduced for Ankle stimulation    The sensory conduction test was normal in all 1 of the tested nerves: R Sural - Ankle (Calf).    The needle EMG examination was performed in 6 muscles. It was normal in 2 muscle(s): R. Vastus medialis, R. Tibialis anterior. The study was abnormal in 4 muscle(s), with  the following distribution:   Abnormal spontaneous/insertional activity was found in R. Peroneus longus, R. Gastrocnemius (Medial head), R. Extensor hallucis longus, R. Lumbar paraspinals (low).   The MUP waveform abnormality was found in R. Peroneus longus, R. Gastrocnemius (Medial head), R. Extensor hallucis longus.   Abnormal interference pattern was found in R. Peroneus longus, R. Gastrocnemius (Medial head), R. Extensor hallucis longus.          Impression  Abnormal examination.  There is electrodiagnostic evidence of:  1. An acute on chronic right S1 radiculopathy.      Clinical history:  The chief complaint of right lower extremity pain is consistent with the above findings.  I will schedule him for an MRI of the lumbar spine.  He will return once the MRI is completed to discuss results and treatment options.      ____________________________  Kermit Davalos D.O.  American Board of Physical Medicine and Rehabilitation  American Board of Pain Medicine  American Board of Electrodiagnostic Medicine  Registered in Musculoskeletal Ultrasound

## 2020-11-13 ENCOUNTER — HOSPITAL ENCOUNTER (OUTPATIENT)
Dept: RADIOLOGY | Facility: HOSPITAL | Age: 65
Discharge: HOME | End: 2020-11-13
Attending: PHYSICAL MEDICINE & REHABILITATION
Payer: MEDICARE

## 2020-11-13 DIAGNOSIS — M54.9 DORSALGIA, UNSPECIFIED: ICD-10-CM

## 2020-11-13 PROCEDURE — 72148 MRI LUMBAR SPINE W/O DYE: CPT | Mod: TC,PO

## 2020-11-17 DIAGNOSIS — M54.9 DORSALGIA, UNSPECIFIED: Primary | ICD-10-CM

## 2020-11-18 ENCOUNTER — PATIENT MESSAGE (OUTPATIENT)
Dept: PHYSICAL MEDICINE AND REHAB | Facility: CLINIC | Age: 65
End: 2020-11-18

## 2020-11-19 ENCOUNTER — HOSPITAL ENCOUNTER (OUTPATIENT)
Dept: RADIOLOGY | Facility: HOSPITAL | Age: 65
Discharge: HOME | End: 2020-11-19
Attending: PHYSICAL MEDICINE & REHABILITATION
Payer: MEDICARE

## 2020-11-19 DIAGNOSIS — M54.9 DORSALGIA, UNSPECIFIED: ICD-10-CM

## 2020-11-19 PROCEDURE — 25500020 PHARM REV CODE 255: Performed by: PHYSICAL MEDICINE & REHABILITATION

## 2020-11-19 PROCEDURE — A9585 GADOBUTROL INJECTION: HCPCS | Performed by: PHYSICAL MEDICINE & REHABILITATION

## 2020-11-19 PROCEDURE — 72158 MRI LUMBAR SPINE W WO CONTRAST: ICD-10-PCS | Mod: 26,,, | Performed by: RADIOLOGY

## 2020-11-19 PROCEDURE — 72158 MRI LUMBAR SPINE W/O & W/DYE: CPT | Mod: 26,,, | Performed by: RADIOLOGY

## 2020-11-19 PROCEDURE — 72158 MRI LUMBAR SPINE W/O & W/DYE: CPT | Mod: TC

## 2020-11-19 RX ORDER — GADOBUTROL 604.72 MG/ML
10 INJECTION INTRAVENOUS
Status: COMPLETED | OUTPATIENT
Start: 2020-11-19 | End: 2020-11-19

## 2020-11-19 RX ADMIN — GADOBUTROL 10 ML: 604.72 INJECTION INTRAVENOUS at 06:11

## 2020-11-24 ENCOUNTER — PATIENT MESSAGE (OUTPATIENT)
Dept: PHYSICAL MEDICINE AND REHAB | Facility: CLINIC | Age: 65
End: 2020-11-24

## 2020-11-27 ENCOUNTER — PATIENT OUTREACH (OUTPATIENT)
Dept: ADMINISTRATIVE | Facility: OTHER | Age: 65
End: 2020-11-27

## 2020-11-27 NOTE — PROGRESS NOTES
Chart was reviewed for overdue Proactive [facility] Encounters ([facility])  topics  Updates were requested from care everywhere  Health Maintenance has been updated  [place] immunization registry triggered

## 2020-11-30 ENCOUNTER — OFFICE VISIT (OUTPATIENT)
Dept: PHYSICAL MEDICINE AND REHAB | Facility: CLINIC | Age: 65
End: 2020-11-30
Payer: MEDICARE

## 2020-11-30 VITALS
WEIGHT: 199 LBS | HEART RATE: 67 BPM | BODY MASS INDEX: 28.49 KG/M2 | DIASTOLIC BLOOD PRESSURE: 77 MMHG | HEIGHT: 70 IN | SYSTOLIC BLOOD PRESSURE: 131 MMHG

## 2020-11-30 DIAGNOSIS — M54.41 CHRONIC RIGHT-SIDED LOW BACK PAIN WITH RIGHT-SIDED SCIATICA: ICD-10-CM

## 2020-11-30 DIAGNOSIS — Z74.09 IMPAIRED FUNCTIONAL MOBILITY AND ACTIVITY TOLERANCE: ICD-10-CM

## 2020-11-30 DIAGNOSIS — G89.29 CHRONIC RIGHT-SIDED LOW BACK PAIN WITH RIGHT-SIDED SCIATICA: ICD-10-CM

## 2020-11-30 DIAGNOSIS — M71.38 SYNOVIAL CYST OF LUMBAR FACET JOINT: ICD-10-CM

## 2020-11-30 DIAGNOSIS — Z01.818 PRE-OP TESTING: ICD-10-CM

## 2020-11-30 DIAGNOSIS — M54.16 LUMBAR RADICULOPATHY: Primary | ICD-10-CM

## 2020-11-30 DIAGNOSIS — R20.2 PARESTHESIAS: ICD-10-CM

## 2020-11-30 PROCEDURE — 99213 OFFICE O/P EST LOW 20 MIN: CPT | Mod: PBBFAC,PN | Performed by: PHYSICAL MEDICINE & REHABILITATION

## 2020-11-30 PROCEDURE — 99999 PR PBB SHADOW E&M-EST. PATIENT-LVL III: CPT | Mod: PBBFAC,,, | Performed by: PHYSICAL MEDICINE & REHABILITATION

## 2020-11-30 PROCEDURE — 99999 PR PBB SHADOW E&M-EST. PATIENT-LVL III: ICD-10-PCS | Mod: PBBFAC,,, | Performed by: PHYSICAL MEDICINE & REHABILITATION

## 2020-11-30 PROCEDURE — 99213 OFFICE O/P EST LOW 20 MIN: CPT | Mod: S$PBB,,, | Performed by: PHYSICAL MEDICINE & REHABILITATION

## 2020-11-30 PROCEDURE — 99213 PR OFFICE/OUTPT VISIT, EST, LEVL III, 20-29 MIN: ICD-10-PCS | Mod: S$PBB,,, | Performed by: PHYSICAL MEDICINE & REHABILITATION

## 2020-11-30 NOTE — PROGRESS NOTES
Today's Date: 2020     Referring Provider: No ref. provider found     Chief Complaint:   Chief Complaint   Patient presents with    Back Pain     discuss procedure before scheduling       HPI: Toni Hyde is a 65 y.o. male  who presents today for review of MRI.  Pain is otherwise unchanged in location, duration, intensity, or characteristics.  He denies any new or worsening bowel or bladder dysfunction, saddle anesthesia, or lower extremity weakness.    Review of Systems   Musculoskeletal: Positive for back pain.   Neurological: Positive for tingling and sensory change.   All other systems reviewed and are negative.       Social History     Socioeconomic History    Marital status:      Spouse name: Not on file    Number of children: Not on file    Years of education: Not on file    Highest education level: Not on file   Occupational History    Not on file   Social Needs    Financial resource strain: Not hard at all    Food insecurity     Worry: Never true     Inability: Never true    Transportation needs     Medical: No     Non-medical: No   Tobacco Use    Smoking status: Former Smoker     Types: Cigarettes     Quit date: 1978     Years since quittin.6    Smokeless tobacco: Never Used   Substance and Sexual Activity    Alcohol use: Yes     Alcohol/week: 2.5 standard drinks     Types: 3 Standard drinks or equivalent per week     Frequency: 2-4 times a month     Drinks per session: 1 or 2     Binge frequency: Never     Comment: 3 drinks per week    Drug use: No    Sexual activity: Not on file   Lifestyle    Physical activity     Days per week: 1 day     Minutes per session: 30 min    Stress: Only a little   Relationships    Social connections     Talks on phone: More than three times a week     Gets together: Once a week     Attends Yazidi service: Not on file     Active member of club or organization: No     Attends meetings of clubs or organizations: Never      Relationship status:    Other Topics Concern    Not on file   Social History Narrative    Not on file       Family History   Problem Relation Age of Onset    Hypertension Father     Neuropathy Father     Stroke Paternal Grandmother     Lung cancer Paternal Grandfather     Cancer Paternal Grandfather     Cataracts Mother     Diabetes Maternal Grandmother     No Known Problems Sister     No Known Problems Maternal Aunt        Current Outpatient Medications on File Prior to Visit   Medication Sig Dispense Refill    allopurinol (ZYLOPRIM) 100 MG tablet TAKE 1 TABLET DAILY 90 tablet 4    amLODIPine (NORVASC) 2.5 MG tablet TAKE 1 TABLET DAILY 90 tablet 3    cetirizine (ZYRTEC) 10 MG tablet Take 10 mg by mouth daily as needed. 1 Tablet(s) Oral PRN .      DIOVAN 320 mg tablet TAKE 1 TABLET DAILY (REPLACES BENAZEPRIL 40 MG) 90 tablet 3    diphenhydrAMINE (BENADRYL) 25 mg capsule Take 25 mg by mouth nightly as needed for Itching.      fluticasone (FLONASE) 50 mcg/actuation nasal spray 2 sprays by Each Nare route daily as needed.       ibuprofen (ADVIL) 200 MG tablet Take 400 mg by mouth every 6 (six) hours as needed. 2 Tablet(s) Oral PRN Every day.      metFORMIN (GLUCOPHAGE-XR) 500 MG ER 24hr tablet TAKE 2 TABLETS TWICE A DAY WITH MEALS (REPLACES METFORMIN 750 MG TWICE A DAY) 360 tablet 0    potassium citrate (UROCIT-K 15) 15 mEq TbSR TAKE 1 TABLET DAILY AT 6 A.M. 100 tablet 3    rosuvastatin (CRESTOR) 20 MG tablet Take 1 tablet (20 mg total) by mouth once daily. 90 tablet 3     No current facility-administered medications on file prior to visit.         Review of patient's allergies indicates:   Allergen Reactions    Shrimp      Per testing        Past Surgical History:   Procedure Laterality Date    ADENOIDECTOMY      CATARACT EXTRACTION      COLONOSCOPY  3/23/2010    Dr. Blackburn, hyperplastic polyps, otherwise negative, 10 year recheck    CYSTOSCOPY Left 04/12/2017    with ureteral stent-  Bert    EYE SURGERY  05/06/2013    Dr Nielsen    kidney stent   04/2017    KNEE ARTHROSCOPY W/ DEBRIDEMENT      right with lateral release     LITHOTRIPSY  04/19/2017    NASAL SINUS SURGERY      retna surgery   5/2013    Dr Peralta    TONSILLECTOMY         Past Medical History:   Diagnosis Date    Cataract     Cataract of left eye     Diabetes mellitus type II     on po meds    Hyperlipidemia     not on meds at present    Hypertension     Kidney stones 04/2017    Left ureteral stone     Plantar fasciitis of right foot 10/2017    Dr Colorado     Tendonitis 01/2018    right foot Dr Colorado    Wears glasses        Vitals:    11/30/20 1411   BP: 131/77   Pulse: 67       Physical Exam  Constitutional:       Appearance: Normal appearance.   HENT:      Head: Normocephalic and atraumatic.      Nose: Nose normal. No congestion.      Mouth/Throat:      Mouth: Mucous membranes are moist.      Pharynx: Oropharynx is clear.   Eyes:      Extraocular Movements: Extraocular movements intact.      Pupils: Pupils are equal, round, and reactive to light.   Pulmonary:      Effort: Pulmonary effort is normal. No respiratory distress.   Abdominal:      General: Abdomen is flat. There is no distension.   Musculoskeletal:      Comments: Status quo   Skin:     General: Skin is warm and dry.   Neurological:      General: No focal deficit present.      Mental Status: He is alert and oriented to person, place, and time. Mental status is at baseline.   Psychiatric:         Mood and Affect: Mood and affect normal.         Behavior: Behavior normal.        Ortho Exam   MRI of the lumbar spine.     Clinical history is low back pain rated down the right leg     There is mild rightward curvature of the lumbar spine. The vertebral  bodies are of normal height. There is no compression or subluxation.     There is diffuse disc desiccation. There is disc space narrowing with  degenerative endplate changes at L3-4. There is a vertebral  body  hemangioma in T12. The conus medullaris is normal in appearance and  ends at T12-L1.           At T12-L1 and L1-2 there is no significant abnormality.     At L2-3 there is a shallow disc bulge and facet hypertrophy resulting  in mild right foraminal narrowing. There is no nerve encroachment.     At L3-4 there is broad-based disc bulge with moderate facet  osteoarthrosis resulting in moderate central canal stenosis and left  foraminal and lateral recess narrowing. Correlation with left L3 and  L4 radicular symptoms is recommended. There is mild right foraminal  narrowing.     At L4-5 there is shallow disc bulge with moderate facet osteoarthrosis  resulting in moderate central canal stenosis and right lateral recess  and foraminal narrowing, right greater than left.        At L5-S1 there is a shallow disc bulge with moderate facet  osteoarthrosis resulting in mild right foraminal narrowing. There is  an 11 x 7 mm ovoid nodule within the right neural foramen. This has a  low signal rim with increased T2 signal center and may represent a  complex synovial cyst this attributes to the right foraminal  narrowing. Correlation with right L5 radicular symptoms is  recommended. Follow-up MRI with IV contrast is recommended to exclude  an enhancing mass.     There are bilateral renal cyst. The paraspinous soft tissues are  normal.     IMPRESSION: Moderate central canal stenosis and left foraminal  narrowing and lateral recess narrowing at L3-4. Correlation with left  L3 and L4 radicular symptoms is recommended     Moderate central canal stenosis, lateral recess and foraminal  narrowing at L4-5, right greater than left. Correlation with bilateral  L4 and L5 radicular symptoms is recommended     Moderate right foraminal narrowing at L5-S1. There is an 11 x 7 mm  ovoid nodule within the right neural foramen anterior to the facet  joint. This may represent a complex synovial cysts versus loose body  or less likely schwannoma.  A follow-up MRI with contrast is  recommended    EXAMINATION:  MRI LUMBAR SPINE W WO CONTRAST     CLINICAL HISTORY:  Back pain or radiculopathy, > 6 wks;L5-S1 neural foramen mass;  Dorsalgia, unspecified     TECHNIQUE:  Multiplanar multisequence pre and post contrast enhanced lumbar spine MRI was performed after the uneventful intravenous administration of 10 mL Gadavist.     COMPARISON:  Lumbar spine MRI without contrast dated 11/13/2020     FINDINGS:  Vertebral column: Redemonstrated is degenerative change which will be described by level.  There is a dextrocurvature of the lumbar spine centered at the level of L3.  The vertebral bodies maintain normal height.  There is no fracture.  Trace retrolisthesis of L3 on L4 is exaggerated by osteophyte formation.  There is marked disc space narrowing at the L3-4 level, worse towards the left where there is left lateral osteophyte formation.  There is associated degenerative endplate signal change.  There is mild to moderate disc space narrowing at the L2-3 level where there is also trace degenerative retrolisthesis of L2 on L3.  The discs are desiccated.  Baseline marrow signal is normal.  There is an incidental hemangioma in the T12 vertebral body.  There is minimal enhancement associated with the degenerative endplate signal change at the L3-4 level.  Otherwise, there is no abnormal enhancement in the lumbar spine.     Spinal canal, conus, epidural space: The spinal canal may be borderline small on a developmental basis.  The conus terminates at the level of T12 and is normal in contour and signal intensity.  There is no epidural fluid collection or mass.  There is an enhancing nerve roots within the cauda equina.  This root is of normal caliber and is nonspecific but could represent reactive or inflammatory enhancement (radiculitis).     Findings by level:     On the sagittal images, the T10-11 and T11-12 levels are normal.     T12-L1: Unremarkable     L1-2: There is  no spinal canal or significant foraminal stenosis.     L2-3: There is disc space narrowing and trace retrolisthesis of L2 on L3.  There is facet joint arthropathy and a mild disc bulge.  There is no spinal stenosis.  There is no significant foraminal stenosis.     L3-4: There is marked disc space narrowing, worse towards the left.  There is left greater than right facet joint arthropathy with ligamentum flavum thickening.  There is a diffuse disc bulge with osteophytic ridging and small superimposed left foraminal disc protrusion.  There is mild-to-moderate spinal canal and left lateral recess stenosis with moderate to severe left and mild right foraminal stenosis without change.     L4-5: There is a diffuse disc bulge.  There is moderate-to-marked facet joint arthropathy with ligamentum flavum thickening.  There is a trace left facet joint effusion.  There is severe spinal stenosis with moderate bilateral lateral recess stenosis and at least moderate bilateral foraminal stenosis without significant change.     L5-S1: There is marked right and moderate left facet joint arthropathy.  There is a very shallow broad central disc protrusion.  Redemonstrated is a structure in the lateral right foramen described previously measuring approximately 11 x 7 mm.  This has a T2 hypointense rim and is T2 hyperintense centrally.  There is peripheral enhancement.  There is also periarticular enhancement associated with the marked right facet joint arthropathy.  This structure remains somewhat nonspecific but is felt to represent a complex and reactive synovial cyst projecting anteriorly from the markedly over grown facet joint and contributing to marked right foraminal stenosis with affect upon the right L5 root.  A similar but smaller 9 x 8 mm lesion is seen posterior to this markedly over grown facet joint and also demonstrate similar signal intensity and enhancement.  There is no significant spinal stenosis or left foraminal  stenosis.     Soft tissues: There are multiple bilateral renal cysts.  The prevertebral soft tissues are grossly normal.     Impression:     1. There is multilevel degenerative change discussed in detail by level above and without significant change compared to recent unenhanced lumbar spine MRI.  There is mild-to-moderate spinal stenosis at the L3-4 level with multifactorial severe spinal stenosis at the L4-5 level.  There is multilevel foraminal stenosis.  2. Redemonstrated is a heterogeneous lesion in the lateral right L5-S1 facet joint which is felt to represent a complex synovial cyst with associated enhancement.  There is a smaller but similar appearing lesion projecting posteriorly from the facet joint.  There is, however, severe right foraminal stenosis at the L5-S1 level related to extensive degenerative change including facet joint arthropathy.  There is periarticular edema and enhancement at this level, right greater than left.  3. There is an enhancing nerve root seen within the cauda equina.  This root is of normal caliber and likely represents nonspecific radiculitis.  4. There are multiple bilateral renal cysts.         Lumbar radiculopathy    Synovial cyst of lumbar facet joint    Chronic right-sided low back pain with right-sided sciatica    Paresthesias    Impaired functional mobility and activity tolerance           PLAN:   Toni Hyde is a 65 y.o. male with chronic right-sided low back pain and radicular symptoms down the right lower extremity.  History, physical examination, MRI findings are consistent with a right L5-S1 synovial cyst extending anteriorly into the neural foramen with impingement on the L5 nerve root.  This was discussed in detail with the patient today.  I recommend a right L5-S1 synovial cyst aspiration and right L5 transforaminal epidural steroid injection.  Risks, benefits, alternatives were discussed.  The patient will be scheduled at the next available  appointment.        Kermit Davalos D.O.  Physical Medicine and Rehabilitation          CC: Patients PCP: Candido Mon MD  CC: Referring Provider: No ref. provider found

## 2020-11-30 NOTE — H&P (VIEW-ONLY)
Today's Date: 2020     Referring Provider: No ref. provider found     Chief Complaint:   Chief Complaint   Patient presents with    Back Pain     discuss procedure before scheduling       HPI: Toni Hyde is a 65 y.o. male  who presents today for review of MRI.  Pain is otherwise unchanged in location, duration, intensity, or characteristics.  He denies any new or worsening bowel or bladder dysfunction, saddle anesthesia, or lower extremity weakness.    Review of Systems   Musculoskeletal: Positive for back pain.   Neurological: Positive for tingling and sensory change.   All other systems reviewed and are negative.       Social History     Socioeconomic History    Marital status:      Spouse name: Not on file    Number of children: Not on file    Years of education: Not on file    Highest education level: Not on file   Occupational History    Not on file   Social Needs    Financial resource strain: Not hard at all    Food insecurity     Worry: Never true     Inability: Never true    Transportation needs     Medical: No     Non-medical: No   Tobacco Use    Smoking status: Former Smoker     Types: Cigarettes     Quit date: 1978     Years since quittin.6    Smokeless tobacco: Never Used   Substance and Sexual Activity    Alcohol use: Yes     Alcohol/week: 2.5 standard drinks     Types: 3 Standard drinks or equivalent per week     Frequency: 2-4 times a month     Drinks per session: 1 or 2     Binge frequency: Never     Comment: 3 drinks per week    Drug use: No    Sexual activity: Not on file   Lifestyle    Physical activity     Days per week: 1 day     Minutes per session: 30 min    Stress: Only a little   Relationships    Social connections     Talks on phone: More than three times a week     Gets together: Once a week     Attends [employer] service: Not on file     Active member of club or organization: No     Attends meetings of clubs or organizations: Never      Relationship status:    Other Topics Concern    Not on file   Social History Narrative    Not on file       Family History   Problem Relation Age of Onset    Hypertension Father     Neuropathy Father     Stroke Paternal Grandmother     Lung cancer Paternal Grandfather     Cancer Paternal Grandfather     Cataracts Mother     Diabetes Maternal Grandmother     No Known Problems Sister     No Known Problems Maternal Aunt        Current Outpatient Medications on File Prior to Visit   Medication Sig Dispense Refill    allopurinol (ZYLOPRIM) 100 MG tablet TAKE 1 TABLET DAILY 90 tablet 4    amLODIPine (NORVASC) 2.5 MG tablet TAKE 1 TABLET DAILY 90 tablet 3    cetirizine (ZYRTEC) 10 MG tablet Take 10 mg by mouth daily as needed. 1 Tablet(s) Oral PRN .      DIOVAN 320 mg tablet TAKE 1 TABLET DAILY (REPLACES BENAZEPRIL 40 MG) 90 tablet 3    diphenhydrAMINE (BENADRYL) 25 mg capsule Take 25 mg by mouth nightly as needed for Itching.      fluticasone (FLONASE) 50 mcg/actuation nasal spray 2 sprays by Each Nare route daily as needed.       ibuprofen (ADVIL) 200 MG tablet Take 400 mg by mouth every 6 (six) hours as needed. 2 Tablet(s) Oral PRN Every day.      metFORMIN (GLUCOPHAGE-XR) 500 MG ER 24hr tablet TAKE 2 TABLETS TWICE A DAY WITH MEALS (REPLACES METFORMIN 750 MG TWICE A DAY) 360 tablet 0    potassium citrate (UROCIT-K 15) 15 mEq TbSR TAKE 1 TABLET DAILY AT 6 A.M. 100 tablet 3    rosuvastatin (CRESTOR) 20 MG tablet Take 1 tablet (20 mg total) by mouth once daily. 90 tablet 3     No current facility-administered medications on file prior to visit.         Review of patient's allergies indicates:   Allergen Reactions    Shrimp      Per testing        Past Surgical History:   Procedure Laterality Date    ADENOIDECTOMY      CATARACT EXTRACTION      COLONOSCOPY  3/23/2010    Dr. Blackburn, hyperplastic polyps, otherwise negative, 10 year recheck    CYSTOSCOPY Left 04/12/2017    with ureteral stent-  Bert    EYE SURGERY  05/06/2013    Dr Nielsen    kidney stent   04/2017    KNEE ARTHROSCOPY W/ DEBRIDEMENT      right with lateral release     LITHOTRIPSY  04/19/2017    NASAL SINUS SURGERY      retna surgery   5/2013    Dr Peralta    TONSILLECTOMY         Past Medical History:   Diagnosis Date    Cataract     Cataract of left eye     Diabetes mellitus type II     on po meds    Hyperlipidemia     not on meds at present    Hypertension     Kidney stones 04/2017    Left ureteral stone     Plantar fasciitis of right foot 10/2017    Dr Colorado     Tendonitis 01/2018    right foot Dr Colorado    Wears glasses        Vitals:    11/30/20 1411   BP: 131/77   Pulse: 67       Physical Exam  Constitutional:       Appearance: Normal appearance.   HENT:      Head: Normocephalic and atraumatic.      Nose: Nose normal. No congestion.      Mouth/Throat:      Mouth: Mucous membranes are moist.      Pharynx: Oropharynx is clear.   Eyes:      Extraocular Movements: Extraocular movements intact.      Pupils: Pupils are equal, round, and reactive to light.   Pulmonary:      Effort: Pulmonary effort is normal. No respiratory distress.   Abdominal:      General: Abdomen is flat. There is no distension.   Musculoskeletal:      Comments: Status quo   Skin:     General: Skin is warm and dry.   Neurological:      General: No focal deficit present.      Mental Status: He is alert and oriented to person, place, and time. Mental status is at baseline.   Psychiatric:         Mood and Affect: Mood and affect normal.         Behavior: Behavior normal.        Ortho Exam   MRI of the lumbar spine.     Clinical history is low back pain rated down the right leg     There is mild rightward curvature of the lumbar spine. The vertebral  bodies are of normal height. There is no compression or subluxation.     There is diffuse disc desiccation. There is disc space narrowing with  degenerative endplate changes at L3-4. There is a vertebral  body  hemangioma in T12. The conus medullaris is normal in appearance and  ends at T12-L1.           At T12-L1 and L1-2 there is no significant abnormality.     At L2-3 there is a shallow disc bulge and facet hypertrophy resulting  in mild right foraminal narrowing. There is no nerve encroachment.     At L3-4 there is broad-based disc bulge with moderate facet  osteoarthrosis resulting in moderate central canal stenosis and left  foraminal and lateral recess narrowing. Correlation with left L3 and  L4 radicular symptoms is recommended. There is mild right foraminal  narrowing.     At L4-5 there is shallow disc bulge with moderate facet osteoarthrosis  resulting in moderate central canal stenosis and right lateral recess  and foraminal narrowing, right greater than left.        At L5-S1 there is a shallow disc bulge with moderate facet  osteoarthrosis resulting in mild right foraminal narrowing. There is  an 11 x 7 mm ovoid nodule within the right neural foramen. This has a  low signal rim with increased T2 signal center and may represent a  complex synovial cyst this attributes to the right foraminal  narrowing. Correlation with right L5 radicular symptoms is  recommended. Follow-up MRI with IV contrast is recommended to exclude  an enhancing mass.     There are bilateral renal cyst. The paraspinous soft tissues are  normal.     IMPRESSION: Moderate central canal stenosis and left foraminal  narrowing and lateral recess narrowing at L3-4. Correlation with left  L3 and L4 radicular symptoms is recommended     Moderate central canal stenosis, lateral recess and foraminal  narrowing at L4-5, right greater than left. Correlation with bilateral  L4 and L5 radicular symptoms is recommended     Moderate right foraminal narrowing at L5-S1. There is an 11 x 7 mm  ovoid nodule within the right neural foramen anterior to the facet  joint. This may represent a complex synovial cysts versus loose body  or less likely schwannoma.  A follow-up MRI with contrast is  recommended    EXAMINATION:  MRI LUMBAR SPINE W WO CONTRAST     CLINICAL HISTORY:  Back pain or radiculopathy, > 6 wks;L5-S1 neural foramen mass;  Dorsalgia, unspecified     TECHNIQUE:  Multiplanar multisequence pre and post contrast enhanced lumbar spine MRI was performed after the uneventful intravenous administration of 10 mL Gadavist.     COMPARISON:  Lumbar spine MRI without contrast dated 11/13/2020     FINDINGS:  Vertebral column: Redemonstrated is degenerative change which will be described by level.  There is a dextrocurvature of the lumbar spine centered at the level of L3.  The vertebral bodies maintain normal height.  There is no fracture.  Trace retrolisthesis of L3 on L4 is exaggerated by osteophyte formation.  There is marked disc space narrowing at the L3-4 level, worse towards the left where there is left lateral osteophyte formation.  There is associated degenerative endplate signal change.  There is mild to moderate disc space narrowing at the L2-3 level where there is also trace degenerative retrolisthesis of L2 on L3.  The discs are desiccated.  Baseline marrow signal is normal.  There is an incidental hemangioma in the T12 vertebral body.  There is minimal enhancement associated with the degenerative endplate signal change at the L3-4 level.  Otherwise, there is no abnormal enhancement in the lumbar spine.     Spinal canal, conus, epidural space: The spinal canal may be borderline small on a developmental basis.  The conus terminates at the level of T12 and is normal in contour and signal intensity.  There is no epidural fluid collection or mass.  There is an enhancing nerve roots within the cauda equina.  This root is of normal caliber and is nonspecific but could represent reactive or inflammatory enhancement (radiculitis).     Findings by level:     On the sagittal images, the T10-11 and T11-12 levels are normal.     T12-L1: Unremarkable     L1-2: There is  no spinal canal or significant foraminal stenosis.     L2-3: There is disc space narrowing and trace retrolisthesis of L2 on L3.  There is facet joint arthropathy and a mild disc bulge.  There is no spinal stenosis.  There is no significant foraminal stenosis.     L3-4: There is marked disc space narrowing, worse towards the left.  There is left greater than right facet joint arthropathy with ligamentum flavum thickening.  There is a diffuse disc bulge with osteophytic ridging and small superimposed left foraminal disc protrusion.  There is mild-to-moderate spinal canal and left lateral recess stenosis with moderate to severe left and mild right foraminal stenosis without change.     L4-5: There is a diffuse disc bulge.  There is moderate-to-marked facet joint arthropathy with ligamentum flavum thickening.  There is a trace left facet joint effusion.  There is severe spinal stenosis with moderate bilateral lateral recess stenosis and at least moderate bilateral foraminal stenosis without significant change.     L5-S1: There is marked right and moderate left facet joint arthropathy.  There is a very shallow broad central disc protrusion.  Redemonstrated is a structure in the lateral right foramen described previously measuring approximately 11 x 7 mm.  This has a T2 hypointense rim and is T2 hyperintense centrally.  There is peripheral enhancement.  There is also periarticular enhancement associated with the marked right facet joint arthropathy.  This structure remains somewhat nonspecific but is felt to represent a complex and reactive synovial cyst projecting anteriorly from the markedly over grown facet joint and contributing to marked right foraminal stenosis with affect upon the right L5 root.  A similar but smaller 9 x 8 mm lesion is seen posterior to this markedly over grown facet joint and also demonstrate similar signal intensity and enhancement.  There is no significant spinal stenosis or left foraminal  stenosis.     Soft tissues: There are multiple bilateral renal cysts.  The prevertebral soft tissues are grossly normal.     Impression:     1. There is multilevel degenerative change discussed in detail by level above and without significant change compared to recent unenhanced lumbar spine MRI.  There is mild-to-moderate spinal stenosis at the L3-4 level with multifactorial severe spinal stenosis at the L4-5 level.  There is multilevel foraminal stenosis.  2. Redemonstrated is a heterogeneous lesion in the lateral right L5-S1 facet joint which is felt to represent a complex synovial cyst with associated enhancement.  There is a smaller but similar appearing lesion projecting posteriorly from the facet joint.  There is, however, severe right foraminal stenosis at the L5-S1 level related to extensive degenerative change including facet joint arthropathy.  There is periarticular edema and enhancement at this level, right greater than left.  3. There is an enhancing nerve root seen within the cauda equina.  This root is of normal caliber and likely represents nonspecific radiculitis.  4. There are multiple bilateral renal cysts.         Lumbar radiculopathy    Synovial cyst of lumbar facet joint    Chronic right-sided low back pain with right-sided sciatica    Paresthesias    Impaired functional mobility and activity tolerance           PLAN:   Toni Hyde is a 65 y.o. male with chronic right-sided low back pain and radicular symptoms down the right lower extremity.  History, physical examination, MRI findings are consistent with a right L5-S1 synovial cyst extending anteriorly into the neural foramen with impingement on the L5 nerve root.  This was discussed in detail with the patient today.  I recommend a right L5-S1 synovial cyst aspiration and right L5 transforaminal epidural steroid injection.  Risks, benefits, alternatives were discussed.  The patient will be scheduled at the next available  appointment.        Kermit Davalos D.O.  Physical Medicine and Rehabilitation          CC: Patients PCP: Candido Mon MD  CC: Referring Provider: No ref. provider found

## 2020-12-01 LAB
LEFT EYE DM RETINOPATHY: NEGATIVE
RIGHT EYE DM RETINOPATHY: NEGATIVE

## 2020-12-02 ENCOUNTER — PATIENT OUTREACH (OUTPATIENT)
Dept: ADMINISTRATIVE | Facility: HOSPITAL | Age: 65
End: 2020-12-02

## 2020-12-02 RX ORDER — SODIUM CHLORIDE 9 MG/ML
INJECTION, SOLUTION INTRAVENOUS CONTINUOUS
Status: CANCELLED | OUTPATIENT
Start: 2020-12-02

## 2020-12-04 ENCOUNTER — LAB VISIT (OUTPATIENT)
Dept: PRIMARY CARE CLINIC | Facility: CLINIC | Age: 65
End: 2020-12-04
Payer: MEDICARE

## 2020-12-04 DIAGNOSIS — Z01.818 PRE-OP TESTING: ICD-10-CM

## 2020-12-04 PROCEDURE — U0003 INFECTIOUS AGENT DETECTION BY NUCLEIC ACID (DNA OR RNA); SEVERE ACUTE RESPIRATORY SYNDROME CORONAVIRUS 2 (SARS-COV-2) (CORONAVIRUS DISEASE [COVID-19]), AMPLIFIED PROBE TECHNIQUE, MAKING USE OF HIGH THROUGHPUT TECHNOLOGIES AS DESCRIBED BY CMS-2020-01-R: HCPCS

## 2020-12-05 LAB — SARS-COV-2 RNA RESP QL NAA+PROBE: NOT DETECTED

## 2020-12-07 ENCOUNTER — HOSPITAL ENCOUNTER (OUTPATIENT)
Facility: AMBULARY SURGERY CENTER | Age: 65
Discharge: HOME | End: 2020-12-07
Attending: PHYSICAL MEDICINE & REHABILITATION | Admitting: PHYSICAL MEDICINE & REHABILITATION
Payer: MEDICARE

## 2020-12-07 DIAGNOSIS — M54.17 LUMBOSACRAL RADICULOPATHY: Primary | ICD-10-CM

## 2020-12-07 PROCEDURE — 64483 PR EPIDURAL INJ, ANES/STEROID, TRANSFORAMINAL, LUMB/SACR, SNGL LEVL: ICD-10-PCS | Mod: RT,,, | Performed by: PHYSICAL MEDICINE & REHABILITATION

## 2020-12-07 PROCEDURE — 99152 PR MOD CONSCIOUS SEDATION, SAME PHYS, 5+ YRS, FIRST 15 MIN: ICD-10-PCS | Mod: ,,, | Performed by: PHYSICAL MEDICINE & REHABILITATION

## 2020-12-07 PROCEDURE — 99152 MOD SED SAME PHYS/QHP 5/>YRS: CPT | Mod: ,,, | Performed by: PHYSICAL MEDICINE & REHABILITATION

## 2020-12-07 PROCEDURE — 64999 PR ASPRIATION OF SYNOVIAL CYST OF FACET: ICD-10-PCS | Mod: 59,,, | Performed by: PHYSICAL MEDICINE & REHABILITATION

## 2020-12-07 PROCEDURE — 64483 NJX AA&/STRD TFRM EPI L/S 1: CPT | Performed by: PHYSICAL MEDICINE & REHABILITATION

## 2020-12-07 PROCEDURE — 64999 UNLISTED PX NERVOUS SYSTEM: CPT | Mod: 59,,, | Performed by: PHYSICAL MEDICINE & REHABILITATION

## 2020-12-07 PROCEDURE — 64483 NJX AA&/STRD TFRM EPI L/S 1: CPT | Mod: RT,,, | Performed by: PHYSICAL MEDICINE & REHABILITATION

## 2020-12-07 RX ORDER — LIDOCAINE HYDROCHLORIDE 10 MG/ML
1 INJECTION, SOLUTION EPIDURAL; INFILTRATION; INTRACAUDAL; PERINEURAL ONCE
Status: ACTIVE | OUTPATIENT
Start: 2020-12-07

## 2020-12-07 RX ORDER — MIDAZOLAM HYDROCHLORIDE 1 MG/ML
INJECTION, SOLUTION INTRAMUSCULAR; INTRAVENOUS
Status: DISCONTINUED | OUTPATIENT
Start: 2020-12-07 | End: 2020-12-07 | Stop reason: HOSPADM

## 2020-12-07 RX ORDER — FENTANYL CITRATE 50 UG/ML
INJECTION, SOLUTION INTRAMUSCULAR; INTRAVENOUS
Status: DISCONTINUED | OUTPATIENT
Start: 2020-12-07 | End: 2020-12-07 | Stop reason: HOSPADM

## 2020-12-07 RX ORDER — DEXAMETHASONE SODIUM PHOSPHATE 4 MG/ML
INJECTION, SOLUTION INTRA-ARTICULAR; INTRALESIONAL; INTRAMUSCULAR; INTRAVENOUS; SOFT TISSUE
Status: DISCONTINUED | OUTPATIENT
Start: 2020-12-07 | End: 2020-12-07 | Stop reason: HOSPADM

## 2020-12-07 RX ORDER — SODIUM CHLORIDE, SODIUM LACTATE, POTASSIUM CHLORIDE, CALCIUM CHLORIDE 600; 310; 30; 20 MG/100ML; MG/100ML; MG/100ML; MG/100ML
INJECTION, SOLUTION INTRAVENOUS CONTINUOUS
Status: DISCONTINUED | OUTPATIENT
Start: 2020-12-07 | End: 2020-12-07 | Stop reason: HOSPADM

## 2020-12-07 RX ORDER — SODIUM CHLORIDE 9 MG/ML
INJECTION, SOLUTION INTRAMUSCULAR; INTRAVENOUS; SUBCUTANEOUS
Status: DISCONTINUED | OUTPATIENT
Start: 2020-12-07 | End: 2020-12-07 | Stop reason: HOSPADM

## 2020-12-07 RX ORDER — ROPIVACAINE HYDROCHLORIDE 5 MG/ML
INJECTION, SOLUTION EPIDURAL; INFILTRATION; PERINEURAL
Status: DISCONTINUED
Start: 2020-12-07 | End: 2020-12-07 | Stop reason: HOSPADM

## 2020-12-07 RX ORDER — LIDOCAINE HYDROCHLORIDE 10 MG/ML
INJECTION, SOLUTION EPIDURAL; INFILTRATION; INTRACAUDAL; PERINEURAL
Status: DISCONTINUED | OUTPATIENT
Start: 2020-12-07 | End: 2020-12-07 | Stop reason: HOSPADM

## 2020-12-07 RX ADMIN — SODIUM CHLORIDE, SODIUM LACTATE, POTASSIUM CHLORIDE, CALCIUM CHLORIDE: 600; 310; 30; 20 INJECTION, SOLUTION INTRAVENOUS at 07:12

## 2020-12-07 NOTE — OP NOTE
BRIEF HISTORY  Toni Hyde is a 65 y.o. male with chronic right-sided low back pain and radicular symptoms down the right lower extremity.  History, physical examination, MRI findings are consistent with a right L5-S1 synovial cyst extending anteriorly into the neural foramen with impingement on the L5 nerve root.  This was discussed in detail with the patient today.  I recommend a right L5-S1 synovial cyst aspiration and right L5 transforaminal epidural steroid injection.  Risks, benefits, alternatives were discussed.     Time-out was taken to identify the patient and the procedure side prior to starting the procedure.    CONSENT  Risks, benefits, and alternatives of procedure were discussed with the patient.  All questions were answered to the satisfaction of the patient.  Written consent was signed prior to the procedure.    PRE-PROCEDURE DIAGNOSIS  Lumbar synovial cyst, right L5 radiculopathy    POST-PROCEDURE DIAGNOSIS  SAME    NAME OF OPERATION  Right L5-S1 facet cyst aspiration  Right L5 transforaminal steroid injection under fluoroscopy.  Right L5 epidurograms    PHYSICIAN  Kermit Davalos, DO    ASSISTANTS  None    LOCAL ANESTHETIC GIVEN  Xylocaine 1% 4 mL.    SEDATION MEDICATIONS  RN IV sedation 2 mg IV Versed, 50 mcg IV Fentanyl    ESTIMATED BLOOD LOSS  None.    COMPLICATIONS  None.    TECHNIQUE  Procedure in detail: Risks and benefits were discussed and written informed consent was obtained.  The patient was placed in the prone position on the exam table.  Skin was cleaned with ChloraPrep.  Skin was then allowed to dry.  Area was draped with sterile towels.  With a 45 degree oblique view to the right, the L5-S1 facet joint was evaluated.  Skin in tract was anesthetized using a 27 gauge 1.5 in needle in approximately 1 cc of 1% lidocaine.  This needle was removed and replaced with a 22 gauge 5 in needle which was advanced under intermittent fluoroscopic visualization into the L5-S1 facet joint.  Once the  facet joint was penetrated, 0.1 cc of Omnipaque 300 was injected showing excellent arthrogram.  Next, approximately 0.5 cc of viscous bloody synovial fluid was aspirated.  Next, 0.5 cc of a solution of 1 cc of preservative-free normal saline, 1 cc of 1% preservative-free lidocaine and 1 cc of 10 milligrams/milliliter of preservative-free dexamethasone was injected intra-articular.  Needle was removed.      The right L5-S1 neural foramen was identified with a 30 degree oblique view to the right.  Skin and tract was anesthetized using a 1.5 inch 27-gauge needle using approximately 1 cc of 1% lidocaine.  This needle was removed and replaced with a 5 inch 22-gauge needle which was advanced under intermittent fluoroscopy and alternating oblique, AP, and lateral views until the needle tip in the AP view was at the 6:00 of the L5 pedicle and in the middle half of the superior aspect of the L5-S1 neural foramen in the lateral view.  Next, in the AP view 1 cc of Omnipaque 300 was injected under live fluoroscopy showing excellent L5 epidurography.  There was no evidence of vascular spread.  Next, 2 cc of a solution of 1 cc of preservative-free normal saline, 1 cc of preservative-free 1% lidocaine, and 1 cc of 10 mg/cc of dexamethasone was injected.  Needle was removed, and Band-Aid was applied.  The patient tolerated the procedure well with no adverse events.  He noted significant pain reduction following the procedure.    EPIDUROGRAM  AP and lateral images were obtained.  The contrast material flowed distally along the root and ascended in the lateral and ventral epidural space to the supra-adjacent disc level.  From the second position the contrast material flowed distally along the nerve root and ascended in the lateral and ventral epidural space to the supra-adjacent disc level.  No venous, arterial or subarachnoid flow was observed at L5 on the right.    MONITORS  Prior to and during the procedure, the patient was  monitored with pulse oximetry, EKG, and blood pressure cuff.  The procedure was tolerated well.  Oxygenation, blood pressure, and pulse rate were maintained within normal limits during the procedure.  The patient was awake, alert, and able to respond to all questions appropriately throughout the entire procedure.      The patient was monitored after the procedure.  On exam, 30 minutes after the procedure, the patient was noted to be neurovascularly intact in BLE.  The patient was discharged home with family/responsible adult in stable condition.  The patient was given post procedure and discharge instructions to follow at home.  Diet on discharge is to be the same as prior to the procedure.  Activity on discharge is as per the instruction sheet given to the patient.  We will see the patient back in clinic in 2-3 weeks for follow-up.

## 2020-12-07 NOTE — INTERVAL H&P NOTE
The patient has been examined and the H&P has been reviewed:    I concur with the findings and no changes have occurred since H&P was written.    Anesthesia/Surgery risks, benefits and alternative options discussed and understood by patient/family.    ASA 2, mallampati 2      Active Hospital Problems    Diagnosis  POA    Lumbosacral radiculopathy [M54.17]  Yes      Resolved Hospital Problems   No resolved problems to display.

## 2020-12-07 NOTE — DISCHARGE SUMMARY
[facility]  Discharge Note  Short Stay    Procedure(s) (LRB):  Injection,steroid,epidural,transforaminal approach L5- S1 (Right)    OUTCOME: Patient tolerated treatment/procedure well without complication and is now ready for discharge.    DISPOSITION: Home or Self Care    FINAL DIAGNOSIS:Lumbar facet cyst, Lumbar radiculopathy  FOLLOWUP: In clinic    DISCHARGE INSTRUCTIONS:  No discharge procedures on file.

## 2020-12-07 NOTE — PLAN OF CARE
Dc criteria met. Denies pain at present. Home with wife driving. Ambulates well to exit and vehicle.

## 2020-12-07 NOTE — BRIEF OP NOTE
[facility]  Brief Operative Note    Surgery Date: 12/7/2020     Surgeon(s) and Role:     * Kermit Davalos MD - Primary    Assisting Surgeon: None    Pre-op Diagnosis:  Lumbar radiculopathy [M54.16]    Post-op Diagnosis:  Post-Op Diagnosis Codes:     * Lumbar radiculopathy [M54.16]    Procedure(s) (LRB):  Injection,steroid,epidural,transforaminal approach L5- S1 (Right)    Anesthesia: RN IV Sedation    Description of the findings of the procedure(s): 0.5 cc of viscous bloody fluid aspirated from the right L5-S1 facet joint. Right L5 epidurogram    Estimated Blood Loss: * No values recorded between 12/7/2020  9:24 AM and 12/7/2020  9:41 AM *         Specimens:   Specimen (12h ago, onward)    None            Discharge Note    OUTCOME: Patient tolerated treatment/procedure well without complication and is now ready for discharge.    DISPOSITION: Home or Self Care    FINAL DIAGNOSIS:  Lumbar radiculopathy    FOLLOWUP: In clinic    DISCHARGE INSTRUCTIONS:  No discharge procedures on file.

## 2020-12-08 VITALS
HEART RATE: 65 BPM | OXYGEN SATURATION: 93 % | RESPIRATION RATE: 18 BRPM | WEIGHT: 199 LBS | SYSTOLIC BLOOD PRESSURE: 109 MMHG | TEMPERATURE: 98 F | HEIGHT: 70 IN | BODY MASS INDEX: 28.49 KG/M2 | DIASTOLIC BLOOD PRESSURE: 67 MMHG

## 2020-12-09 DIAGNOSIS — E11.65 TYPE 2 DIABETES MELLITUS WITH HYPERGLYCEMIA, WITHOUT LONG-TERM CURRENT USE OF INSULIN: ICD-10-CM

## 2020-12-09 RX ORDER — METFORMIN HYDROCHLORIDE 500 MG/1
TABLET, EXTENDED RELEASE ORAL
Qty: 360 TABLET | Refills: 1 | Status: SHIPPED | OUTPATIENT
Start: 2020-12-09 | End: 2021-06-08

## 2020-12-09 NOTE — TELEPHONE ENCOUNTER
No new care gaps identified.  Powered by [employer]. Reference number: [id number]. 12/09/2020 1:19:15 AM   CST

## 2020-12-10 NOTE — PROGRESS NOTES
Refill Authorization Note   Toni Hyde  is requesting a refill authorization.  Brief Assessment and Rationale for Refill:  Approve     Medication Therapy Plan:  CDMR. approve     Medication Reconciliation Completed: No   Comments:       Requested Prescriptions   Pending Prescriptions Disp Refills    metFORMIN (GLUCOPHAGE-XR) 500 MG ER 24hr tablet [Pharmacy Med Name: METFORMIN HCL ER TABS 500MG] 360 tablet 1     Sig: TAKE 2 TABLETS TWICE A DAY WITH MEALS       Endocrinology:  Diabetes - Biguanides Passed - 12/9/2020  6:53 PM        Passed - Patient is at least 18 years old        Passed - Office visit in past 12 months or future 90 days     Recent Outpatient Visits            1 week ago Lumbar radiculopathy    [facility] Kermit Davalos MD    4 weeks ago Lumbosacral radiculopathy at [facility]    [facility] Kermit Davalos MD    1 month ago Lumbar radiculopathy    [facility] Kermit Davalos MD    2 months ago Type 2 diabetes mellitus with hyperglycemia, without long-term current use of insulin    [facility] Candido Mon MD    8 months ago Type 2 diabetes mellitus with hyperglycemia, without long-term current use of insulin    [facility] Candido Mon MD          Future Appointments              In 3 months LAB, [facility] [place]    In 3 months SPECIMEN, [facility] [place]                Passed - Cr is 1.4 or below and within 360 days     Creatinine   Date Value Ref Range Status   11/19/2020 1.3 0.5 - 1.4 mg/dL Final   10/02/2020 1.1 0.5 - 1.4 mg/dL Final   04/06/2020 1.2 0.5 - 1.4 mg/dL Final              Passed - HBA1C is 8 or below and within 180 days     Hemoglobin A1C   Date Value Ref Range Status   10/02/2020 6.3 (H) 4.0 - 5.6 % Final     Comment:     ADA Screening Guidelines:  5.7-6.4%  Consistent with prediabetes  >or=6.5%  Consistent with diabetes  High levels of  fetal hemoglobin interfere with the HbA1C  assay. Heterozygous hemoglobin variants (HbS, HgC, etc)do  not significantly interfere with this assay.   However, presence of multiple variants may affect accuracy.     04/06/2020 6.2 (H) 4.0 - 5.6 % Final     Comment:     ADA Screening Guidelines:  5.7-6.4%  Consistent with prediabetes  >or=6.5%  Consistent with diabetes  High levels of fetal hemoglobin interfere with the HbA1C  assay. Heterozygous hemoglobin variants (HbS, HgC, etc)do  not significantly interfere with this assay.   However, presence of multiple variants may affect accuracy.     10/31/2019 5.8 (H) 4.0 - 5.6 % Final     Comment:     ADA Screening Guidelines:  5.7-6.4%  Consistent with prediabetes  >or=6.5%  Consistent with diabetes  High levels of fetal hemoglobin interfere with the HbA1C  assay. Heterozygous hemoglobin variants (HbS, HgC, etc)do  not significantly interfere with this assay.   However, presence of multiple variants may affect accuracy.                Passed - eGFR is 30 or above and within 360 days     eGFR if non    Date Value Ref Range Status   11/19/2020 57 (A) >60 mL/min/1.73 m^2 Final     Comment:     Calculation used to obtain the estimated glomerular filtration  rate (eGFR) is the CKD-EPI equation.      10/02/2020 >60.0 >60 mL/min/1.73 m^2 Final     Comment:     Calculation used to obtain the estimated glomerular filtration  rate (eGFR) is the CKD-EPI equation.      04/06/2020 >60.0 >60 mL/min/1.73 m^2 Final     Comment:     Calculation used to obtain the estimated glomerular filtration  rate (eGFR) is the CKD-EPI equation.        eGFR if    Date Value Ref Range Status   11/19/2020 >60 >60 mL/min/1.73 m^2 Final   10/02/2020 >60.0 >60 mL/min/1.73 m^2 Final   04/06/2020 >60.0 >60 mL/min/1.73 m^2 Final                  Appointments  past 12m or future 3m with PCP    Date Provider   Last Visit   10/6/2020 Candido Mon MD   Next Visit   Visit date not  found Candido Mon MD   ED visits in past 90 days: 0     Note composed:6:53 PM 12/09/2020

## 2020-12-11 ENCOUNTER — PATIENT MESSAGE (OUTPATIENT)
Dept: OTHER | Facility: OTHER | Age: 65
End: 2020-12-11

## 2020-12-16 ENCOUNTER — PATIENT MESSAGE (OUTPATIENT)
Dept: PHYSICAL MEDICINE AND REHAB | Facility: CLINIC | Age: 65
End: 2020-12-16

## 2020-12-30 ENCOUNTER — PATIENT MESSAGE (OUTPATIENT)
Dept: PHYSICAL MEDICINE AND REHAB | Facility: CLINIC | Age: 65
End: 2020-12-30

## 2020-12-30 ENCOUNTER — PATIENT MESSAGE (OUTPATIENT)
Dept: UROLOGY | Facility: CLINIC | Age: 65
End: 2020-12-30

## 2021-01-04 ENCOUNTER — PATIENT OUTREACH (OUTPATIENT)
Dept: ADMINISTRATIVE | Facility: OTHER | Age: 66
End: 2021-01-04

## 2021-01-05 ENCOUNTER — OFFICE VISIT (OUTPATIENT)
Dept: UROLOGY | Facility: CLINIC | Age: 66
End: 2021-01-05
Payer: MEDICARE

## 2021-01-05 VITALS
SYSTOLIC BLOOD PRESSURE: 132 MMHG | BODY MASS INDEX: 28.5 KG/M2 | DIASTOLIC BLOOD PRESSURE: 81 MMHG | RESPIRATION RATE: 18 BRPM | HEIGHT: 70 IN | WEIGHT: 199.06 LBS | HEART RATE: 82 BPM

## 2021-01-05 DIAGNOSIS — N20.0 RENAL CALCULI: Primary | ICD-10-CM

## 2021-01-05 PROCEDURE — 99999 PR PBB SHADOW E&M-EST. PATIENT-LVL III: CPT | Mod: PBBFAC,,, | Performed by: UROLOGY

## 2021-01-05 PROCEDURE — 99999 PR PBB SHADOW E&M-EST. PATIENT-LVL III: ICD-10-PCS | Mod: PBBFAC,,, | Performed by: UROLOGY

## 2021-01-05 PROCEDURE — 99215 OFFICE O/P EST HI 40 MIN: CPT | Mod: 25,S$PBB,, | Performed by: UROLOGY

## 2021-01-05 PROCEDURE — 99215 PR OFFICE/OUTPT VISIT, EST, LEVL V, 40-54 MIN: ICD-10-PCS | Mod: 25,S$PBB,, | Performed by: UROLOGY

## 2021-01-05 PROCEDURE — 81000 URINALYSIS NONAUTO W/SCOPE: CPT | Mod: PBBFAC,PN | Performed by: UROLOGY

## 2021-01-05 PROCEDURE — 99213 OFFICE O/P EST LOW 20 MIN: CPT | Mod: PBBFAC,PN | Performed by: UROLOGY

## 2021-01-05 RX ORDER — ALLOPURINOL 100 MG/1
100 TABLET ORAL DAILY
Qty: 90 TABLET | Refills: 3 | Status: SHIPPED | OUTPATIENT
Start: 2021-01-05 | End: 2021-11-04 | Stop reason: SDUPTHER

## 2021-01-05 RX ORDER — POTASSIUM CITRATE 15 MEQ/1
TABLET, EXTENDED RELEASE ORAL
Qty: 90 TABLET | Refills: 3 | Status: SHIPPED | OUTPATIENT
Start: 2021-01-05 | End: 2021-11-04 | Stop reason: SDUPTHER

## 2021-01-12 ENCOUNTER — HOSPITAL ENCOUNTER (OUTPATIENT)
Dept: RADIOLOGY | Facility: HOSPITAL | Age: 66
Discharge: HOME | End: 2021-01-12
Attending: UROLOGY
Payer: MEDICARE

## 2021-01-12 DIAGNOSIS — N20.0 RENAL CALCULI: ICD-10-CM

## 2021-01-12 PROCEDURE — 74018 RADEX ABDOMEN 1 VIEW: CPT | Mod: TC,FY

## 2021-01-12 PROCEDURE — 74176 CT ABDOMEN PELVIS WITHOUT CONTRAST: ICD-10-PCS | Mod: 26,,, | Performed by: RADIOLOGY

## 2021-01-12 PROCEDURE — 74176 CT ABD & PELVIS W/O CONTRAST: CPT | Mod: TC

## 2021-01-12 PROCEDURE — 74176 CT ABD & PELVIS W/O CONTRAST: CPT | Mod: 26,,, | Performed by: RADIOLOGY

## 2021-01-12 PROCEDURE — 74018 RADEX ABDOMEN 1 VIEW: CPT | Mod: 26,,, | Performed by: RADIOLOGY

## 2021-01-12 PROCEDURE — 74018 XR ABDOMEN AP 1 VIEW: ICD-10-PCS | Mod: 26,,, | Performed by: RADIOLOGY

## 2021-03-05 ENCOUNTER — IMMUNIZATION (OUTPATIENT)
Dept: FAMILY MEDICINE | Facility: CLINIC | Age: 66
End: 2021-03-05
Payer: MEDICARE

## 2021-03-05 DIAGNOSIS — Z23 NEED FOR VACCINATION: Primary | ICD-10-CM

## 2021-03-05 PROCEDURE — 91300 COVID-19, MRNA, LNP-S, PF, 30 MCG/0.3 ML DOSE VACCINE: CPT | Mod: ,,, | Performed by: FAMILY MEDICINE

## 2021-03-05 PROCEDURE — 0001A COVID-19, MRNA, LNP-S, PF, 30 MCG/0.3 ML DOSE VACCINE: ICD-10-PCS | Mod: CV19,,, | Performed by: FAMILY MEDICINE

## 2021-03-05 PROCEDURE — 91300 COVID-19, MRNA, LNP-S, PF, 30 MCG/0.3 ML DOSE VACCINE: ICD-10-PCS | Mod: ,,, | Performed by: FAMILY MEDICINE

## 2021-03-05 PROCEDURE — 0001A COVID-19, MRNA, LNP-S, PF, 30 MCG/0.3 ML DOSE VACCINE: CPT | Mod: CV19,,, | Performed by: FAMILY MEDICINE

## 2021-03-15 DIAGNOSIS — E78.5 HYPERLIPIDEMIA ASSOCIATED WITH TYPE 2 DIABETES MELLITUS: ICD-10-CM

## 2021-03-15 DIAGNOSIS — E11.69 HYPERLIPIDEMIA ASSOCIATED WITH TYPE 2 DIABETES MELLITUS: ICD-10-CM

## 2021-03-17 RX ORDER — ROSUVASTATIN CALCIUM 20 MG/1
TABLET, COATED ORAL
Qty: 90 TABLET | Refills: 2 | Status: SHIPPED | OUTPATIENT
Start: 2021-03-17 | End: 2021-10-13 | Stop reason: SDUPTHER

## 2021-03-26 ENCOUNTER — IMMUNIZATION (OUTPATIENT)
Dept: FAMILY MEDICINE | Facility: CLINIC | Age: 66
End: 2021-03-26
Payer: MEDICARE

## 2021-03-26 DIAGNOSIS — Z23 NEED FOR VACCINATION: Primary | ICD-10-CM

## 2021-03-26 PROCEDURE — 0002A COVID-19, MRNA, LNP-S, PF, 30 MCG/0.3 ML DOSE VACCINE: CPT | Mod: CV19,S$GLB,, | Performed by: FAMILY MEDICINE

## 2021-03-26 PROCEDURE — 91300 COVID-19, MRNA, LNP-S, PF, 30 MCG/0.3 ML DOSE VACCINE: ICD-10-PCS | Mod: S$GLB,,, | Performed by: FAMILY MEDICINE

## 2021-03-26 PROCEDURE — 0002A COVID-19, MRNA, LNP-S, PF, 30 MCG/0.3 ML DOSE VACCINE: ICD-10-PCS | Mod: CV19,S$GLB,, | Performed by: FAMILY MEDICINE

## 2021-03-26 PROCEDURE — 91300 COVID-19, MRNA, LNP-S, PF, 30 MCG/0.3 ML DOSE VACCINE: CPT | Mod: S$GLB,,, | Performed by: FAMILY MEDICINE

## 2021-04-06 ENCOUNTER — LAB VISIT (OUTPATIENT)
Dept: LAB | Facility: HOSPITAL | Age: 66
End: 2021-04-06
Attending: FAMILY MEDICINE
Payer: MEDICARE

## 2021-04-06 DIAGNOSIS — E11.65 TYPE 2 DIABETES MELLITUS WITH HYPERGLYCEMIA, WITHOUT LONG-TERM CURRENT USE OF INSULIN: ICD-10-CM

## 2021-04-06 DIAGNOSIS — E78.5 HYPERLIPIDEMIA ASSOCIATED WITH TYPE 2 DIABETES MELLITUS: Primary | ICD-10-CM

## 2021-04-06 DIAGNOSIS — E11.69 HYPERLIPIDEMIA ASSOCIATED WITH TYPE 2 DIABETES MELLITUS: Primary | ICD-10-CM

## 2021-04-06 DIAGNOSIS — N20.0 KIDNEY STONES: ICD-10-CM

## 2021-04-06 DIAGNOSIS — E11.59 HYPERTENSION ASSOCIATED WITH DIABETES: ICD-10-CM

## 2021-04-06 DIAGNOSIS — I15.2 HYPERTENSION ASSOCIATED WITH DIABETES: ICD-10-CM

## 2021-04-06 LAB
ANION GAP SERPL CALC-SCNC: 9 MMOL/L (ref 8–16)
BUN SERPL-MCNC: 29 MG/DL (ref 8–23)
CALCIUM SERPL-MCNC: 9.3 MG/DL (ref 8.7–10.5)
CHLORIDE SERPL-SCNC: 104 MMOL/L (ref 95–110)
CO2 SERPL-SCNC: 28 MMOL/L (ref 23–29)
CREAT SERPL-MCNC: 1.1 MG/DL (ref 0.5–1.4)
EST. GFR  (AFRICAN AMERICAN): >60 ML/MIN/1.73 M^2
EST. GFR  (NON AFRICAN AMERICAN): >60 ML/MIN/1.73 M^2
ESTIMATED AVG GLUCOSE: 131 MG/DL (ref 68–131)
GLUCOSE SERPL-MCNC: 90 MG/DL (ref 70–110)
HBA1C MFR BLD: 6.2 % (ref 4–5.6)
POTASSIUM SERPL-SCNC: 4.2 MMOL/L (ref 3.5–5.1)
SODIUM SERPL-SCNC: 141 MMOL/L (ref 136–145)

## 2021-04-06 PROCEDURE — 83036 HEMOGLOBIN GLYCOSYLATED A1C: CPT | Performed by: FAMILY MEDICINE

## 2021-04-06 PROCEDURE — 36415 COLL VENOUS BLD VENIPUNCTURE: CPT | Mod: PO | Performed by: FAMILY MEDICINE

## 2021-04-06 PROCEDURE — 80048 BASIC METABOLIC PNL TOTAL CA: CPT | Performed by: FAMILY MEDICINE

## 2021-04-29 DIAGNOSIS — E11.59 HYPERTENSION ASSOCIATED WITH DIABETES: ICD-10-CM

## 2021-04-29 DIAGNOSIS — I15.2 HYPERTENSION ASSOCIATED WITH DIABETES: ICD-10-CM

## 2021-05-03 RX ORDER — VALSARTAN 320 MG/1
TABLET ORAL
Qty: 90 TABLET | Refills: 1 | Status: SHIPPED | OUTPATIENT
Start: 2021-05-03 | End: 2021-10-13 | Stop reason: SDUPTHER

## 2021-06-07 DIAGNOSIS — E11.65 TYPE 2 DIABETES MELLITUS WITH HYPERGLYCEMIA, WITHOUT LONG-TERM CURRENT USE OF INSULIN: ICD-10-CM

## 2021-06-08 RX ORDER — METFORMIN HYDROCHLORIDE 500 MG/1
TABLET, EXTENDED RELEASE ORAL
Qty: 360 TABLET | Refills: 0 | Status: SHIPPED | OUTPATIENT
Start: 2021-06-08 | End: 2021-09-07

## 2021-09-05 DIAGNOSIS — E11.65 TYPE 2 DIABETES MELLITUS WITH HYPERGLYCEMIA, WITHOUT LONG-TERM CURRENT USE OF INSULIN: ICD-10-CM

## 2021-09-07 RX ORDER — METFORMIN HYDROCHLORIDE 500 MG/1
TABLET, EXTENDED RELEASE ORAL
Qty: 360 TABLET | Refills: 0 | Status: SHIPPED | OUTPATIENT
Start: 2021-09-07 | End: 2021-10-13

## 2021-09-24 ENCOUNTER — IMMUNIZATION (OUTPATIENT)
Dept: PRIMARY CARE CLINIC | Facility: CLINIC | Age: 66
End: 2021-09-24
Payer: MEDICARE

## 2021-09-24 DIAGNOSIS — Z23 NEED FOR VACCINATION: Primary | ICD-10-CM

## 2021-09-24 PROCEDURE — 0003A COVID-19, MRNA, LNP-S, PF, 30 MCG/0.3 ML DOSE VACCINE: ICD-10-PCS | Mod: S$GLB,,, | Performed by: FAMILY MEDICINE

## 2021-09-24 PROCEDURE — 0003A COVID-19, MRNA, LNP-S, PF, 30 MCG/0.3 ML DOSE VACCINE: CPT | Mod: S$GLB,,, | Performed by: FAMILY MEDICINE

## 2021-09-24 PROCEDURE — 91300 COVID-19, MRNA, LNP-S, PF, 30 MCG/0.3 ML DOSE VACCINE: ICD-10-PCS | Mod: S$GLB,,, | Performed by: FAMILY MEDICINE

## 2021-09-24 PROCEDURE — 91300 COVID-19, MRNA, LNP-S, PF, 30 MCG/0.3 ML DOSE VACCINE: CPT | Mod: S$GLB,,, | Performed by: FAMILY MEDICINE

## 2021-10-04 ENCOUNTER — LAB VISIT (OUTPATIENT)
Dept: LAB | Facility: HOSPITAL | Age: 66
End: 2021-10-04
Attending: FAMILY MEDICINE
Payer: MEDICARE

## 2021-10-04 DIAGNOSIS — E11.65 TYPE 2 DIABETES MELLITUS WITH HYPERGLYCEMIA, WITHOUT LONG-TERM CURRENT USE OF INSULIN: ICD-10-CM

## 2021-10-04 DIAGNOSIS — E78.5 HYPERLIPIDEMIA ASSOCIATED WITH TYPE 2 DIABETES MELLITUS: ICD-10-CM

## 2021-10-04 DIAGNOSIS — E11.59 HYPERTENSION ASSOCIATED WITH DIABETES: ICD-10-CM

## 2021-10-04 DIAGNOSIS — N20.0 KIDNEY STONES: ICD-10-CM

## 2021-10-04 DIAGNOSIS — E11.69 HYPERLIPIDEMIA ASSOCIATED WITH TYPE 2 DIABETES MELLITUS: ICD-10-CM

## 2021-10-04 DIAGNOSIS — I15.2 HYPERTENSION ASSOCIATED WITH DIABETES: ICD-10-CM

## 2021-10-04 LAB
ALBUMIN SERPL BCP-MCNC: 3.8 G/DL (ref 3.5–5.2)
ALP SERPL-CCNC: 47 U/L (ref 55–135)
ALT SERPL W/O P-5'-P-CCNC: 17 U/L (ref 10–44)
ANION GAP SERPL CALC-SCNC: 14 MMOL/L (ref 8–16)
AST SERPL-CCNC: 22 U/L (ref 10–40)
BASOPHILS # BLD AUTO: 0.08 K/UL (ref 0–0.2)
BASOPHILS NFR BLD: 1.2 % (ref 0–1.9)
BILIRUB SERPL-MCNC: 0.7 MG/DL (ref 0.1–1)
BUN SERPL-MCNC: 24 MG/DL (ref 8–23)
CALCIUM SERPL-MCNC: 9.5 MG/DL (ref 8.7–10.5)
CHLORIDE SERPL-SCNC: 106 MMOL/L (ref 95–110)
CHOLEST SERPL-MCNC: 97 MG/DL (ref 120–199)
CHOLEST/HDLC SERPL: 2.3 {RATIO} (ref 2–5)
CO2 SERPL-SCNC: 23 MMOL/L (ref 23–29)
CREAT SERPL-MCNC: 1.4 MG/DL (ref 0.5–1.4)
DIFFERENTIAL METHOD: ABNORMAL
EOSINOPHIL # BLD AUTO: 0.2 K/UL (ref 0–0.5)
EOSINOPHIL NFR BLD: 3.2 % (ref 0–8)
ERYTHROCYTE [DISTWIDTH] IN BLOOD BY AUTOMATED COUNT: 13 % (ref 11.5–14.5)
EST. GFR  (AFRICAN AMERICAN): >60 ML/MIN/1.73 M^2
EST. GFR  (NON AFRICAN AMERICAN): 52 ML/MIN/1.73 M^2
ESTIMATED AVG GLUCOSE: 120 MG/DL (ref 68–131)
GLUCOSE SERPL-MCNC: 90 MG/DL (ref 70–110)
HBA1C MFR BLD: 5.8 % (ref 4–5.6)
HCT VFR BLD AUTO: 45.8 % (ref 40–54)
HDLC SERPL-MCNC: 43 MG/DL (ref 40–75)
HDLC SERPL: 44.3 % (ref 20–50)
HGB BLD-MCNC: 14.5 G/DL (ref 14–18)
IMM GRANULOCYTES # BLD AUTO: 0.03 K/UL (ref 0–0.04)
IMM GRANULOCYTES NFR BLD AUTO: 0.5 % (ref 0–0.5)
LDLC SERPL CALC-MCNC: 42.2 MG/DL (ref 63–159)
LYMPHOCYTES # BLD AUTO: 1.6 K/UL (ref 1–4.8)
LYMPHOCYTES NFR BLD: 24.6 % (ref 18–48)
MCH RBC QN AUTO: 30.7 PG (ref 27–31)
MCHC RBC AUTO-ENTMCNC: 31.7 G/DL (ref 32–36)
MCV RBC AUTO: 97 FL (ref 82–98)
MONOCYTES # BLD AUTO: 0.8 K/UL (ref 0.3–1)
MONOCYTES NFR BLD: 12 % (ref 4–15)
NEUTROPHILS # BLD AUTO: 3.8 K/UL (ref 1.8–7.7)
NEUTROPHILS NFR BLD: 58.5 % (ref 38–73)
NONHDLC SERPL-MCNC: 54 MG/DL
NRBC BLD-RTO: 0 /100 WBC
PLATELET # BLD AUTO: 179 K/UL (ref 150–450)
PMV BLD AUTO: 11.1 FL (ref 9.2–12.9)
POTASSIUM SERPL-SCNC: 4.1 MMOL/L (ref 3.5–5.1)
PROT SERPL-MCNC: 6.6 G/DL (ref 6–8.4)
RBC # BLD AUTO: 4.72 M/UL (ref 4.6–6.2)
SODIUM SERPL-SCNC: 143 MMOL/L (ref 136–145)
TRIGL SERPL-MCNC: 59 MG/DL (ref 30–150)
URATE SERPL-MCNC: 4.9 MG/DL (ref 3.4–7)
WBC # BLD AUTO: 6.51 K/UL (ref 3.9–12.7)

## 2021-10-04 PROCEDURE — 85025 COMPLETE CBC W/AUTO DIFF WBC: CPT | Performed by: FAMILY MEDICINE

## 2021-10-04 PROCEDURE — 36415 COLL VENOUS BLD VENIPUNCTURE: CPT | Mod: PO | Performed by: FAMILY MEDICINE

## 2021-10-04 PROCEDURE — 80061 LIPID PANEL: CPT | Performed by: FAMILY MEDICINE

## 2021-10-04 PROCEDURE — 83036 HEMOGLOBIN GLYCOSYLATED A1C: CPT | Performed by: FAMILY MEDICINE

## 2021-10-04 PROCEDURE — 80053 COMPREHEN METABOLIC PANEL: CPT | Performed by: FAMILY MEDICINE

## 2021-10-04 PROCEDURE — 84550 ASSAY OF BLOOD/URIC ACID: CPT | Performed by: FAMILY MEDICINE

## 2021-10-12 ENCOUNTER — TELEPHONE (OUTPATIENT)
Dept: FAMILY MEDICINE | Facility: CLINIC | Age: 66
End: 2021-10-12

## 2021-10-13 ENCOUNTER — OFFICE VISIT (OUTPATIENT)
Dept: FAMILY MEDICINE | Facility: CLINIC | Age: 66
End: 2021-10-13
Attending: FAMILY MEDICINE
Payer: MEDICARE

## 2021-10-13 VITALS
SYSTOLIC BLOOD PRESSURE: 117 MMHG | DIASTOLIC BLOOD PRESSURE: 68 MMHG | RESPIRATION RATE: 18 BRPM | OXYGEN SATURATION: 97 % | HEIGHT: 70 IN | TEMPERATURE: 98 F | WEIGHT: 176.69 LBS | HEART RATE: 58 BPM | BODY MASS INDEX: 25.3 KG/M2

## 2021-10-13 DIAGNOSIS — E11.65 TYPE 2 DIABETES MELLITUS WITH HYPERGLYCEMIA, WITHOUT LONG-TERM CURRENT USE OF INSULIN: ICD-10-CM

## 2021-10-13 DIAGNOSIS — E11.69 HYPERLIPIDEMIA ASSOCIATED WITH TYPE 2 DIABETES MELLITUS: ICD-10-CM

## 2021-10-13 DIAGNOSIS — I15.2 HYPERTENSION ASSOCIATED WITH DIABETES: ICD-10-CM

## 2021-10-13 DIAGNOSIS — Z00.00 ENCOUNTER FOR PREVENTIVE HEALTH EXAMINATION: Primary | ICD-10-CM

## 2021-10-13 DIAGNOSIS — I10 ESSENTIAL HYPERTENSION: ICD-10-CM

## 2021-10-13 DIAGNOSIS — E78.5 HYPERLIPIDEMIA ASSOCIATED WITH TYPE 2 DIABETES MELLITUS: ICD-10-CM

## 2021-10-13 DIAGNOSIS — E11.59 HYPERTENSION ASSOCIATED WITH DIABETES: ICD-10-CM

## 2021-10-13 DIAGNOSIS — Z86.010 HISTORY OF COLON POLYPS: ICD-10-CM

## 2021-10-13 PROCEDURE — 99999 PR PBB SHADOW E&M-EST. PATIENT-LVL V: ICD-10-PCS | Mod: PBBFAC,,, | Performed by: FAMILY MEDICINE

## 2021-10-13 PROCEDURE — 99214 OFFICE O/P EST MOD 30 MIN: CPT | Mod: S$PBB,,, | Performed by: FAMILY MEDICINE

## 2021-10-13 PROCEDURE — 99215 OFFICE O/P EST HI 40 MIN: CPT | Mod: PBBFAC,PN | Performed by: FAMILY MEDICINE

## 2021-10-13 PROCEDURE — 99999 PR PBB SHADOW E&M-EST. PATIENT-LVL V: CPT | Mod: PBBFAC,,, | Performed by: FAMILY MEDICINE

## 2021-10-13 PROCEDURE — 99214 PR OFFICE/OUTPT VISIT, EST, LEVL IV, 30-39 MIN: ICD-10-PCS | Mod: S$PBB,,, | Performed by: FAMILY MEDICINE

## 2021-10-13 RX ORDER — AMLODIPINE BESYLATE 2.5 MG/1
2.5 TABLET ORAL DAILY
Qty: 90 TABLET | Refills: 3 | Status: SHIPPED | OUTPATIENT
Start: 2021-10-13 | End: 2022-05-31 | Stop reason: SDUPTHER

## 2021-10-13 RX ORDER — ROSUVASTATIN CALCIUM 20 MG/1
TABLET, COATED ORAL
Qty: 90 TABLET | Refills: 3 | Status: SHIPPED | OUTPATIENT
Start: 2021-10-13 | End: 2022-05-31 | Stop reason: SDUPTHER

## 2021-10-13 RX ORDER — VALSARTAN 320 MG/1
TABLET ORAL
Qty: 90 TABLET | Refills: 3 | Status: SHIPPED | OUTPATIENT
Start: 2021-10-13 | End: 2022-05-31 | Stop reason: SDUPTHER

## 2021-10-13 RX ORDER — METFORMIN HYDROCHLORIDE 750 MG/1
750 TABLET, EXTENDED RELEASE ORAL 2 TIMES DAILY WITH MEALS
Qty: 180 TABLET | Refills: 1 | Status: SHIPPED | OUTPATIENT
Start: 2021-10-13 | End: 2022-05-31 | Stop reason: SDUPTHER

## 2021-10-14 ENCOUNTER — TELEPHONE (OUTPATIENT)
Dept: FAMILY MEDICINE | Facility: CLINIC | Age: 66
End: 2021-10-14

## 2021-10-19 ENCOUNTER — PATIENT MESSAGE (OUTPATIENT)
Dept: GASTROENTEROLOGY | Facility: CLINIC | Age: 66
End: 2021-10-19

## 2021-10-19 ENCOUNTER — TELEPHONE (OUTPATIENT)
Dept: GASTROENTEROLOGY | Facility: CLINIC | Age: 66
End: 2021-10-19

## 2021-10-19 DIAGNOSIS — K63.5 POLYP OF COLON, UNSPECIFIED PART OF COLON, UNSPECIFIED TYPE: Primary | ICD-10-CM

## 2021-10-27 ENCOUNTER — PATIENT MESSAGE (OUTPATIENT)
Dept: GASTROENTEROLOGY | Facility: CLINIC | Age: 66
End: 2021-10-27
Payer: MEDICARE

## 2021-10-27 ENCOUNTER — TELEPHONE (OUTPATIENT)
Dept: GASTROENTEROLOGY | Facility: CLINIC | Age: 66
End: 2021-10-27
Payer: MEDICARE

## 2021-10-28 ENCOUNTER — TELEPHONE (OUTPATIENT)
Dept: UROLOGY | Facility: CLINIC | Age: 66
End: 2021-10-28
Payer: MEDICARE

## 2021-11-04 ENCOUNTER — OFFICE VISIT (OUTPATIENT)
Dept: UROLOGY | Facility: CLINIC | Age: 66
End: 2021-11-04
Payer: MEDICARE

## 2021-11-04 ENCOUNTER — PATIENT OUTREACH (OUTPATIENT)
Dept: ADMINISTRATIVE | Facility: OTHER | Age: 66
End: 2021-11-04
Payer: MEDICARE

## 2021-11-04 VITALS
HEIGHT: 70 IN | SYSTOLIC BLOOD PRESSURE: 123 MMHG | DIASTOLIC BLOOD PRESSURE: 78 MMHG | RESPIRATION RATE: 18 BRPM | HEART RATE: 57 BPM | BODY MASS INDEX: 25.71 KG/M2 | WEIGHT: 179.56 LBS

## 2021-11-04 DIAGNOSIS — N20.0 RENAL CALCULI: Primary | ICD-10-CM

## 2021-11-04 PROCEDURE — 99213 OFFICE O/P EST LOW 20 MIN: CPT | Mod: S$PBB,,, | Performed by: NURSE PRACTITIONER

## 2021-11-04 PROCEDURE — 99213 PR OFFICE/OUTPT VISIT, EST, LEVL III, 20-29 MIN: ICD-10-PCS | Mod: S$PBB,,, | Performed by: NURSE PRACTITIONER

## 2021-11-04 PROCEDURE — 99999 PR PBB SHADOW E&M-EST. PATIENT-LVL IV: ICD-10-PCS | Mod: PBBFAC,,, | Performed by: NURSE PRACTITIONER

## 2021-11-04 PROCEDURE — 99999 PR PBB SHADOW E&M-EST. PATIENT-LVL IV: CPT | Mod: PBBFAC,,, | Performed by: NURSE PRACTITIONER

## 2021-11-04 PROCEDURE — 99214 OFFICE O/P EST MOD 30 MIN: CPT | Mod: PBBFAC,PN | Performed by: NURSE PRACTITIONER

## 2021-11-04 RX ORDER — POTASSIUM CITRATE 15 MEQ/1
TABLET, EXTENDED RELEASE ORAL
Qty: 90 TABLET | Refills: 3 | Status: SHIPPED | OUTPATIENT
Start: 2021-11-04 | End: 2022-02-25

## 2021-11-04 RX ORDER — ALLOPURINOL 100 MG/1
100 TABLET ORAL DAILY
Qty: 90 TABLET | Refills: 3 | Status: SHIPPED | OUTPATIENT
Start: 2021-11-04 | End: 2022-02-25

## 2021-11-19 ENCOUNTER — HOSPITAL ENCOUNTER (OUTPATIENT)
Facility: HOSPITAL | Age: 66
Discharge: HOME | End: 2021-11-19
Attending: INTERNAL MEDICINE | Admitting: INTERNAL MEDICINE
Payer: MEDICARE

## 2021-11-19 ENCOUNTER — ANESTHESIA EVENT (OUTPATIENT)
Dept: ENDOSCOPY | Facility: HOSPITAL | Age: 66
End: 2021-11-19
Payer: MEDICARE

## 2021-11-19 ENCOUNTER — ANESTHESIA (OUTPATIENT)
Dept: ENDOSCOPY | Facility: HOSPITAL | Age: 66
End: 2021-11-19
Payer: MEDICARE

## 2021-11-19 DIAGNOSIS — K64.8 INTERNAL HEMORRHOIDS: ICD-10-CM

## 2021-11-19 DIAGNOSIS — K57.90 DIVERTICULOSIS: ICD-10-CM

## 2021-11-19 DIAGNOSIS — Z12.11 SCREEN FOR COLON CANCER: ICD-10-CM

## 2021-11-19 DIAGNOSIS — K63.5 POLYP OF COLON, UNSPECIFIED PART OF COLON, UNSPECIFIED TYPE: Primary | ICD-10-CM

## 2021-11-19 PROCEDURE — 37000009 HC ANESTHESIA EA ADD 15 MINS: Performed by: INTERNAL MEDICINE

## 2021-11-19 PROCEDURE — 88305 TISSUE EXAM BY PATHOLOGIST: CPT | Performed by: PATHOLOGY

## 2021-11-19 PROCEDURE — D9220A PRA ANESTHESIA: Mod: PT,ANES,, | Performed by: ANESTHESIOLOGY

## 2021-11-19 PROCEDURE — 45380 COLONOSCOPY AND BIOPSY: CPT | Mod: 59,PT,, | Performed by: INTERNAL MEDICINE

## 2021-11-19 PROCEDURE — 45385 COLONOSCOPY W/LESION REMOVAL: CPT | Mod: PT | Performed by: INTERNAL MEDICINE

## 2021-11-19 PROCEDURE — 45385 PR COLONOSCOPY,REMV LESN,SNARE: ICD-10-PCS | Mod: PT,,, | Performed by: INTERNAL MEDICINE

## 2021-11-19 PROCEDURE — 25000003 PHARM REV CODE 250: Performed by: INTERNAL MEDICINE

## 2021-11-19 PROCEDURE — D9220A PRA ANESTHESIA: Mod: PT,CRNA,, | Performed by: NURSE ANESTHETIST, CERTIFIED REGISTERED

## 2021-11-19 PROCEDURE — 63600175 PHARM REV CODE 636 W HCPCS: Performed by: NURSE ANESTHETIST, CERTIFIED REGISTERED

## 2021-11-19 PROCEDURE — 27201012 HC FORCEPS, HOT/COLD, DISP: Performed by: INTERNAL MEDICINE

## 2021-11-19 PROCEDURE — D9220A PRA ANESTHESIA: ICD-10-PCS | Mod: PT,ANES,, | Performed by: ANESTHESIOLOGY

## 2021-11-19 PROCEDURE — 45380 PR COLONOSCOPY,BIOPSY: ICD-10-PCS | Mod: 59,PT,, | Performed by: INTERNAL MEDICINE

## 2021-11-19 PROCEDURE — 25000003 PHARM REV CODE 250: Performed by: NURSE ANESTHETIST, CERTIFIED REGISTERED

## 2021-11-19 PROCEDURE — 27201089 HC SNARE, DISP (ANY): Performed by: INTERNAL MEDICINE

## 2021-11-19 PROCEDURE — 88305 TISSUE EXAM BY PATHOLOGIST: CPT | Mod: 26,,, | Performed by: PATHOLOGY

## 2021-11-19 PROCEDURE — 37000008 HC ANESTHESIA 1ST 15 MINUTES: Performed by: INTERNAL MEDICINE

## 2021-11-19 PROCEDURE — 88305 TISSUE EXAM BY PATHOLOGIST: ICD-10-PCS | Mod: 26,,, | Performed by: PATHOLOGY

## 2021-11-19 PROCEDURE — 45380 COLONOSCOPY AND BIOPSY: CPT | Mod: PT,59 | Performed by: INTERNAL MEDICINE

## 2021-11-19 PROCEDURE — 45385 COLONOSCOPY W/LESION REMOVAL: CPT | Mod: PT,,, | Performed by: INTERNAL MEDICINE

## 2021-11-19 PROCEDURE — D9220A PRA ANESTHESIA: ICD-10-PCS | Mod: PT,CRNA,, | Performed by: NURSE ANESTHETIST, CERTIFIED REGISTERED

## 2021-11-19 RX ORDER — PROPOFOL 10 MG/ML
VIAL (ML) INTRAVENOUS
Status: DISCONTINUED | OUTPATIENT
Start: 2021-11-19 | End: 2021-11-19

## 2021-11-19 RX ORDER — SODIUM CHLORIDE 9 MG/ML
INJECTION, SOLUTION INTRAVENOUS CONTINUOUS
Status: DISCONTINUED | OUTPATIENT
Start: 2021-11-19 | End: 2021-11-19 | Stop reason: HOSPADM

## 2021-11-19 RX ADMIN — PROPOFOL 30 MG: 10 INJECTION, EMULSION INTRAVENOUS at 07:11

## 2021-11-19 RX ADMIN — GLYCOPYRROLATE 0.2 MG: 0.2 INJECTION, SOLUTION INTRAMUSCULAR; INTRAVITREAL at 07:11

## 2021-11-19 RX ADMIN — PROPOFOL 30 MG: 10 INJECTION, EMULSION INTRAVENOUS at 08:11

## 2021-11-19 RX ADMIN — PROPOFOL 100 MG: 10 INJECTION, EMULSION INTRAVENOUS at 07:11

## 2021-11-19 RX ADMIN — SODIUM CHLORIDE: 0.9 INJECTION, SOLUTION INTRAVENOUS at 07:11

## 2021-11-22 ENCOUNTER — OFFICE VISIT (OUTPATIENT)
Dept: FAMILY MEDICINE | Facility: CLINIC | Age: 66
End: 2021-11-22
Payer: MEDICARE

## 2021-11-22 ENCOUNTER — PATIENT MESSAGE (OUTPATIENT)
Dept: FAMILY MEDICINE | Facility: CLINIC | Age: 66
End: 2021-11-22

## 2021-11-22 VITALS
TEMPERATURE: 98 F | BODY MASS INDEX: 26.17 KG/M2 | HEART RATE: 62 BPM | HEIGHT: 70 IN | DIASTOLIC BLOOD PRESSURE: 80 MMHG | WEIGHT: 182.81 LBS | RESPIRATION RATE: 18 BRPM | OXYGEN SATURATION: 100 % | SYSTOLIC BLOOD PRESSURE: 128 MMHG

## 2021-11-22 VITALS
WEIGHT: 175 LBS | DIASTOLIC BLOOD PRESSURE: 77 MMHG | RESPIRATION RATE: 16 BRPM | OXYGEN SATURATION: 98 % | BODY MASS INDEX: 25.11 KG/M2 | SYSTOLIC BLOOD PRESSURE: 128 MMHG | HEART RATE: 52 BPM

## 2021-11-22 DIAGNOSIS — L24.89 IRRITANT CONTACT DERMATITIS DUE TO OTHER AGENTS: Primary | ICD-10-CM

## 2021-11-22 PROCEDURE — 99213 PR OFFICE/OUTPT VISIT, EST, LEVL III, 20-29 MIN: ICD-10-PCS | Mod: S$GLB,,, | Performed by: STUDENT IN AN ORGANIZED HEALTH CARE EDUCATION/TRAINING PROGRAM

## 2021-11-22 PROCEDURE — 99213 OFFICE O/P EST LOW 20 MIN: CPT | Mod: S$GLB,,, | Performed by: STUDENT IN AN ORGANIZED HEALTH CARE EDUCATION/TRAINING PROGRAM

## 2021-11-22 RX ORDER — TRIAMCINOLONE ACETONIDE 5 MG/G
CREAM TOPICAL 2 TIMES DAILY
Qty: 15 G | Refills: 1 | Status: SHIPPED | OUTPATIENT
Start: 2021-11-22 | End: 2022-05-05

## 2021-11-30 LAB
FINAL PATHOLOGIC DIAGNOSIS: NORMAL
GROSS: NORMAL
Lab: NORMAL

## 2022-01-20 ENCOUNTER — LAB VISIT (OUTPATIENT)
Dept: PRIMARY CARE CLINIC | Facility: OTHER | Age: 67
End: 2022-01-20
Attending: INTERNAL MEDICINE
Payer: MEDICARE

## 2022-01-20 DIAGNOSIS — Z20.822 ENCOUNTER FOR LABORATORY TESTING FOR COVID-19 VIRUS: ICD-10-CM

## 2022-01-20 PROCEDURE — U0003 INFECTIOUS AGENT DETECTION BY NUCLEIC ACID (DNA OR RNA); SEVERE ACUTE RESPIRATORY SYNDROME CORONAVIRUS 2 (SARS-COV-2) (CORONAVIRUS DISEASE [COVID-19]), AMPLIFIED PROBE TECHNIQUE, MAKING USE OF HIGH THROUGHPUT TECHNOLOGIES AS DESCRIBED BY CMS-2020-01-R: HCPCS | Performed by: INTERNAL MEDICINE

## 2022-01-21 LAB
SARS-COV-2 RNA RESP QL NAA+PROBE: NOT DETECTED
SARS-COV-2- CYCLE NUMBER: NORMAL

## 2022-01-24 ENCOUNTER — OFFICE VISIT (OUTPATIENT)
Dept: FAMILY MEDICINE | Facility: CLINIC | Age: 67
End: 2022-01-24
Payer: MEDICARE

## 2022-01-24 VITALS
OXYGEN SATURATION: 97 % | WEIGHT: 186.31 LBS | BODY MASS INDEX: 26.67 KG/M2 | DIASTOLIC BLOOD PRESSURE: 72 MMHG | SYSTOLIC BLOOD PRESSURE: 124 MMHG | HEART RATE: 61 BPM | RESPIRATION RATE: 18 BRPM | HEIGHT: 70 IN

## 2022-01-24 DIAGNOSIS — J06.9 UPPER RESPIRATORY TRACT INFECTION, UNSPECIFIED TYPE: Primary | ICD-10-CM

## 2022-01-24 DIAGNOSIS — J02.9 PHARYNGITIS, UNSPECIFIED ETIOLOGY: ICD-10-CM

## 2022-01-24 LAB — GROUP A STREP, MOLECULAR: NEGATIVE

## 2022-01-24 PROCEDURE — 99213 OFFICE O/P EST LOW 20 MIN: CPT | Mod: S$PBB,,, | Performed by: STUDENT IN AN ORGANIZED HEALTH CARE EDUCATION/TRAINING PROGRAM

## 2022-01-24 PROCEDURE — 99999 PR PBB SHADOW E&M-EST. PATIENT-LVL IV: ICD-10-PCS | Mod: PBBFAC,,, | Performed by: STUDENT IN AN ORGANIZED HEALTH CARE EDUCATION/TRAINING PROGRAM

## 2022-01-24 PROCEDURE — 99999 PR PBB SHADOW E&M-EST. PATIENT-LVL IV: CPT | Mod: PBBFAC,,, | Performed by: STUDENT IN AN ORGANIZED HEALTH CARE EDUCATION/TRAINING PROGRAM

## 2022-01-24 PROCEDURE — 99213 PR OFFICE/OUTPT VISIT, EST, LEVL III, 20-29 MIN: ICD-10-PCS | Mod: S$PBB,,, | Performed by: STUDENT IN AN ORGANIZED HEALTH CARE EDUCATION/TRAINING PROGRAM

## 2022-01-24 PROCEDURE — 99214 OFFICE O/P EST MOD 30 MIN: CPT | Mod: PBBFAC,PO | Performed by: STUDENT IN AN ORGANIZED HEALTH CARE EDUCATION/TRAINING PROGRAM

## 2022-01-24 PROCEDURE — 87651 STREP A DNA AMP PROBE: CPT | Mod: PO | Performed by: STUDENT IN AN ORGANIZED HEALTH CARE EDUCATION/TRAINING PROGRAM

## 2022-01-24 NOTE — PROGRESS NOTES
[facility] MEDICINE CLINIC NOTE    Patient Name: Toni Hyde  YOB: 1955    PRESENTING HISTORY   Chief Complaint:   Chief Complaint   Patient presents with    Sore Throat    Nasal Congestion    Cough        History of Present Illness:  Mr. Toni Hyde is a 66 y.o. male      Sore throat, onset Tuesday  Worsening  Now hoarse    A/w dry Cough, congestion    Tested for COVID on Thursday last week    No fever.   Minor issue with swallowing, occasional.                                            Review of Systems   All other systems reviewed and are negative.        PAST HISTORY:     Past Medical History:   Diagnosis Date    Cataract     Cataract of left eye     Diabetes mellitus type II     on po meds    Hyperlipidemia     not on meds at present    Hypertension     Kidney stones 04/2017    Left ureteral stone     Plantar fasciitis of right foot 10/2017    Dr Colorado     Tendonitis 01/2018    right foot Dr Colorado    Wears glasses        Past Surgical History:   Procedure Laterality Date    ADENOIDECTOMY      CATARACT EXTRACTION      COLONOSCOPY  3/23/2010    Dr. Blackburn, hyperplastic polyps, otherwise negative, 10 year recheck    COLONOSCOPY N/A 11/19/2021    Procedure: COLONOSCOPY;  Surgeon: Marcus Sexton MD;  Location: [facility];  Service: Endoscopy;  Laterality: N/A;    CYSTOSCOPY Left 04/12/2017    with ureteral stent-Dr. Noel    EYE SURGERY  05/06/2013    Dr Nielsen    kidney stent   04/2017    KNEE ARTHROSCOPY W/ DEBRIDEMENT      right with lateral release     LITHOTRIPSY  04/19/2017    NASAL SINUS SURGERY      retna surgery   5/2013    Dr Peralta    TONSILLECTOMY      TRANSFORAMINAL EPIDURAL INJECTION OF STEROID Right 12/7/2020    Procedure: Injection,steroid,epidural,transforaminal approach L5- S1;  Surgeon: Kermit Davalos MD;  Location: [facility];  Service: Pain Management;  Laterality: Right;  L5-s1       Family History   Problem Relation Age of Onset     Hypertension Father     Neuropathy Father     Stroke Paternal Grandmother     Lung cancer Paternal Grandfather     Cancer Paternal Grandfather     Cataracts Mother     Diabetes Maternal Grandmother     No Known Problems Sister     No Known Problems Maternal Aunt        Social History     Socioeconomic History    Marital status:    Tobacco Use    Smoking status: Former Smoker     Types: Cigarettes     Quit date: 1978     Years since quittin.8    Smokeless tobacco: Never Used   Substance and Sexual Activity    Alcohol use: Yes     Alcohol/week: 2.5 standard drinks     Types: 3 Standard drinks or equivalent per week     Comment: 3 drinks per week    Drug use: No     Social Determinants of Health     Financial Resource Strain: Low Risk     Difficulty of Paying Living Expenses: Not hard at all   Food Insecurity: No Food Insecurity    Worried About Running Out of Food in the Last Year: Never true    Ran Out of Food in the Last Year: Never true   Transportation Needs: No Transportation Needs    Lack of Transportation (Medical): No    Lack of Transportation (Non-Medical): No   Physical Activity: Sufficiently Active    Days of Exercise per Week: 5 days    Minutes of Exercise per Session: 40 min   Stress: No Stress Concern Present    Feeling of Stress : Not at all   Social Connections: Unknown    Frequency of Communication with Friends and Family: More than three times a week    Frequency of Social Gatherings with Friends and Family: Once a week    Active Member of Clubs or Organizations: No    Attends Club or Organization Meetings: Never    Marital Status:    Housing Stability: Low Risk     Unable to Pay for Housing in the Last Year: No    Number of Places Lived in the Last Year: 1    Unstable Housing in the Last Year: No       MEDICATIONS & ALLERGIES:     Current Outpatient Medications on File Prior to Visit   Medication Sig    allopurinoL (ZYLOPRIM) 100 MG tablet Take 1  "tablet (100 mg total) by mouth once daily.    amLODIPine (NORVASC) 2.5 MG tablet Take 1 tablet (2.5 mg total) by mouth once daily.    cetirizine (ZYRTEC) 10 MG tablet Take 10 mg by mouth daily as needed. 1 Tablet(s) Oral PRN .    diphenhydrAMINE (BENADRYL) 25 mg capsule Take 25 mg by mouth nightly as needed for Itching.    fluticasone (FLONASE) 50 mcg/actuation nasal spray 2 sprays by Each Nare route daily as needed.     guaifenesin (MUCINEX ORAL) Take 400 mg by mouth 2 (two) times daily as needed for Congestion.    ibuprofen (ADVIL,MOTRIN) 200 MG tablet Take 400 mg by mouth every 6 (six) hours as needed. 2 Tablet(s) Oral PRN Every day.    metFORMIN (GLUCOPHAGE-XR) 750 MG ER 24hr tablet Take 1 tablet (750 mg total) by mouth 2 (two) times daily with meals.    potassium citrate (UROCIT-K 15) 15 mEq TbSR TAKE 1 TABLET DAILY AT 6 A.M.    rosuvastatin (CRESTOR) 20 MG tablet TAKE 1 TABLET DAILY (REPLACES PRAVASTATIN)    triamcinolone acetonide 0.5% (KENALOG) 0.5 % Crea Apply topically 2 (two) times daily.    valsartan (DIOVAN) 320 MG tablet TAKE 1 TABLET DAILY (REPLACES BENAZEPRIL 40 MG)     Current Facility-Administered Medications on File Prior to Visit   Medication    lidocaine (PF) 10 mg/ml (1%) injection 10 mg       Review of patient's allergies indicates:   Allergen Reactions    Shrimp      Per testing   States no ivp or betadine allergies.        OBJECTIVE:   Vital Signs:  Vitals:    01/24/22 0925   BP: 124/72   Pulse: 61   Resp: 18   SpO2: 97%   Weight: 84.5 kg (186 lb 4.6 oz)   Height: 5' 10" (1.778 m)       No results found for this or any previous visit (from the past 24 hour(s)).      Physical Exam  Vitals and nursing note reviewed.   Constitutional:       General: He is not in acute distress.     Appearance: He is not ill-appearing, toxic-appearing or diaphoretic.   HENT:      Head: Normocephalic and atraumatic.      Right Ear: External ear normal.      Left Ear: External ear normal.      " Mouth/Throat:      Comments: Unable to completely evaluate posterior pharynx due to anatomy. No visualized erythema or purulence  Eyes:      General: No scleral icterus.     Extraocular Movements: EOM normal.      Conjunctiva/sclera: Conjunctivae normal.      Pupils: Pupils are equal, round, and reactive to light.   Neck:      Thyroid: No thyromegaly.   Cardiovascular:      Rate and Rhythm: Normal rate and regular rhythm.      Heart sounds: Normal heart sounds. No murmur heard.      Pulmonary:      Effort: Pulmonary effort is normal. No respiratory distress.      Breath sounds: Normal breath sounds. No wheezing or rales.   Musculoskeletal:         General: No tenderness, deformity or edema. Normal range of motion.      Cervical back: Normal range of motion and neck supple.   Lymphadenopathy:      Cervical: No cervical adenopathy.   Skin:     General: Skin is warm and dry.      Findings: No erythema or rash.   Neurological:      Mental Status: He is alert and oriented to person, place, and time.      GCS: GCS score is 15.      Gait: Gait is intact.   Psychiatric:         Mood and Affect: Mood and affect normal.         Cognition and Memory: Memory normal.         Judgment: Judgment normal.         ASSESSMENT & PLAN:     Upper respiratory tract infection, unspecified type    Pharyngitis, unspecified etiology  -     Group A Strep, Molecular  -     (Magic mouthwash) 1:1:1 diphenhydramine(Benadryl) 12.5mg/5ml liq, aluminum & magnesium hydroxide-simethicone (Maalox), LIDOcaine viscous 2%; Swish and spit 5 mLs every 4 (four) hours as needed. for mouth sores  Dispense: 90 mL; Refill: 0      Suspect he has viral URI with PND leading to sore throat.     Cannot exclude strep based on inspection limitation so will swab, but think this is low likelihood.     Recommend treatment of PND  Symptomatic throat management      Jamal Villasenor MD   Internal Medicine    This note was created using Dragon voice recognition software that  occasionally misinterprets phrases or words.

## 2022-02-24 NOTE — PROGRESS NOTES
[facility] Urology New Patient/H&P:     Toni Hyde is a 66 y.o. male who presents for or bph/stone follow up transitioning care from Dr Noel    Last saw NP 11/2021 noting: He has been known to have uric acid stones he also underwent ESWL in 2017 by Dr. Noel.  He has not experienced any recent stone episodes and no complaints.  He does continue to take allopurinol 100 mg p.o. q.d. and potassium citrate 15meq BID with no problems.  CT 1/12/21:  Right lower pole calyx with 4 mm stone and a 1 mm stone.  Left kidney has a 2 mm, a 2 mm, a 3 mm and a 11 mm stone seen from top to bottom. Bilateral renal cysts, larger on left with some peripheral calc, diverticulosis, 4.2cm prostate  Correltaing KUB 1/12/21: There are least 2 rounded calcifications left mid abdomen centered at the L2 level largest 9 mm.  Vascular phleboliths left pelvic sidewall. Impression notes   4/25/17 L ureteral ESWL and stent removal. For 9mm mid ureteral stone. Cysto noted 4 cm with mild PEREIRA    Patient reports previously having calcium oxalate stones analyzed 20 years prior, and only started potassium citrate with findings of 100% uric acid stone on stone analysis from his ESWL past fragments in 2017. On chart review, I did find his stone analysis confirming 100% uric acid, as well as a 24 hour urine 1 month later on 5/22/17 which had good urine volume of 2 L and normal urine uric acid but high saturation of uric acid 3.4, and high saturation of calcium oxalate at 6.63 increasing the risk of both types of stones, with a very low pH of 4.981 despite good citrate at 1698, and high dietary oxalate at 46.   He denies any history of gout, or prior known uric acid issues prior to 2017.   Uric acid, serum, on file with primary care is 4.9 in October 2021, normal.  Creatinine 1.4/EGFR 52.7 in October 2021 as well.  On review, has had mild CKD 3 for quite some time with baseline GFR around 55-58  Not following low purine or low uric acid diet. Was told  previously to reduce meat intake previously  Last PSA on file 0.6 in 2020.  Stable in the 0.40.6 range for all years prior.  No family history of prostate cancer.    Personal review of CT scan from January 2020 to consistent with report above with large left lower pole stone burden about 1-1.1 cm and scattered other small punctate stones throughout the left collecting system upper and lower pole.  In the right kidney there is a 4 mm lower pole stone and a smaller upper pole stone.  The left kidney has a central midpole cyst extending towards the collecting system as well as an upper pole cyst.  Reported as cyst stable from previous imaging, the upper pole lesion looks less fluid density on the non contrasted imaging to me    He does report intermittent stone fragment passage. More so dust/small fragments. Only know bc gets hung up in urethra.  A few times a year.  No significant pain or stone episodes.  Occasional flank or back pain which may or may not be related.  No blood in the urine.  Urinalysis is dipstick negative today.  Has been taking 100 mg allopurinol and 15 mEq daily, not b.i.d., of potassium citrate.    AUA SS: 15/3 (4 intermittency, 3: freq, urgency; 2 sleeping, 1: emptying, weak, strain)  Stops and starts a lot. If occupied easy to postpone, but there is some urgency when just sitting around. Occ NTF 2-3x/night, but if no fluids after 8pm can make it through the night        Past Medical History:   Diagnosis Date    Cataract     Cataract of left eye     Diabetes mellitus type II     on po meds    Hyperlipidemia     not on meds at present    Hypertension     Kidney stones 04/2017    Left ureteral stone     Plantar fasciitis of right foot 10/2017    Dr Colorado     Tendonitis 01/2018    right foot Dr Colorado    Wears glasses        Past Surgical History:   Procedure Laterality Date    ADENOIDECTOMY      CATARACT EXTRACTION      COLONOSCOPY  3/23/2010    Dr. Blackburn, hyperplastic polyps,  otherwise negative, 10 year recheck    COLONOSCOPY N/A 2021    Procedure: COLONOSCOPY;  Surgeon: Marcus Sexton MD;  Location[facility];  Service: Endoscopy;  Laterality: N/A;    CYSTOSCOPY Left 2017    with ureteral stent-Dr. Noel    EYE SURGERY  2013    Dr Nielsen    kidney stent   2017    KNEE ARTHROSCOPY W/ DEBRIDEMENT      right with lateral release     LITHOTRIPSY  2017    NASAL SINUS SURGERY      retna surgery   2013    Dr Peralta    TONSILLECTOMY      TRANSFORAMINAL EPIDURAL INJECTION OF STEROID Right 2020    Procedure: Injection,steroid,epidural,transforaminal approach L5- S1;  Surgeon: Kermit Davalos MD;  Location: [facility];  Service: Pain Management;  Laterality: Right;  L5-s1       Family History   Problem Relation Age of Onset    Hypertension Father     Neuropathy Father     Stroke Paternal Grandmother     Lung cancer Paternal Grandfather     Cancer Paternal Grandfather     Cataracts Mother     Diabetes Maternal Grandmother     No Known Problems Sister     No Known Problems Maternal Aunt        Social History     Socioeconomic History    Marital status:    Tobacco Use    Smoking status: Former Smoker     Types: Cigarettes     Quit date: 1978     Years since quittin.9    Smokeless tobacco: Never Used   Substance and Sexual Activity    Alcohol use: Yes     Alcohol/week: 2.5 standard drinks     Types: 3 Standard drinks or equivalent per week     Comment: 3 drinks per week    Drug use: No     Social Determinants of Health     Financial Resource Strain: Low Risk     Difficulty of Paying Living Expenses: Not hard at all   Food Insecurity: No Food Insecurity    Worried About Running Out of Food in the Last Year: Never true    Ran Out of Food in the Last Year: Never true   Transportation Needs: No Transportation Needs    Lack of Transportation (Medical): No    Lack of Transportation (Non-Medical): No   Physical Activity: Sufficiently  "Active    Days of Exercise per Week: 5 days    Minutes of Exercise per Session: 40 min   Stress: No Stress Concern Present    Feeling of Stress : Not at all   Social Connections: Unknown    Frequency of Communication with Friends and Family: More than three times a week    Frequency of Social Gatherings with Friends and Family: Once a week    Active Member of Clubs or Organizations: No    Attends Club or Organization Meetings: Never    Marital Status:    Housing Stability: Low Risk     Unable to Pay for Housing in the Last Year: No    Number of Places Lived in the Last Year: 1    Unstable Housing in the Last Year: No       Review of patient's allergies indicates:   Allergen Reactions    Shrimp      Per testing   States no ivp or betadine allergies.        Medications Reviewed: see MAR    Focused Physical Exam    Vitals:    02/25/22 0924   BP: 121/80   Pulse: 61   Resp: 18     Body mass index is 26.69 kg/m². Weight: 84.4 kg (186 lb) Height: 5' 10" (177.8 cm)       Abdomen: Soft, non-tender, nondistended, no CVA tenderness  :  normal phallus without plaques/lesions, orthotopic urethral meatus, bilaterally desc testes in normal scrotum without mass or lesion  JUAN FRANCISCO: normal sphincter tone, no masses, no hemmorrhoids   PROSTATE: 25g, no nodules, non-tender, symmetrical.       LABS:    Recent Results (from the past 336 hour(s))   POCT Bladder Scan    Collection Time: 02/25/22  9:32 AM   Result Value Ref Range    POC Residual Urine Volume 28 0 - 100 mL   POCT URINE DIPSTICK WITHOUT MICROSCOPE    Collection Time: 02/25/22  9:32 AM   Result Value Ref Range    Color, UA Yellow     pH, UA 6     WBC, UA neg     Nitrite, UA neg     Protein, POC neg     Glucose, UA neg     Ketones, UA neg     Urobilinogen, UA 0.2     Bilirubin, POC neg     Blood, UA neg     Clarity, UA Clear     Spec Grav UA 1.030          Assessment/Diagnosis:    1. Kidney stones  POCT Bladder Scan    POCT URINE DIPSTICK WITHOUT MICROSCOPE    " Creatinine, Serum    COVID-19 Routine Screening    Case Request Operating Room: REMOVAL, CALCULUS, URETER, URETEROSCOPIC    Basic metabolic panel    CBC auto differential    Urinalysis    Urine culture   2. BPH with obstruction/lower urinary tract symptoms     3. Renal cyst, acquired, left  Creatinine, Serum   4. Prostate cancer screening  PSA, Screening   5. Calculus of urinary tract in diseases classified elsewhere  CT Urogram Abd Pelvis W WO   6. Other specified disorders of kidney and ureter  CT Urogram Abd Pelvis W WO   7. Renal calculi         Plans:  Extensive review of prior urologic history and medical record as summarized above.  Especially as relates to kidney stones.  We did review prior workups, including 24 hour urines, procedures, and stone analyses.  He did inquire if uric acid stone formers could make calcium oxalate and uric acid stones and we did note that they could depending on the underlying mechanism and underlying urine pH.  We reviewed his medical management of stone prevention noting that low-dose allopurinol, or allopurinol, is not indicated simply for the prevention of uric acid stones but is indicated in cases of hyperuricemia.  Unclear if he ever had this, but even on a very low-dose of 100 mg, his uric acid is quite normal and would recommend discontinuing this as he has never had gout.  Can follow his uric acid in the future as we complete metabolic workup again.  As well, he is on incredibly low dose, not therapeutic, of potassium citrate at only 15 mg daily.  We did discuss 15 b.i.d. would be a more recommended or appropriate dose and therefore a new prescription was sent in at this value.    We did then review basic stone prevention recommendations, as well as uric acid/low purine/gout diet of which handout was provided.  We then reviewed his residual stone disease in discussed management, especially of his left-sided large stone burden noting greater than 1 cm lower pole stone  with multiple other stones.  We discussed ESWL versus ureteroscopic stone extraction versus PCNL all risks and benefits, especially as it relates to his stone burden.  Certainly given lower pole 1 cm stone, ESWL as a poor option, and stones not been visible on x-ray completely given the uric acid status, though that is questionable as some of the left lower pole stone has been seen on x-ray.  Ultimately after discussion of all procedures, risks and benefits, discussed ureteroscopic stone extraction with laser lithotripsy and stone basket extraction.  We did discuss pre treatment with Flomax for ureteral relaxation to help facilitate upper tract access and does understand may need stent placement and return for definitive stone extraction after passive dilation.  All risks and benefits of the procedure were discussed in detail and left ureteroscopy and stone extraction were booked in the OR on 5/5/22 per his request.    His imaging is 1-year-old, so prior to proceeding, we did discuss obtaining updated CT scan, and for contrasted examination of cyst which does extent was the collecting system, will get CT urogram.  As he will need labs on arrival updated screening PSA.  We reviewed the need for metabolic workup after stone free, and will revisit 24 hour urine on his new medical regimen in the absence of stones at that time, and re-evaluate his BPH at the time of cystoscopy and stent removal.  However given his BPH/LUTS, would start Flomax 0.4 mg daily now, not just to pre treat for ureteroscopy, and remain on it pending further evaluation.    All questions patient had were answered in detail and he is agreeable to the treatment plan.    Total time spent in/on encounter today, including face to face time with patient, counseling, medical record review, interpretation of tests/results, , and treatment plan coordination: 70 minutes

## 2022-02-25 ENCOUNTER — OFFICE VISIT (OUTPATIENT)
Dept: UROLOGY | Facility: CLINIC | Age: 67
End: 2022-02-25
Payer: MEDICARE

## 2022-02-25 VITALS
RESPIRATION RATE: 18 BRPM | SYSTOLIC BLOOD PRESSURE: 121 MMHG | WEIGHT: 186 LBS | HEART RATE: 61 BPM | BODY MASS INDEX: 26.63 KG/M2 | HEIGHT: 70 IN | DIASTOLIC BLOOD PRESSURE: 80 MMHG

## 2022-02-25 DIAGNOSIS — N22 CALCULUS OF URINARY TRACT IN DISEASES CLASSIFIED ELSEWHERE: ICD-10-CM

## 2022-02-25 DIAGNOSIS — N20.0 KIDNEY STONES: Primary | ICD-10-CM

## 2022-02-25 DIAGNOSIS — Z12.5 PROSTATE CANCER SCREENING: ICD-10-CM

## 2022-02-25 DIAGNOSIS — N40.1 BPH WITH OBSTRUCTION/LOWER URINARY TRACT SYMPTOMS: ICD-10-CM

## 2022-02-25 DIAGNOSIS — N13.8 BPH WITH OBSTRUCTION/LOWER URINARY TRACT SYMPTOMS: ICD-10-CM

## 2022-02-25 DIAGNOSIS — N28.1 RENAL CYST, ACQUIRED, LEFT: ICD-10-CM

## 2022-02-25 DIAGNOSIS — N28.89 OTHER SPECIFIED DISORDERS OF KIDNEY AND URETER: ICD-10-CM

## 2022-02-25 DIAGNOSIS — N20.0 RENAL CALCULI: ICD-10-CM

## 2022-02-25 LAB
BILIRUB SERPL-MCNC: NORMAL MG/DL
BLOOD URINE, POC: NORMAL
CLARITY, POC UA: CLEAR
COLOR, POC UA: YELLOW
GLUCOSE UR QL STRIP: NORMAL
KETONES UR QL STRIP: NORMAL
LEUKOCYTE ESTERASE URINE, POC: NORMAL
NITRITE, POC UA: NORMAL
PH, POC UA: 6
POC RESIDUAL URINE VOLUME: 28 ML (ref 0–100)
PROTEIN, POC: NORMAL
SPECIFIC GRAVITY, POC UA: 1.03
UROBILINOGEN, POC UA: 0.2

## 2022-02-25 PROCEDURE — 99999 PR PBB SHADOW E&M-EST. PATIENT-LVL V: ICD-10-PCS | Mod: PBBFAC,,, | Performed by: UROLOGY

## 2022-02-25 PROCEDURE — 99417 PROLNG OP E/M EACH 15 MIN: CPT | Mod: S$PBB,,, | Performed by: UROLOGY

## 2022-02-25 PROCEDURE — 99417 PR PROLONGED SVC, OUTPT, W/WO DIRECT PT CONTACT,  EA ADDTL 15 MIN: ICD-10-PCS | Mod: S$PBB,,, | Performed by: UROLOGY

## 2022-02-25 PROCEDURE — 99999 PR PBB SHADOW E&M-EST. PATIENT-LVL V: CPT | Mod: PBBFAC,,, | Performed by: UROLOGY

## 2022-02-25 PROCEDURE — 51798 US URINE CAPACITY MEASURE: CPT | Mod: PBBFAC,PN | Performed by: UROLOGY

## 2022-02-25 PROCEDURE — 81002 URINALYSIS NONAUTO W/O SCOPE: CPT | Mod: PBBFAC,PN | Performed by: UROLOGY

## 2022-02-25 PROCEDURE — 99215 OFFICE O/P EST HI 40 MIN: CPT | Mod: S$PBB,,, | Performed by: UROLOGY

## 2022-02-25 PROCEDURE — 99215 PR OFFICE/OUTPT VISIT, EST, LEVL V, 40-54 MIN: ICD-10-PCS | Mod: S$PBB,,, | Performed by: UROLOGY

## 2022-02-25 PROCEDURE — 99215 OFFICE O/P EST HI 40 MIN: CPT | Mod: PBBFAC,PN | Performed by: UROLOGY

## 2022-02-25 RX ORDER — POTASSIUM CITRATE 15 MEQ/1
15 TABLET, EXTENDED RELEASE ORAL 2 TIMES DAILY
Qty: 180 TABLET | Refills: 3 | Status: SHIPPED | OUTPATIENT
Start: 2022-02-25 | End: 2023-03-07

## 2022-02-25 RX ORDER — TAMSULOSIN HYDROCHLORIDE 0.4 MG/1
0.4 CAPSULE ORAL NIGHTLY
Qty: 90 CAPSULE | Refills: 3 | Status: SHIPPED | OUTPATIENT
Start: 2022-02-25 | End: 2022-03-10

## 2022-03-09 ENCOUNTER — PATIENT MESSAGE (OUTPATIENT)
Dept: SURGERY | Facility: HOSPITAL | Age: 67
End: 2022-03-09
Payer: MEDICARE

## 2022-03-10 ENCOUNTER — PATIENT MESSAGE (OUTPATIENT)
Dept: SURGERY | Facility: HOSPITAL | Age: 67
End: 2022-03-10
Payer: MEDICARE

## 2022-03-10 RX ORDER — ALFUZOSIN HYDROCHLORIDE 10 MG/1
10 TABLET, EXTENDED RELEASE ORAL
Qty: 30 TABLET | Refills: 11 | Status: SHIPPED | OUTPATIENT
Start: 2022-03-10 | End: 2023-04-18 | Stop reason: SDUPTHER

## 2022-03-11 ENCOUNTER — HOSPITAL ENCOUNTER (OUTPATIENT)
Dept: RADIOLOGY | Facility: HOSPITAL | Age: 67
Discharge: HOME | End: 2022-03-11
Attending: UROLOGY
Payer: MEDICARE

## 2022-03-11 DIAGNOSIS — N28.89 OTHER SPECIFIED DISORDERS OF KIDNEY AND URETER: ICD-10-CM

## 2022-03-11 DIAGNOSIS — N22 CALCULUS OF URINARY TRACT IN DISEASES CLASSIFIED ELSEWHERE: ICD-10-CM

## 2022-03-11 PROCEDURE — 74178 CT UROGRAM ABD PELVIS W WO: ICD-10-PCS | Mod: 26,,, | Performed by: RADIOLOGY

## 2022-03-11 PROCEDURE — 74178 CT ABD&PLV WO CNTR FLWD CNTR: CPT | Mod: TC

## 2022-03-11 PROCEDURE — 74178 CT ABD&PLV WO CNTR FLWD CNTR: CPT | Mod: 26,,, | Performed by: RADIOLOGY

## 2022-03-11 PROCEDURE — 25500020 PHARM REV CODE 255: Performed by: UROLOGY

## 2022-03-11 RX ADMIN — IOHEXOL 125 ML: 350 INJECTION, SOLUTION INTRAVENOUS at 08:03

## 2022-03-13 NOTE — PROGRESS NOTES
Can advise CT demonstrated similar findings to a year ago regarding stones thus stone extraction plan the same, and also notes no concerning masses as all visualized lesions seen on kidneys are simple cysts. PSA normal.stable as well

## 2022-04-01 ENCOUNTER — PATIENT MESSAGE (OUTPATIENT)
Dept: FAMILY MEDICINE | Facility: CLINIC | Age: 67
End: 2022-04-01
Payer: MEDICARE

## 2022-04-11 ENCOUNTER — IMMUNIZATION (OUTPATIENT)
Dept: PRIMARY CARE CLINIC | Facility: CLINIC | Age: 67
End: 2022-04-11
Payer: MEDICARE

## 2022-04-11 DIAGNOSIS — Z23 NEED FOR VACCINATION: Primary | ICD-10-CM

## 2022-04-11 PROCEDURE — 91305 COVID-19, MRNA, LNP-S, PF, 30 MCG/0.3 ML DOSE VACCINE (PFIZER): ICD-10-PCS | Mod: S$GLB,,, | Performed by: FAMILY MEDICINE

## 2022-04-11 PROCEDURE — 0054A COVID-19, MRNA, LNP-S, PF, 30 MCG/0.3 ML DOSE VACCINE (PFIZER): ICD-10-PCS | Mod: S$GLB,,, | Performed by: FAMILY MEDICINE

## 2022-04-11 PROCEDURE — 0054A COVID-19, MRNA, LNP-S, PF, 30 MCG/0.3 ML DOSE VACCINE (PFIZER): CPT | Mod: S$GLB,,, | Performed by: FAMILY MEDICINE

## 2022-04-11 PROCEDURE — 91305 COVID-19, MRNA, LNP-S, PF, 30 MCG/0.3 ML DOSE VACCINE (PFIZER): CPT | Mod: S$GLB,,, | Performed by: FAMILY MEDICINE

## 2022-04-25 ENCOUNTER — HOSPITAL ENCOUNTER (OUTPATIENT)
Dept: RADIOLOGY | Facility: HOSPITAL | Age: 67
Discharge: HOME | End: 2022-04-25
Attending: UROLOGY
Payer: MEDICARE

## 2022-04-25 ENCOUNTER — HOSPITAL ENCOUNTER (OUTPATIENT)
Dept: PREADMISSION TESTING | Facility: HOSPITAL | Age: 67
Discharge: HOME | End: 2022-04-25
Attending: UROLOGY
Payer: MEDICARE

## 2022-04-25 VITALS — HEIGHT: 70 IN | BODY MASS INDEX: 26.63 KG/M2 | WEIGHT: 186 LBS

## 2022-04-25 DIAGNOSIS — N20.0 KIDNEY STONES: ICD-10-CM

## 2022-04-25 DIAGNOSIS — Z01.810 PREOP CARDIOVASCULAR EXAM: ICD-10-CM

## 2022-04-25 PROCEDURE — 71046 X-RAY EXAM CHEST 2 VIEWS: CPT | Mod: 26,,, | Performed by: RADIOLOGY

## 2022-04-25 PROCEDURE — 71046 XR CHEST PA AND LATERAL: ICD-10-PCS | Mod: 26,,, | Performed by: RADIOLOGY

## 2022-04-25 PROCEDURE — 99900104 DSU ONLY-NO CHARGE-EA ADD'L HR (STAT)

## 2022-04-25 PROCEDURE — 93005 ELECTROCARDIOGRAM TRACING: CPT

## 2022-04-25 PROCEDURE — 93010 ELECTROCARDIOGRAM REPORT: CPT | Mod: ,,, | Performed by: SPECIALIST

## 2022-04-25 PROCEDURE — 99900103 DSU ONLY-NO CHARGE-INITIAL HR (STAT)

## 2022-04-25 PROCEDURE — 93010 EKG 12-LEAD: ICD-10-PCS | Mod: ,,, | Performed by: SPECIALIST

## 2022-04-25 PROCEDURE — 71046 X-RAY EXAM CHEST 2 VIEWS: CPT | Mod: TC,FY

## 2022-04-25 NOTE — DISCHARGE INSTRUCTIONS
To confirm, Your doctor has instructed you that surgery is scheduled for: 5/5/22  Angela   [phone]    Please report to [facility], [facility] the morning of surgery. You must check-in and receive a wristband before going to your procedure.    Pre-Op will call the afternoon prior to surgery between 1:00 and 6:00 PM with the final arrival time.  Phone number: [phone]    PLEASE NOTE:  The surgery schedule has many variables which may affect the time of your surgery case.  Family members should be available if your surgery time changes.  Plan to be here the day of your procedure between 4-6 hours.    MEDICATIONS:  TAKE ONLY THESE MEDICATIONS WITH A SMALL SIP OF WATER THE MORNING OF YOUR PROCEDURE:    See List    DO NOT TAKE THESE MEDICATIONS 5-7 DAYS PRIOR to your procedure or per your surgeon's request:   ASPIRIN, ALEVE, ADVIL, IBUPROFEN, FISH OIL VITAMIN E, HERBALS  (May take Tylenol)    ONLY if you are prescribed any types of blood thinners such as:  Aspirin, Coumadin, Plavix, Pradaxa, Xarelto, Aggrenox, Effient, Eliquis, Savasya, Brilinta, or any other, ask your surgeon whether you should stop taking them and how long before surgery you should stop.  You may also need to verify with the prescribing physician if it is ok to stop your medication.      INSTRUCTIONS IMPORTANT!!  Do not eat or drink anything between midnight and the time of your procedure- this includes gum, mints, and candy.  Do not smoke or drink alcoholic beverages 24 hours prior to your procedure.  Shower the night before AND the morning of your procedure with a Chlorhexidine wash such as Hibiclens or Dial antibacterial soap from the neck down.  Do not get it on your face or in your eyes.  You may use your own shampoo and face wash. This helps your skin to be as bacteria free as possible.    If you wear contact lenses, dentures, hearing aids or glasses, bring a container to put them in during surgery and give to a  family member for safe keeping.  Please leave all jewelry, piercing's and valuables at home.   DO NOT remove hair from the surgery site.  Do not shave the incision site unless you are given specific instructions to do so.    ONLY if you have been diagnosed with sleep apnea please bring your C-PAP machine.  ONLY if you wear home oxygen please bring your portable oxygen tank the day of your procedure.  ONLY if you have a history of OPEN HEART SURGERY you will need a clearance from your Cardiologist per Anesthesia.      ONLY for patients requiring bowel prep, written instructions will be given by your doctor's office.  ONLY if you have a neuro stimulator, please bring the controller with you the morning of surgery  ONLY if a type and screen test is needed before surgery, please return:  If your doctor has scheduled you for an overnight stay, bring a small overnight bag with any personal items you need.  Make arrangements in advance for transportation home by a responsible adult.  It is not safe to drive a vehicle during the 24 hours after anesthesia.       [facility] will resume routine visitation for COVID-19 negative patients, including inpatients, outpatients, and  procedural areas. Visitors are still required to practice social distancing in waiting rooms, cafeterias, and common  areas. Visitors and patients should maintain six feet distance between those not in their group. Visitors will be  asked to leave if they exhibit symptoms of respiratory infection, have tested positive for COVID -19 in the last  ten days, have a pending COVID-19 test for symptoms, are unable or refuse to wear a mask OR do not comply  with current policy.       All [facility] facilities and properties are tobacco free.  Smoking is NOT allowed.   If you have any questions about these instructions, call Pre-Op Admit  Nursing at [phone] or the Pre-Op Day Surgery Unit at [phone].

## 2022-04-26 ENCOUNTER — PATIENT MESSAGE (OUTPATIENT)
Dept: PHYSICAL MEDICINE AND REHAB | Facility: CLINIC | Age: 67
End: 2022-04-26
Payer: MEDICARE

## 2022-05-02 ENCOUNTER — LAB VISIT (OUTPATIENT)
Dept: PRIMARY CARE CLINIC | Facility: CLINIC | Age: 67
End: 2022-05-02
Payer: MEDICARE

## 2022-05-02 DIAGNOSIS — N20.0 KIDNEY STONES: ICD-10-CM

## 2022-05-02 LAB — SARS-COV-2 RNA RESP QL NAA+PROBE: NOT DETECTED

## 2022-05-02 PROCEDURE — U0005 INFEC AGEN DETEC AMPLI PROBE: HCPCS | Performed by: UROLOGY

## 2022-05-02 PROCEDURE — U0003 INFECTIOUS AGENT DETECTION BY NUCLEIC ACID (DNA OR RNA); SEVERE ACUTE RESPIRATORY SYNDROME CORONAVIRUS 2 (SARS-COV-2) (CORONAVIRUS DISEASE [COVID-19]), AMPLIFIED PROBE TECHNIQUE, MAKING USE OF HIGH THROUGHPUT TECHNOLOGIES AS DESCRIBED BY CMS-2020-01-R: HCPCS | Performed by: UROLOGY

## 2022-05-04 ENCOUNTER — ANESTHESIA EVENT (OUTPATIENT)
Dept: SURGERY | Facility: HOSPITAL | Age: 67
End: 2022-05-04
Payer: MEDICARE

## 2022-05-05 ENCOUNTER — HOSPITAL ENCOUNTER (OUTPATIENT)
Facility: HOSPITAL | Age: 67
Discharge: HOME | End: 2022-05-05
Attending: UROLOGY | Admitting: UROLOGY
Payer: MEDICARE

## 2022-05-05 ENCOUNTER — ANESTHESIA (OUTPATIENT)
Dept: SURGERY | Facility: HOSPITAL | Age: 67
End: 2022-05-05
Payer: MEDICARE

## 2022-05-05 VITALS
DIASTOLIC BLOOD PRESSURE: 66 MMHG | BODY MASS INDEX: 25.91 KG/M2 | OXYGEN SATURATION: 94 % | RESPIRATION RATE: 16 BRPM | HEIGHT: 70 IN | WEIGHT: 181 LBS | SYSTOLIC BLOOD PRESSURE: 111 MMHG | HEART RATE: 71 BPM | TEMPERATURE: 98 F

## 2022-05-05 DIAGNOSIS — N20.1 LEFT URETERAL STONE: Primary | ICD-10-CM

## 2022-05-05 DIAGNOSIS — N20.0 KIDNEY STONES: ICD-10-CM

## 2022-05-05 PROCEDURE — D9220A PRA ANESTHESIA: Mod: CRNA,,, | Performed by: NURSE ANESTHETIST, CERTIFIED REGISTERED

## 2022-05-05 PROCEDURE — D9220A PRA ANESTHESIA: ICD-10-PCS | Mod: CRNA,,, | Performed by: NURSE ANESTHETIST, CERTIFIED REGISTERED

## 2022-05-05 PROCEDURE — 52356 CYSTO/URETERO W/LITHOTRIPSY: CPT | Mod: LT,,, | Performed by: UROLOGY

## 2022-05-05 PROCEDURE — 63600175 PHARM REV CODE 636 W HCPCS: Performed by: UROLOGY

## 2022-05-05 PROCEDURE — 71000015 HC POSTOP RECOV 1ST HR: Performed by: UROLOGY

## 2022-05-05 PROCEDURE — 27201423 OPTIME MED/SURG SUP & DEVICES STERILE SUPPLY: Performed by: UROLOGY

## 2022-05-05 PROCEDURE — 36000706: Performed by: UROLOGY

## 2022-05-05 PROCEDURE — D9220A PRA ANESTHESIA: ICD-10-PCS | Mod: ANES,,, | Performed by: ANESTHESIOLOGY

## 2022-05-05 PROCEDURE — D9220A PRA ANESTHESIA: Mod: ANES,,, | Performed by: ANESTHESIOLOGY

## 2022-05-05 PROCEDURE — 74420 UROGRAPHY RTRGR +-KUB: CPT | Mod: 26,,, | Performed by: UROLOGY

## 2022-05-05 PROCEDURE — 36000707: Performed by: UROLOGY

## 2022-05-05 PROCEDURE — 71000033 HC RECOVERY, INTIAL HOUR: Performed by: UROLOGY

## 2022-05-05 PROCEDURE — 25000003 PHARM REV CODE 250: Performed by: ANESTHESIOLOGY

## 2022-05-05 PROCEDURE — 71000039 HC RECOVERY, EACH ADD'L HOUR: Performed by: UROLOGY

## 2022-05-05 PROCEDURE — C2617 STENT, NON-COR, TEM W/O DEL: HCPCS | Performed by: UROLOGY

## 2022-05-05 PROCEDURE — C1769 GUIDE WIRE: HCPCS | Performed by: UROLOGY

## 2022-05-05 PROCEDURE — 74420 PR  X-RAY RETROGRADE PYELOGRAM: ICD-10-PCS | Mod: 26,,, | Performed by: UROLOGY

## 2022-05-05 PROCEDURE — 37000009 HC ANESTHESIA EA ADD 15 MINS: Performed by: UROLOGY

## 2022-05-05 PROCEDURE — C1894 INTRO/SHEATH, NON-LASER: HCPCS | Performed by: UROLOGY

## 2022-05-05 PROCEDURE — 25000003 PHARM REV CODE 250: Performed by: NURSE ANESTHETIST, CERTIFIED REGISTERED

## 2022-05-05 PROCEDURE — 82365 CALCULUS SPECTROSCOPY: CPT | Performed by: UROLOGY

## 2022-05-05 PROCEDURE — 63600175 PHARM REV CODE 636 W HCPCS: Performed by: NURSE ANESTHETIST, CERTIFIED REGISTERED

## 2022-05-05 PROCEDURE — 52356 PR CYSTO/URETERO W/LITHOTRIPSY: ICD-10-PCS | Mod: LT,,, | Performed by: UROLOGY

## 2022-05-05 PROCEDURE — 37000008 HC ANESTHESIA 1ST 15 MINUTES: Performed by: UROLOGY

## 2022-05-05 PROCEDURE — 25500020 PHARM REV CODE 255: Performed by: UROLOGY

## 2022-05-05 PROCEDURE — 99900103 DSU ONLY-NO CHARGE-INITIAL HR (STAT): Performed by: UROLOGY

## 2022-05-05 PROCEDURE — C1758 CATHETER, URETERAL: HCPCS | Performed by: UROLOGY

## 2022-05-05 DEVICE — STENT SET URETERAL 6X26CM: Type: IMPLANTABLE DEVICE | Site: URETER | Status: FUNCTIONAL

## 2022-05-05 RX ORDER — DEXAMETHASONE SODIUM PHOSPHATE 4 MG/ML
INJECTION, SOLUTION INTRA-ARTICULAR; INTRALESIONAL; INTRAMUSCULAR; INTRAVENOUS; SOFT TISSUE
Status: DISCONTINUED | OUTPATIENT
Start: 2022-05-05 | End: 2022-05-05

## 2022-05-05 RX ORDER — OXYCODONE HYDROCHLORIDE 5 MG/1
5 TABLET ORAL
Status: DISCONTINUED | OUTPATIENT
Start: 2022-05-05 | End: 2022-05-05 | Stop reason: HOSPADM

## 2022-05-05 RX ORDER — FENTANYL CITRATE 50 UG/ML
INJECTION, SOLUTION INTRAMUSCULAR; INTRAVENOUS
Status: DISCONTINUED | OUTPATIENT
Start: 2022-05-05 | End: 2022-05-05

## 2022-05-05 RX ORDER — CEFAZOLIN SODIUM 2 G/50ML
2 SOLUTION INTRAVENOUS
Status: COMPLETED | OUTPATIENT
Start: 2022-05-05 | End: 2022-05-05

## 2022-05-05 RX ORDER — PROPOFOL 10 MG/ML
VIAL (ML) INTRAVENOUS
Status: DISCONTINUED | OUTPATIENT
Start: 2022-05-05 | End: 2022-05-05

## 2022-05-05 RX ORDER — FENTANYL CITRATE 50 UG/ML
25 INJECTION, SOLUTION INTRAMUSCULAR; INTRAVENOUS EVERY 5 MIN PRN
Status: DISCONTINUED | OUTPATIENT
Start: 2022-05-05 | End: 2022-05-05 | Stop reason: HOSPADM

## 2022-05-05 RX ORDER — LIDOCAINE HCL/PF 100 MG/5ML
SYRINGE (ML) INTRAVENOUS
Status: DISCONTINUED | OUTPATIENT
Start: 2022-05-05 | End: 2022-05-05

## 2022-05-05 RX ORDER — SODIUM CHLORIDE 0.9 % (FLUSH) 0.9 %
10 SYRINGE (ML) INJECTION
Status: DISCONTINUED | OUTPATIENT
Start: 2022-05-05 | End: 2022-05-05 | Stop reason: HOSPADM

## 2022-05-05 RX ORDER — EPHEDRINE SULFATE 50 MG/ML
INJECTION, SOLUTION INTRAVENOUS
Status: DISCONTINUED | OUTPATIENT
Start: 2022-05-05 | End: 2022-05-05

## 2022-05-05 RX ORDER — METOCLOPRAMIDE HYDROCHLORIDE 5 MG/ML
10 INJECTION INTRAMUSCULAR; INTRAVENOUS EVERY 10 MIN PRN
Status: DISCONTINUED | OUTPATIENT
Start: 2022-05-05 | End: 2022-05-05 | Stop reason: HOSPADM

## 2022-05-05 RX ORDER — ACETAMINOPHEN 10 MG/ML
INJECTION, SOLUTION INTRAVENOUS
Status: DISCONTINUED | OUTPATIENT
Start: 2022-05-05 | End: 2022-05-05

## 2022-05-05 RX ORDER — MIDAZOLAM HYDROCHLORIDE 1 MG/ML
INJECTION INTRAMUSCULAR; INTRAVENOUS
Status: DISCONTINUED | OUTPATIENT
Start: 2022-05-05 | End: 2022-05-05

## 2022-05-05 RX ORDER — SULFAMETHOXAZOLE AND TRIMETHOPRIM 800; 160 MG/1; MG/1
1 TABLET ORAL 2 TIMES DAILY
Qty: 10 TABLET | Refills: 0 | Status: SHIPPED | OUTPATIENT
Start: 2022-05-05 | End: 2022-05-10

## 2022-05-05 RX ORDER — ONDANSETRON HYDROCHLORIDE 2 MG/ML
INJECTION, SOLUTION INTRAMUSCULAR; INTRAVENOUS
Status: DISCONTINUED | OUTPATIENT
Start: 2022-05-05 | End: 2022-05-05

## 2022-05-05 RX ORDER — LIDOCAINE HYDROCHLORIDE 10 MG/ML
1 INJECTION, SOLUTION EPIDURAL; INFILTRATION; INTRACAUDAL; PERINEURAL ONCE
Status: DISCONTINUED | OUTPATIENT
Start: 2022-05-05 | End: 2022-05-05 | Stop reason: HOSPADM

## 2022-05-05 RX ADMIN — ACETAMINOPHEN 1000 MG: 10 INJECTION, SOLUTION INTRAVENOUS at 10:05

## 2022-05-05 RX ADMIN — SODIUM CHLORIDE, SODIUM GLUCONATE, SODIUM ACETATE, POTASSIUM CHLORIDE, MAGNESIUM CHLORIDE, SODIUM PHOSPHATE, DIBASIC, AND POTASSIUM PHOSPHATE: .53; .5; .37; .037; .03; .012; .00082 INJECTION, SOLUTION INTRAVENOUS at 11:05

## 2022-05-05 RX ADMIN — LIDOCAINE HYDROCHLORIDE 100 MG: 20 INJECTION INTRAVENOUS at 10:05

## 2022-05-05 RX ADMIN — CEFAZOLIN SODIUM 2 G: 2 SOLUTION INTRAVENOUS at 10:05

## 2022-05-05 RX ADMIN — DEXAMETHASONE SODIUM PHOSPHATE 4 MG: 4 INJECTION, SOLUTION INTRA-ARTICULAR; INTRALESIONAL; INTRAMUSCULAR; INTRAVENOUS; SOFT TISSUE at 10:05

## 2022-05-05 RX ADMIN — PROPOFOL 160 MG: 10 INJECTION, EMULSION INTRAVENOUS at 10:05

## 2022-05-05 RX ADMIN — ONDANSETRON 4 MG: 2 INJECTION, SOLUTION INTRAMUSCULAR; INTRAVENOUS at 10:05

## 2022-05-05 RX ADMIN — FENTANYL CITRATE 100 MCG: 50 INJECTION, SOLUTION INTRAMUSCULAR; INTRAVENOUS at 10:05

## 2022-05-05 RX ADMIN — EPHEDRINE SULFATE 25 MG: 50 INJECTION, SOLUTION INTRAMUSCULAR; INTRAVENOUS; SUBCUTANEOUS at 10:05

## 2022-05-05 RX ADMIN — EPHEDRINE SULFATE 25 MG: 50 INJECTION, SOLUTION INTRAMUSCULAR; INTRAVENOUS; SUBCUTANEOUS at 11:05

## 2022-05-05 RX ADMIN — SODIUM CHLORIDE, SODIUM GLUCONATE, SODIUM ACETATE, POTASSIUM CHLORIDE, MAGNESIUM CHLORIDE, SODIUM PHOSPHATE, DIBASIC, AND POTASSIUM PHOSPHATE: .53; .5; .37; .037; .03; .012; .00082 INJECTION, SOLUTION INTRAVENOUS at 10:05

## 2022-05-05 RX ADMIN — MIDAZOLAM HYDROCHLORIDE 2 MG: 1 INJECTION, SOLUTION INTRAMUSCULAR; INTRAVENOUS at 10:05

## 2022-05-05 NOTE — PLAN OF CARE
Pt AAOx3. Resp even, unlabored. No complaints of pain at this time. NAD noted. Ambulated to bathroom without difficulty. Voided teresa colored urine, no clots seen.  Pt tolerating PO well. Discharge and follow-up instructions discussed. Pt and family verbalize understanding. Pt ready for discharge.

## 2022-05-05 NOTE — H&P (VIEW-ONLY)
[facility] New Patient/H&P:      Toni Hyde is a 66 y.o. male who presents for or bph/stone follow up transitioning care from Dr Noel     Last saw NP 11/2021 noting: He has been known to have uric acid stones he also underwent ESWL in 2017 by Dr. Noel.  He has not experienced any recent stone episodes and no complaints.  He does continue to take allopurinol 100 mg p.o. q.d. and potassium citrate 15meq BID with no problems.  CT 1/12/21:  Right lower pole calyx with 4 mm stone and a 1 mm stone.  Left kidney has a 2 mm, a 2 mm, a 3 mm and a 11 mm stone seen from top to bottom. Bilateral renal cysts, larger on left with some peripheral calc, diverticulosis, 4.2cm prostate  Correltaing KUB 1/12/21: There are least 2 rounded calcifications left mid abdomen centered at the L2 level largest 9 mm.  Vascular phleboliths left pelvic sidewall. Impression notes   4/25/17 L ureteral ESWL and stent removal. For 9mm mid ureteral stone. Cysto noted 4 cm with mild PEREIRA     Patient reports previously having calcium oxalate stones analyzed 20 years prior, and only started potassium citrate with findings of 100% uric acid stone on stone analysis from his ESWL past fragments in 2017. On chart review, I did find his stone analysis confirming 100% uric acid, as well as a 24 hour urine 1 month later on 5/22/17 which had good urine volume of 2 L and normal urine uric acid but high saturation of uric acid 3.4, and high saturation of calcium oxalate at 6.63 increasing the risk of both types of stones, with a very low pH of 4.981 despite good citrate at 1698, and high dietary oxalate at 46.   He denies any history of gout, or prior known uric acid issues prior to 2017.   Uric acid, serum, on file with primary care is 4.9 in October 2021, normal.  Creatinine 1.4/EGFR 52.7 in October 2021 as well.  On review, has had mild CKD 3 for quite some time with baseline GFR around 55-58  Not following low purine or low uric acid diet. Was  told previously to reduce meat intake previously  Last PSA on file 0.6 in 2020.  Stable in the 0.40.6 range for all years prior.  No family history of prostate cancer.     Personal review of CT scan from January 2020 to consistent with report above with large left lower pole stone burden about 1-1.1 cm and scattered other small punctate stones throughout the left collecting system upper and lower pole.  In the right kidney there is a 4 mm lower pole stone and a smaller upper pole stone.  The left kidney has a central midpole cyst extending towards the collecting system as well as an upper pole cyst.  Reported as cyst stable from previous imaging, the upper pole lesion looks less fluid density on the non contrasted imaging to me     He does report intermittent stone fragment passage. More so dust/small fragments. Only know bc gets hung up in urethra.  A few times a year.  No significant pain or stone episodes.  Occasional flank or back pain which may or may not be related.  No blood in the urine.  Urinalysis is dipstick negative today.  Has been taking 100 mg allopurinol and 15 mEq daily, not b.i.d., of potassium citrate.     AUA SS: 15/3 (4 intermittency, 3: freq, urgency; 2 sleeping, 1: emptying, weak, strain)  Stops and starts a lot. If occupied easy to postpone, but there is some urgency when just sitting around. Occ NTF 2-3x/night, but if no fluids after 8pm can make it through the night                Past Medical History:   Diagnosis Date    Cataract      Cataract of left eye      Diabetes mellitus type II       on po meds    Hyperlipidemia       not on meds at present    Hypertension      Kidney stones 04/2017    Left ureteral stone      Plantar fasciitis of right foot 10/2017     Dr Colorado     Tendonitis 01/2018     right foot Dr Colorado    Wears glasses                 Past Surgical History:   Procedure Laterality Date    ADENOIDECTOMY        CATARACT EXTRACTION        COLONOSCOPY   3/23/2010      Dr. Blackburn, hyperplastic polyps, otherwise negative, 10 year recheck    COLONOSCOPY N/A 2021     Procedure: COLONOSCOPY;  Surgeon: Marcus Sexton MD;  [facility];  Service: Endoscopy;  Laterality: N/A;    CYSTOSCOPY Left 2017     with ureteral stent-Dr. Noel    EYE SURGERY   2013     Dr Nielsen    kidney stent    2017    KNEE ARTHROSCOPY W/ DEBRIDEMENT         right with lateral release     LITHOTRIPSY   2017    NASAL SINUS SURGERY        retna surgery    2013     Dr Peralta    TONSILLECTOMY        TRANSFORAMINAL EPIDURAL INJECTION OF STEROID Right 2020     Procedure: Injection,steroid,epidural,transforaminal approach L5- S1;  Surgeon: Kermit Davalos MD;  Location: [facility];  Service: Pain Management;  Laterality: Right;  L5-s1               Family History   Problem Relation Age of Onset    Hypertension Father      Neuropathy Father      Stroke Paternal Grandmother      Lung cancer Paternal Grandfather      Cancer Paternal Grandfather      Cataracts Mother      Diabetes Maternal Grandmother      No Known Problems Sister      No Known Problems Maternal Aunt           Social History               Socioeconomic History    Marital status:    Tobacco Use    Smoking status: Former Smoker       Types: Cigarettes       Quit date: 1978       Years since quittin.9    Smokeless tobacco: Never Used   Substance and Sexual Activity    Alcohol use: Yes       Alcohol/week: 2.5 standard drinks       Types: 3 Standard drinks or equivalent per week       Comment: 3 drinks per week    Drug use: No      Social Determinants of Health          Financial Resource Strain: Low Risk     Difficulty of Paying Living Expenses: Not hard at all   Food Insecurity: No Food Insecurity    Worried About Running Out of Food in the Last Year: Never true    Ran Out of Food in the Last Year: Never true   Transportation Needs: No Transportation Needs    Lack of  "Transportation (Medical): No    Lack of Transportation (Non-Medical): No   Physical Activity: Sufficiently Active    Days of Exercise per Week: 5 days    Minutes of Exercise per Session: 40 min   Stress: No Stress Concern Present    Feeling of Stress : Not at all   Social Connections: Unknown    Frequency of Communication with Friends and Family: More than three times a week    Frequency of Social Gatherings with Friends and Family: Once a week    Active Member of Clubs or Organizations: No    Attends Club or Organization Meetings: Never    Marital Status:    Housing Stability: Low Risk     Unable to Pay for Housing in the Last Year: No    Number of Places Lived in the Last Year: 1    Unstable Housing in the Last Year: No                  Review of patient's allergies indicates:   Allergen Reactions    Shrimp         Per testing   States no ivp or betadine allergies.          Medications Reviewed: see MAR     Focused Physical Exam         Vitals:     02/25/22 0924   BP: 121/80   Pulse: 61   Resp: 18      Body mass index is 26.69 kg/m². Weight: 84.4 kg (186 lb) Height: 5' 10" (177.8 cm)         Abdomen: Soft, non-tender, nondistended, no CVA tenderness  :  normal phallus without plaques/lesions, orthotopic urethral meatus, bilaterally desc testes in normal scrotum without mass or lesion  JUAN FRANCISCO: normal sphincter tone, no masses, no hemmorrhoids   PROSTATE: 25g, no nodules, non-tender, symmetrical.         LABS:     Recent Results         Recent Results (from the past 336 hour(s))   POCT Bladder Scan     Collection Time: 02/25/22  9:32 AM   Result Value Ref Range     POC Residual Urine Volume 28 0 - 100 mL   POCT URINE DIPSTICK WITHOUT MICROSCOPE     Collection Time: 02/25/22  9:32 AM   Result Value Ref Range     Color, UA Yellow       pH, UA 6       WBC, UA neg       Nitrite, UA neg       Protein, POC neg       Glucose, UA neg       Ketones, UA neg       Urobilinogen, UA 0.2       Bilirubin, POC " neg       Blood, UA neg       Clarity, UA Clear       Spec Grav UA 1.030                 Assessment/Diagnosis:     1. Kidney stones  POCT Bladder Scan     POCT URINE DIPSTICK WITHOUT MICROSCOPE     Creatinine, Serum     COVID-19 Routine Screening     Case Request Operating Room: REMOVAL, CALCULUS, URETER, URETEROSCOPIC     Basic metabolic panel     CBC auto differential     Urinalysis     Urine culture   2. BPH with obstruction/lower urinary tract symptoms      3. Renal cyst, acquired, left  Creatinine, Serum   4. Prostate cancer screening  PSA, Screening   5. Calculus of urinary tract in diseases classified elsewhere  CT Urogram Abd Pelvis W WO   6. Other specified disorders of kidney and ureter  CT Urogram Abd Pelvis W WO   7. Renal calculi            Plans:  Extensive review of prior urologic history and medical record as summarized above.  Especially as relates to kidney stones.  We did review prior workups, including 24 hour urines, procedures, and stone analyses.  He did inquire if uric acid stone formers could make calcium oxalate and uric acid stones and we did note that they could depending on the underlying mechanism and underlying urine pH.  We reviewed his medical management of stone prevention noting that low-dose allopurinol, or allopurinol, is not indicated simply for the prevention of uric acid stones but is indicated in cases of hyperuricemia.  Unclear if he ever had this, but even on a very low-dose of 100 mg, his uric acid is quite normal and would recommend discontinuing this as he has never had gout.  Can follow his uric acid in the future as we complete metabolic workup again.  As well, he is on incredibly low dose, not therapeutic, of potassium citrate at only 15 mg daily.  We did discuss 15 b.i.d. would be a more recommended or appropriate dose and therefore a new prescription was sent in at this value.     We did then review basic stone prevention recommendations, as well as uric acid/low  purine/gout diet of which handout was provided.  We then reviewed his residual stone disease in discussed management, especially of his left-sided large stone burden noting greater than 1 cm lower pole stone with multiple other stones.  We discussed ESWL versus ureteroscopic stone extraction versus PCNL all risks and benefits, especially as it relates to his stone burden.  Certainly given lower pole 1 cm stone, ESWL as a poor option, and stones not been visible on x-ray completely given the uric acid status, though that is questionable as some of the left lower pole stone has been seen on x-ray.  Ultimately after discussion of all procedures, risks and benefits, discussed ureteroscopic stone extraction with laser lithotripsy and stone basket extraction.  We did discuss pre treatment with Flomax for ureteral relaxation to help facilitate upper tract access and does understand may need stent placement and return for definitive stone extraction after passive dilation.  All risks and benefits of the procedure were discussed in detail and left ureteroscopy and stone extraction were booked in the OR on 5/5/22 per his request.     His imaging is 1-year-old, so prior to proceeding, we did discuss obtaining updated CT scan, and for contrasted examination of cyst which does extent was the collecting system, will get CT urogram.  As he will need labs on arrival updated screening PSA.  We reviewed the need for metabolic workup after stone free, and will revisit 24 hour urine on his new medical regimen in the absence of stones at that time, and re-evaluate his BPH at the time of cystoscopy and stent removal.  However given his BPH/LUTS, would start Flomax 0.4 mg daily now, not just to pre treat for ureteroscopy, and remain on it pending further evaluation.     All questions patient had were answered in detail and he is agreeable to the treatment plan.     Can advise CT demonstrated similar findings to a year ago regarding stones  thus stone extraction plan the same, and also notes no concerning masses as all visualized lesions seen on kidneys are simple cysts. PSA normal.stable as well    Proceed as planned with left stone extraction

## 2022-05-05 NOTE — TRANSFER OF CARE
"Anesthesia Transfer of Care Note    Patient: Toni Hyde    Procedure(s) Performed: Procedure(s) (LRB):  REMOVAL, CALCULUS, URETER, URETEROSCOPIC (Left)  CYSTOURETEROSCOPY,WITH HOLMIUM LASER LITHOTRIPSY OF URETERAL CALCULUS (Left)  CYSTOSCOPY, WITH RETROGRADE PYELOGRAM AND URETERAL STENT INSERTION (Left)    Patient location: PACU    Anesthesia Type: general    Transport from OR: Transported from OR on 2-3 L/min O2 by NC with adequate spontaneous ventilation    Post pain: adequate analgesia    Post assessment: no apparent anesthetic complications and tolerated procedure well    Post vital signs: stable    Level of consciousness: awake, alert and oriented    Nausea/Vomiting: no nausea/vomiting    Complications: none    Transfer of care protocol was followed      Last vitals:   Visit Vitals  /78 (BP Location: Left arm, Patient Position: Lying)   Pulse (!) 57   Temp 36.6 °C (97.9 °F) (Temporal)   Resp 16   Ht 5' 10" (1.778 m)   Wt 82.1 kg (181 lb)   SpO2 97%   BMI 25.97 kg/m²     "

## 2022-05-05 NOTE — H&P
[facility] New Patient/H&P:      Toni Hyde is a 66 y.o. male who presents for or bph/stone follow up transitioning care from Dr Noel     Last saw NP 11/2021 noting: He has been known to have uric acid stones he also underwent ESWL in 2017 by Dr. Noel.  He has not experienced any recent stone episodes and no complaints.  He does continue to take allopurinol 100 mg p.o. q.d. and potassium citrate 15meq BID with no problems.  CT 1/12/21:  Right lower pole calyx with 4 mm stone and a 1 mm stone.  Left kidney has a 2 mm, a 2 mm, a 3 mm and a 11 mm stone seen from top to bottom. Bilateral renal cysts, larger on left with some peripheral calc, diverticulosis, 4.2cm prostate  Correltaing KUB 1/12/21: There are least 2 rounded calcifications left mid abdomen centered at the L2 level largest 9 mm.  Vascular phleboliths left pelvic sidewall. Impression notes   4/25/17 L ureteral ESWL and stent removal. For 9mm mid ureteral stone. Cysto noted 4 cm with mild PEREIRA     Patient reports previously having calcium oxalate stones analyzed 20 years prior, and only started potassium citrate with findings of 100% uric acid stone on stone analysis from his ESWL past fragments in 2017. On chart review, I did find his stone analysis confirming 100% uric acid, as well as a 24 hour urine 1 month later on 5/22/17 which had good urine volume of 2 L and normal urine uric acid but high saturation of uric acid 3.4, and high saturation of calcium oxalate at 6.63 increasing the risk of both types of stones, with a very low pH of 4.981 despite good citrate at 1698, and high dietary oxalate at 46.   He denies any history of gout, or prior known uric acid issues prior to 2017.   Uric acid, serum, on file with primary care is 4.9 in October 2021, normal.  Creatinine 1.4/EGFR 52.7 in October 2021 as well.  On review, has had mild CKD 3 for quite some time with baseline GFR around 55-58  Not following low purine or low uric acid diet. Was  told previously to reduce meat intake previously  Last PSA on file 0.6 in 2020.  Stable in the 0.40.6 range for all years prior.  No family history of prostate cancer.     Personal review of CT scan from January 2020 to consistent with report above with large left lower pole stone burden about 1-1.1 cm and scattered other small punctate stones throughout the left collecting system upper and lower pole.  In the right kidney there is a 4 mm lower pole stone and a smaller upper pole stone.  The left kidney has a central midpole cyst extending towards the collecting system as well as an upper pole cyst.  Reported as cyst stable from previous imaging, the upper pole lesion looks less fluid density on the non contrasted imaging to me     He does report intermittent stone fragment passage. More so dust/small fragments. Only know bc gets hung up in urethra.  A few times a year.  No significant pain or stone episodes.  Occasional flank or back pain which may or may not be related.  No blood in the urine.  Urinalysis is dipstick negative today.  Has been taking 100 mg allopurinol and 15 mEq daily, not b.i.d., of potassium citrate.     AUA SS: 15/3 (4 intermittency, 3: freq, urgency; 2 sleeping, 1: emptying, weak, strain)  Stops and starts a lot. If occupied easy to postpone, but there is some urgency when just sitting around. Occ NTF 2-3x/night, but if no fluids after 8pm can make it through the night                Past Medical History:   Diagnosis Date    Cataract      Cataract of left eye      Diabetes mellitus type II       on po meds    Hyperlipidemia       not on meds at present    Hypertension      Kidney stones 04/2017    Left ureteral stone      Plantar fasciitis of right foot 10/2017     Dr Colorado     Tendonitis 01/2018     right foot Dr Colorado    Wears glasses                 Past Surgical History:   Procedure Laterality Date    ADENOIDECTOMY        CATARACT EXTRACTION        COLONOSCOPY   3/23/2010      Dr. Blackburn, hyperplastic polyps, otherwise negative, 10 year recheck    COLONOSCOPY N/A 2021     Procedure: COLONOSCOPY;  Surgeon: Marcus Sexton MD;  Location: [facility];  Service: Endoscopy;  Laterality: N/A;    CYSTOSCOPY Left 2017     with ureteral stent-Dr. Noel    EYE SURGERY   2013     Dr Nielsen    kidney stent    2017    KNEE ARTHROSCOPY W/ DEBRIDEMENT         right with lateral release     LITHOTRIPSY   2017    NASAL SINUS SURGERY        retna surgery    2013     Dr Peralta    TONSILLECTOMY        TRANSFORAMINAL EPIDURAL INJECTION OF STEROID Right 2020     Procedure: Injection,steroid,epidural,transforaminal approach L5- S1;  Surgeon: Kermit Davalos MD;  Location: Replaced by [facility];  Service: Pain Management;  Laterality: Right;  L5-s1               Family History   Problem Relation Age of Onset    Hypertension Father      Neuropathy Father      Stroke Paternal Grandmother      Lung cancer Paternal Grandfather      Cancer Paternal Grandfather      Cataracts Mother      Diabetes Maternal Grandmother      No Known Problems Sister      No Known Problems Maternal Aunt           Social History               Socioeconomic History    Marital status:    Tobacco Use    Smoking status: Former Smoker       Types: Cigarettes       Quit date: 1978       Years since quittin.9    Smokeless tobacco: Never Used   Substance and Sexual Activity    Alcohol use: Yes       Alcohol/week: 2.5 standard drinks       Types: 3 Standard drinks or equivalent per week       Comment: 3 drinks per week    Drug use: No      Social Determinants of Health          Financial Resource Strain: Low Risk     Difficulty of Paying Living Expenses: Not hard at all   Food Insecurity: No Food Insecurity    Worried About Running Out of Food in the Last Year: Never true    Ran Out of Food in the Last Year: Never true   Transportation Needs: No Transportation Needs    Lack of  "Transportation (Medical): No    Lack of Transportation (Non-Medical): No   Physical Activity: Sufficiently Active    Days of Exercise per Week: 5 days    Minutes of Exercise per Session: 40 min   Stress: No Stress Concern Present    Feeling of Stress : Not at all   Social Connections: Unknown    Frequency of Communication with Friends and Family: More than three times a week    Frequency of Social Gatherings with Friends and Family: Once a week    Active Member of Clubs or Organizations: No    Attends Club or Organization Meetings: Never    Marital Status:    Housing Stability: Low Risk     Unable to Pay for Housing in the Last Year: No    Number of Places Lived in the Last Year: 1    Unstable Housing in the Last Year: No                  Review of patient's allergies indicates:   Allergen Reactions    Shrimp         Per testing   States no ivp or betadine allergies.          Medications Reviewed: see MAR     Focused Physical Exam         Vitals:     02/25/22 0924   BP: 121/80   Pulse: 61   Resp: 18      Body mass index is 26.69 kg/m². Weight: 84.4 kg (186 lb) Height: 5' 10" (177.8 cm)         Abdomen: Soft, non-tender, nondistended, no CVA tenderness  :  normal phallus without plaques/lesions, orthotopic urethral meatus, bilaterally desc testes in normal scrotum without mass or lesion  JUAN FRANCISCO: normal sphincter tone, no masses, no hemmorrhoids   PROSTATE: 25g, no nodules, non-tender, symmetrical.         LABS:     Recent Results         Recent Results (from the past 336 hour(s))   POCT Bladder Scan     Collection Time: 02/25/22  9:32 AM   Result Value Ref Range     POC Residual Urine Volume 28 0 - 100 mL   POCT URINE DIPSTICK WITHOUT MICROSCOPE     Collection Time: 02/25/22  9:32 AM   Result Value Ref Range     Color, UA Yellow       pH, UA 6       WBC, UA neg       Nitrite, UA neg       Protein, POC neg       Glucose, UA neg       Ketones, UA neg       Urobilinogen, UA 0.2       Bilirubin, POC " neg       Blood, UA neg       Clarity, UA Clear       Spec Grav UA 1.030                 Assessment/Diagnosis:     1. Kidney stones  POCT Bladder Scan     POCT URINE DIPSTICK WITHOUT MICROSCOPE     Creatinine, Serum     COVID-19 Routine Screening     Case Request Operating Room: REMOVAL, CALCULUS, URETER, URETEROSCOPIC     Basic metabolic panel     CBC auto differential     Urinalysis     Urine culture   2. BPH with obstruction/lower urinary tract symptoms      3. Renal cyst, acquired, left  Creatinine, Serum   4. Prostate cancer screening  PSA, Screening   5. Calculus of urinary tract in diseases classified elsewhere  CT Urogram Abd Pelvis W WO   6. Other specified disorders of kidney and ureter  CT Urogram Abd Pelvis W WO   7. Renal calculi            Plans:  Extensive review of prior urologic history and medical record as summarized above.  Especially as relates to kidney stones.  We did review prior workups, including 24 hour urines, procedures, and stone analyses.  He did inquire if uric acid stone formers could make calcium oxalate and uric acid stones and we did note that they could depending on the underlying mechanism and underlying urine pH.  We reviewed his medical management of stone prevention noting that low-dose allopurinol, or allopurinol, is not indicated simply for the prevention of uric acid stones but is indicated in cases of hyperuricemia.  Unclear if he ever had this, but even on a very low-dose of 100 mg, his uric acid is quite normal and would recommend discontinuing this as he has never had gout.  Can follow his uric acid in the future as we complete metabolic workup again.  As well, he is on incredibly low dose, not therapeutic, of potassium citrate at only 15 mg daily.  We did discuss 15 b.i.d. would be a more recommended or appropriate dose and therefore a new prescription was sent in at this value.     We did then review basic stone prevention recommendations, as well as uric acid/low  purine/gout diet of which handout was provided.  We then reviewed his residual stone disease in discussed management, especially of his left-sided large stone burden noting greater than 1 cm lower pole stone with multiple other stones.  We discussed ESWL versus ureteroscopic stone extraction versus PCNL all risks and benefits, especially as it relates to his stone burden.  Certainly given lower pole 1 cm stone, ESWL as a poor option, and stones not been visible on x-ray completely given the uric acid status, though that is questionable as some of the left lower pole stone has been seen on x-ray.  Ultimately after discussion of all procedures, risks and benefits, discussed ureteroscopic stone extraction with laser lithotripsy and stone basket extraction.  We did discuss pre treatment with Flomax for ureteral relaxation to help facilitate upper tract access and does understand may need stent placement and return for definitive stone extraction after passive dilation.  All risks and benefits of the procedure were discussed in detail and left ureteroscopy and stone extraction were booked in the OR on 5/5/22 per his request.     His imaging is 1-year-old, so prior to proceeding, we did discuss obtaining updated CT scan, and for contrasted examination of cyst which does extent was the collecting system, will get CT urogram.  As he will need labs on arrival updated screening PSA.  We reviewed the need for metabolic workup after stone free, and will revisit 24 hour urine on his new medical regimen in the absence of stones at that time, and re-evaluate his BPH at the time of cystoscopy and stent removal.  However given his BPH/LUTS, would start Flomax 0.4 mg daily now, not just to pre treat for ureteroscopy, and remain on it pending further evaluation.     All questions patient had were answered in detail and he is agreeable to the treatment plan.     Can advise CT demonstrated similar findings to a year ago regarding stones  thus stone extraction plan the same, and also notes no concerning masses as all visualized lesions seen on kidneys are simple cysts. PSA normal.stable as well    Proceed as planned with left stone extraction

## 2022-05-05 NOTE — ANESTHESIA PREPROCEDURE EVALUATION
05/05/2022  Toni Hyde is a 67 y.o., male.    Pre-op Assessment    I have reviewed the Patient Summary Reports.    I have reviewed the Nursing Notes. I have reviewed the NPO Status.   I have reviewed the Medications.     Review of Systems  Anesthesia Hx:  No problems with previous Anesthesia    Social:  Former Smoker    Cardiovascular:   Exercise tolerance: good Hypertension    Pulmonary:  Pulmonary Normal    Renal/:   Chronic Renal Disease renal calculi    Neurological:   Neuromuscular Disease,    Endocrine:   Diabetes, type 2        Physical Exam  General:  Well nourished      Airway/Jaw/Neck:  Airway Findings: Mouth Opening: Small, but > 3cm   Tongue: Normal   General Airway Assessment: Adult Mallampati: III  TM Distance: Normal, at least 6 cm       Dental:  Intact     Chest/Lungs:  Chest/Lungs Clear    Heart/Vascular:  Heart Findings: Rate: Bradycardia             Anesthesia Plan  Type of Anesthesia, risks & benefits discussed:  Anesthesia Type:  Gen ETT    Patient's Preference:   Plan Factors:          Intra-op Monitoring Plan: standard ASA monitors  Intra-op Monitoring Plan Comments:   Post Op Pain Control Plan: IV/PO Opioids PRN  Post Op Pain Control Plan Comments:     Induction:   IV  Beta Blocker:  Patient is not currently on a Beta-Blocker (No further documentation required).       Informed Consent: Informed consent signed with the Patient and all parties understand the risks and agree with anesthesia plan.  All questions answered.  Anesthesia consent signed with patient.  ASA Score: 2     Day of Surgery Review of History & Physical:    H&P Update referred to the surgeon/provider.          Ready For Surgery From Anesthesia Perspective.           Physical Exam  General: Well nourished    Airway:  Mallampati: III   Mouth Opening: Small, but > 3cm  TM Distance: Normal, at least 6 cm  Tongue:  Normal    Dental:  Intact    Heart:  Rate: Bradycardia          Anesthesia Plan  Type of Anesthesia, risks & benefits discussed:    Anesthesia Type: Gen ETT  Intra-op Monitoring Plan: standard ASA monitors  Post Op Pain Control Plan: IV/PO Opioids PRN  Induction:  IV  Informed Consent: Informed consent signed with the Patient and all parties understand the risks and agree with anesthesia plan.  All questions answered.   ASA Score: 2  Day of Surgery Review of History & Physical: H&P Update referred to the surgeon/provider.    Ready For Surgery From Anesthesia Perspective.       .

## 2022-05-05 NOTE — PLAN OF CARE
Patient prepared for surgery. Surgical and anesthesia consents signed. Performed incentive spirometer teaching. Belongings in pre-op locker. Text message alerts set up with colleague Aristides.

## 2022-05-05 NOTE — ANESTHESIA POSTPROCEDURE EVALUATION
Anesthesia Post Evaluation    Patient: Toni Hyde    Procedure(s) Performed: Procedure(s) (LRB):  REMOVAL, CALCULUS, URETER, URETEROSCOPIC (Left)  CYSTOURETEROSCOPY,WITH HOLMIUM LASER LITHOTRIPSY OF URETERAL CALCULUS (Left)  CYSTOSCOPY, WITH RETROGRADE PYELOGRAM AND URETERAL STENT INSERTION (Left)    Final Anesthesia Type: general      Patient location during evaluation: PACU  Patient participation: Yes- Able to Participate  Level of consciousness: awake and alert  Post-procedure vital signs: reviewed and stable  Pain management: adequate  Airway patency: patent    PONV status at discharge: No PONV  Anesthetic complications: no      Cardiovascular status: hemodynamically stable  Respiratory status: unassisted and room air  Hydration status: euvolemic  Follow-up not needed.          Vitals Value Taken Time   /64 05/05/22 1331   Temp 36.5 °C (97.7 °F) 05/05/22 1325   Pulse 64 05/05/22 1335   Resp 19 05/05/22 1335   SpO2 95 % 05/05/22 1335   Vitals shown include unvalidated device data.      Event Time   Out of Recovery 13:40:00         Pain/Mallika Score: Mallika Score: 10 (5/5/2022  1:25 PM)

## 2022-05-05 NOTE — PLAN OF CARE
Pt transferred to phase II. VSS, denies pain, tolerated clear liquids without N/V, due to void. Report given to JOHN Rocha.

## 2022-05-05 NOTE — OP NOTE
[facility]rology Operative/Brief Discharge Note    Date: 05/05/2022    Staff Surgeon: Ronal Benitez MD    Pre-Op Diagnosis: left nephrolithiasis     Post-Op Diagnosis: same    Procedure(s) Performed:   Left ureteroscopy with holmium laser lithotripsy and stone basket extraction  Cystoscopy with placement of left double-J ureteral stent 6 Nigerian by 26 cm  Left retrograde pyelogram including injection of contrast  Fluoroscopy less than 1 hour including interpretation of fluoroscopic images     Specimen(s):  kidney stones     Anesthesia: General endotracheal anesthesia     Findings:  scattered left stones in upper middle and lower pole with larger burden in lower pole, all removed    Estimated Blood Loss:  Minimal     Drains:  Ureteral stent     Complications:  None      Indications for procedure:  68 yo M with long history of kidney stones and increasing stone burden since last intervention. Noted to have left 1.1 cm lower pole stone burden and scattered other small punctate stones throughout the left collecting system upper and lower pole.  Given his significant stone burden, elected to proceed with ureteroscopic stone extraction after extensive discussion of risks and benefits and alternatives.     Procedure in detail:  After appropriate informed consent was obtained, the patient was taken to the operating room and placed in the lithotomy position.  He was prepped and draped in standard sterile cystoscopic fashion.  Preoperative antibiotics were administered, and a WHO approved time-out was performed     A rigid Olympus cystoscope in a 21 Nigerian sheath was passed into the bladder via the urethra noting mild ingrowth prostatic lateral lobes with incomplete obstruction, more anterolateral proximally.  The bladder was mildly trabeculated bilateral ureteral orifices were identified in orthotopic position on the trigone.   On fluoroscopy,  lower pole stone burden was seen to be  radiopaque in the kidney, and motion  guidewire was passed through the scope into the left ureter until it was seen to curl proximally in the collecting system of the stent off-loaded.     Over the guidewire, a 10 Bhutanese dual lumen catheter was passed under fluoroscopic guidance into the distal ureter and through the 2nd port a superstiff guidewire was passed until it was also seen to curl proximally in the collecting system at which time the dual-lumen catheter was off-loaded and over the superstiff guidewire, a 14-16 Bhutanese 45 cm ureteral access sheath was passed under live fluoroscopic guidance until the tip was seen in the proximal ureter. There was no resistance.  The inner stylet and guidewire were discontinued.      A digital flexible ureteroscope was passed into the proximal ureter and traversed into the collecting system.  small stone seen in upper middle and lower pole calyx, and larger stone burden localized in lower pole consistent with CT and fluoroscopic imaging.  Some stones not distinctly radiopaque, however the lower pole stone burden correlated on imaging and flouro.     273 micron holmium laser fiber was used to perform holmium laser lithotripsy of the larger stones, with tipless Nitinol stone basket extraction of upper and mid pole stones and stone fragments. Some mid and all of lower pole stones needed lithotripsy to initially dust edges, then fragment into smaller pieces amenable to basket extraction.  One of the larger stone fragments was just too big to make it into the access sheath and did require laser lithotripsy within the proximal ureter as well, and once it was split into a few fragments these were all basket extracted too.  After clearing the proximal ureter of the stone burden, the collecting system was reinspected all upper middle lower pole calices were found to be free of any significant stone fragments. Only punctate stone dust and fragments smaller than the diameter of the laser fiber remained within and  radiodensity cleared.     Once the collecting system was visually clear of significant stone burden, ureteroscope was withdrawn into the proximal ureter, and dilute Isovue contrast was injected through the ureteroscope to perform retrograde pyelogram noting smooth filling of the proximal ureter into the collecting system which had sharp calices, and filled well with contrast with no filling defects to indicate any residual stone.  At this time, with normal retrograde pyelogram and having visually cleared the collecting system, once again mapping it with direct vision using the retrograde pyelogram as a guide, the ureteroscope was removed.  This was done in a systematic fashion keeping a 360 degree view of the ureter to the bladder with the access sheath noting no stones in the ureter.  The guidewire remained in place, and was then backloaded through the cystoscope.     Over the guidewire a 6 Wolof by 26 cm double-J ureteral stent was placed under fluoroscopic guidance until it was seen to curl proximally in the collecting system and under direct vision at the level of the bladder curl here at the ureteral orifice.  The bladder was drained via the cystoscope sheath.  He tolerated the procedure well without complication.  He was awakened taken to PACU without incident     Disposition: home today status post uncomplicated procedure as above.  On uroxatral given intolerance of flomax, which he will continue, and few days of prophylactic antibiotics have been prescribed.  He will return on 5/17/22 to the [facility] for stent removal.    Discharge home today status post uncomplicated procedure as above  Diet - resume home diet  Follow up: 5/17 at [facility] for stent removal  Instructions:   Drink plenty of water.  May see blood in urine. May intermittently see blood in the urine as long as stent is in place.  Be sure to remain well hydrated.  May see more blood with dehydration increased activity so rest and hydrate if increasing  "blood in the urine.  May experience "stent pain":  Pain in the side similar to kidney stone during and just after urination.  Alfuzosin daily also helps prevent this and decrease this as it relaxes the ureter around the stent.  Can take Advil/Motrin for any other discomfort.  Complete antibiotics.  Meds:     Medication List      START taking these medications    sulfamethoxazole-trimethoprim 800-160mg 800-160 mg Tab  Commonly known as: BACTRIM DS  Take 1 tablet by mouth 2 (two) times daily. for 5 days        CONTINUE taking these medications    alfuzosin 10 mg Tb24  Commonly known as: UROXATRAL  Take 1 tablet (10 mg total) by mouth daily with breakfast.     amLODIPine 2.5 MG tablet  Commonly known as: NORVASC  Take 1 tablet (2.5 mg total) by mouth once daily.     cetirizine 10 MG tablet  Commonly known as: ZYRTEC     diphenhydrAMINE 25 mg capsule  Commonly known as: BENADRYL     fluticasone propionate 50 mcg/actuation nasal spray  Commonly known as: FLONASE     ibuprofen 200 MG tablet  Commonly known as: ADVIL,MOTRIN     metFORMIN 750 MG ER 24hr tablet  Commonly known as: GLUCOPHAGE-XR  Take 1 tablet (750 mg total) by mouth 2 (two) times daily with meals.     MUCINEX ORAL     potassium citrate 15 mEq Tbsr  Commonly known as: UROCIT-K 15  Take 15 mEq by mouth 2 (two) times a day.     rosuvastatin 20 MG tablet  Commonly known as: CRESTOR  TAKE 1 TABLET DAILY (REPLACES PRAVASTATIN)     valsartan 320 MG tablet  Commonly known as: DIOVAN  TAKE 1 TABLET DAILY (REPLACES BENAZEPRIL 40 MG)        STOP taking these medications    (MAGIC MOUTHWASH) 1:1:1 BENADRYL 12.5MG/5ML LIQ, ALUMINUM & MAGNESIUM     triamcinolone acetonide 0.5% 0.5 % Crea  Commonly known as: KENALOG           Where to Get Your Medications      These medications were sent to [id number] - [place], [place] - [address] AT [facility] [address]  [address], [place] [address]    Phone: [phone] "   · sulfamethoxazole-trimethoprim 800-160mg 800-160 mg Tab

## 2022-05-05 NOTE — ANESTHESIA PROCEDURE NOTES
Intubation    Date/Time: 5/5/2022 10:40 AM  Performed by: Matt Holloway CRNA  Authorized by: Eric Yates MD     Intubation:     Induction:  Intravenous    Intubated:  Postinduction    Mask Ventilation:  Easy mask    Attempts:  2    Attempted By:  CRNA    Method of Intubation:  Direct    Blade:  Uday 3    Attempted By (2nd Attempt):  CRNA    Method of Intubation (2nd Attempt):  Video laryngoscopy    Blade (2nd Attempt):  Herbert 3    Laryngeal View Grade (2nd Attempt): Grade I - full view of cords      Difficult Airway Encountered?: No      Complications:  None    Airway Device:  Oral endotracheal tube    Airway Device Size:  8.0    Style/Cuff Inflation:  Cuffed (inflated to minimal occlusive pressure)    Inflation Amount (mL):  5    Tube secured:  23    Secured at:  The lips    Placement Verified By:  Capnometry    Complicating Factors:  Narrow palate and small mouth    Findings Post-Intubation:  BS equal bilateral and atraumatic/condition of teeth unchanged

## 2022-05-05 NOTE — DISCHARGE INSTRUCTIONS
Using an Incentive Spirometer    An incentive spirometer is a device that helps you do deep breathing exercises. These exercises expand your lungs, aid in circulation, and help prevent pneumonia. Deep breathing exercises also help you breathe better and improve the function of your lungs by:  Keeping your lungs clear  Strengthening your breathing muscles  Helping prevent respiratory complications or problems  The incentive spirometer gives you a way to take an active part in recover. A nurse or therapist will teach you breathing exercises. To do these exercises, you will breathe in through your mouth and not your nose. The incentive spirometer only works correctly if you breathe in through your mouth.  Steps to clear lungs  Step 1. Exhale normally. Then, inhale normally.  Relax and breathe out.  Step 2. Place your lips tightly around the mouthpiece.  Make sure the device is upright and not tilted.  Step 3. Inhale as much air as you can through the mouthpiece (don't breath through your nose).  Inhale slowly and deeply.  Hold your breath long enough to keep the balls or disk raised for at least 3 to 5 seconds, or as instructed by your healthcare provider.  Some spirometers have an indicator to let you know that you are breathing in too fast. If the indicator goes off, breathe in more slowly.  Step 4. Repeat the exercise regularly.  Do this exercise every hour while you're awake, or as instructed by your healthcare provider.  If you were taught deep breathing and coughing exercises, do them regularly as instructed by your healthcare provider.      Post op instructions for prevention of DVT  What is deep vein thrombosis?  Deep vein thrombosis (DVT) is the medical term for blood clots in the deep veins of the leg.  These blood clots can be dangerous.  A DVT can block a blood vessel and keep blood from getting where it needs to go.  Another problem is that the clot can travel to other parts of the body such as the lungs.   A clot that travels to the lungs is called a pulmonary embolus (PE) and can cause serious problems with breathing which can lead to death.  Am I at risk for DVT/PE?  If you are not very active, you are at risk of DVT.  Anyone confined to bed, sitting for long periods of time, recovering from surgery, etc. increases the risk of DVT.  Other risk factors are cancer diagnosis, certain medications, estrogen replacement in any form,older age, obesity, pregnancy, smoking, history of clotting disorders, and dehydration.  How will I know if I have a DVT?  Swelling in the lower leg  Pain  Warmth, redness, hardness or bulging of the vein  If you have any of these symptoms, call your doctors office right away.  Some people will not have any symptoms until the clot moves to the lungs.  What are the symptoms of a PE?  Panting, shortness of breath, or trouble breathing  Sharp, knife-like chest pain when you breathe  Coughing or coughing up blood  Rapid heartbeat  If you have any of these symptoms or get worse quickly, call 911 for emergency treatment.  How can I prevent a DVT?  Avoid long periods of inactivity and dont cross your legs--get up and walk around every hour or so.  Stay active--walking after surgery is highly encouraged.  This means you should get out of the house and walk in the neighborhood.  Going up and down stairs will not impair healing (unless advised against such activity by your doctor).    Drink plenty of noncaffeinated, nonalcoholic fluids each day to prevent dehydration.  Wear special support stockings, if they have been advised by your doctor.  If you travel, stop at least once an hour and walk around.  Avoid smoking (assistance with stopping is available through your healthcare provider)  Always notify your doctor if you are not able to follow the post operative instructions that are given to you at the time of discharge.  It may be necessary to prescribe one of the medications available to prevent DVT.

## 2022-05-12 LAB
COMPN STONE: NORMAL
SPECIMEN SOURCE: NORMAL
STONE ANALYSIS IR-IMP: NORMAL

## 2022-05-13 NOTE — DISCHARGE INSTRUCTIONS
Cystoscopy    Cystoscopy is a procedure that lets your doctor look directly inside your urethra and bladder. It can be used to:  Help diagnose a problem with your urethra, bladder, or kidneys.  Take a sample (biopsy) of bladder or urethral tissue.  Treat certain problems (such as removing kidney stones).  Place a stent to bypass an obstruction.  Take special X-rays of the kidneys.  Based on the findings, your doctor may recommend other tests or treatments.  What is a cystoscope?  A cystoscope is a telescope-like instrument that contains lenses and fiberoptics (small glass wires that make bright light). The cystoscope may be straight and rigid, or flexible to bend around curves in the urethra. The doctor may look directly into the cystoscope, or project the image onto a monitor.  Getting ready  Ask your doctor if you should stop taking any medicines before the procedure.  Ask whether you should avoid eating or drinking anything after midnight before the procedure.  Follow any other instructions your doctor gives you.  Tell your doctor before the exam if you:  Take any medicines, such as aspirin or blood thinners  Have allergies to any medicines  Are pregnant   The procedure  Cystoscopy is done in the doctors office, surgery center, or hospital. The doctor and a nurse are present during the procedure. It takes only a few minutes, longer if a biopsy, X-ray, or treatment needs to be done.  During the procedure:  You lie on an exam table on your back, knees bent and legs apart. You are covered with a drape.  Your urethra and the area around it are washed. Anesthetic jelly may be applied to numb the urethra. Other pain medicine is usually not needed. In some cases, you may be offered a mild sedative to help you relax. If a more extensive procedure is to be done, such as a biopsy or kidney stone removal, general anesthesia may be needed.  The cystoscope is inserted. A sterile fluid is put into the bladder to expand it.  You may feel pressure from this fluid.  When the procedure is done, the cystoscope is removed.  After the procedure  If you had a sedative, general anesthesia, or spinal anesthesia, you must have someone drive you home. Once youre home:  Drink plenty of fluids.  You may have burning or light bleeding when you urinate--this is normal.  Medicines may be prescribed to ease any discomfort or prevent infection. Take these as directed.  Call your doctor if you have heavy bleeding or blood clots, burning that lasts more than a day, a fever over 100°F  (38° C), or trouble urinating.    After Surgery:  Always be aware that any surgery can cause these symptoms:    Pain- Medication can be prescribed for pain to decrease your pain but may not completely take your pain away.  Over the Counter pain medicine my be enough and you can always use Ice and rest to help ease pain.    Bleeding- a little bleeding after a surgery is usually within normal.  If there is a lot of blood you need to notify your MD.  Emergency treatments of bleeding are cold application, elevation of the bleeding site and compression.    Infection- Infection after surgery is NOT a normal occurrence.  Signs of infection are fever, swelling, hot to touch the incision.  If this occurs notify your MD immediately.    Nausea- this can be common after a surgery especially if you have had anesthesia medicine or are taking pain medicine.  Staying on clear liquids, bland foods, gingerale, or over the counter anti nausea medicines can help.  If you vomit more than once, notify your MD.  Anti Nausea medicines can be prescribed.

## 2022-05-17 ENCOUNTER — HOSPITAL ENCOUNTER (OUTPATIENT)
Facility: AMBULARY SURGERY CENTER | Age: 67
Discharge: HOME | End: 2022-05-17
Attending: UROLOGY | Admitting: UROLOGY
Payer: MEDICARE

## 2022-05-17 DIAGNOSIS — M54.17 LUMBOSACRAL RADICULOPATHY: Primary | ICD-10-CM

## 2022-05-17 DIAGNOSIS — N20.1 LEFT URETERAL STONE: ICD-10-CM

## 2022-05-17 PROCEDURE — 52310 CYSTOSCOPY AND TREATMENT: CPT | Performed by: UROLOGY

## 2022-05-17 PROCEDURE — 52310 CYSTOSCOPY AND TREATMENT: CPT | Mod: ,,, | Performed by: UROLOGY

## 2022-05-17 PROCEDURE — 52310 PR CYSTOSCOPY,REMV CALCULUS,SIMPLE: ICD-10-PCS | Mod: ,,, | Performed by: UROLOGY

## 2022-05-17 RX ORDER — LIDOCAINE HYDROCHLORIDE 20 MG/ML
JELLY TOPICAL
Status: DISCONTINUED
Start: 2022-05-17 | End: 2022-05-17 | Stop reason: HOSPADM

## 2022-05-17 RX ORDER — LIDOCAINE HYDROCHLORIDE 20 MG/ML
JELLY TOPICAL
Status: DISCONTINUED | OUTPATIENT
Start: 2022-05-17 | End: 2022-05-17 | Stop reason: HOSPADM

## 2022-05-17 RX ORDER — WATER 1 ML/ML
IRRIGANT IRRIGATION
Status: DISCONTINUED | OUTPATIENT
Start: 2022-05-17 | End: 2022-05-17 | Stop reason: HOSPADM

## 2022-05-17 RX ORDER — CIPROFLOXACIN 500 MG/1
500 TABLET ORAL 2 TIMES DAILY
Qty: 4 TABLET | Refills: 0 | Status: SHIPPED | OUTPATIENT
Start: 2022-05-17 | End: 2022-05-31

## 2022-05-17 NOTE — INTERVAL H&P NOTE
The patient has been examined and the H&P has been reviewed:    5/5/22 stone extraction  Here for stent removal  Acceptable for asxc      There are no hospital problems to display for this patient.

## 2022-05-17 NOTE — PLAN OF CARE
Discharge instructions given to pt, verbalized understanding.  24 hour urine info attached.  Refused PO fluids.  No c/o burning; does have slight bleeding with urination.  Ambulating out to vehicle to self care in no distress.

## 2022-05-18 VITALS
OXYGEN SATURATION: 100 % | WEIGHT: 181 LBS | BODY MASS INDEX: 25.91 KG/M2 | TEMPERATURE: 98 F | DIASTOLIC BLOOD PRESSURE: 81 MMHG | HEIGHT: 70 IN | HEART RATE: 65 BPM | RESPIRATION RATE: 20 BRPM | SYSTOLIC BLOOD PRESSURE: 114 MMHG

## 2022-05-18 NOTE — OP NOTE
[facility] Urology Operative/Discharge Note     Date: 5/17/22     Staff Surgeon: Ronal Benitez MD      Pre-Op Diagnosis:   left indwelling ureteral stent      Post-Op Diagnosis: same      Procedure(s) Performed:   Cystoscopy with left ureteral stent removal      Specimen(s): none      Anesthesia: local urojet      Findings: ureteral stent, removed      Estimated Blood Loss: none      Drains: none      Complications: none      Indications for procedure:   67 year old male status post L URS/HLL of 1.1 cm lower pole stone burden and scattered other small punctate stones throughout the left collecting system upper and lower pole.     Procedure in detail:  After informed consent, the patient was placed dorsal lithotomy position and prepped and drapped in standard cystoscopic fashion and 2% xylocaine jelly was instilled into the urethra.  A flexible cystoscope was passed into the bladder via the urethra,     Urethra appeared normal, with moderate prostate enlargement  bilateral ureteral orifices in orthotpic positionwith the distal curl of the stent seen emanating from left ureteral orifice. No calcifications on distal curl of stent.  2-pronged grasper was passed through the scope and used to grasp and remove the stent. Stent was inspected on the back table and found to be completely in tact. Patient tolerated procedure well. There were no complications.       Disposition: home.   Stone prevention recs provided  Stone 90% Caox and 10% uric acid. Noted to already be on urocitK  FU NP 3 mos with metabolic workup 24hr urine and labs, as well as US to ro silent obstruction  May need dose adjustments. Also discussed low purine/low uric acid diet.  Complete 2d abx  Noted flomax worked better than uroxatral for his LUTS but had dizziness which not having on uroxatral  Can reeval LUTS at f/u too and discuss further management      Discharge home today status post uncomplicated procedure as above  Diet - stone prevention  Follow  up:  as above - ANDREA and labs and 24hr urine and NP in 3 mos for stone prevention and bph reeval  Instructions: drink water, may see blood in urine, complete antibiotics, follow stone prevention recs  Meds:     Medication List      START taking these medications    ciprofloxacin HCl 500 MG tablet  Commonly known as: CIPRO  Take 1 tablet (500 mg total) by mouth 2 (two) times daily.        CONTINUE taking these medications    alfuzosin 10 mg Tb24  Commonly known as: UROXATRAL  Take 1 tablet (10 mg total) by mouth daily with breakfast.     amLODIPine 2.5 MG tablet  Commonly known as: NORVASC  Take 1 tablet (2.5 mg total) by mouth once daily.     cetirizine 10 MG tablet  Commonly known as: ZYRTEC     diphenhydrAMINE 25 mg capsule  Commonly known as: BENADRYL     fluticasone propionate 50 mcg/actuation nasal spray  Commonly known as: FLONASE     ibuprofen 200 MG tablet  Commonly known as: ADVIL,MOTRIN     metFORMIN 750 MG ER 24hr tablet  Commonly known as: GLUCOPHAGE-XR  Take 1 tablet (750 mg total) by mouth 2 (two) times daily with meals.     MUCINEX ORAL     potassium citrate 15 mEq Tbsr  Commonly known as: UROCIT-K 15  Take 15 mEq by mouth 2 (two) times a day.     rosuvastatin 20 MG tablet  Commonly known as: CRESTOR  TAKE 1 TABLET DAILY (REPLACES PRAVASTATIN)     valsartan 320 MG tablet  Commonly known as: DIOVAN  TAKE 1 TABLET DAILY (REPLACES BENAZEPRIL 40 MG)        ASK your doctor about these medications    triamcinolone acetonide 0.5% 0.5 % Crea  Commonly known as: KENALOG  Apply topically 2 (two) times daily.           Where to Get Your Medications      These medications were sent to [id number] - [place], [place] - [address] [facility] [place] [facility] [place] [address]  [address], [place] [address]    Phone: [phone]   · ciprofloxacin HCl 500 MG tablet

## 2022-05-24 ENCOUNTER — OFFICE VISIT (OUTPATIENT)
Dept: SPINE | Facility: CLINIC | Age: 67
End: 2022-05-24
Payer: MEDICARE

## 2022-05-24 VITALS — RESPIRATION RATE: 18 BRPM | WEIGHT: 181 LBS | BODY MASS INDEX: 25.91 KG/M2 | HEIGHT: 70 IN

## 2022-05-24 DIAGNOSIS — M54.17 LUMBOSACRAL RADICULOPATHY: Primary | ICD-10-CM

## 2022-05-24 DIAGNOSIS — M71.38 SYNOVIAL CYST OF LUMBAR FACET JOINT: ICD-10-CM

## 2022-05-24 DIAGNOSIS — M54.16 LUMBAR RADICULOPATHY, CHRONIC: ICD-10-CM

## 2022-05-24 PROCEDURE — 99214 OFFICE O/P EST MOD 30 MIN: CPT | Mod: S$GLB,,, | Performed by: PHYSICAL MEDICINE & REHABILITATION

## 2022-05-24 PROCEDURE — 99214 PR OFFICE/OUTPT VISIT, EST, LEVL IV, 30-39 MIN: ICD-10-PCS | Mod: S$GLB,,, | Performed by: PHYSICAL MEDICINE & REHABILITATION

## 2022-05-24 NOTE — PROGRESS NOTES
SUBJECTIVE:    Patient ID: Toni Hyde is a 67 y.o. male.    Chief Complaint: Low-back Pain and Leg Pain (R leg pain)    This is a 67-year-old man who sees Dr. Mon for primary care.  History of diabetes hypertension and hyperlipidemia otherwise denies any chronic major medical problems.  Presents with complaints of right-sided low back pain with right leg pain into the posterior portion of the leg and plantar portion of the right foot.  These are familiar symptoms.  I note that he saw Dr. Davalos in .  MRI at that time showed a synovial cyst on the right at L5-S1.  Patient subsequently underwent right L5-S1 cyst aspiration with a right transforaminal epidural steroid injection at L5-S1 on 2020 with Dr. Davalos.  He had an excellent response to that procedure and has only recently started to develop symptoms again.  These are familiar symptoms described above.  Bowel and bladder are intact.  No fever.  Symptoms are worse in the morning and better with activity.  He is interested in repeating the procedure.  Pain level is 4/10        Past Medical History:   Diagnosis Date    Cataract     Cataract of left eye     Diabetes mellitus type II     on po meds    Hyperlipidemia     not on meds at present    Hypertension     Kidney stones 2017    Left ureteral stone     Plantar fasciitis of right foot 10/2017    Dr [place]     Tendonitis 2018    right foot Dr Colorado    Wears glasses      Social History     Socioeconomic History    Marital status:    Tobacco Use    Smoking status: Former Smoker     Types: Cigarettes     Quit date: 1978     Years since quittin.1    Smokeless tobacco: Never Used   Substance and Sexual Activity    Alcohol use: Yes     Alcohol/week: 2.5 standard drinks     Types: 3 Standard drinks or equivalent per week     Comment: 3 drinks per week    Drug use: No     Social Determinants of Health     Financial Resource Strain: Low Risk     Difficulty of  Paying Living Expenses: Not hard at all   Food Insecurity: No Food Insecurity    Worried About Running Out of Food in the Last Year: Never true    Ran Out of Food in the Last Year: Never true   Transportation Needs: No Transportation Needs    Lack of Transportation (Medical): No    Lack of Transportation (Non-Medical): No   Physical Activity: Sufficiently Active    Days of Exercise per Week: 5 days    Minutes of Exercise per Session: 40 min   Stress: No Stress Concern Present    Feeling of Stress : Not at all   Social Connections: Unknown    Frequency of Communication with Friends and Family: More than three times a week    Frequency of Social Gatherings with Friends and Family: Once a week    Active Member of Clubs or Organizations: No    Attends Club or Organization Meetings: Never    Marital Status:    Housing Stability: Low Risk     Unable to Pay for Housing in the Last Year: No    Number of Places Lived in the Last Year: 1    Unstable Housing in the Last Year: No     Past Surgical History:   Procedure Laterality Date    ADENOIDECTOMY      CATARACT EXTRACTION      COLONOSCOPY  3/23/2010    Dr. Blackburn, hyperplastic polyps, otherwise negative, 10 year recheck    COLONOSCOPY N/A 11/19/2021    Procedure: COLONOSCOPY;  Surgeon: Marcus Sexton MD;  [facility];  Service: Endoscopy;  Laterality: N/A;    CYSTOSCOPY Left 04/12/2017    with ureteral stent-Dr. Noel    CYSTOSCOPY Left 5/17/2022    Procedure: CYSTOSCOPY with stent removal;  Surgeon: oRnal Benitez MD;  Location: [facility];  Service: Urology;  Laterality: Left;    CYSTOSCOPY WITH URETEROSCOPY, RETROGRADE PYELOGRAPHY, AND INSERTION OF STENT Left 5/5/2022    Procedure: CYSTOSCOPY, WITH RETROGRADE PYELOGRAM AND URETERAL STENT INSERTION;  Surgeon: Ronal Benitez MD;  Location: [facility];  Service: Urology;  Laterality: Left;    EYE SURGERY  05/06/2013    Dr Nielsen    kidney stent   04/2017    KNEE ARTHROSCOPY W/  "DEBRIDEMENT      right with lateral release     LITHOTRIPSY  04/19/2017    NASAL SINUS SURGERY      retna surgery   5/2013    Dr Peralta    TONSILLECTOMY      TRANSFORAMINAL EPIDURAL INJECTION OF STEROID Right 12/7/2020    Procedure: Injection,steroid,epidural,transforaminal approach L5- S1;  Surgeon: Kermit Davalos MD;  Location: [facility];  Service: Pain Management;  Laterality: Right;  L5-s1    URETEROSCOPIC REMOVAL OF URETERIC CALCULUS Left 5/5/2022    Procedure: REMOVAL, CALCULUS, URETER, URETEROSCOPIC;  Surgeon: Ronal Benitez MD;  Location: [facility] OR;  Service: Urology;  Laterality: Left;     Family History   Problem Relation Age of Onset    Hypertension Father     Neuropathy Father     Stroke Paternal Grandmother     Lung cancer Paternal Grandfather     Cancer Paternal Grandfather     Cataracts Mother     Diabetes Maternal Grandmother     No Known Problems Sister     No Known Problems Maternal Aunt      Vitals:    05/24/22 1309   Resp: 18   Weight: 82.1 kg (181 lb)   Height: 5' 10" (1.778 m)       Review of Systems   Constitutional: Negative for chills, diaphoresis, fatigue, fever and unexpected weight change.   HENT: Negative for trouble swallowing.    Eyes: Negative for visual disturbance.   Respiratory: Negative for shortness of breath.    Cardiovascular: Negative for chest pain.   Gastrointestinal: Negative for abdominal pain, constipation, diarrhea, nausea and vomiting.   Genitourinary: Negative for difficulty urinating.   Musculoskeletal: Negative for arthralgias, back pain, gait problem, joint swelling, myalgias, neck pain and neck stiffness.   Neurological: Negative for dizziness, speech difficulty, weakness, light-headedness, numbness and headaches.          Objective:      Physical Exam  Neurological:      Mental Status: He is alert and oriented to person, place, and time.      Comments: He is awake and in no acute distress  No point tenderness or palpable masses about the lumbar " spine  Forward flexion and extension of the lumbar spine are normal and painless  He can heel and toe walk normally  Reflexes- +1-+2 reflexes at the following:   C5-Biceps   C6-Brachioradialis   C7-Triceps   L3/4-Patellar   S1-Achilles  Cale sign negative bilaterally  Strength testing- 5/5 strength in the following muscle groups:  C5-Elbow flexion  C6-Wrist extension  C7-Elbow extension  C8-Finger flexion  T1-Finger abduction  L2-Hip flexion  L3-Knee extension  L4-Ankle dorsiflexion  L5-Great toe extension  S1-Ankle plantar flexion    Straight leg raise negative bilaterally             Assessment:       1. Lumbosacral radiculopathy    2. Synovial cyst of lumbar facet joint    3. Lumbar radiculopathy, chronic           Plan:     he presents today with familiar right leg discomfort thought secondary to a lumbar facet cyst on the right at L5-S1 with previous excellent response to cyst aspiration and transforaminal injection at L5-S1.  I am going to get an updated MRI to see if the cyst has reformed.  If it has then we would repeat aspiration and transforaminal injection.  If it has not than transforaminal injection alone may suffice.  Follow-up with me after the scan      Lumbosacral radiculopathy    Synovial cyst of lumbar facet joint    Lumbar radiculopathy, chronic  -     MRI Lumbar Spine W WO Cont; Future; Expected date: 05/24/2022

## 2022-05-27 ENCOUNTER — LAB VISIT (OUTPATIENT)
Dept: LAB | Facility: HOSPITAL | Age: 67
End: 2022-05-27
Attending: FAMILY MEDICINE
Payer: MEDICARE

## 2022-05-27 DIAGNOSIS — E11.65 TYPE 2 DIABETES MELLITUS WITH HYPERGLYCEMIA, WITHOUT LONG-TERM CURRENT USE OF INSULIN: ICD-10-CM

## 2022-05-27 LAB
ANION GAP SERPL CALC-SCNC: 10 MMOL/L (ref 8–16)
BUN SERPL-MCNC: 23 MG/DL (ref 8–23)
CALCIUM SERPL-MCNC: 9.2 MG/DL (ref 8.7–10.5)
CHLORIDE SERPL-SCNC: 102 MMOL/L (ref 95–110)
CO2 SERPL-SCNC: 25 MMOL/L (ref 23–29)
CREAT SERPL-MCNC: 1.1 MG/DL (ref 0.5–1.4)
EST. GFR  (AFRICAN AMERICAN): >60 ML/MIN/1.73 M^2
EST. GFR  (NON AFRICAN AMERICAN): >60 ML/MIN/1.73 M^2
ESTIMATED AVG GLUCOSE: 134 MG/DL (ref 68–131)
GLUCOSE SERPL-MCNC: 89 MG/DL (ref 70–110)
HBA1C MFR BLD: 6.3 % (ref 4–5.6)
POTASSIUM SERPL-SCNC: 4.3 MMOL/L (ref 3.5–5.1)
SODIUM SERPL-SCNC: 137 MMOL/L (ref 136–145)

## 2022-05-27 PROCEDURE — 80048 BASIC METABOLIC PNL TOTAL CA: CPT | Performed by: FAMILY MEDICINE

## 2022-05-27 PROCEDURE — 36415 COLL VENOUS BLD VENIPUNCTURE: CPT | Mod: PO | Performed by: FAMILY MEDICINE

## 2022-05-27 PROCEDURE — 83036 HEMOGLOBIN GLYCOSYLATED A1C: CPT | Performed by: FAMILY MEDICINE

## 2022-05-30 ENCOUNTER — HOSPITAL ENCOUNTER (OUTPATIENT)
Dept: RADIOLOGY | Facility: HOSPITAL | Age: 67
Discharge: HOME | End: 2022-05-30
Attending: PHYSICAL MEDICINE & REHABILITATION
Payer: MEDICARE

## 2022-05-30 DIAGNOSIS — M54.16 LUMBAR RADICULOPATHY, CHRONIC: ICD-10-CM

## 2022-05-30 PROCEDURE — 25500020 PHARM REV CODE 255: Mod: PO | Performed by: PHYSICAL MEDICINE & REHABILITATION

## 2022-05-30 PROCEDURE — A9585 GADOBUTROL INJECTION: HCPCS | Mod: PO | Performed by: PHYSICAL MEDICINE & REHABILITATION

## 2022-05-30 PROCEDURE — 72158 MRI LUMBAR SPINE W/O & W/DYE: CPT | Mod: TC,PO

## 2022-05-30 RX ORDER — GADOBUTROL 604.72 MG/ML
8.5 INJECTION INTRAVENOUS
Status: COMPLETED | OUTPATIENT
Start: 2022-05-30 | End: 2022-05-30

## 2022-05-30 RX ADMIN — GADOBUTROL 8.5 ML: 604.72 INJECTION INTRAVENOUS at 02:05

## 2022-05-31 ENCOUNTER — OFFICE VISIT (OUTPATIENT)
Dept: FAMILY MEDICINE | Facility: CLINIC | Age: 67
End: 2022-05-31
Attending: FAMILY MEDICINE
Payer: MEDICARE

## 2022-05-31 VITALS
WEIGHT: 188.06 LBS | DIASTOLIC BLOOD PRESSURE: 60 MMHG | HEIGHT: 70 IN | HEART RATE: 71 BPM | TEMPERATURE: 98 F | RESPIRATION RATE: 18 BRPM | SYSTOLIC BLOOD PRESSURE: 106 MMHG | BODY MASS INDEX: 26.92 KG/M2 | OXYGEN SATURATION: 95 %

## 2022-05-31 DIAGNOSIS — I10 ESSENTIAL HYPERTENSION: ICD-10-CM

## 2022-05-31 DIAGNOSIS — E11.69 HYPERLIPIDEMIA ASSOCIATED WITH TYPE 2 DIABETES MELLITUS: ICD-10-CM

## 2022-05-31 DIAGNOSIS — E11.65 TYPE 2 DIABETES MELLITUS WITH HYPERGLYCEMIA, WITHOUT LONG-TERM CURRENT USE OF INSULIN: Primary | ICD-10-CM

## 2022-05-31 DIAGNOSIS — N40.0 BENIGN PROSTATIC HYPERPLASIA WITHOUT LOWER URINARY TRACT SYMPTOMS: ICD-10-CM

## 2022-05-31 DIAGNOSIS — E78.5 HYPERLIPIDEMIA ASSOCIATED WITH TYPE 2 DIABETES MELLITUS: ICD-10-CM

## 2022-05-31 DIAGNOSIS — I15.2 HYPERTENSION ASSOCIATED WITH DIABETES: ICD-10-CM

## 2022-05-31 DIAGNOSIS — N20.0 KIDNEY STONES: ICD-10-CM

## 2022-05-31 DIAGNOSIS — E11.59 HYPERTENSION ASSOCIATED WITH DIABETES: ICD-10-CM

## 2022-05-31 PROCEDURE — 99999 PR PBB SHADOW E&M-EST. PATIENT-LVL III: ICD-10-PCS | Mod: PBBFAC,,, | Performed by: FAMILY MEDICINE

## 2022-05-31 PROCEDURE — 99213 OFFICE O/P EST LOW 20 MIN: CPT | Mod: PBBFAC,PN | Performed by: FAMILY MEDICINE

## 2022-05-31 PROCEDURE — 99214 PR OFFICE/OUTPT VISIT, EST, LEVL IV, 30-39 MIN: ICD-10-PCS | Mod: S$PBB,,, | Performed by: FAMILY MEDICINE

## 2022-05-31 PROCEDURE — 99999 PR PBB SHADOW E&M-EST. PATIENT-LVL III: CPT | Mod: PBBFAC,,, | Performed by: FAMILY MEDICINE

## 2022-05-31 PROCEDURE — 99214 OFFICE O/P EST MOD 30 MIN: CPT | Mod: S$PBB,,, | Performed by: FAMILY MEDICINE

## 2022-05-31 RX ORDER — VALSARTAN 320 MG/1
TABLET ORAL
Qty: 90 TABLET | Refills: 3 | Status: SHIPPED | OUTPATIENT
Start: 2022-05-31 | End: 2022-10-17

## 2022-05-31 RX ORDER — ROSUVASTATIN CALCIUM 20 MG/1
TABLET, COATED ORAL
Qty: 90 TABLET | Refills: 3 | Status: SHIPPED | OUTPATIENT
Start: 2022-05-31 | End: 2023-06-23 | Stop reason: SDUPTHER

## 2022-05-31 RX ORDER — AMLODIPINE BESYLATE 2.5 MG/1
2.5 TABLET ORAL DAILY
Qty: 90 TABLET | Refills: 3 | Status: SHIPPED | OUTPATIENT
Start: 2022-05-31 | End: 2023-06-23 | Stop reason: SDUPTHER

## 2022-05-31 RX ORDER — METFORMIN HYDROCHLORIDE 750 MG/1
750 TABLET, EXTENDED RELEASE ORAL 2 TIMES DAILY WITH MEALS
Qty: 180 TABLET | Refills: 1 | Status: SHIPPED | OUTPATIENT
Start: 2022-05-31 | End: 2022-07-07

## 2022-05-31 NOTE — PROGRESS NOTES
Subjective:       Patient ID: Toni Hyde is a 67 y.o. male.    Chief Complaint: Follow-up (6 month)    67-year-old male coming in for six month follow-up of diabetes and hypertension.  Lab was done May 27th with an A1c of 6.3, moderately elevated microalbumin/creatinine ratio at 95.5, a glucose of 89, creatinine of 1.1, and a GFR greater than 60. He also recently had a PSA done by Dr. Benitez on March 11, 2022 that was low at 0.56.  The patient has been seeing Dr. Benitez for a series of kidney stones but has no symptoms at this time.  History includes diabetes type 2, hypertension, hyperlipidemia, lumbosacral radicular pain with previous epidural steroid injections, the kidney stones and BPH without urinary changes.  His colonoscopy was done November 19, 2021 by Dr. Sexton with a three year recheck recommended in November 2024. He has had lithotripsy and stenting, cataract surgery, right knee arthroscopic surgery, and a tonsillectomy.  He reports that he had his eye exam with Dr. Leo melt September 10, 2021. Health maintenance is otherwise up-to-date although he will be coming due for a foot exam in the not too distant future.  His next lipid panel and A1c should be done in October/November 2022. Currently he is taking amlodipine 2.5 mg daily and Diovan 320 mg daily for his blood pressure with Crestor 20 mg daily for his cholesterol and metformin 750 mg b.i.d. for his diabetes.  His personal target for his A1c is 5.7.  We did have a discussion about appropriate targets for his age, we do not want him falling secondary to hypoglycemia.    Past Medical History:  No date: Cataract  No date: Cataract of left eye  No date: Diabetes mellitus type II      Comment:  on po meds  No date: Hyperlipidemia      Comment:  not on meds at present  No date: Hypertension  04/2017: Kidney stones  No date: Left ureteral stone  10/2017: Plantar fasciitis of right foot      Comment:  Dr Colorado   01/2018: Tendonitis      Comment:   right foot Dr Colorado  No date: Wears glasses    Past Surgical History:  No date: ADENOIDECTOMY  No date: CATARACT EXTRACTION  3/23/2010: COLONOSCOPY      Comment:  Dr. Blackburn, hyperplastic polyps, otherwise negative, 10                year recheck  11/19/2021: COLONOSCOPY; N/A      Comment:  Procedure: COLONOSCOPY;  Surgeon: Marcus Sexton MD;                Location: [facility];  Service: Endoscopy;  Laterality:                N/A;  04/12/2017: CYSTOSCOPY; Left      Comment:  with ureteral stent-Dr. Noel  5/17/2022: CYSTOSCOPY; Left      Comment:  Procedure: CYSTOSCOPY with stent removal;  Surgeon:                Ronal Benitez MD;  Location: [facility] OR;  Service:                Urology;  Laterality: Left;  5/5/2022: CYSTOSCOPY WITH URETEROSCOPY, RETROGRADE PYELOGRAPHY, AND   INSERTION OF STENT; Left      Comment:  Procedure: CYSTOSCOPY, WITH RETROGRADE PYELOGRAM AND                URETERAL STENT INSERTION;  Surgeon: Ronal Benitez MD;  Location: Replaced by [facility];  Service: Urology;  Laterality:                Left;  05/06/2013: EYE SURGERY      Comment:  Dr Nielsen  04/2017: kidney stent   No date: KNEE ARTHROSCOPY W/ DEBRIDEMENT      Comment:  right with lateral release   04/19/2017: LITHOTRIPSY  No date: NASAL SINUS SURGERY  5/2013: retna surgery       Comment:  Dr Peralta  No date: TONSILLECTOMY  12/7/2020: TRANSFORAMINAL EPIDURAL INJECTION OF STEROID; Right      Comment:  Procedure: Injection,steroid,epidural,transforaminal                approach L5- S1;  Surgeon: Kermit Davalos MD;  Location:                [facility];  Service: Pain Management;  Laterality: Right;                 L5-s1  5/5/2022: URETEROSCOPIC REMOVAL OF URETERIC CALCULUS; Left      Comment:  Procedure: REMOVAL, CALCULUS, URETER, URETEROSCOPIC;                 Surgeon: Ronal Benitez MD;  Location: [facility] OR;                 Service: Urology;  Laterality: Left;    Current Outpatient Medications on File Prior to Visit:  alfuzosin  (UROXATRAL) 10 mg Tb24, Take 1 tablet (10 mg total) by mouth daily with breakfast., Disp: 30 tablet, Rfl: 11  cetirizine (ZYRTEC) 10 MG tablet, Take 10 mg by mouth daily as needed. 1 Tablet(s) Oral PRN ., Disp: , Rfl:   diphenhydrAMINE (BENADRYL) 25 mg capsule, Take 25 mg by mouth nightly as needed for Itching., Disp: , Rfl:   fluticasone (FLONASE) 50 mcg/actuation nasal spray, 2 sprays by Each Nare route daily as needed. , Disp: , Rfl:   guaifenesin (MUCINEX ORAL), Take 400 mg by mouth 2 (two) times daily as needed for Congestion., Disp: , Rfl:   ibuprofen (ADVIL,MOTRIN) 200 MG tablet, Take 400 mg by mouth every 6 (six) hours as needed. 2 Tablet(s) Oral PRN Every day., Disp: , Rfl:   potassium citrate (UROCIT-K 15) 15 mEq TbSR, Take 15 mEq by mouth 2 (two) times a day., Disp: 180 tablet, Rfl: 3  (DISCONTINUED) amLODIPine (NORVASC) 2.5 MG tablet, Take 1 tablet (2.5 mg total) by mouth once daily., Disp: 90 tablet, Rfl: 3  (DISCONTINUED) metFORMIN (GLUCOPHAGE-XR) 750 MG ER 24hr tablet, Take 1 tablet (750 mg total) by mouth 2 (two) times daily with meals., Disp: 180 tablet, Rfl: 1  (DISCONTINUED) rosuvastatin (CRESTOR) 20 MG tablet, TAKE 1 TABLET DAILY (REPLACES PRAVASTATIN), Disp: 90 tablet, Rfl: 3  (DISCONTINUED) valsartan (DIOVAN) 320 MG tablet, TAKE 1 TABLET DAILY (REPLACES BENAZEPRIL 40 MG), Disp: 90 tablet, Rfl: 3  (DISCONTINUED) ciprofloxacin HCl (CIPRO) 500 MG tablet, Take 1 tablet (500 mg total) by mouth 2 (two) times daily. (Patient not taking: Reported on 5/31/2022), Disp: 4 tablet, Rfl: 0  (DISCONTINUED) triamcinolone acetonide 0.5% (KENALOG) 0.5 % Crea, Apply topically 2 (two) times daily., Disp: 15 g, Rfl: 1    Current Facility-Administered Medications on File Prior to Visit:  lidocaine (PF) 10 mg/ml (1%) injection 10 mg, 1 mL, Intradermal, Once, Kermit Davalos MD            Review of Systems   Constitutional: Negative for chills, diaphoresis and fever.   Eyes: Negative for visual disturbance.    Respiratory: Negative for chest tightness and shortness of breath.    Cardiovascular: Negative for chest pain, palpitations and leg swelling.   Gastrointestinal: Negative for constipation, diarrhea and nausea.   Endocrine: Negative for polydipsia and polyuria.   Musculoskeletal: Negative for back pain and myalgias.   Skin: Negative for color change and rash.       Objective:      Physical Exam  Vitals and nursing note reviewed.   Constitutional:       General: He is not in acute distress.     Appearance: Normal appearance. He is well-developed and normal weight. He is not ill-appearing, toxic-appearing or diaphoretic.      Comments: Good blood pressure control  Normal pulse with regular rhythm  Normal weight for age with a BMI of 26.9 he is up 11.4 lb from his October 13, 2021 visit.   HENT:      Head: Normocephalic and atraumatic.      Right Ear: Tympanic membrane, ear canal and external ear normal. There is no impacted cerumen.      Left Ear: Tympanic membrane, ear canal and external ear normal. There is no impacted cerumen.      Nose: Nose normal. No congestion or rhinorrhea.      Mouth/Throat:      Mouth: Mucous membranes are moist.      Pharynx: Oropharynx is clear. No oropharyngeal exudate or posterior oropharyngeal erythema.   Eyes:      General: No scleral icterus.     Extraocular Movements: Extraocular movements intact.      Conjunctiva/sclera: Conjunctivae normal.      Pupils: Pupils are equal, round, and reactive to light.   Neck:      Thyroid: No thyromegaly.      Vascular: No carotid bruit or JVD.      Trachea: No tracheal deviation.   Cardiovascular:      Rate and Rhythm: Normal rate and regular rhythm.      Pulses: Normal pulses.           Dorsalis pedis pulses are 2+ on the right side and 2+ on the left side.        Posterior tibial pulses are 2+ on the right side and 2+ on the left side.      Heart sounds: Normal heart sounds. No murmur heard.    No friction rub. No gallop.   Pulmonary:       Effort: Pulmonary effort is normal. No respiratory distress.      Breath sounds: Normal breath sounds. No stridor. No wheezing, rhonchi or rales.   Chest:      Chest wall: No tenderness.   Abdominal:      General: Abdomen is flat. Bowel sounds are normal. There is no distension.      Palpations: Abdomen is soft. There is no mass.      Tenderness: There is no abdominal tenderness. There is no guarding or rebound.      Hernia: No hernia is present.   Musculoskeletal:         General: No swelling, tenderness, deformity or signs of injury. Normal range of motion.      Cervical back: Normal range of motion and neck supple. No rigidity or tenderness.      Right lower leg: No edema.      Left lower leg: No edema.      Right foot: Normal range of motion. No deformity, bunion, Charcot foot, foot drop or prominent metatarsal heads.      Left foot: Normal range of motion. No deformity, bunion, Charcot foot, foot drop or prominent metatarsal heads.   Feet:      Right foot:      Protective Sensation: 9 sites tested. 9 sites sensed.      Skin integrity: Skin integrity normal. No ulcer, blister, skin breakdown, erythema, warmth, callus, dry skin or fissure.      Toenail Condition: Right toenails are normal.      Left foot:      Protective Sensation: 9 sites tested. 9 sites sensed.      Skin integrity: Skin integrity normal. No ulcer, blister, skin breakdown, erythema, warmth, callus, dry skin or fissure.      Toenail Condition: Left toenails are normal.   Lymphadenopathy:      Cervical: No cervical adenopathy.   Skin:     General: Skin is warm and dry.      Capillary Refill: Capillary refill takes less than 2 seconds.      Coloration: Skin is not jaundiced or pale.      Findings: No bruising, erythema, lesion or rash.   Neurological:      General: No focal deficit present.      Mental Status: He is alert and oriented to person, place, and time. Mental status is at baseline.      Cranial Nerves: No cranial nerve deficit.       Sensory: No sensory deficit.      Motor: No weakness.      Coordination: Coordination normal.      Gait: Gait normal.      Deep Tendon Reflexes: Reflexes are normal and symmetric. Reflexes normal.      Comments: Proprioception intact all 10 toes   Psychiatric:         Mood and Affect: Mood normal.         Behavior: Behavior normal.         Thought Content: Thought content normal.         Judgment: Judgment normal.         Assessment:       1. Type 2 diabetes mellitus with hyperglycemia, without long-term current use of insulin    2. Hypertension associated with diabetes    3. Hyperlipidemia associated with type 2 diabetes mellitus    4. Kidney stones    5. Benign prostatic hyperplasia without lower urinary tract symptoms    6. Essential hypertension    7. BMI 26.0-26.9,adult        Plan:       1. Type 2 diabetes mellitus with hyperglycemia, without long-term current use of insulin  Lab Results   Component Value Date    HGBA1C 6.3 (H) 05/27/2022   Schedule next lab in October/November 2022 with office visit to follow  - metFORMIN (GLUCOPHAGE-XR) 750 MG ER 24hr tablet; Take 1 tablet (750 mg total) by mouth 2 (two) times daily with meals.  Dispense: 180 tablet; Refill: 1  - Comprehensive Metabolic Panel; Future  - Hemoglobin A1C; Future  - Lipid Panel; Future    2. Hypertension associated with diabetes  Good control, no medication changes needed refill sent to mail order pharmacy  - valsartan (DIOVAN) 320 MG tablet; TAKE 1 TABLET DAILY (REPLACES BENAZEPRIL 40 MG)  Dispense: 90 tablet; Refill: 3  - Comprehensive Metabolic Panel; Future  - Lipid Panel; Future  - CBC Auto Differential; Future    3. Hyperlipidemia associated with type 2 diabetes mellitus  Schedule lab October/November 2022  - rosuvastatin (CRESTOR) 20 MG tablet; TAKE 1 TABLET DAILY (REPLACES PRAVASTATIN)  Dispense: 90 tablet; Refill: 3  - Comprehensive Metabolic Panel; Future  - Lipid Panel; Future    4. Kidney stones  Currently asymptomatic    5. Benign  prostatic hyperplasia without lower urinary tract symptoms  Stable    6. Essential hypertension  - amLODIPine (NORVASC) 2.5 MG tablet; Take 1 tablet (2.5 mg total) by mouth once daily.  Dispense: 90 tablet; Refill: 3    7. BMI 26.0-26.9,adult

## 2022-06-01 ENCOUNTER — OFFICE VISIT (OUTPATIENT)
Dept: SPINE | Facility: CLINIC | Age: 67
End: 2022-06-01
Payer: MEDICARE

## 2022-06-01 VITALS — HEIGHT: 70 IN | BODY MASS INDEX: 26.92 KG/M2 | WEIGHT: 188 LBS

## 2022-06-01 DIAGNOSIS — M71.38 SYNOVIAL CYST OF LUMBAR FACET JOINT: Primary | ICD-10-CM

## 2022-06-01 DIAGNOSIS — M54.17 LUMBOSACRAL RADICULOPATHY: ICD-10-CM

## 2022-06-01 PROCEDURE — 99213 OFFICE O/P EST LOW 20 MIN: CPT | Mod: S$GLB,,, | Performed by: PHYSICAL MEDICINE & REHABILITATION

## 2022-06-01 PROCEDURE — 99213 PR OFFICE/OUTPT VISIT, EST, LEVL III, 20-29 MIN: ICD-10-PCS | Mod: S$GLB,,, | Performed by: PHYSICAL MEDICINE & REHABILITATION

## 2022-06-01 NOTE — PROGRESS NOTES
SUBJECTIVE:    Patient ID: Toni Hyde is a 67 y.o. male.    Chief Complaint: Follow-up (MRI)    He is here to review his lumbar MRI done 05/30/2022 to evaluate his complaint of familiar right-sided low back pain with right leg pain into the posterior portion of the leg and plantar portion of the right foot.     The MRI is summarized below:    ADDENDUM #1         Upon further review, the synovial cyst within the right L5-S1 neural foramen seen on the comparison exam remains present on today's study. It demonstrates less fluid signal intensity in previously, but is similar in size. It measures 11 x 7 mm on sagittal T2 image 4. This lesion shows no appreciable enhancement on postcontrast imaging. Findings were communicated to Dr. Gamble at time of addendum via [employer] secure chat.     Electronically signed by:  Chalo Florian MD  6/1/2022 12:48 PM CDT Workstation: [id number]      ORIGINAL REPORT         MR LUMBAR SPINE WITHOUT THEN WITH IV CONTRAST     CLINICAL HISTORY:  67 years Male Lumbar radiculopathy, symptoms persist with conservative treatment LOW BACK AND R LEG PAIN/; HX SYNOVIAL CYST W DRAINAGE 2020/; NO SX NO TRAUMA NO CA     COMPARISON: Lumbar spine MRI November 13, 2020     FINDINGS: Dextrocurvature of the lumbar spine centered at L3 measuring 9 degrees, similar to prior. No anterolisthesis or retrolisthesis. Lumbar vertebral body heights are maintained. Small intraosseous hemangioma within the T12 vertebral body. Mild reactive marrow edema along the leftward aspect of the opposing L3-4 vertebral bodies. No aggressive/T1 marrow replacing lesion.     Conus medullaris appears normal and terminates at the L1 vertebral body level. Paraspinal muscles are unremarkable. Limited images through the retroperitoneum demonstrate numerous bilateral renal cysts. Bilateral SI joint degenerative changes.     T12-L1 and L1-2: Mild facet arthropathy. No disc herniation. No neural foraminal or spinal canal  compromise.     L2-3: Mild broad-based disc bulge which minimally narrows the spinal canal. Mild bilateral facet arthropathy. Mild neural foraminal narrowing on the right. No left-sided neural foraminal narrowing.     L3-4: Degenerative disc space loss, asymmetrically greater on the left. Broad-based disc osteophyte complex, with left lateral disc osteophyte complex. Severe narrowing of left lateral recess, with contact on the transiting left L4 nerve root. Moderate narrowing of left neural foramen, with extraforaminal disc osteophyte complex contacting the exiting left L3 nerve root. Mild narrowing of right neural foramen.     L4-5: Moderate bilateral facet arthropathy, with ligamentum flavum hypertrophy. Mild broad-based disc bulge. Findings result in moderate narrowing of lateral recesses and central spinal canal. Moderate bilateral neural foraminal narrowing.     L5-S1: Severe bilateral facet arthropathy. Small facet joint effusion on the right. No central spinal canal narrowing. Severe narrowing of right neural foramen, with contact on the exiting right L5 nerve root. Previously seen synovial cyst is no longer clearly evident, noting the provided clinical history of synovial cyst drainage. Mild narrowing of left neural foramen.     Postcontrast imaging shows no pathologic enhancement involving the spinal canal osseous or soft tissue structures of the lumbar spine.     IMPRESSION:     Lumbar dextrocurvature and multilevel lumbar spondylosis, as outlined above.     Severe narrowing of right neural foramen at L5-S1. Please correlate for right L5 radiculopathic symptoms.     Significant narrowing of left neural foramen and left lateral recess at L3-4. Please correlate for corresponding left L3 and left L4 radiculopathic symptoms.     No abnormal postcontrast enhancement.      Clinically he is about the same.  Symptoms are described above.  Pain level is 3/10.  He has no new or progressive problems        Past  Medical History:   Diagnosis Date    Cataract     Cataract of left eye     Diabetes mellitus type II     on po meds    Hyperlipidemia     not on meds at present    Hypertension     Kidney stones 2017    Left ureteral stone     Plantar fasciitis of right foot 10/2017    Dr Jhns     Tendonitis 2018    right foot Dr Stanislav    Wears glasses      Social History     Socioeconomic History    Marital status:    Tobacco Use    Smoking status: Former Smoker     Types: Cigarettes     Quit date: 1978     Years since quittin.1    Smokeless tobacco: Never Used   Substance and Sexual Activity    Alcohol use: Yes     Alcohol/week: 2.5 standard drinks     Types: 3 Standard drinks or equivalent per week     Comment: 3 drinks per week    Drug use: No     Social Determinants of Health     Financial Resource Strain: Low Risk     Difficulty of Paying Living Expenses: Not hard at all   Food Insecurity: No Food Insecurity    Worried About Running Out of Food in the Last Year: Never true    Ran Out of Food in the Last Year: Never true   Transportation Needs: No Transportation Needs    Lack of Transportation (Medical): No    Lack of Transportation (Non-Medical): No   Physical Activity: Insufficiently Active    Days of Exercise per Week: 2 days    Minutes of Exercise per Session: 30 min   Stress: No Stress Concern Present    Feeling of Stress : Only a little   Social Connections: Unknown    Frequency of Communication with Friends and Family: More than three times a week    Frequency of Social Gatherings with Friends and Family: Once a week    Active Member of Clubs or Organizations: No    Attends Club or Organization Meetings: Never    Marital Status:    Housing Stability: Low Risk     Unable to Pay for Housing in the Last Year: No    Number of Places Lived in the Last Year: 1    Unstable Housing in the Last Year: No     Past Surgical History:   Procedure Laterality Date     "ADENOIDECTOMY      CATARACT EXTRACTION      COLONOSCOPY  3/23/2010    Dr. Blackburn, hyperplastic polyps, otherwise negative, 10 year recheck    COLONOSCOPY N/A 11/19/2021    Procedure: COLONOSCOPY;  Surgeon: Marcus Sexton MD;  Location: [facility];  Service: Endoscopy;  Laterality: N/A;    CYSTOSCOPY Left 04/12/2017    with ureteral stent-Dr. Noel    CYSTOSCOPY Left 5/17/2022    Procedure: CYSTOSCOPY with stent removal;  Surgeon: Ronal Benitez MD;  Location: [facility] OR;  Service: Urology;  Laterality: Left;    CYSTOSCOPY WITH URETEROSCOPY, RETROGRADE PYELOGRAPHY, AND INSERTION OF STENT Left 5/5/2022    Procedure: CYSTOSCOPY, WITH RETROGRADE PYELOGRAM AND URETERAL STENT INSERTION;  Surgeon: Ronal Benitez MD;  Location: [facility];  Service: Urology;  Laterality: Left;    EYE SURGERY  05/06/2013    Dr Nielsen    kidney stent   04/2017    KNEE ARTHROSCOPY W/ DEBRIDEMENT      right with lateral release     LITHOTRIPSY  04/19/2017    NASAL SINUS SURGERY      retna surgery   5/2013    Dr Peralta    TONSILLECTOMY      TRANSFORAMINAL EPIDURAL INJECTION OF STEROID Right 12/7/2020    Procedure: Injection,steroid,epidural,transforaminal approach L5- S1;  Surgeon: Kermit Davalos MD;  Location: [facility] OR;  Service: Pain Management;  Laterality: Right;  L5-s1    URETEROSCOPIC REMOVAL OF URETERIC CALCULUS Left 5/5/2022    Procedure: REMOVAL, CALCULUS, URETER, URETEROSCOPIC;  Surgeon: Ronal Benitez MD;  Location: [facility] OR;  Service: Urology;  Laterality: Left;     Family History   Problem Relation Age of Onset    Hypertension Father     Neuropathy Father     Stroke Paternal Grandmother     Lung cancer Paternal Grandfather     Cancer Paternal Grandfather     Cataracts Mother     Diabetes Maternal Grandmother     No Known Problems Sister     No Known Problems Maternal Aunt      Vitals:    06/01/22 1305   Weight: 85.3 kg (188 lb)   Height: 5' 10" (1.778 m)       Review of Systems   Constitutional: Negative for " chills, diaphoresis, fatigue, fever and unexpected weight change.   HENT: Negative for trouble swallowing.    Eyes: Negative for visual disturbance.   Respiratory: Negative for shortness of breath.    Cardiovascular: Negative for chest pain.   Gastrointestinal: Negative for abdominal pain, constipation, diarrhea, nausea and vomiting.   Genitourinary: Negative for difficulty urinating.   Musculoskeletal: Negative for arthralgias, back pain, gait problem, joint swelling, myalgias, neck pain and neck stiffness.   Neurological: Negative for dizziness, speech difficulty, weakness, light-headedness, numbness and headaches.          Objective:      Physical Exam  Neurological:      Mental Status: He is alert and oriented to person, place, and time.             Assessment:       1. Synovial cyst of lumbar facet joint    2. Lumbosacral radiculopathy           Plan:     I reassured him he has no worrisome findings on his MRI.  It appears that the synovial cyst on the right at L5-S1 either reformed or never completely resolved.  Regardless the plan is to consult with Dr. Saul to review films in preparation for likely repeat cyst aspiration and transforaminal injection at L5-S1.  I will follow-up with the patient after the procedure      Synovial cyst of lumbar facet joint    Lumbosacral radiculopathy

## 2022-06-01 NOTE — H&P (VIEW-ONLY)
SUBJECTIVE:    Patient ID: Toni Hyde is a 67 y.o. male.    Chief Complaint: Follow-up (MRI)    He is here to review his lumbar MRI done 05/30/2022 to evaluate his complaint of familiar right-sided low back pain with right leg pain into the posterior portion of the leg and plantar portion of the right foot.     The MRI is summarized below:    ADDENDUM #1         Upon further review, the synovial cyst within the right L5-S1 neural foramen seen on the comparison exam remains present on today's study. It demonstrates less fluid signal intensity in previously, but is similar in size. It measures 11 x 7 mm on sagittal T2 image 4. This lesion shows no appreciable enhancement on postcontrast imaging. Findings were communicated to Dr. Gamble at time of addendum via [facility] secure chat.     Electronically signed by:  Chalo Florian MD  6/1/2022 12:48 PM CDT Workstation: [id number]      ORIGINAL REPORT         MR LUMBAR SPINE WITHOUT THEN WITH IV CONTRAST     CLINICAL HISTORY:  67 years Male Lumbar radiculopathy, symptoms persist with conservative treatment LOW BACK AND R LEG PAIN/; HX SYNOVIAL CYST W DRAINAGE 2020/; NO SX NO TRAUMA NO CA     COMPARISON: Lumbar spine MRI November 13, 2020     FINDINGS: Dextrocurvature of the lumbar spine centered at L3 measuring 9 degrees, similar to prior. No anterolisthesis or retrolisthesis. Lumbar vertebral body heights are maintained. Small intraosseous hemangioma within the T12 vertebral body. Mild reactive marrow edema along the leftward aspect of the opposing L3-4 vertebral bodies. No aggressive/T1 marrow replacing lesion.     Conus medullaris appears normal and terminates at the L1 vertebral body level. Paraspinal muscles are unremarkable. Limited images through the retroperitoneum demonstrate numerous bilateral renal cysts. Bilateral SI joint degenerative changes.     T12-L1 and L1-2: Mild facet arthropathy. No disc herniation. No neural foraminal or spinal canal  compromise.     L2-3: Mild broad-based disc bulge which minimally narrows the spinal canal. Mild bilateral facet arthropathy. Mild neural foraminal narrowing on the right. No left-sided neural foraminal narrowing.     L3-4: Degenerative disc space loss, asymmetrically greater on the left. Broad-based disc osteophyte complex, with left lateral disc osteophyte complex. Severe narrowing of left lateral recess, with contact on the transiting left L4 nerve root. Moderate narrowing of left neural foramen, with extraforaminal disc osteophyte complex contacting the exiting left L3 nerve root. Mild narrowing of right neural foramen.     L4-5: Moderate bilateral facet arthropathy, with ligamentum flavum hypertrophy. Mild broad-based disc bulge. Findings result in moderate narrowing of lateral recesses and central spinal canal. Moderate bilateral neural foraminal narrowing.     L5-S1: Severe bilateral facet arthropathy. Small facet joint effusion on the right. No central spinal canal narrowing. Severe narrowing of right neural foramen, with contact on the exiting right L5 nerve root. Previously seen synovial cyst is no longer clearly evident, noting the provided clinical history of synovial cyst drainage. Mild narrowing of left neural foramen.     Postcontrast imaging shows no pathologic enhancement involving the spinal canal osseous or soft tissue structures of the lumbar spine.     IMPRESSION:     Lumbar dextrocurvature and multilevel lumbar spondylosis, as outlined above.     Severe narrowing of right neural foramen at L5-S1. Please correlate for right L5 radiculopathic symptoms.     Significant narrowing of left neural foramen and left lateral recess at L3-4. Please correlate for corresponding left L3 and left L4 radiculopathic symptoms.     No abnormal postcontrast enhancement.      Clinically he is about the same.  Symptoms are described above.  Pain level is 3/10.  He has no new or progressive problems        Past  Medical History:   Diagnosis Date    Cataract     Cataract of left eye     Diabetes mellitus type II     on po meds    Hyperlipidemia     not on meds at present    Hypertension     Kidney stones 2017    Left ureteral stone     Plantar fasciitis of right foot 10/2017    Dr Jhns     Tendonitis 2018    right foot Dr Stanislav    Wears glasses      Social History     Socioeconomic History    Marital status:    Tobacco Use    Smoking status: Former Smoker     Types: Cigarettes     Quit date: 1978     Years since quittin.1    Smokeless tobacco: Never Used   Substance and Sexual Activity    Alcohol use: Yes     Alcohol/week: 2.5 standard drinks     Types: 3 Standard drinks or equivalent per week     Comment: 3 drinks per week    Drug use: No     Social Determinants of Health     Financial Resource Strain: Low Risk     Difficulty of Paying Living Expenses: Not hard at all   Food Insecurity: No Food Insecurity    Worried About Running Out of Food in the Last Year: Never true    Ran Out of Food in the Last Year: Never true   Transportation Needs: No Transportation Needs    Lack of Transportation (Medical): No    Lack of Transportation (Non-Medical): No   Physical Activity: Insufficiently Active    Days of Exercise per Week: 2 days    Minutes of Exercise per Session: 30 min   Stress: No Stress Concern Present    Feeling of Stress : Only a little   Social Connections: Unknown    Frequency of Communication with Friends and Family: More than three times a week    Frequency of Social Gatherings with Friends and Family: Once a week    Active Member of Clubs or Organizations: No    Attends Club or Organization Meetings: Never    Marital Status:    Housing Stability: Low Risk     Unable to Pay for Housing in the Last Year: No    Number of Places Lived in the Last Year: 1    Unstable Housing in the Last Year: No     Past Surgical History:   Procedure Laterality Date     "ADENOIDECTOMY      CATARACT EXTRACTION      COLONOSCOPY  3/23/2010    Dr. Blackburn, hyperplastic polyps, otherwise negative, 10 year recheck    COLONOSCOPY N/A 11/19/2021    Procedure: COLONOSCOPY;  Surgeon: Marcus Sexton MD;  Location: [facility];  Service: Endoscopy;  Laterality: N/A;    CYSTOSCOPY Left 04/12/2017    with ureteral stent-Dr. Noel    CYSTOSCOPY Left 5/17/2022    Procedure: CYSTOSCOPY with stent removal;  Surgeon: Ronal Benitez MD;  Location: [facility] OR;  Service: Urology;  Laterality: Left;    CYSTOSCOPY WITH URETEROSCOPY, RETROGRADE PYELOGRAPHY, AND INSERTION OF STENT Left 5/5/2022    Procedure: CYSTOSCOPY, WITH RETROGRADE PYELOGRAM AND URETERAL STENT INSERTION;  Surgeon: Ronal Benitez MD;  Location: [facility];  Service: Urology;  Laterality: Left;    EYE SURGERY  05/06/2013    Dr Nielsen    kidney stent   04/2017    KNEE ARTHROSCOPY W/ DEBRIDEMENT      right with lateral release     LITHOTRIPSY  04/19/2017    NASAL SINUS SURGERY      retna surgery   5/2013    Dr Peralta    TONSILLECTOMY      TRANSFORAMINAL EPIDURAL INJECTION OF STEROID Right 12/7/2020    Procedure: Injection,steroid,epidural,transforaminal approach L5- S1;  Surgeon: Kermit Davalos MD;  Location: [facility] OR;  Service: Pain Management;  Laterality: Right;  L5-s1    URETEROSCOPIC REMOVAL OF URETERIC CALCULUS Left 5/5/2022    Procedure: REMOVAL, CALCULUS, URETER, URETEROSCOPIC;  Surgeon: Ronal Benitez MD;  Location: [facility] OR;  Service: Urology;  Laterality: Left;     Family History   Problem Relation Age of Onset    Hypertension Father     Neuropathy Father     Stroke Paternal Grandmother     Lung cancer Paternal Grandfather     Cancer Paternal Grandfather     Cataracts Mother     Diabetes Maternal Grandmother     No Known Problems Sister     No Known Problems Maternal Aunt      Vitals:    06/01/22 1305   Weight: 85.3 kg (188 lb)   Height: 5' 10" (1.778 m)       Review of Systems   Constitutional: Negative for " chills, diaphoresis, fatigue, fever and unexpected weight change.   HENT: Negative for trouble swallowing.    Eyes: Negative for visual disturbance.   Respiratory: Negative for shortness of breath.    Cardiovascular: Negative for chest pain.   Gastrointestinal: Negative for abdominal pain, constipation, diarrhea, nausea and vomiting.   Genitourinary: Negative for difficulty urinating.   Musculoskeletal: Negative for arthralgias, back pain, gait problem, joint swelling, myalgias, neck pain and neck stiffness.   Neurological: Negative for dizziness, speech difficulty, weakness, light-headedness, numbness and headaches.          Objective:      Physical Exam  Neurological:      Mental Status: He is alert and oriented to person, place, and time.             Assessment:       1. Synovial cyst of lumbar facet joint    2. Lumbosacral radiculopathy           Plan:     I reassured him he has no worrisome findings on his MRI.  It appears that the synovial cyst on the right at L5-S1 either reformed or never completely resolved.  Regardless the plan is to consult with Dr. Saul to review films in preparation for likely repeat cyst aspiration and transforaminal injection at L5-S1.  I will follow-up with the patient after the procedure      Synovial cyst of lumbar facet joint    Lumbosacral radiculopathy

## 2022-06-02 ENCOUNTER — TELEPHONE (OUTPATIENT)
Dept: PAIN MEDICINE | Facility: CLINIC | Age: 67
End: 2022-06-02
Payer: MEDICARE

## 2022-06-02 ENCOUNTER — PATIENT MESSAGE (OUTPATIENT)
Dept: PAIN MEDICINE | Facility: CLINIC | Age: 67
End: 2022-06-02
Payer: MEDICARE

## 2022-06-02 ENCOUNTER — TELEPHONE (OUTPATIENT)
Dept: SPINE | Facility: CLINIC | Age: 67
End: 2022-06-02
Payer: MEDICARE

## 2022-06-02 DIAGNOSIS — M54.17 LUMBOSACRAL RADICULOPATHY: Primary | ICD-10-CM

## 2022-06-02 NOTE — TELEPHONE ENCOUNTER
----- Message from Ronal Gamble MD sent at 6/2/2022 10:13 AM CDT -----  Please let him know I had opportunity to discuss his MRI with Dr. Saul and we will proceed with cyst aspiration and transforaminal injection at L5-S1.  Same procedure he had with Dr. Davalos

## 2022-06-02 NOTE — TELEPHONE ENCOUNTER
Spoke with patient advised that Dr Gamble spoke with Dr Saul who advised to proceed with cyst aspiration and injection. I advised him Dr Saul staff will be [facility] him [facility]. Pt voiced understanding

## 2022-06-02 NOTE — TELEPHONE ENCOUNTER
----- Message from Ronal Gamble MD sent at 6/2/2022 10:12 AM CDT -----  Please schedule for a right L5-S1 cyst aspiration and transforaminal injection on the right at L5-S1.

## 2022-06-23 ENCOUNTER — HOSPITAL ENCOUNTER (OUTPATIENT)
Facility: AMBULARY SURGERY CENTER | Age: 67
Discharge: HOME | End: 2022-06-23
Attending: ANESTHESIOLOGY | Admitting: ANESTHESIOLOGY
Payer: MEDICARE

## 2022-06-23 DIAGNOSIS — M54.16 LUMBAR RADICULITIS: ICD-10-CM

## 2022-06-23 LAB — POCT GLUCOSE: 137 MG/DL (ref 70–110)

## 2022-06-23 PROCEDURE — 64999 UNLISTED PX NERVOUS SYSTEM: CPT | Mod: 59,RT,, | Performed by: ANESTHESIOLOGY

## 2022-06-23 PROCEDURE — 64483 NJX AA&/STRD TFRM EPI L/S 1: CPT | Mod: RT,,, | Performed by: ANESTHESIOLOGY

## 2022-06-23 PROCEDURE — 64483 NJX AA&/STRD TFRM EPI L/S 1: CPT | Performed by: ANESTHESIOLOGY

## 2022-06-23 PROCEDURE — 64999 PR ASPRIATION OF SYNOVIAL CYST OF FACET: ICD-10-PCS | Mod: 59,RT,, | Performed by: ANESTHESIOLOGY

## 2022-06-23 PROCEDURE — 64483 PR EPIDURAL INJ, ANES/STEROID, TRANSFORAMINAL, LUMB/SACR, SNGL LEVL: ICD-10-PCS | Mod: RT,,, | Performed by: ANESTHESIOLOGY

## 2022-06-23 PROCEDURE — 64999 UNLISTED PX NERVOUS SYSTEM: CPT

## 2022-06-23 RX ORDER — LIDOCAINE HYDROCHLORIDE 10 MG/ML
INJECTION, SOLUTION EPIDURAL; INFILTRATION; INTRACAUDAL; PERINEURAL
Status: DISCONTINUED | OUTPATIENT
Start: 2022-06-23 | End: 2022-06-23 | Stop reason: HOSPADM

## 2022-06-23 RX ORDER — MIDAZOLAM HYDROCHLORIDE 1 MG/ML
INJECTION INTRAMUSCULAR; INTRAVENOUS
Status: DISCONTINUED | OUTPATIENT
Start: 2022-06-23 | End: 2022-06-23 | Stop reason: HOSPADM

## 2022-06-23 RX ORDER — BUPIVACAINE HYDROCHLORIDE 2.5 MG/ML
INJECTION, SOLUTION EPIDURAL; INFILTRATION; INTRACAUDAL
Status: DISCONTINUED | OUTPATIENT
Start: 2022-06-23 | End: 2022-06-23 | Stop reason: HOSPADM

## 2022-06-23 RX ORDER — FENTANYL CITRATE 50 UG/ML
INJECTION, SOLUTION INTRAMUSCULAR; INTRAVENOUS
Status: DISCONTINUED | OUTPATIENT
Start: 2022-06-23 | End: 2022-06-23 | Stop reason: HOSPADM

## 2022-06-23 RX ORDER — DEXAMETHASONE SODIUM PHOSPHATE 10 MG/ML
INJECTION INTRAMUSCULAR; INTRAVENOUS
Status: DISCONTINUED | OUTPATIENT
Start: 2022-06-23 | End: 2022-06-23 | Stop reason: HOSPADM

## 2022-06-23 RX ORDER — SODIUM CHLORIDE, SODIUM LACTATE, POTASSIUM CHLORIDE, CALCIUM CHLORIDE 600; 310; 30; 20 MG/100ML; MG/100ML; MG/100ML; MG/100ML
INJECTION, SOLUTION INTRAVENOUS ONCE AS NEEDED
Status: COMPLETED | OUTPATIENT
Start: 2022-06-23 | End: 2022-06-23

## 2022-06-23 RX ADMIN — SODIUM CHLORIDE, SODIUM LACTATE, POTASSIUM CHLORIDE, CALCIUM CHLORIDE: 600; 310; 30; 20 INJECTION, SOLUTION INTRAVENOUS at 11:06

## 2022-06-23 NOTE — DISCHARGE SUMMARY
[facility]  Discharge Note  Short Stay    Procedure(s) (LRB):  Injection,steroid,epidural,transforaminal approach (Right)    OUTCOME: Patient tolerated treatment/procedure well without complication and is now ready for discharge.    DISPOSITION: Home or Self Care    FINAL DIAGNOSIS:  <principal problem not specified>    FOLLOWUP: In clinic    DISCHARGE INSTRUCTIONS:    Discharge Procedure Orders   Notify your health care provider if you experience any of the following:  temperature >100.4     Notify your health care provider if you experience any of the following:  severe uncontrolled pain     Notify your health care provider if you experience any of the following:  redness, tenderness, or signs of infection (pain, swelling, redness, odor or green/yellow discharge around incision site)     Activity as tolerated        TIME SPENT ON DISCHARGE: 30 minutes

## 2022-06-23 NOTE — OP NOTE
PROCEDURE DATE: 6/23/2022    PROCEDURE: 1. Right L5-S1 transforaminal epidural steroid injection under fluoroscopy      2. Right L5-S1 facet cyst aspiration attempt  DIAGNOSIS: Lumbar radiculitis; Lumbar facet cyst    Post op diagnosis: Same    PHYSICIAN: Christos Saul MD    MEDICATIONS INJECTED:  Dexamethasone 5mg (0.5ml) and 1.5ml 0.25% bupivicaine at each nerve root.     LOCAL ANESTHETIC INJECTED:  Lidocaine 1%. 2 ml per site.    SEDATION MEDICATIONS: RN IV sedation    ESTIMATED BLOOD LOSS:  None    COMPLICATIONS:  None    TECHNIQUE:   A time-out was taken to identify patient and procedure side prior to starting the procedure. The patient was placed in a prone position, prepped and draped in the usual sterile fashion using ChloraPrep and sterile towels.  The area to be injected was determined under fluoroscopic guidance in AP and oblique view.  Local anesthetic was given by raising a wheal and going down to the hub of a 25-gauge 1.5 inch needle.  In oblique view, a 3.5 inch 22-gauge bent-tip spinal needle was introduced towards 6 oclock position of the pedicle of each above named nerve root level.  The needle was walked medially then hinged into the neural foramen and position was confirmed in AP and lateral views.  1ml contrast dye was injected to confirm appropriate placement and that there was no vascular uptake.  After negative aspiration for blood or CSF, the medication was then injected. This was performed at the right L5-S1 level(s).   Next, the levels were determined under fluoroscopic guidance in the AP view and then rotated into the oblique view to visualize the joint space.  Local anesthetic was given by raising a wheal at the skin and then anesthetizing a tract using a 25-gauge, 1.5inch needle.  A 3.5 in 22-gauge needle was introduced into the right L5-S1 facet joint. Aspiration attempt was performed but very little fluid was drawn back.  2ml  contrast was injected to confirm placement and to confirm  arthrogram and no vascular uptake.  There was giveway pressure felt.  Medication was then injected slowly.  The patient tolerated the procedure well.    The patient was monitored after the procedure.  Patient was given post procedure and discharge instructions to follow at home. The patient was discharged in a stable condition.

## 2022-06-24 VITALS
RESPIRATION RATE: 17 BRPM | DIASTOLIC BLOOD PRESSURE: 82 MMHG | TEMPERATURE: 98 F | WEIGHT: 188 LBS | BODY MASS INDEX: 26.92 KG/M2 | OXYGEN SATURATION: 98 % | SYSTOLIC BLOOD PRESSURE: 122 MMHG | HEART RATE: 55 BPM | HEIGHT: 70 IN

## 2022-07-07 DIAGNOSIS — E11.65 TYPE 2 DIABETES MELLITUS WITH HYPERGLYCEMIA, WITHOUT LONG-TERM CURRENT USE OF INSULIN: ICD-10-CM

## 2022-07-07 RX ORDER — METFORMIN HYDROCHLORIDE 750 MG/1
TABLET, EXTENDED RELEASE ORAL
Qty: 180 TABLET | Refills: 1 | Status: SHIPPED | OUTPATIENT
Start: 2022-07-07 | End: 2023-06-23 | Stop reason: SDUPTHER

## 2022-07-07 NOTE — TELEPHONE ENCOUNTER
Refill Authorization Note   Toni Hyde  is requesting a refill authorization.  Brief Assessment and Rationale for Refill:  Approve     Medication Therapy Plan:  fovs and required labs    Medication Reconciliation Completed: No   Comments:     No Care Gaps Recommended  Note composed:12:01 PM 07/07/2022

## 2022-07-07 NOTE — TELEPHONE ENCOUNTER
Care Due:                  Date            Visit Type   Department     Provider  --------------------------------------------------------------------------------                                EP -                              PRIMARY      [facility]  Last Visit: 05-      CARE ([facility])   PRACTICE       Candido Mon                              EP -                              PRIMARY      [facility]  Next Visit: 10-      CARE ([facility])   PRACTICE       Candido Mon                                                            Last  Test          Frequency    Reason                     Performed    Due Date  --------------------------------------------------------------------------------    CMP.........  12 months..  rosuvastatin.............  10-   09-    Lipid Panel.  12 months..  rosuvastatin.............  10-   09-    [facility] Embedded Care Gaps. Reference number: [id number]. 7/07/2022   6:36:58 AM CDT

## 2022-07-21 ENCOUNTER — OFFICE VISIT (OUTPATIENT)
Dept: SPINE | Facility: CLINIC | Age: 67
End: 2022-07-21
Payer: MEDICARE

## 2022-07-21 VITALS — WEIGHT: 188 LBS | BODY MASS INDEX: 26.92 KG/M2 | HEIGHT: 70 IN

## 2022-07-21 DIAGNOSIS — M71.38 SYNOVIAL CYST OF LUMBAR FACET JOINT: Primary | ICD-10-CM

## 2022-07-21 DIAGNOSIS — M54.16 RIGHT LUMBAR RADICULITIS: ICD-10-CM

## 2022-07-21 PROCEDURE — 99213 PR OFFICE/OUTPT VISIT, EST, LEVL III, 20-29 MIN: ICD-10-PCS | Mod: S$GLB,,, | Performed by: PHYSICAL MEDICINE & REHABILITATION

## 2022-07-21 PROCEDURE — 99213 OFFICE O/P EST LOW 20 MIN: CPT | Mod: S$GLB,,, | Performed by: PHYSICAL MEDICINE & REHABILITATION

## 2022-07-21 NOTE — PROGRESS NOTES
SUBJECTIVE:    Patient ID: Toni Hyde is a 67 y.o. male.    Chief Complaint: Follow-up (TFESI 2022)    He is here for follow-up status post right L5-S1 synovial cyst aspiration attempt and L5-S1 transforaminal injection on 2022 with Dr. Saul.  Says he is about 80-90% better since that procedure.  Still gets some low back pain in the morning but that resolves with activity.  He has variable recurrent right leg discomfort.  He takes Tylenol at night any believes that helps some with the morning discomfort.  He also found that exercise on the elliptical seems to help.  Current pain level is 0/10.        Past Medical History:   Diagnosis Date    Cataract     Cataract of left eye     Diabetes mellitus type II     on po meds    Hyperlipidemia     not on meds at present    Hypertension     Kidney stones 2017    Left ureteral stone     Plantar fasciitis of right foot 10/2017    Dr Colorado     Tendonitis 2018    right foot Dr Colorado    Wears glasses      Social History     Socioeconomic History    Marital status:    Tobacco Use    Smoking status: Former Smoker     Types: Cigarettes     Quit date: 1978     Years since quittin.3    Smokeless tobacco: Never Used   Substance and Sexual Activity    Alcohol use: Yes     Alcohol/week: 2.5 standard drinks     Types: 3 Standard drinks or equivalent per week     Comment: 3 drinks per week    Drug use: No     Social Determinants of Health     Financial Resource Strain: Low Risk     Difficulty of Paying Living Expenses: Not hard at all   Food Insecurity: No Food Insecurity    Worried About Running Out of Food in the Last Year: Never true    Ran Out of Food in the Last Year: Never true   Transportation Needs: No Transportation Needs    Lack of Transportation (Medical): No    Lack of Transportation (Non-Medical): No   Physical Activity: Insufficiently Active    Days of Exercise per Week: 2 days    Minutes of Exercise per  Session: 30 min   Stress: No Stress Concern Present    Feeling of Stress : Only a little   Social Connections: Unknown    Frequency of Communication with Friends and Family: More than three times a week    Frequency of Social Gatherings with Friends and Family: Once a week    Active Member of Clubs or Organizations: No    Attends Club or Organization Meetings: Never    Marital Status:    Housing Stability: Low Risk     Unable to Pay for Housing in the Last Year: No    Number of Places Lived in the Last Year: 1    Unstable Housing in the Last Year: No     Past Surgical History:   Procedure Laterality Date    ADENOIDECTOMY      CATARACT EXTRACTION      COLONOSCOPY  3/23/2010    Dr. Blackburn, hyperplastic polyps, otherwise negative, 10 year recheck    COLONOSCOPY N/A 11/19/2021    Procedure: COLONOSCOPY;  Surgeon: Marcus Sexton MD;  Location: [facility];  Service: Endoscopy;  Laterality: N/A;    CYSTOSCOPY Left 04/12/2017    with ureteral stent-Dr. Noel    CYSTOSCOPY Left 5/17/2022    Procedure: CYSTOSCOPY with stent removal;  Surgeon: Ronal Benitez MD;  Location: [facility];  Service: Urology;  Laterality: Left;    CYSTOSCOPY WITH URETEROSCOPY, RETROGRADE PYELOGRAPHY, AND INSERTION OF STENT Left 5/5/2022    Procedure: CYSTOSCOPY, WITH RETROGRADE PYELOGRAM AND URETERAL STENT INSERTION;  Surgeon: Ronal Benitez MD;  Location: [facility];  Service: Urology;  Laterality: Left;    EYE SURGERY  05/06/2013    Dr Nielsen    kidney stent   04/2017    KNEE ARTHROSCOPY W/ DEBRIDEMENT      right with lateral release     LITHOTRIPSY  04/19/2017    NASAL SINUS SURGERY      retna surgery   5/2013    Dr Peralta    TONSILLECTOMY      TRANSFORAMINAL EPIDURAL INJECTION OF STEROID Right 12/7/2020    Procedure: Injection,steroid,epidural,transforaminal approach L5- S1;  Surgeon: Kermit Davalos MD;  Location: [facility];  Service: Pain Management;  Laterality: Right;  L5-s1    TRANSFORAMINAL EPIDURAL INJECTION OF  "STEROID Right 6/23/2022    Procedure: Injection,steroid,epidural,transforaminal approach;  Surgeon: Christos Saul MD;  Location: [facility] OR;  Service: Pain Management;  Laterality: Right;  L5-S1 and cyst aspiration L5-S1    URETEROSCOPIC REMOVAL OF URETERIC CALCULUS Left 5/5/2022    Procedure: REMOVAL, CALCULUS, URETER, URETEROSCOPIC;  Surgeon: Ronal Benitez MD;  Location: [facility] OR;  Service: Urology;  Laterality: Left;     Family History   Problem Relation Age of Onset    Hypertension Father     Neuropathy Father     Stroke Paternal Grandmother     Lung cancer Paternal Grandfather     Cancer Paternal Grandfather     Cataracts Mother     Diabetes Maternal Grandmother     No Known Problems Sister     No Known Problems Maternal Aunt      Vitals:    07/21/22 1423   Weight: 85.3 kg (188 lb)   Height: 5' 10" (1.778 m)       Review of Systems   Constitutional: Negative for chills, diaphoresis, fatigue, fever and unexpected weight change.   HENT: Negative for trouble swallowing.    Eyes: Negative for visual disturbance.   Respiratory: Negative for shortness of breath.    Cardiovascular: Negative for chest pain.   Gastrointestinal: Negative for abdominal pain, constipation, diarrhea, nausea and vomiting.   Genitourinary: Negative for difficulty urinating.   Musculoskeletal: Negative for arthralgias, back pain, gait problem, joint swelling, myalgias, neck pain and neck stiffness.   Neurological: Negative for dizziness, speech difficulty, weakness, light-headedness, numbness and headaches.          Objective:      Physical Exam  Neurological:      Mental Status: He is alert and oriented to person, place, and time.             Assessment:       1. Synovial cyst of lumbar facet joint    2. Right lumbar radiculitis           Plan:     improved following transforaminal injection right L5-S1.  Patient is currently satisfied with his quality of life but is interested in a neurosurgical opinion about his case.  I will refer " him to Dr. Matthews.  He can follow up with me on an as-needed basis.  I encouraged him to continue to exercise.      Synovial cyst of lumbar facet joint  -     Ambulatory referral/consult to Neurosurgery; Future; Expected date: 07/28/2022    Right lumbar radiculitis  -     Ambulatory referral/consult to Neurosurgery; Future; Expected date: 07/28/2022

## 2022-08-10 ENCOUNTER — TELEPHONE (OUTPATIENT)
Dept: FAMILY MEDICINE | Facility: CLINIC | Age: 67
End: 2022-08-10
Payer: MEDICARE

## 2022-08-10 NOTE — TELEPHONE ENCOUNTER
Called and spoke to pt about setting up AWV  Appt set up for 9/21/2022 @3:00pm w/Ms Stephanie at [facility]

## 2022-08-17 ENCOUNTER — HOSPITAL ENCOUNTER (OUTPATIENT)
Dept: RADIOLOGY | Facility: HOSPITAL | Age: 67
Discharge: HOME | End: 2022-08-17
Attending: UROLOGY
Payer: MEDICARE

## 2022-08-17 DIAGNOSIS — N20.1 LEFT URETERAL STONE: ICD-10-CM

## 2022-08-17 PROCEDURE — 76770 US RETROPERITONEAL COMPLETE: ICD-10-PCS | Mod: 26,,, | Performed by: RADIOLOGY

## 2022-08-17 PROCEDURE — 76770 US EXAM ABDO BACK WALL COMP: CPT | Mod: TC

## 2022-08-17 PROCEDURE — 76770 US EXAM ABDO BACK WALL COMP: CPT | Mod: 26,,, | Performed by: RADIOLOGY

## 2022-09-12 ENCOUNTER — OFFICE VISIT (OUTPATIENT)
Dept: UROLOGY | Facility: CLINIC | Age: 67
End: 2022-09-12
Payer: MEDICARE

## 2022-09-12 VITALS
BODY MASS INDEX: 26.92 KG/M2 | HEIGHT: 70 IN | WEIGHT: 188.06 LBS | DIASTOLIC BLOOD PRESSURE: 80 MMHG | SYSTOLIC BLOOD PRESSURE: 125 MMHG | HEART RATE: 51 BPM

## 2022-09-12 DIAGNOSIS — N20.0 RENAL STONES: Primary | ICD-10-CM

## 2022-09-12 PROCEDURE — 99214 OFFICE O/P EST MOD 30 MIN: CPT | Mod: S$PBB,,, | Performed by: NURSE PRACTITIONER

## 2022-09-12 PROCEDURE — 99999 PR PBB SHADOW E&M-EST. PATIENT-LVL IV: ICD-10-PCS | Mod: PBBFAC,,, | Performed by: NURSE PRACTITIONER

## 2022-09-12 PROCEDURE — 99214 PR OFFICE/OUTPT VISIT, EST, LEVL IV, 30-39 MIN: ICD-10-PCS | Mod: S$PBB,,, | Performed by: NURSE PRACTITIONER

## 2022-09-12 PROCEDURE — 81002 URINALYSIS NONAUTO W/O SCOPE: CPT | Mod: PBBFAC,PN | Performed by: NURSE PRACTITIONER

## 2022-09-12 PROCEDURE — 99999 PR PBB SHADOW E&M-EST. PATIENT-LVL IV: CPT | Mod: PBBFAC,,, | Performed by: NURSE PRACTITIONER

## 2022-09-12 PROCEDURE — 99214 OFFICE O/P EST MOD 30 MIN: CPT | Mod: PBBFAC,PN | Performed by: NURSE PRACTITIONER

## 2022-09-12 NOTE — PROGRESS NOTES
CHIEF COMPLAINT:    Mr. Hyde is a 67 y.o. male presenting for f/u kidney stones  PRESENTING ILLNESS:    Toni Hyde is a 67 y.o. male who presents for f/u kidney stones. Last clinic visit was 3/2/22 with Dr. Benitez.    Last visit with Dr. Benitez  Last saw NP 11/2021 noting: He has been known to have uric acid stones he also underwent ESWL in 2017 by Dr. Noel.  He has not experienced any recent stone episodes and no complaints.  He does continue to take allopurinol 100 mg p.o. q.d. and potassium citrate 15meq BID with no problems.  CT 1/12/21:  Right lower pole calyx with 4 mm stone and a 1 mm stone.  Left kidney has a 2 mm, a 2 mm, a 3 mm and a 11 mm stone seen from top to bottom. Bilateral renal cysts, larger on left with some peripheral calc, diverticulosis, 4.2cm prostate  Correltaing KUB 1/12/21: There are least 2 rounded calcifications left mid abdomen centered at the L2 level largest 9 mm.  Vascular phleboliths left pelvic sidewall. Impression notes   4/25/17 L ureteral ESWL and stent removal. For 9mm mid ureteral stone. Cysto noted 4 cm with mild PEREIRA   Patient reports previously having calcium oxalate stones analyzed 20 years prior, and only started potassium citrate with findings of 100% uric acid stone on stone analysis from his ESWL past fragments in 2017. On chart review, I did find his stone analysis confirming 100% uric acid, as well as a 24 hour urine 1 month later on 5/22/17 which had good urine volume of 2 L and normal urine uric acid but high saturation of uric acid 3.4, and high saturation of calcium oxalate at 6.63 increasing the risk of both types of stones, with a very low pH of 4.981 despite good citrate at 1698, and high dietary oxalate at 46.   He denies any history of gout, or prior known uric acid issues prior to 2017.   Uric acid, serum, on file with primary care is 4.9 in October 2021, normal.  Creatinine 1.4/EGFR 52.7 in October 2021 as well.  On review, has had mild CKD 3  for quite some time with baseline GFR around 55-58  Not following low purine or low uric acid diet. Was told previously to reduce meat intake previously  Last PSA on file 0.6 in 2020.  Stable in the 0.40.6 range for all years prior.  No family history of prostate cancer.   Personal review of CT scan from January 2020 to consistent with report above with large left lower pole stone burden about 1-1.1 cm and scattered other small punctate stones throughout the left collecting system upper and lower pole.  In the right kidney there is a 4 mm lower pole stone and a smaller upper pole stone.  The left kidney has a central midpole cyst extending towards the collecting system as well as an upper pole cyst.  Reported as cyst stable from previous imaging, the upper pole lesion looks less fluid density on the non contrasted imaging to me   He does report intermittent stone fragment passage. More so dust/small fragments. Only know bc gets hung up in urethra.  A few times a year.  No significant pain or stone episodes.  Occasional flank or back pain which may or may not be related.  No blood in the urine.  Urinalysis is dipstick negative today.  Has been taking 100 mg allopurinol and 15 mEq daily, not b.i.d., of potassium citrate.   AUA SS: 15/3 (4 intermittency, 3: freq, urgency; 2 sleeping, 1: emptying, weak, strain)  Stops and starts a lot. If occupied easy to postpone, but there is some urgency when just sitting around. Occ NTF 2-3x/night, but if no fluids after 8pm can make it through the night  5/5/22 Procedure(s) Performed:   Left ureteroscopy with holmium laser lithotripsy and stone basket extraction  Cystoscopy with placement of left double-J ureteral stent 6 Czech by 26 cm  Left retrograde pyelogram including injection of contrast  Fluoroscopy less than 1 hour including interpretation of fluoroscopic images  Findings:  scattered left stones in upper middle and lower pole with larger burden in lower pole, all  removed  5/17/22 Procedure(s) Performed:   Cystoscopy with left ureteral stent removal  Findings: ureteral stent, removed    Today 9/12/22 patient presents to clinic for f/u kidney stones. He has been doing well since procedure. He increased urocit K to 1 tablet BID as instructed. He drinks ~ 1 L of water with crystal light lemonade per day. He is trying to increase water intake. Denies dysuria, gross hematuria, flank pain, f/c/n/v. He is taking alfusozin for BPH/LUTS. He reports his symptoms have mildly improved. Flomax worked better on LUTS but he was unable to tolerate d/t dizziness.    8/17/22   BMP: K 4.3, creatinine 1.2 / GFR >60  PTH intact 55.3  Uric acid 6.4    8/17/22 ANDREA  Impression:   Multiple bilateral intrarenal stones.  No hydronephrosis is noted.  Three cyst identified of each kidney.  A solid mass is not noted       Urine cultures:   Lab Results   Component Value Date    LABURIN No growth 04/25/2022    LABURIN No growth 06/30/2017    LABURIN NO GROWTH AFTER 48 HOURS. 09/26/2012           REVIEW OF SYSTEMS:    Review of Systems    Constitutional: Negative for fever and chills.   HENT: Negative for hearing loss.   Eyes: Negative for visual disturbance.   Respiratory: Negative for shortness of breath.   Cardiovascular: Negative for chest pain.   Gastrointestinal: Negative for nausea, vomiting, and constipation.   Genitourinary:  See above  Neurological: Negative for dizziness.   Hematological: Does not bruise/bleed easily.   Psychiatric/Behavioral: Negative for confusion.       PATIENT HISTORY:    Past Medical History:   Diagnosis Date    Cataract     Cataract of left eye     Diabetes mellitus type II     on po meds    Hyperlipidemia     not on meds at present    Hypertension     Kidney stones 04/2017    Left ureteral stone     Plantar fasciitis of right foot 10/2017    Dr Colorado     Tendonitis 01/2018    right foot Dr Colorado    Wears glasses        Past Surgical History:   Procedure Laterality Date     ADENOIDECTOMY      CATARACT EXTRACTION      COLONOSCOPY  3/23/2010    Dr. Blackburn, hyperplastic polyps, otherwise negative, 10 year recheck    COLONOSCOPY N/A 11/19/2021    Procedure: COLONOSCOPY;  Surgeon: Marcus Sexton MD;  Location: [facility];  Service: Endoscopy;  Laterality: N/A;    CYSTOSCOPY Left 04/12/2017    with ureteral stent-Dr. Noel    CYSTOSCOPY Left 5/17/2022    Procedure: CYSTOSCOPY with stent removal;  Surgeon: Ronal Benitez MD;  Location: [facility];  Service: Urology;  Laterality: Left;    CYSTOSCOPY WITH URETEROSCOPY, RETROGRADE PYELOGRAPHY, AND INSERTION OF STENT Left 5/5/2022    Procedure: CYSTOSCOPY, WITH RETROGRADE PYELOGRAM AND URETERAL STENT INSERTION;  Surgeon: Ronal Benitez MD;  Location: [facility];  Service: Urology;  Laterality: Left;    EYE SURGERY  05/06/2013    Dr Nielsen    kidney stent   04/2017    KNEE ARTHROSCOPY W/ DEBRIDEMENT      right with lateral release     LITHOTRIPSY  04/19/2017    NASAL SINUS SURGERY      retna surgery   5/2013    Dr Peralta    TONSILLECTOMY      TRANSFORAMINAL EPIDURAL INJECTION OF STEROID Right 12/7/2020    Procedure: Injection,steroid,epidural,transforaminal approach L5- S1;  Surgeon: Kermit Davalos MD;  Location: [facility] OR;  Service: Pain Management;  Laterality: Right;  L5-s1    TRANSFORAMINAL EPIDURAL INJECTION OF STEROID Right 6/23/2022    Procedure: Injection,steroid,epidural,transforaminal approach;  Surgeon: Christos Saul MD;  Location: [facility] OR;  Service: Pain Management;  Laterality: Right;  L5-S1 and cyst aspiration L5-S1    URETEROSCOPIC REMOVAL OF URETERIC CALCULUS Left 5/5/2022    Procedure: REMOVAL, CALCULUS, URETER, URETEROSCOPIC;  Surgeon: Ronal Benitez MD;  Location: [facility] OR;  Service: Urology;  Laterality: Left;       Family History   Problem Relation Age of Onset    Hypertension Father     Neuropathy Father     Stroke Paternal Grandmother     Lung cancer Paternal Grandfather     Cancer Paternal Grandfather     Cataracts Mother      Diabetes Maternal Grandmother     No Known Problems Sister     No Known Problems Maternal Aunt        Social History     Socioeconomic History    Marital status:    Tobacco Use    Smoking status: Former     Types: Cigarettes     Quit date: 1978     Years since quittin.4    Smokeless tobacco: Never   Substance and Sexual Activity    Alcohol use: Yes     Alcohol/week: 2.5 standard drinks     Types: 3 Standard drinks or equivalent per week     Comment: 3 drinks per week    Drug use: No     Social Determinants of Health     Financial Resource Strain: Low Risk     Difficulty of Paying Living Expenses: Not hard at all   Food Insecurity: No Food Insecurity    Worried About Running Out of Food in the Last Year: Never true    Ran Out of Food in the Last Year: Never true   Transportation Needs: No Transportation Needs    Lack of Transportation (Medical): No    Lack of Transportation (Non-Medical): No   Physical Activity: Insufficiently Active    Days of Exercise per Week: 2 days    Minutes of Exercise per Session: 30 min   Stress: No Stress Concern Present    Feeling of Stress : Only a little   Social Connections: Unknown    Frequency of Communication with Friends and Family: More than three times a week    Frequency of Social Gatherings with Friends and Family: Once a week    Active Member of Clubs or Organizations: No    Attends Club or Organization Meetings: Never    Marital Status:    Housing Stability: Low Risk     Unable to Pay for Housing in the Last Year: No    Number of Places Lived in the Last Year: 1    Unstable Housing in the Last Year: No       Allergies:  Shrimp    Medications:    Current Outpatient Medications:     alfuzosin (UROXATRAL) 10 mg Tb24, Take 1 tablet (10 mg total) by mouth daily with breakfast., Disp: 30 tablet, Rfl: 11    amLODIPine (NORVASC) 2.5 MG tablet, Take 1 tablet (2.5 mg total) by mouth once daily., Disp: 90 tablet, Rfl: 3    cetirizine (ZYRTEC) 10 MG tablet, Take  10 mg by mouth daily as needed. 1 Tablet(s) Oral PRN ., Disp: , Rfl:     diphenhydrAMINE (BENADRYL) 25 mg capsule, Take 25 mg by mouth nightly as needed for Itching., Disp: , Rfl:     fluticasone (FLONASE) 50 mcg/actuation nasal spray, 2 sprays by Each Nare route daily as needed. , Disp: , Rfl:     guaifenesin (MUCINEX ORAL), Take 400 mg by mouth 2 (two) times daily as needed for Congestion., Disp: , Rfl:     ibuprofen (ADVIL,MOTRIN) 200 MG tablet, Take 400 mg by mouth every 6 (six) hours as needed. 2 Tablet(s) Oral PRN Every day., Disp: , Rfl:     metFORMIN (GLUCOPHAGE-XR) 750 MG ER 24hr tablet, TAKE 1 TABLET TWICE A DAY WITH MEALS., Disp: 180 tablet, Rfl: 1    potassium citrate (UROCIT-K 15) 15 mEq TbSR, Take 15 mEq by mouth 2 (two) times a day., Disp: 180 tablet, Rfl: 3    rosuvastatin (CRESTOR) 20 MG tablet, TAKE 1 TABLET DAILY (REPLACES PRAVASTATIN), Disp: 90 tablet, Rfl: 3    valsartan (DIOVAN) 320 MG tablet, TAKE 1 TABLET DAILY (REPLACES BENAZEPRIL 40 MG), Disp: 90 tablet, Rfl: 3  No current facility-administered medications for this visit.    Facility-Administered Medications Ordered in Other Visits:     lidocaine (PF) 10 mg/ml (1%) injection 10 mg, 1 mL, Intradermal, Once, Kermit Davalos MD    PHYSICAL EXAMINATION:    Constitutional: He is oriented to person, place, and time. He appears well-developed and well-nourished.  He is in no apparent distress.    Neck: Normal ROM.     Cardiovascular: Normal rate.      Pulmonary/Chest: Effort normal. No respiratory distress.     Abdominal:  He exhibits no distension.  There is no CVA tenderness.     Neurological: He is alert and oriented to person, place, and time.     Skin: Skin is warm and dry.     Psych: Cooperative with normal affect.      Physical Exam      LABS:    U/a: sp grav 1.020, pH 5, 50 glucose, otherwise negative    Lab Results   Component Value Date    PSA 0.56 03/11/2022    PSA 0.59 04/06/2020    PSA 0.62 06/15/2016    PSADIAG 0.51 06/15/2018     PSADIAG 0.54 05/18/2017     Lab Results   Component Value Date    CREATININE 1.2 08/17/2022         IMPRESSION:    Encounter Diagnoses   Name Primary?    Renal stones Yes         PLAN:  -Reviewed lab results with patient  -Continue Urocit K for stones and alfuzosin for BPH/LUTS  -Increase water intake to at least 2 L per day.  Stone prevention discussed  -RTC 6 months with renal US and KUB prior.   Will need BMP and PSA at next visit.    I encouraged him or any of his family members to call or email me with questions and/or concerns.      30 minutes of total time spent on the encounter, which includes face to face time and non-face to face time preparing to see the patient (eg, review of tests), Obtaining and/or reviewing separately obtained history, Documenting clinical information in the electronic or other health record, Independently interpreting results (not separately reported) and communicating results to the patient/family/caregiver, or Care coordination (not separately reported).

## 2022-09-12 NOTE — PATIENT INSTRUCTIONS
-The patient was encouraged to drink at least 2 liters of water a day, limit iced tea and hunter as well as foods high in oxalate.  They were cautioned to try to limit salt and red meat intake. Low oxalate diet (limit spinach, rhubarb, nuts, beets, potatoes, chocolate).  We also discussed adding citrate to the diet with the addition of jm or lemon juice to their water or alternatively with crystal light.      Get prompt medical attention if any of the following occur:  Severe pain that returns and not relieved by pain medicines  Repeated vomiting or unable to keep down fluids  Weakness, dizziness or fainting  Fever of 101.4ºF (38ºC) or higher, or as directed by your healthcare provider  Blood clots in urine  Foul smelling or cloudy urine  Unable to pass urine for 8 hours or increasing bladder pressure

## 2022-09-15 ENCOUNTER — PATIENT MESSAGE (OUTPATIENT)
Dept: FAMILY MEDICINE | Facility: CLINIC | Age: 67
End: 2022-09-15
Payer: MEDICARE

## 2022-09-19 ENCOUNTER — OFFICE VISIT (OUTPATIENT)
Dept: NEUROSURGERY | Facility: CLINIC | Age: 67
End: 2022-09-19
Payer: MEDICARE

## 2022-09-19 ENCOUNTER — PATIENT MESSAGE (OUTPATIENT)
Dept: UROLOGY | Facility: CLINIC | Age: 67
End: 2022-09-19
Payer: MEDICARE

## 2022-09-19 VITALS
HEART RATE: 69 BPM | WEIGHT: 186.31 LBS | SYSTOLIC BLOOD PRESSURE: 115 MMHG | BODY MASS INDEX: 26.67 KG/M2 | DIASTOLIC BLOOD PRESSURE: 74 MMHG | HEIGHT: 70 IN

## 2022-09-19 DIAGNOSIS — M54.16 LUMBAR RADICULOPATHY: Primary | ICD-10-CM

## 2022-09-19 DIAGNOSIS — M54.16 RIGHT LUMBAR RADICULITIS: ICD-10-CM

## 2022-09-19 DIAGNOSIS — M71.38 SYNOVIAL CYST OF LUMBAR FACET JOINT: ICD-10-CM

## 2022-09-19 PROCEDURE — 99205 PR OFFICE/OUTPT VISIT, NEW, LEVL V, 60-74 MIN: ICD-10-PCS | Mod: S$GLB,,, | Performed by: NEUROLOGICAL SURGERY

## 2022-09-19 PROCEDURE — 99205 OFFICE O/P NEW HI 60 MIN: CPT | Mod: S$GLB,,, | Performed by: NEUROLOGICAL SURGERY

## 2022-09-19 NOTE — PROGRESS NOTES
I appreciate this referral from Dr. Gamble    HPI:  This is a very pleasant 67-year-old gentleman with a history of well-controlled diabetes mellitus, hypertension, hyperlipidemia, abdominal obesity, and kidney stones who presents with a 3 year history of waxing and waning right leg pain in the L5 distribution.  He describes aching discomfort in the right posterior lateral thigh and lateral calf into the dorsum of the right foot which is worse in the morning and worse with prolonged sitting.  He obtains some relief with Tylenol or Motrin as well as walking.  He also notes mild, right greater than left, lumbosacral pain, though this is much less severe than the right leg pain.  He denies left leg symptoms.  He denies focal weakness, paresthesias, saddle anesthesia, sphincter dysfunction.  He has a known right L5-S1 foraminal synovial cyst which has been aspirated on 2 occasions.  On the 1st occasion he had 90% relief  which lasted approximately 1 year.  Approximately 3 months ago he had a 2nd cyst aspiration which provided short-term relief, but now he is experiencing recurrence of symptoms.  He is taking Advil and Tylenol at night for symptom management.  He is anxious for definitive resolution of his symptoms.    Smoking status:  Former smoker, quit 1978  Past Medical History:   Diagnosis Date    Cataract     Cataract of left eye     Diabetes mellitus type II     on po meds    Hyperlipidemia     not on meds at present    Hypertension     Kidney stones 04/2017    Left ureteral stone     Plantar fasciitis of right foot 10/2017    Dr Colorado     Tendonitis 01/2018    right foot Dr Colorado    Wears glasses       Past Surgical History:   Procedure Laterality Date    ADENOIDECTOMY      CATARACT EXTRACTION      COLONOSCOPY  3/23/2010    Dr. Blackburn, hyperplastic polyps, otherwise negative, 10 year recheck    COLONOSCOPY N/A 11/19/2021    Procedure: COLONOSCOPY;  Surgeon: Marcus Sexton MD;  Location: [facility];  Service:  Endoscopy;  Laterality: N/A;    CYSTOSCOPY Left 2017    with ureteral stent-Dr. Noel    CYSTOSCOPY Left 2022    Procedure: CYSTOSCOPY with stent removal;  Surgeon: Ronal Benitez MD;  Location: [facility] OR;  Service: Urology;  Laterality: Left;    CYSTOSCOPY WITH URETEROSCOPY, RETROGRADE PYELOGRAPHY, AND INSERTION OF STENT Left 2022    Procedure: CYSTOSCOPY, WITH RETROGRADE PYELOGRAM AND URETERAL STENT INSERTION;  Surgeon: Ronal Benitez MD;  Location: [facility] OR;  Service: Urology;  Laterality: Left;    EYE SURGERY  2013    Dr Nielsen    kidney stent   2017    KNEE ARTHROSCOPY W/ DEBRIDEMENT      right with lateral release     LITHOTRIPSY  2017    NASAL SINUS SURGERY      retna surgery   2013    Dr Peralta    TONSILLECTOMY      TRANSFORAMINAL EPIDURAL INJECTION OF STEROID Right 2020    Procedure: Injection,steroid,epidural,transforaminal approach L5- S1;  Surgeon: Kermit Davalos MD;  Location: [facility] OR;  Service: Pain Management;  Laterality: Right;  L5-s1    TRANSFORAMINAL EPIDURAL INJECTION OF STEROID Right 2022    Procedure: Injection,steroid,epidural,transforaminal approach;  Surgeon: Christos Saul MD;  Location: [facility] OR;  Service: Pain Management;  Laterality: Right;  L5-S1 and cyst aspiration L5-S1    URETEROSCOPIC REMOVAL OF URETERIC CALCULUS Left 2022    Procedure: REMOVAL, CALCULUS, URETER, URETEROSCOPIC;  Surgeon: Ronal Benitez MD;  Location: [facility] OR;  Service: Urology;  Laterality: Left;     Social History     Tobacco Use    Smoking status: Former     Types: Cigarettes     Quit date: 1978     Years since quittin.4    Smokeless tobacco: Never   Substance Use Topics    Alcohol use: Yes     Alcohol/week: 2.5 standard drinks     Types: 3 Standard drinks or equivalent per week     Comment: 3 drinks per week    Drug use: No       Review of Systems: All systems reviewed and are as above or otherwise negative.    General: well developed, well nourished, no  distress.   Head: normocephalic, atraumatic  Eyes: pupils equal, round, reactive to light with accomodation, EOMI.   Neck: trachea midline. No JVD  Cardiovascular: No LE edema   Pulmonary: normal respirations, no signs of respiratory distress  Abdomen: soft, non-distended, not tender to palpation  Sensory: intact to light touch throughout  Extremities: No bruising, deformity  Skin: Skin is warm, dry and intact.    Motor Strength: Moves all extremities spontaneously with good tone.  Full strength upper and lower extremities. No abnormal movements seen.     Strength  Deltoids Triceps Biceps Wrist Extension Wrist Flexion Hand    Upper: R 5/5 5/5 5/5 5/5 5/5 5/5    L 5/5 5/5 5/5 5/5 5/5 5/5     Iliopsoas Quadriceps Knee  Flexion Tibialis  anterior Gastro- cnemius EHL   Lower: R 5/5 5/5 5/5 5/5 5/5 5/5    L 5/5 5/5 5/5 5/5 5/5 5/5     Neurologic: Alert and oriented. Thought content appropriate.  GCS: Motor: 6/Verbal: 5/Eyes: 4 GCS Total: 15  Mental Status: Awake, Alert, Oriented x 4  Language: No aphasia  Speech: No dysarthria  Cranial nerves: face symmetric, tongue midline, CN II-XII grossly intact.     Pronator Drift: no drift noted  Finger-to-nose: Intact bilaterally  DTR's: 2 + and symmetric in UE and LE  Mcqueen: absent  Clonus: absent  Babinski: absent    Gait: normal    Tandem Gait: No difficulty           Able to walk on heels & toes    Cervical Spine  ROM: full    Nontender to palpation    Lumbar Spine   ROM: full   Nontender to palpation    Pain on Hip ROM: Negative  Straight leg raise: negative bilaterally     SI Joint tenderness: Negative bilaterally   TIAGO: Negative bilaterally  Thigh Thrust: Negative bilaterally  Gaenslen: Negative bilaterally    Significant Exam Findings:  Motor and sensory intact.  Straight leg raising positive 90°.  Mild tenderness right lumbosacral region    Significant Radiology Findings:  I reviewed MRI May 30, 2022 in detail with the patient.  Pertinent findings include advanced  degenerative disc disease with disc desiccation, Modic endplate changes L3-4.  Severe left lateral recess stenosis L3-4.  Degenerative dextroscoliosis apex at L3.  Moderate degenerative lateral recess stenosis L4-5.  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  Facet effusion right L5-S1    Analysis:  He would likely benefit from surgery.  I offered a right L5-S1 resection of extradural mass using a trans facet approach and minimally invasive technique.  I outlined the surgical goals, material risks, and treatment alternatives.  I explained that additional surgery at this segment or others may be necessary in the future given the other areas of pathology.  He voiced understanding and wants to proceed with surgery.  Written consent was obtained.  He was counseled discontinue all NSAIDs 10 days prior to the procedure.  We will request preoperative medical clearance with his primary care physician, Dr. Mon.    [unfilled]   Toni was seen today for back pain and leg pain.    Diagnoses and all orders for this visit:    Lumbar radiculopathy    Synovial cyst of lumbar facet joint  -     Ambulatory referral/consult to Neurosurgery    Right lumbar radiculitis  -     Ambulatory referral/consult to Neurosurgery         No follow-ups on file.

## 2022-09-19 NOTE — H&P (VIEW-ONLY)
I appreciate this referral from Dr. Gamble    HPI:  This is a very pleasant 67-year-old gentleman with a history of well-controlled diabetes mellitus, hypertension, hyperlipidemia, abdominal obesity, and kidney stones who presents with a 3 year history of waxing and waning right leg pain in the L5 distribution.  He describes aching discomfort in the right posterior lateral thigh and lateral calf into the dorsum of the right foot which is worse in the morning and worse with prolonged sitting.  He obtains some relief with Tylenol or Motrin as well as walking.  He also notes mild, right greater than left, lumbosacral pain, though this is much less severe than the right leg pain.  He denies left leg symptoms.  He denies focal weakness, paresthesias, saddle anesthesia, sphincter dysfunction.  He has a known right L5-S1 foraminal synovial cyst which has been aspirated on 2 occasions.  On the 1st occasion he had 90% relief  which lasted approximately 1 year.  Approximately 3 months ago he had a 2nd cyst aspiration which provided short-term relief, but now he is experiencing recurrence of symptoms.  He is taking Advil and Tylenol at night for symptom management.  He is anxious for definitive resolution of his symptoms.    Smoking status:  Former smoker, quit 1978  Past Medical History:   Diagnosis Date    Cataract     Cataract of left eye     Diabetes mellitus type II     on po meds    Hyperlipidemia     not on meds at present    Hypertension     Kidney stones 04/2017    Left ureteral stone     Plantar fasciitis of right foot 10/2017    Dr Colorado     Tendonitis 01/2018    right foot Dr Colorado    Wears glasses       Past Surgical History:   Procedure Laterality Date    ADENOIDECTOMY      CATARACT EXTRACTION      COLONOSCOPY  3/23/2010    Dr. Blackburn, hyperplastic polyps, otherwise negative, 10 year recheck    COLONOSCOPY N/A 11/19/2021    Procedure: COLONOSCOPY;  Surgeon: Marcus Sexton MD;  Location: [facility];  Service:  Endoscopy;  Laterality: N/A;    CYSTOSCOPY Left 2017    with ureteral stent-Dr. Noel    CYSTOSCOPY Left 2022    Procedure: CYSTOSCOPY with stent removal;  Surgeon: Ronal Benitez MD;  Location: [facility] OR;  Service: Urology;  Laterality: Left;    CYSTOSCOPY WITH URETEROSCOPY, RETROGRADE PYELOGRAPHY, AND INSERTION OF STENT Left 2022    Procedure: CYSTOSCOPY, WITH RETROGRADE PYELOGRAM AND URETERAL STENT INSERTION;  Surgeon: Ronal Benitez MD;  Location: [facility] OR;  Service: Urology;  Laterality: Left;    EYE SURGERY  2013    Dr Nielsen    kidney stent   2017    KNEE ARTHROSCOPY W/ DEBRIDEMENT      right with lateral release     LITHOTRIPSY  2017    NASAL SINUS SURGERY      retna surgery   2013    Dr Peralta    TONSILLECTOMY      TRANSFORAMINAL EPIDURAL INJECTION OF STEROID Right 2020    Procedure: Injection,steroid,epidural,transforaminal approach L5- S1;  Surgeon: Kermit Davalos MD;  Location: [facility] OR;  Service: Pain Management;  Laterality: Right;  L5-s1    TRANSFORAMINAL EPIDURAL INJECTION OF STEROID Right 2022    Procedure: Injection,steroid,epidural,transforaminal approach;  Surgeon: Christos Saul MD;  Location: [facility] OR;  Service: Pain Management;  Laterality: Right;  L5-S1 and cyst aspiration L5-S1    URETEROSCOPIC REMOVAL OF URETERIC CALCULUS Left 2022    Procedure: REMOVAL, CALCULUS, URETER, URETEROSCOPIC;  Surgeon: Ronal Benitez MD;  Location: [facility] OR;  Service: Urology;  Laterality: Left;     Social History     Tobacco Use    Smoking status: Former     Types: Cigarettes     Quit date: 1978     Years since quittin.4    Smokeless tobacco: Never   Substance Use Topics    Alcohol use: Yes     Alcohol/week: 2.5 standard drinks     Types: 3 Standard drinks or equivalent per week     Comment: 3 drinks per week    Drug use: No       Review of Systems: All systems reviewed and are as above or otherwise negative.    General: well developed, well nourished, no  distress.   Head: normocephalic, atraumatic  Eyes: pupils equal, round, reactive to light with accomodation, EOMI.   Neck: trachea midline. No JVD  Cardiovascular: No LE edema   Pulmonary: normal respirations, no signs of respiratory distress  Abdomen: soft, non-distended, not tender to palpation  Sensory: intact to light touch throughout  Extremities: No bruising, deformity  Skin: Skin is warm, dry and intact.    Motor Strength: Moves all extremities spontaneously with good tone.  Full strength upper and lower extremities. No abnormal movements seen.     Strength  Deltoids Triceps Biceps Wrist Extension Wrist Flexion Hand    Upper: R 5/5 5/5 5/5 5/5 5/5 5/5    L 5/5 5/5 5/5 5/5 5/5 5/5     Iliopsoas Quadriceps Knee  Flexion Tibialis  anterior Gastro- cnemius EHL   Lower: R 5/5 5/5 5/5 5/5 5/5 5/5    L 5/5 5/5 5/5 5/5 5/5 5/5     Neurologic: Alert and oriented. Thought content appropriate.  GCS: Motor: 6/Verbal: 5/Eyes: 4 GCS Total: 15  Mental Status: Awake, Alert, Oriented x 4  Language: No aphasia  Speech: No dysarthria  Cranial nerves: face symmetric, tongue midline, CN II-XII grossly intact.     Pronator Drift: no drift noted  Finger-to-nose: Intact bilaterally  DTR's: 2 + and symmetric in UE and LE  Mcqueen: absent  Clonus: absent  Babinski: absent    Gait: normal    Tandem Gait: No difficulty           Able to walk on heels & toes    Cervical Spine  ROM: full    Nontender to palpation    Lumbar Spine   ROM: full   Nontender to palpation    Pain on Hip ROM: Negative  Straight leg raise: negative bilaterally     SI Joint tenderness: Negative bilaterally   TIAGO: Negative bilaterally  Thigh Thrust: Negative bilaterally  Gaenslen: Negative bilaterally    Significant Exam Findings:  Motor and sensory intact.  Straight leg raising positive 90°.  Mild tenderness right lumbosacral region    Significant Radiology Findings:  I reviewed MRI May 30, 2022 in detail with the patient.  Pertinent findings include advanced  degenerative disc disease with disc desiccation, Modic endplate changes L3-4.  Severe left lateral recess stenosis L3-4.  Degenerative dextroscoliosis apex at L3.  Moderate degenerative lateral recess stenosis L4-5.  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  Facet effusion right L5-S1    Analysis:  He would likely benefit from surgery.  I offered a right L5-S1 resection of extradural mass using a trans facet approach and minimally invasive technique.  I outlined the surgical goals, material risks, and treatment alternatives.  I explained that additional surgery at this segment or others may be necessary in the future given the other areas of pathology.  He voiced understanding and wants to proceed with surgery.  Written consent was obtained.  He was counseled discontinue all NSAIDs 10 days prior to the procedure.  We will request preoperative medical clearance with his primary care physician, Dr. Mon.    [unfilled]   Toni was seen today for back pain and leg pain.    Diagnoses and all orders for this visit:    Lumbar radiculopathy    Synovial cyst of lumbar facet joint  -     Ambulatory referral/consult to Neurosurgery    Right lumbar radiculitis  -     Ambulatory referral/consult to Neurosurgery         No follow-ups on file.

## 2022-09-19 NOTE — PROGRESS NOTES
Reviewed imaging labs and NP visit  Though was 24 hour urine done prior? Visit only reviewed labs/imaging.  As well ultrasound suggests multiple bilateral renal stones  CT in march had none on right and stones on left were removed  Would book RBUS again prior to his next f/u with NP rayes  As well if didn't do 24h urine, should do asap. If so, get results for review  ALSO set up a new 24 hour urine for monitoring in 6 mos prior to his return visit

## 2022-09-20 ENCOUNTER — IMMUNIZATION (OUTPATIENT)
Dept: PRIMARY CARE CLINIC | Facility: CLINIC | Age: 67
End: 2022-09-20
Payer: MEDICARE

## 2022-09-20 ENCOUNTER — TELEPHONE (OUTPATIENT)
Dept: ADMINISTRATIVE | Facility: CLINIC | Age: 67
End: 2022-09-20
Payer: MEDICARE

## 2022-09-20 ENCOUNTER — TELEPHONE (OUTPATIENT)
Dept: UROLOGY | Facility: CLINIC | Age: 67
End: 2022-09-20
Payer: MEDICARE

## 2022-09-20 DIAGNOSIS — Z23 NEED FOR VACCINATION: Primary | ICD-10-CM

## 2022-09-20 PROCEDURE — 0124A COVID-19, MRNA, LNP-S, BIVALENT BOOSTER, PF, 30 MCG/0.3 ML DOSE: CPT | Mod: S$GLB,,, | Performed by: FAMILY MEDICINE

## 2022-09-20 PROCEDURE — 91312 COVID-19, MRNA, LNP-S, BIVALENT BOOSTER, PF, 30 MCG/0.3 ML DOSE: ICD-10-PCS | Mod: S$GLB,,, | Performed by: FAMILY MEDICINE

## 2022-09-20 PROCEDURE — 91312 COVID-19, MRNA, LNP-S, BIVALENT BOOSTER, PF, 30 MCG/0.3 ML DOSE: CPT | Mod: S$GLB,,, | Performed by: FAMILY MEDICINE

## 2022-09-20 PROCEDURE — 0124A COVID-19, MRNA, LNP-S, BIVALENT BOOSTER, PF, 30 MCG/0.3 ML DOSE: ICD-10-PCS | Mod: S$GLB,,, | Performed by: FAMILY MEDICINE

## 2022-09-20 NOTE — TELEPHONE ENCOUNTER
----- Message from Miguel Angel Masters sent at 9/19/2022  4:43 PM CDT -----  Regarding: pt called  Name of Who is Calling: CALI MAIN [[id number]]      What is the request in detail: pt called requesting for MERT to give him a call he has his paperwork.Please advise      Can the clinic reply by [facility]: no      [facility][phone] (home)        X Size Of Lesion In Cm: 0

## 2022-09-21 ENCOUNTER — OFFICE VISIT (OUTPATIENT)
Dept: FAMILY MEDICINE | Facility: CLINIC | Age: 67
End: 2022-09-21
Payer: MEDICARE

## 2022-09-21 VITALS
RESPIRATION RATE: 18 BRPM | DIASTOLIC BLOOD PRESSURE: 78 MMHG | HEIGHT: 70 IN | SYSTOLIC BLOOD PRESSURE: 130 MMHG | OXYGEN SATURATION: 96 % | HEART RATE: 78 BPM | WEIGHT: 190.5 LBS | TEMPERATURE: 98 F | BODY MASS INDEX: 27.27 KG/M2

## 2022-09-21 DIAGNOSIS — Z74.09 OTHER REDUCED MOBILITY: ICD-10-CM

## 2022-09-21 DIAGNOSIS — E11.69 HYPERLIPIDEMIA ASSOCIATED WITH TYPE 2 DIABETES MELLITUS: ICD-10-CM

## 2022-09-21 DIAGNOSIS — E11.59 HYPERTENSION ASSOCIATED WITH DIABETES: ICD-10-CM

## 2022-09-21 DIAGNOSIS — I15.2 HYPERTENSION ASSOCIATED WITH DIABETES: ICD-10-CM

## 2022-09-21 DIAGNOSIS — E66.3 OVERWEIGHT (BMI 25.0-29.9): ICD-10-CM

## 2022-09-21 DIAGNOSIS — E11.69 TYPE 2 DIABETES MELLITUS WITH OTHER SPECIFIED COMPLICATION, WITHOUT LONG-TERM CURRENT USE OF INSULIN: ICD-10-CM

## 2022-09-21 DIAGNOSIS — Z00.00 ENCOUNTER FOR PREVENTIVE HEALTH EXAMINATION: Primary | ICD-10-CM

## 2022-09-21 DIAGNOSIS — E78.5 HYPERLIPIDEMIA ASSOCIATED WITH TYPE 2 DIABETES MELLITUS: ICD-10-CM

## 2022-09-21 PROCEDURE — G0439 PPPS, SUBSEQ VISIT: HCPCS | Mod: ,,, | Performed by: FAMILY MEDICINE

## 2022-09-21 PROCEDURE — 99215 OFFICE O/P EST HI 40 MIN: CPT | Mod: PBBFAC,PN | Performed by: FAMILY MEDICINE

## 2022-09-21 PROCEDURE — 99999 PR PBB SHADOW E&M-EST. PATIENT-LVL V: CPT | Mod: PBBFAC,,, | Performed by: FAMILY MEDICINE

## 2022-09-21 PROCEDURE — 99999 PR PBB SHADOW E&M-EST. PATIENT-LVL V: ICD-10-PCS | Mod: PBBFAC,,, | Performed by: FAMILY MEDICINE

## 2022-09-21 PROCEDURE — G0439 PR MEDICARE ANNUAL WELLNESS SUBSEQUENT VISIT: ICD-10-PCS | Mod: ,,, | Performed by: FAMILY MEDICINE

## 2022-09-21 NOTE — PROGRESS NOTES
"Toni Hyde presented for a  Medicare AWV and comprehensive Health Risk Assessment today. The following components were reviewed and updated:    Medical history  Family History  Social history  Allergies and Current Medications  Health Risk Assessment  Health Maintenance  Care Team         ** See Completed Assessments for Annual Wellness Visit within the encounter summary.**         The following assessments were completed:  Living Situation  CAGE  Depression Screening  Timed Get Up and Go  Whisper Test  Cognitive Function Screening  Nutrition Screening  ADL Screening  PAQ Screening          Vitals:    09/21/22 1511   BP: 130/78   BP Location: Right arm   Patient Position: Sitting   BP Method: Medium (Manual)   Pulse: 78   Resp: 18   Temp: 98.3 °F (36.8 °C)   SpO2: 96%   Weight: 86.4 kg (190 lb 7.6 oz)   Height: 5' 10" (1.778 m)     Body mass index is 27.33 kg/m².  Physical Exam  Vitals reviewed.   Constitutional:       Appearance: Normal appearance.   HENT:      Head: Normocephalic and atraumatic.   Eyes:      Pupils: Pupils are equal, round, and reactive to light.   Pulmonary:      Effort: Pulmonary effort is normal. No respiratory distress.   Skin:     General: Skin is warm and dry.   Neurological:      General: No focal deficit present.      Mental Status: He is alert and oriented to person, place, and time.   Psychiatric:         Mood and Affect: Mood normal.         Behavior: Behavior normal.     The ASCVD Risk score (Cindy SAENZ, et al., 2019) failed to calculate for the following reasons:    The valid total cholesterol range is 130 to 320 mg/dL        Diagnoses and health risks identified today and associated recommendations/orders:    1. Encounter for preventive health examination    2. Overweight (BMI 25.0-29.9)  Body mass index is 27.33 kg/m².  Continue healthy diet and regular exercise as tolerated.  Continue medications as prescribed.  Follow up with PCP     3. Hyperlipidemia associated with type 2 " diabetes mellitus  Stable  Continue medications as prescribed.  Follow up with PCP     4. Hypertension associated with diabetes  Stable  Continue medications as prescribed.  Follow up with PCP     5. Type 2 diabetes mellitus with other specified complication, without long-term current use of insulin  Stable  Continue medications as prescribed.  Follow up with PCP     6. Other reduced mobility  Stable without assistive device.    Provided Toni with a 5-10 year written screening schedule and personal prevention plan. Recommendations were developed using the USPSTF age appropriate recommendations. Education, counseling, and referrals were provided as needed. After Visit Summary printed and given to patient which includes a list of additional screenings\tests needed.    Follow up if symptoms worsen or fail to improve, for scheduled appt, 1 year for AWV.    Bruna Brown NP        Future Appointments       Date Provider Specialty Appt Notes    12/2/2022  Lab .    12/7/2022 Candido Mon MD Family Medicine 6 mon f/u    3/10/2023  Radiology     3/10/2023  Radiology     3/13/2023 Tequila Foster NP Urology 6 mo f/u          I offered to discuss advanced care planning, including how to pick a person who would make decisions for you if you were unable to make them for yourself, called a health care power of , and what kind of decisions you might make such as use of life sustaining treatments such as ventilators and tube feeding when faced with a life limiting illness recorded on a living will that they will need to know. (How you want to be cared for as you near the end of your natural life)     X Patient is interested in learning more about how to make advanced directives.  I provided them paperwork and offered to discuss this with them.

## 2022-09-21 NOTE — PATIENT INSTRUCTIONS
Counseling and Referral of Other Preventative  (Italic type indicates deductible and co-insurance are waived)    Patient Name: Toni Hyde  Today's Date: 9/21/2022    Health Maintenance       Date Due Completion Date    Eye Exam 12/01/2021 12/1/2020    Override on 5/30/2017: Done (Dr Kwan  [facility]  report sent to scanning   kb)    Override on 9/24/2013: Done    Pneumococcal Vaccines (Age 65+) (3 - PPSV23 or PCV20) 06/01/2022 12/31/2018    Influenza Vaccine (1) 09/01/2022 10/15/2021    Lipid Panel 10/04/2022 10/4/2021    Hemoglobin A1c 11/27/2022 5/27/2022    COVID-19 Vaccine (5 - Booster for Pfizer series) 01/20/2023 9/20/2022    Diabetes Urine Screening 05/27/2023 5/27/2022    Foot Exam 05/31/2023 5/31/2022    Override on 4/6/2020: Done    Low Dose Statin 09/12/2023 9/12/2022    Colorectal Cancer Screening 11/19/2024 11/19/2021    TETANUS VACCINE 06/01/2027 6/1/2017        No orders of the defined types were placed in this encounter.      The following information is provided to all patients.  This information is to help you find resources for any of the problems found today that may be affecting your health:                Living healthy guide: www.[facility].gov      Understanding Diabetes: www.diabetes.org      Eating healthy: www.cdc.gov/healthyweight      CDC home safety checklist: www.cdc.gov/steadi/patient.html      Agency on Aging: www.goea.[place].gov      Alcoholics anonymous (AA): www.aa.org      Physical Activity: www.sean.nih.gov/uu5gbse      Tobacco use: www.quitwithusla.org

## 2022-09-22 DIAGNOSIS — M71.38 SYNOVIAL CYST OF LUMBAR SPINE: Primary | ICD-10-CM

## 2022-09-22 DIAGNOSIS — R73.09 OTHER ABNORMAL GLUCOSE: ICD-10-CM

## 2022-09-22 DIAGNOSIS — Z01.818 PRE-OP EXAM: ICD-10-CM

## 2022-09-22 DIAGNOSIS — M79.606 PAIN OF LOWER EXTREMITY, UNSPECIFIED LATERALITY: ICD-10-CM

## 2022-09-22 RX ORDER — MUPIROCIN 20 MG/G
1 OINTMENT TOPICAL 2 TIMES DAILY
Status: CANCELLED | OUTPATIENT
Start: 2022-09-22 | End: 2022-09-23

## 2022-09-22 RX ORDER — MUPIROCIN 20 MG/G
OINTMENT TOPICAL
Status: CANCELLED | OUTPATIENT
Start: 2022-09-22

## 2022-10-04 ENCOUNTER — PATIENT MESSAGE (OUTPATIENT)
Dept: NEUROSURGERY | Facility: CLINIC | Age: 67
End: 2022-10-04
Payer: MEDICARE

## 2022-10-07 ENCOUNTER — HOSPITAL ENCOUNTER (OUTPATIENT)
Dept: PREADMISSION TESTING | Facility: HOSPITAL | Age: 67
Discharge: HOME | End: 2022-10-07
Attending: NEUROLOGICAL SURGERY
Payer: MEDICARE

## 2022-10-07 ENCOUNTER — PATIENT MESSAGE (OUTPATIENT)
Dept: FAMILY MEDICINE | Facility: CLINIC | Age: 67
End: 2022-10-07
Payer: MEDICARE

## 2022-10-07 ENCOUNTER — HOSPITAL ENCOUNTER (OUTPATIENT)
Dept: RADIOLOGY | Facility: HOSPITAL | Age: 67
Discharge: HOME | End: 2022-10-07
Attending: NEUROLOGICAL SURGERY
Payer: MEDICARE

## 2022-10-07 VITALS — HEIGHT: 71 IN | BODY MASS INDEX: 26.18 KG/M2 | WEIGHT: 187 LBS

## 2022-10-07 DIAGNOSIS — Z01.818 PRE-OP EXAM: ICD-10-CM

## 2022-10-07 DIAGNOSIS — M71.38 SYNOVIAL CYST OF LUMBAR SPINE: ICD-10-CM

## 2022-10-07 PROCEDURE — 99900104 DSU ONLY-NO CHARGE-EA ADD'L HR (STAT)

## 2022-10-07 PROCEDURE — 71045 X-RAY EXAM CHEST 1 VIEW: CPT | Mod: 26,,, | Performed by: RADIOLOGY

## 2022-10-07 PROCEDURE — 71045 X-RAY EXAM CHEST 1 VIEW: CPT | Mod: TC,FY

## 2022-10-07 PROCEDURE — 71045 XR CHEST 1 VIEW: ICD-10-PCS | Mod: 26,,, | Performed by: RADIOLOGY

## 2022-10-07 PROCEDURE — 99900103 DSU ONLY-NO CHARGE-INITIAL HR (STAT)

## 2022-10-07 NOTE — DISCHARGE INSTRUCTIONS
To confirm, Your doctor has instructed you that surgery is scheduled for:     Please report to [facility], Registration the morning of surgery. You must check-in and receive a wristband before going to your procedure.    Pre-Op will call the afternoon prior to surgery between 1:00 and 6:00 PM with the final arrival time.  Phone number: [phone]    PLEASE NOTE:  The surgery schedule has many variables which may affect the time of your surgery case.  Family members should be available if your surgery time changes.  Plan to be here the day of your procedure between 4-6 hours.    MEDICATIONS:  TAKE ONLY THESE MEDICATIONS WITH A SMALL SIP OF WATER THE MORNING OF YOUR PROCEDURE:      DO NOT TAKE THESE MEDICATIONS 5-7 DAYS PRIOR to your procedure or per your surgeon's request:   ASPIRIN, ALEVE, ADVIL, IBUPROFEN, FISH OIL VITAMIN E, HERBALS  (May take Tylenol)    ONLY if you are prescribed any types of blood thinners such as:  Aspirin, Coumadin, Plavix, Pradaxa, Xarelto, Aggrenox, Effient, Eliquis, Savasya, Brilinta, or any other, ask your surgeon whether you should stop taking them and how long before surgery you should stop.  You may also need to verify with the prescribing physician if it is ok to stop your medication.      INSTRUCTIONS IMPORTANT!!  Do not eat or drink anything between midnight and the time of your procedure- this includes gum, mints, and candy.  Do not smoke or drink alcoholic beverages 24 hours prior to your procedure.  Shower the night before AND the morning of your procedure with a Chlorhexidine wash such as Hibiclens or Dial antibacterial soap from the neck down.  Do not get it on your face or in your eyes.  You may use your own shampoo and face wash. This helps your skin to be as bacteria free as possible.    If you wear contact lenses, dentures, hearing aids or glasses, bring a container to put them in during surgery and give to a family member for safe keeping.  Please  leave all jewelry, piercing's and valuables at home.   DO NOT remove hair from the surgery site.  Do not shave the incision site unless you are given specific instructions to do so.    ONLY if you have been diagnosed with sleep apnea please bring your C-PAP machine.  ONLY if you wear home oxygen please bring your portable oxygen tank the day of your procedure.  ONLY if you have a history of OPEN HEART SURGERY you will need a clearance from your Cardiologist per Anesthesia.      ONLY for patients requiring bowel prep, written instructions will be given by your doctor's office.  ONLY if you have a neuro stimulator, please bring the controller with you the morning of surgery  ONLY if a type and screen test is needed before surgery, please return:  If your doctor has scheduled you for an overnight stay, bring a small overnight bag with any personal items you need.  Make arrangements in advance for transportation home by a responsible adult.  It is not safe to drive a vehicle during the 24 hours after anesthesia.      [facility] Visitor Policy    Effective September 26, 2022    [facility] will resume routine visitation for COVID-19 negative patients, including inpatients, outpatients, and procedural areas, in accordance with local campus procedures.    All [facility] facilities and properties are tobacco free.  Smoking is NOT allowed.   If you have any questions about these instructions, call Pre-Op Admit  Nursing at [phone] or the Pre-Op Day Surgery Unit at [phone].

## 2022-10-10 ENCOUNTER — PATIENT MESSAGE (OUTPATIENT)
Dept: ADMINISTRATIVE | Facility: HOSPITAL | Age: 67
End: 2022-10-10
Payer: MEDICARE

## 2022-10-12 ENCOUNTER — ANESTHESIA EVENT (OUTPATIENT)
Dept: SURGERY | Facility: HOSPITAL | Age: 67
End: 2022-10-12
Payer: MEDICARE

## 2022-10-13 ENCOUNTER — OFFICE VISIT (OUTPATIENT)
Dept: FAMILY MEDICINE | Facility: CLINIC | Age: 67
End: 2022-10-13
Payer: MEDICARE

## 2022-10-13 VITALS
HEART RATE: 60 BPM | OXYGEN SATURATION: 96 % | BODY MASS INDEX: 26.91 KG/M2 | SYSTOLIC BLOOD PRESSURE: 112 MMHG | TEMPERATURE: 98 F | RESPIRATION RATE: 14 BRPM | WEIGHT: 192.25 LBS | DIASTOLIC BLOOD PRESSURE: 60 MMHG | HEIGHT: 71 IN

## 2022-10-13 DIAGNOSIS — Z01.818 PREOP EXAMINATION: Primary | ICD-10-CM

## 2022-10-13 DIAGNOSIS — Z23 NEED FOR IMMUNIZATION AGAINST INFLUENZA: ICD-10-CM

## 2022-10-13 PROCEDURE — 99214 PR OFFICE/OUTPT VISIT, EST, LEVL IV, 30-39 MIN: ICD-10-PCS | Mod: S$PBB,,, | Performed by: FAMILY MEDICINE

## 2022-10-13 PROCEDURE — G0008 ADMIN INFLUENZA VIRUS VAC: HCPCS | Mod: PBBFAC,PO

## 2022-10-13 PROCEDURE — 99214 OFFICE O/P EST MOD 30 MIN: CPT | Mod: S$PBB,,, | Performed by: FAMILY MEDICINE

## 2022-10-13 PROCEDURE — 99999 PR PBB SHADOW E&M-EST. PATIENT-LVL V: ICD-10-PCS | Mod: PBBFAC,,, | Performed by: FAMILY MEDICINE

## 2022-10-13 PROCEDURE — 99999 PR PBB SHADOW E&M-EST. PATIENT-LVL V: CPT | Mod: PBBFAC,,, | Performed by: FAMILY MEDICINE

## 2022-10-13 PROCEDURE — 99215 OFFICE O/P EST HI 40 MIN: CPT | Mod: PBBFAC,PO | Performed by: FAMILY MEDICINE

## 2022-10-13 NOTE — PROGRESS NOTES
Subjective:       Patient ID: Toni Hyde is a 67 y.o. male.    Chief Complaint: Pre-op Exam (Scheduled for 10/19 - cyst on back)    Patient presents today for Pre-op clearance for Lumbar Surgery with Dr. LUH Matthews on 10/19/2022.   Patient reports no prior anesthesia problems, no current/recent sign of symptom of infection and no recent Cardio-Pulmonary or Neuro symptoms.   He has been off Ibuprofen and takes no ASA, Fish Oil or Vit E.    PMHx- HTN; HLD; Pre-Diabetes    He requests Flu vaccine today.      Review of Systems   Constitutional:  Negative for fever.   HENT:  Positive for congestion (allergies). Negative for ear pain, sinus pain and sore throat.    Respiratory:  Negative for cough and shortness of breath.    Cardiovascular:  Negative for chest pain and palpitations.   Gastrointestinal:  Negative for abdominal pain, diarrhea and nausea.   Genitourinary:  Negative for dysuria.   Skin:  Negative for rash and wound.   Neurological:  Negative for numbness.   All other systems reviewed and are negative.    Objective:      Physical Exam  Constitutional:       General: He is not in acute distress.     Appearance: He is well-developed.   HENT:      Right Ear: Tympanic membrane normal.      Left Ear: Tympanic membrane normal.      Nose: Nose normal.      Mouth/Throat:      Mouth: Mucous membranes are moist.      Pharynx: Oropharynx is clear. No posterior oropharyngeal erythema.   Eyes:      Conjunctiva/sclera: Conjunctivae normal.      Pupils: Pupils are equal, round, and reactive to light.   Neck:      Vascular: No carotid bruit.   Cardiovascular:      Rate and Rhythm: Normal rate and regular rhythm.      Heart sounds: No murmur heard.  Pulmonary:      Effort: Pulmonary effort is normal.      Breath sounds: Normal breath sounds.   Abdominal:      General: Bowel sounds are normal.      Palpations: Abdomen is soft.      Tenderness: There is no abdominal tenderness.   Musculoskeletal:         General: Normal  range of motion.      Cervical back: Neck supple.   Skin:     General: Skin is warm and dry.   Neurological:      General: No focal deficit present.      Mental Status: He is alert.      Deep Tendon Reflexes:      Reflex Scores:       Patellar reflexes are 2+ on the right side and 2+ on the left side.     Comments: Interactive       Assessment:       1. Preop examination        Plan:       Preop examination      CBC:   Lab Results   Component Value Date    WBC 6.59 10/07/2022    HGB 15.5 10/07/2022    HCT 47.7 10/07/2022     10/07/2022    MCV 96 10/07/2022    RDW 12.5 10/07/2022     BMP:  Lab Results   Component Value Date     10/07/2022    K 4.2 10/07/2022     10/07/2022    CO2 25 10/07/2022    BUN 23 10/07/2022    CREATININE 1.3 10/07/2022     10/07/2022    CALCIUM 9.9 10/07/2022    ALKPHOS 47 (L) 10/04/2021    PROT 6.6 10/04/2021    ALBUMIN 3.8 10/04/2021    BILITOT 0.7 10/04/2021    AST 22 10/04/2021    ALT 17 10/04/2021     Urinalysis:   Lab Results   Component Value Date    COLORU Yellow 10/07/2022    COLORU Yellow 10/07/2022    APPEARANCEUA Clear 10/07/2022    APPEARANCEUA Clear 10/07/2022    PHUR 7.0 10/07/2022    PHUR 6.0 10/07/2022    SPECGRAV 1.020 10/07/2022    SPECGRAV 1.020 10/07/2022    GLUCUA Negative 10/07/2022    GLUCUA Negative 10/07/2022    KETONESU Negative 10/07/2022    KETONESU Negative 10/07/2022    BILIRUBINUA Negative 10/07/2022    BILIRUBINUA Negative 10/07/2022    LEUKOCYTESUR Negative 10/07/2022    LEUKOCYTESUR Negative 10/07/2022         EKG:  Results for orders placed or performed during the hospital encounter of 04/25/22   EKG 12-lead    Collection Time: 04/25/22 10:22 AM    Narrative    Test Reason : N20.0,    Vent. Rate : 065 BPM     Atrial Rate : 065 BPM     P-R Int : 164 ms          QRS Dur : 134 ms      QT Int : 420 ms       P-R-T Axes : 022 -39 -01 degrees     QTc Int : 436 ms    Normal sinus rhythm  Left axis deviation  Right bundle branch  block  Voltage criteria for left ventricular hypertrophy  Abnormal ECG  When compared with ECG of 24-APR-2013 16:34,  Right bundle branch block has replaced Incomplete right bundle branch block  Confirmed by Oh AMARAL, Ronal LEE ([id number]) on 4/25/2022 9:08:05 PM    Referred By:  DR. BRENNAN           Confirmed By:Ronal Casiano MD       Chest Xray:  Impression:     Very mild bibasilar atelectatic stranding or hypoventilatory change with no confluent infiltrates        Electronically signed by: Elyssa Garcia MD  Date:                                            10/07/2022      Khan score is 0.09%.  Patient is at low risk for mahin-operative Cardiac event.    He is medically cleared for surgery.    Patient Instructions   Melatonin 3 mg plus Magnesium 50 mg before bedtime may help sleep.

## 2022-10-18 ENCOUNTER — TELEPHONE (OUTPATIENT)
Dept: NEUROSURGERY | Facility: CLINIC | Age: 67
End: 2022-10-18
Payer: MEDICARE

## 2022-10-18 NOTE — TELEPHONE ENCOUNTER
Contacted pt to confirm that he stopped taking blood thinners/anti inflammatories at least 10 days ago and does not have any open wounds/rashes or new medical problems since evaluated by Dr. Matthews. Reminded pt to be npo after midnight and to contact our office with any questions or concerns. Pt voiced understanding.

## 2022-10-19 ENCOUNTER — HOSPITAL ENCOUNTER (OUTPATIENT)
Facility: HOSPITAL | Age: 67
Discharge: HOME | End: 2022-10-20
Attending: NEUROLOGICAL SURGERY | Admitting: NEUROLOGICAL SURGERY
Payer: MEDICARE

## 2022-10-19 ENCOUNTER — ANESTHESIA (OUTPATIENT)
Dept: SURGERY | Facility: HOSPITAL | Age: 67
End: 2022-10-19
Payer: MEDICARE

## 2022-10-19 DIAGNOSIS — M54.17 LUMBOSACRAL RADICULOPATHY: Primary | ICD-10-CM

## 2022-10-19 DIAGNOSIS — M71.38 SYNOVIAL CYST OF LUMBAR SPINE: ICD-10-CM

## 2022-10-19 DIAGNOSIS — R07.9 CHEST PAIN: ICD-10-CM

## 2022-10-19 PROCEDURE — 63267 EXCISE INTRSPINL LESION LMBR: CPT | Mod: ,,, | Performed by: NEUROLOGICAL SURGERY

## 2022-10-19 PROCEDURE — D9220A PRA ANESTHESIA: Mod: CRNA,,, | Performed by: STUDENT IN AN ORGANIZED HEALTH CARE EDUCATION/TRAINING PROGRAM

## 2022-10-19 PROCEDURE — 71000039 HC RECOVERY, EACH ADD'L HOUR: Performed by: NEUROLOGICAL SURGERY

## 2022-10-19 PROCEDURE — 88304 TISSUE EXAM BY PATHOLOGIST: CPT | Performed by: STUDENT IN AN ORGANIZED HEALTH CARE EDUCATION/TRAINING PROGRAM

## 2022-10-19 PROCEDURE — 36000707: Performed by: NEUROLOGICAL SURGERY

## 2022-10-19 PROCEDURE — 94760 N-INVAS EAR/PLS OXIMETRY 1: CPT

## 2022-10-19 PROCEDURE — C1729 CATH, DRAINAGE: HCPCS | Performed by: NEUROLOGICAL SURGERY

## 2022-10-19 PROCEDURE — D9220A PRA ANESTHESIA: Mod: ANES,,, | Performed by: ANESTHESIOLOGY

## 2022-10-19 PROCEDURE — 25000003 PHARM REV CODE 250: Performed by: NURSE PRACTITIONER

## 2022-10-19 PROCEDURE — 71000015 HC POSTOP RECOV 1ST HR: Performed by: NEUROLOGICAL SURGERY

## 2022-10-19 PROCEDURE — 37000008 HC ANESTHESIA 1ST 15 MINUTES: Performed by: NEUROLOGICAL SURGERY

## 2022-10-19 PROCEDURE — 71000033 HC RECOVERY, INTIAL HOUR: Performed by: NEUROLOGICAL SURGERY

## 2022-10-19 PROCEDURE — D9220A PRA ANESTHESIA: ICD-10-PCS | Mod: CRNA,,, | Performed by: STUDENT IN AN ORGANIZED HEALTH CARE EDUCATION/TRAINING PROGRAM

## 2022-10-19 PROCEDURE — 25000003 PHARM REV CODE 250: Performed by: NEUROLOGICAL SURGERY

## 2022-10-19 PROCEDURE — 99900103 DSU ONLY-NO CHARGE-INITIAL HR (STAT): Performed by: NEUROLOGICAL SURGERY

## 2022-10-19 PROCEDURE — 88304 PR  SURG PATH,LEVEL III: ICD-10-PCS | Mod: 26,,, | Performed by: STUDENT IN AN ORGANIZED HEALTH CARE EDUCATION/TRAINING PROGRAM

## 2022-10-19 PROCEDURE — 88304 TISSUE EXAM BY PATHOLOGIST: CPT | Mod: 26,,, | Performed by: STUDENT IN AN ORGANIZED HEALTH CARE EDUCATION/TRAINING PROGRAM

## 2022-10-19 PROCEDURE — 71000016 HC POSTOP RECOV ADDL HR: Performed by: NEUROLOGICAL SURGERY

## 2022-10-19 PROCEDURE — 36000706: Performed by: NEUROLOGICAL SURGERY

## 2022-10-19 PROCEDURE — 25000003 PHARM REV CODE 250: Performed by: ANESTHESIOLOGY

## 2022-10-19 PROCEDURE — 94799 UNLISTED PULMONARY SVC/PX: CPT

## 2022-10-19 PROCEDURE — 25000003 PHARM REV CODE 250: Performed by: STUDENT IN AN ORGANIZED HEALTH CARE EDUCATION/TRAINING PROGRAM

## 2022-10-19 PROCEDURE — 88311 PR  DECALCIFY TISSUE: ICD-10-PCS | Mod: 26,,, | Performed by: STUDENT IN AN ORGANIZED HEALTH CARE EDUCATION/TRAINING PROGRAM

## 2022-10-19 PROCEDURE — 63600175 PHARM REV CODE 636 W HCPCS: Performed by: NEUROLOGICAL SURGERY

## 2022-10-19 PROCEDURE — 88311 DECALCIFY TISSUE: CPT | Mod: 26,,, | Performed by: STUDENT IN AN ORGANIZED HEALTH CARE EDUCATION/TRAINING PROGRAM

## 2022-10-19 PROCEDURE — 99900104 DSU ONLY-NO CHARGE-EA ADD'L HR (STAT): Performed by: NEUROLOGICAL SURGERY

## 2022-10-19 PROCEDURE — 88305 TISSUE EXAM BY PATHOLOGIST: CPT | Performed by: STUDENT IN AN ORGANIZED HEALTH CARE EDUCATION/TRAINING PROGRAM

## 2022-10-19 PROCEDURE — 37000009 HC ANESTHESIA EA ADD 15 MINS: Performed by: NEUROLOGICAL SURGERY

## 2022-10-19 PROCEDURE — 63267 PR EXCIS INTRASP LESN,XDURAL,LUMBAR: ICD-10-PCS | Mod: ,,, | Performed by: NEUROLOGICAL SURGERY

## 2022-10-19 PROCEDURE — D9220A PRA ANESTHESIA: ICD-10-PCS | Mod: ANES,,, | Performed by: ANESTHESIOLOGY

## 2022-10-19 PROCEDURE — 63600175 PHARM REV CODE 636 W HCPCS: Performed by: STUDENT IN AN ORGANIZED HEALTH CARE EDUCATION/TRAINING PROGRAM

## 2022-10-19 RX ORDER — ACETAMINOPHEN 10 MG/ML
INJECTION, SOLUTION INTRAVENOUS
Status: DISCONTINUED | OUTPATIENT
Start: 2022-10-19 | End: 2022-10-19

## 2022-10-19 RX ORDER — FENTANYL CITRATE 50 UG/ML
25 INJECTION, SOLUTION INTRAMUSCULAR; INTRAVENOUS EVERY 5 MIN PRN
Status: DISCONTINUED | OUTPATIENT
Start: 2022-10-19 | End: 2022-10-19 | Stop reason: HOSPADM

## 2022-10-19 RX ORDER — POLYETHYLENE GLYCOL 3350 17 G/17G
17 POWDER, FOR SOLUTION ORAL 2 TIMES DAILY PRN
Status: DISCONTINUED | OUTPATIENT
Start: 2022-10-19 | End: 2022-10-20 | Stop reason: HOSPADM

## 2022-10-19 RX ORDER — AMLODIPINE BESYLATE 2.5 MG/1
2.5 TABLET ORAL NIGHTLY
Status: DISCONTINUED | OUTPATIENT
Start: 2022-10-19 | End: 2022-10-20 | Stop reason: HOSPADM

## 2022-10-19 RX ORDER — MAG HYDROX/ALUMINUM HYD/SIMETH 200-200-20
30 SUSPENSION, ORAL (FINAL DOSE FORM) ORAL 4 TIMES DAILY PRN
Status: DISCONTINUED | OUTPATIENT
Start: 2022-10-19 | End: 2022-10-20 | Stop reason: HOSPADM

## 2022-10-19 RX ORDER — PROPOFOL 10 MG/ML
VIAL (ML) INTRAVENOUS
Status: DISCONTINUED | OUTPATIENT
Start: 2022-10-19 | End: 2022-10-19

## 2022-10-19 RX ORDER — ATORVASTATIN CALCIUM 40 MG/1
80 TABLET, FILM COATED ORAL DAILY
Status: DISCONTINUED | OUTPATIENT
Start: 2022-10-20 | End: 2022-10-20 | Stop reason: HOSPADM

## 2022-10-19 RX ORDER — CEPHALEXIN 500 MG/1
500 CAPSULE ORAL EVERY 6 HOURS
Qty: 40 CAPSULE | Refills: 0 | Status: SHIPPED | OUTPATIENT
Start: 2022-10-19 | End: 2022-12-07

## 2022-10-19 RX ORDER — OXYCODONE HYDROCHLORIDE 5 MG/1
5 TABLET ORAL
Status: DISCONTINUED | OUTPATIENT
Start: 2022-10-19 | End: 2022-10-19 | Stop reason: HOSPADM

## 2022-10-19 RX ORDER — GUAIFENESIN 600 MG/1
600 TABLET, EXTENDED RELEASE ORAL 2 TIMES DAILY PRN
Status: DISCONTINUED | OUTPATIENT
Start: 2022-10-19 | End: 2022-10-20 | Stop reason: HOSPADM

## 2022-10-19 RX ORDER — IBUPROFEN 200 MG
24 TABLET ORAL
Status: DISCONTINUED | OUTPATIENT
Start: 2022-10-19 | End: 2022-10-19 | Stop reason: SDUPTHER

## 2022-10-19 RX ORDER — MUPIROCIN 20 MG/G
OINTMENT TOPICAL
Status: DISCONTINUED | OUTPATIENT
Start: 2022-10-19 | End: 2022-10-19 | Stop reason: HOSPADM

## 2022-10-19 RX ORDER — IBUPROFEN 200 MG
16 TABLET ORAL
Status: DISCONTINUED | OUTPATIENT
Start: 2022-10-20 | End: 2022-10-20 | Stop reason: HOSPADM

## 2022-10-19 RX ORDER — OXYCODONE AND ACETAMINOPHEN 5; 325 MG/1; MG/1
1 TABLET ORAL EVERY 4 HOURS PRN
Qty: 28 TABLET | Refills: 0 | Status: SHIPPED | OUTPATIENT
Start: 2022-10-19 | End: 2022-12-07

## 2022-10-19 RX ORDER — KETAMINE HYDROCHLORIDE 50 MG/ML
INJECTION, SOLUTION INTRAMUSCULAR; INTRAVENOUS
Status: DISCONTINUED | OUTPATIENT
Start: 2022-10-19 | End: 2022-10-19

## 2022-10-19 RX ORDER — METHOCARBAMOL 750 MG/1
750 TABLET, FILM COATED ORAL 4 TIMES DAILY
Qty: 40 TABLET | Refills: 1 | Status: SHIPPED | OUTPATIENT
Start: 2022-10-19 | End: 2022-10-29

## 2022-10-19 RX ORDER — CETIRIZINE HYDROCHLORIDE 10 MG/1
10 TABLET ORAL DAILY
Status: DISCONTINUED | OUTPATIENT
Start: 2022-10-20 | End: 2022-10-20 | Stop reason: HOSPADM

## 2022-10-19 RX ORDER — VASOPRESSIN 20 [USP'U]/ML
INJECTION, SOLUTION INTRAMUSCULAR; SUBCUTANEOUS
Status: DISCONTINUED | OUTPATIENT
Start: 2022-10-19 | End: 2022-10-19

## 2022-10-19 RX ORDER — GLUCAGON 1 MG
1 KIT INJECTION
Status: DISCONTINUED | OUTPATIENT
Start: 2022-10-19 | End: 2022-10-19 | Stop reason: SDUPTHER

## 2022-10-19 RX ORDER — SODIUM CHLORIDE 0.9 % (FLUSH) 0.9 %
10 SYRINGE (ML) INJECTION EVERY 12 HOURS PRN
Status: DISCONTINUED | OUTPATIENT
Start: 2022-10-19 | End: 2022-10-20 | Stop reason: HOSPADM

## 2022-10-19 RX ORDER — ONDANSETRON 2 MG/ML
INJECTION INTRAMUSCULAR; INTRAVENOUS
Status: DISCONTINUED | OUTPATIENT
Start: 2022-10-19 | End: 2022-10-19

## 2022-10-19 RX ORDER — METHYLPREDNISOLONE ACETATE 80 MG/ML
INJECTION, SUSPENSION INTRA-ARTICULAR; INTRALESIONAL; INTRAMUSCULAR; SOFT TISSUE
Status: DISCONTINUED | OUTPATIENT
Start: 2022-10-19 | End: 2022-10-19 | Stop reason: HOSPADM

## 2022-10-19 RX ORDER — FLUTICASONE PROPIONATE 50 MCG
2 SPRAY, SUSPENSION (ML) NASAL DAILY
Status: DISCONTINUED | OUTPATIENT
Start: 2022-10-20 | End: 2022-10-20 | Stop reason: HOSPADM

## 2022-10-19 RX ORDER — BUPIVACAINE HYDROCHLORIDE AND EPINEPHRINE 2.5; 5 MG/ML; UG/ML
INJECTION, SOLUTION EPIDURAL; INFILTRATION; INTRACAUDAL; PERINEURAL
Status: DISCONTINUED | OUTPATIENT
Start: 2022-10-19 | End: 2022-10-19 | Stop reason: HOSPADM

## 2022-10-19 RX ORDER — TAMSULOSIN HYDROCHLORIDE 0.4 MG/1
0.4 CAPSULE ORAL DAILY
Status: DISCONTINUED | OUTPATIENT
Start: 2022-10-20 | End: 2022-10-20

## 2022-10-19 RX ORDER — LANOLIN ALCOHOL/MO/W.PET/CERES
800 CREAM (GRAM) TOPICAL
Status: DISCONTINUED | OUTPATIENT
Start: 2022-10-19 | End: 2022-10-20 | Stop reason: HOSPADM

## 2022-10-19 RX ORDER — CEPHALEXIN 500 MG/1
500 CAPSULE ORAL EVERY 6 HOURS
Status: DISCONTINUED | OUTPATIENT
Start: 2022-10-20 | End: 2022-10-20 | Stop reason: HOSPADM

## 2022-10-19 RX ORDER — CEFAZOLIN SODIUM 1 G/3ML
INJECTION, POWDER, FOR SOLUTION INTRAMUSCULAR; INTRAVENOUS
Status: DISCONTINUED | OUTPATIENT
Start: 2022-10-19 | End: 2022-10-19 | Stop reason: HOSPADM

## 2022-10-19 RX ORDER — ONDANSETRON HYDROCHLORIDE 4 MG/5ML
4 SOLUTION ORAL ONCE
Status: DISCONTINUED | OUTPATIENT
Start: 2022-10-19 | End: 2022-10-20 | Stop reason: HOSPADM

## 2022-10-19 RX ORDER — INSULIN ASPART 100 [IU]/ML
0-5 INJECTION, SOLUTION INTRAVENOUS; SUBCUTANEOUS
Status: DISCONTINUED | OUTPATIENT
Start: 2022-10-20 | End: 2022-10-20 | Stop reason: HOSPADM

## 2022-10-19 RX ORDER — DEXAMETHASONE SODIUM PHOSPHATE 4 MG/ML
INJECTION, SOLUTION INTRA-ARTICULAR; INTRALESIONAL; INTRAMUSCULAR; INTRAVENOUS; SOFT TISSUE
Status: DISCONTINUED | OUTPATIENT
Start: 2022-10-19 | End: 2022-10-19

## 2022-10-19 RX ORDER — METOCLOPRAMIDE HYDROCHLORIDE 5 MG/ML
10 INJECTION INTRAMUSCULAR; INTRAVENOUS EVERY 10 MIN PRN
Status: DISCONTINUED | OUTPATIENT
Start: 2022-10-19 | End: 2022-10-19 | Stop reason: HOSPADM

## 2022-10-19 RX ORDER — IBUPROFEN 200 MG
24 TABLET ORAL
Status: DISCONTINUED | OUTPATIENT
Start: 2022-10-20 | End: 2022-10-20 | Stop reason: HOSPADM

## 2022-10-19 RX ORDER — SUCCINYLCHOLINE CHLORIDE 20 MG/ML
INJECTION INTRAMUSCULAR; INTRAVENOUS
Status: DISCONTINUED | OUTPATIENT
Start: 2022-10-19 | End: 2022-10-19

## 2022-10-19 RX ORDER — LIDOCAINE HYDROCHLORIDE 10 MG/ML
1 INJECTION, SOLUTION EPIDURAL; INFILTRATION; INTRACAUDAL; PERINEURAL ONCE
Status: COMPLETED | OUTPATIENT
Start: 2022-10-19 | End: 2022-10-19

## 2022-10-19 RX ORDER — OXYCODONE AND ACETAMINOPHEN 5; 325 MG/1; MG/1
1 TABLET ORAL EVERY 4 HOURS PRN
Status: DISCONTINUED | OUTPATIENT
Start: 2022-10-19 | End: 2022-10-20 | Stop reason: HOSPADM

## 2022-10-19 RX ORDER — AMOXICILLIN 250 MG
1 CAPSULE ORAL 2 TIMES DAILY
Status: DISCONTINUED | OUTPATIENT
Start: 2022-10-19 | End: 2022-10-20 | Stop reason: HOSPADM

## 2022-10-19 RX ORDER — FENTANYL CITRATE 50 UG/ML
INJECTION, SOLUTION INTRAMUSCULAR; INTRAVENOUS
Status: DISCONTINUED | OUTPATIENT
Start: 2022-10-19 | End: 2022-10-19

## 2022-10-19 RX ORDER — PROPOFOL 10 MG/ML
VIAL (ML) INTRAVENOUS CONTINUOUS PRN
Status: DISCONTINUED | OUTPATIENT
Start: 2022-10-19 | End: 2022-10-19

## 2022-10-19 RX ORDER — LIDOCAINE HYDROCHLORIDE 20 MG/ML
INJECTION INTRAVENOUS
Status: DISCONTINUED | OUTPATIENT
Start: 2022-10-19 | End: 2022-10-19

## 2022-10-19 RX ORDER — GLUCAGON 1 MG
1 KIT INJECTION
Status: DISCONTINUED | OUTPATIENT
Start: 2022-10-20 | End: 2022-10-20 | Stop reason: HOSPADM

## 2022-10-19 RX ORDER — ROCURONIUM BROMIDE 10 MG/ML
INJECTION, SOLUTION INTRAVENOUS
Status: DISCONTINUED | OUTPATIENT
Start: 2022-10-19 | End: 2022-10-19

## 2022-10-19 RX ORDER — HYDROMORPHONE HYDROCHLORIDE 2 MG/ML
0.2 INJECTION, SOLUTION INTRAMUSCULAR; INTRAVENOUS; SUBCUTANEOUS EVERY 5 MIN PRN
Status: DISCONTINUED | OUTPATIENT
Start: 2022-10-19 | End: 2022-10-19 | Stop reason: HOSPADM

## 2022-10-19 RX ORDER — TALC
6 POWDER (GRAM) TOPICAL NIGHTLY PRN
Status: DISCONTINUED | OUTPATIENT
Start: 2022-10-19 | End: 2022-10-20 | Stop reason: HOSPADM

## 2022-10-19 RX ORDER — IBUPROFEN 200 MG
16 TABLET ORAL
Status: DISCONTINUED | OUTPATIENT
Start: 2022-10-19 | End: 2022-10-19 | Stop reason: SDUPTHER

## 2022-10-19 RX ORDER — ONDANSETRON 4 MG/1
4 TABLET, ORALLY DISINTEGRATING ORAL ONCE
Qty: 10 TABLET | Refills: 0 | Status: SHIPPED | OUTPATIENT
Start: 2022-10-19 | End: 2022-10-19

## 2022-10-19 RX ORDER — MUPIROCIN 20 MG/G
1 OINTMENT TOPICAL 2 TIMES DAILY
Status: DISCONTINUED | OUTPATIENT
Start: 2022-10-19 | End: 2022-10-19 | Stop reason: HOSPADM

## 2022-10-19 RX ORDER — EPHEDRINE SULFATE 50 MG/ML
INJECTION, SOLUTION INTRAVENOUS
Status: DISCONTINUED | OUTPATIENT
Start: 2022-10-19 | End: 2022-10-19

## 2022-10-19 RX ORDER — CEFAZOLIN SODIUM 2 G/50ML
2 SOLUTION INTRAVENOUS
Status: COMPLETED | OUTPATIENT
Start: 2022-10-19 | End: 2022-10-19

## 2022-10-19 RX ORDER — MIDAZOLAM HYDROCHLORIDE 1 MG/ML
INJECTION, SOLUTION INTRAMUSCULAR; INTRAVENOUS
Status: DISCONTINUED | OUTPATIENT
Start: 2022-10-19 | End: 2022-10-19

## 2022-10-19 RX ORDER — ACETAMINOPHEN 325 MG/1
650 TABLET ORAL EVERY 4 HOURS PRN
Status: DISCONTINUED | OUTPATIENT
Start: 2022-10-19 | End: 2022-10-20 | Stop reason: HOSPADM

## 2022-10-19 RX ORDER — PHENYLEPHRINE HYDROCHLORIDE 10 MG/ML
INJECTION INTRAVENOUS
Status: DISCONTINUED | OUTPATIENT
Start: 2022-10-19 | End: 2022-10-19

## 2022-10-19 RX ORDER — METHOCARBAMOL 750 MG/1
750 TABLET, FILM COATED ORAL 4 TIMES DAILY
Status: DISCONTINUED | OUTPATIENT
Start: 2022-10-19 | End: 2022-10-20 | Stop reason: HOSPADM

## 2022-10-19 RX ORDER — NALOXONE HCL 0.4 MG/ML
0.02 VIAL (ML) INJECTION
Status: DISCONTINUED | OUTPATIENT
Start: 2022-10-19 | End: 2022-10-20 | Stop reason: HOSPADM

## 2022-10-19 RX ADMIN — EPHEDRINE SULFATE 10 MG: 50 INJECTION, SOLUTION INTRAMUSCULAR; INTRAVENOUS; SUBCUTANEOUS at 03:10

## 2022-10-19 RX ADMIN — CEPHALEXIN 500 MG: 500 CAPSULE ORAL at 11:10

## 2022-10-19 RX ADMIN — VASOPRESSIN 2 UNITS: 20 INJECTION, SOLUTION INTRAMUSCULAR; SUBCUTANEOUS at 04:10

## 2022-10-19 RX ADMIN — DOCUSATE SODIUM AND SENNOSIDES 1 TABLET: 8.6; 5 TABLET, FILM COATED ORAL at 09:10

## 2022-10-19 RX ADMIN — PHENYLEPHRINE HYDROCHLORIDE 100 MCG: 10 INJECTION INTRAVENOUS at 04:10

## 2022-10-19 RX ADMIN — KETAMINE HYDROCHLORIDE 10 MG: 50 INJECTION, SOLUTION, CONCENTRATE INTRAMUSCULAR; INTRAVENOUS at 03:10

## 2022-10-19 RX ADMIN — SODIUM CHLORIDE, SODIUM GLUCONATE, SODIUM ACETATE, POTASSIUM CHLORIDE, MAGNESIUM CHLORIDE, SODIUM PHOSPHATE, DIBASIC, AND POTASSIUM PHOSPHATE: .53; .5; .37; .037; .03; .012; .00082 INJECTION, SOLUTION INTRAVENOUS at 05:10

## 2022-10-19 RX ADMIN — DEXAMETHASONE SODIUM PHOSPHATE 8 MG: 4 INJECTION, SOLUTION INTRA-ARTICULAR; INTRALESIONAL; INTRAMUSCULAR; INTRAVENOUS; SOFT TISSUE at 03:10

## 2022-10-19 RX ADMIN — EPHEDRINE SULFATE 5 MG: 50 INJECTION, SOLUTION INTRAMUSCULAR; INTRAVENOUS; SUBCUTANEOUS at 03:10

## 2022-10-19 RX ADMIN — KETAMINE HYDROCHLORIDE 10 MG: 50 INJECTION, SOLUTION, CONCENTRATE INTRAMUSCULAR; INTRAVENOUS at 04:10

## 2022-10-19 RX ADMIN — LIDOCAINE HYDROCHLORIDE 80 MG: 20 INJECTION, SOLUTION INTRAVENOUS at 03:10

## 2022-10-19 RX ADMIN — PROPOFOL 100 MG: 10 INJECTION, EMULSION INTRAVENOUS at 03:10

## 2022-10-19 RX ADMIN — SODIUM CHLORIDE, SODIUM GLUCONATE, SODIUM ACETATE, POTASSIUM CHLORIDE, MAGNESIUM CHLORIDE, SODIUM PHOSPHATE, DIBASIC, AND POTASSIUM PHOSPHATE: .53; .5; .37; .037; .03; .012; .00082 INJECTION, SOLUTION INTRAVENOUS at 12:10

## 2022-10-19 RX ADMIN — SODIUM CHLORIDE, SODIUM GLUCONATE, SODIUM ACETATE, POTASSIUM CHLORIDE, MAGNESIUM CHLORIDE, SODIUM PHOSPHATE, DIBASIC, AND POTASSIUM PHOSPHATE: .53; .5; .37; .037; .03; .012; .00082 INJECTION, SOLUTION INTRAVENOUS at 04:10

## 2022-10-19 RX ADMIN — FENTANYL CITRATE 50 MCG: 50 INJECTION, SOLUTION INTRAMUSCULAR; INTRAVENOUS at 03:10

## 2022-10-19 RX ADMIN — ONDANSETRON 4 MG: 2 INJECTION INTRAMUSCULAR; INTRAVENOUS at 03:10

## 2022-10-19 RX ADMIN — CEFAZOLIN SODIUM 2 G: 2 SOLUTION INTRAVENOUS at 03:10

## 2022-10-19 RX ADMIN — MUPIROCIN: 20 OINTMENT TOPICAL at 12:10

## 2022-10-19 RX ADMIN — EPHEDRINE SULFATE 10 MG: 50 INJECTION, SOLUTION INTRAMUSCULAR; INTRAVENOUS; SUBCUTANEOUS at 04:10

## 2022-10-19 RX ADMIN — METHOCARBAMOL TABLETS 750 MG: 750 TABLET, COATED ORAL at 09:10

## 2022-10-19 RX ADMIN — MIDAZOLAM 2 MG: 1 INJECTION INTRAMUSCULAR; INTRAVENOUS at 03:10

## 2022-10-19 RX ADMIN — PHENYLEPHRINE HYDROCHLORIDE 0.3 MCG/KG/MIN: 10 INJECTION INTRAVENOUS at 03:10

## 2022-10-19 RX ADMIN — ROCURONIUM BROMIDE 10 MG: 10 INJECTION, SOLUTION INTRAVENOUS at 03:10

## 2022-10-19 RX ADMIN — LIDOCAINE HYDROCHLORIDE 10 MG: 10 INJECTION, SOLUTION EPIDURAL; INFILTRATION; INTRACAUDAL; PERINEURAL at 12:10

## 2022-10-19 RX ADMIN — GLYCOPYRROLATE 0.2 MG: 0.2 INJECTION, SOLUTION INTRAMUSCULAR; INTRAVITREAL at 03:10

## 2022-10-19 RX ADMIN — PROPOFOL 50 MCG/KG/MIN: 10 INJECTION, EMULSION INTRAVENOUS at 03:10

## 2022-10-19 RX ADMIN — VASOPRESSIN 1 UNITS: 20 INJECTION, SOLUTION INTRAMUSCULAR; SUBCUTANEOUS at 04:10

## 2022-10-19 RX ADMIN — SUCCINYLCHOLINE CHLORIDE 140 MG: 20 INJECTION, SOLUTION INTRAMUSCULAR; INTRAVENOUS; PARENTERAL at 03:10

## 2022-10-19 RX ADMIN — EPHEDRINE SULFATE 10 MG: 50 INJECTION, SOLUTION INTRAMUSCULAR; INTRAVENOUS; SUBCUTANEOUS at 05:10

## 2022-10-19 RX ADMIN — ACETAMINOPHEN 1000 MG: 10 INJECTION, SOLUTION INTRAVENOUS at 03:10

## 2022-10-19 NOTE — BRIEF OP NOTE
Date of procedure:  October 19, 2022     Preoperative diagnosis:    1. Lumbar radiculopathy    2. Lumbar degenerative disc disease    3. Lumbar extradural mass, right L5-S1    4. Hypertension    5. Diabetes mellitus     Postoperative diagnosis:    1. Same     Procedure:     Right L5-S1 laminectomy, resection extradural mass using trans facet approach    Surgeon: Marvel Matthews D.O., MBA    Assistant:  See medical record     Anesthesia:  General endotracheal     Estimated blood loss:  50 cc     Specimens:  Right L5-S1 extradural mass     Complications:  None    The patient tolerated the above procedure well.  He was extubated in the operating room transferred to the recovery room in stable condition.

## 2022-10-19 NOTE — ANESTHESIA PROCEDURE NOTES
Intubation    Date/Time: 10/19/2022 3:14 PM  Performed by: Maik Olmos CRNA  Authorized by: Maik Olmos CRNA     Intubation:     Induction:  Intravenous    Intubated:  Postinduction    Mask Ventilation:  Easy mask    Attempts:  1    Attempted By:  KENNEY (Maik Olmos)    Method of Intubation:  Video laryngoscopy    Blade:  Herbert 3    Laryngeal View Grade: Grade I - full view of cords      Difficult Airway Encountered?: No      Complications:  None    Airway Device:  Oral endotracheal tube    Airway Device Size:  7.5    Style/Cuff Inflation:  Cuffed    Inflation Amount (mL):  6    Tube secured:  22    Secured at:  The lips    Placement Verified By:  Capnometry    Complicating Factors:  None    Findings Post-Intubation:  BS equal bilateral and atraumatic/condition of teeth unchanged

## 2022-10-19 NOTE — PLAN OF CARE
Pre-op complete. No family present at this time.  Text notifications initiated. Pt denies need for safe. Belongings in locker.

## 2022-10-19 NOTE — TRANSFER OF CARE
"Anesthesia Transfer of Care Note    Patient: Toni Hyde    Procedure(s) Performed: Procedure(s) (LRB):  FUSION, SPINE, LUMBAR, TLIF (N/A)    Patient location: PACU    Anesthesia Type: general    Transport from OR: Transported from OR on 2-3 L/min O2 by NC with adequate spontaneous ventilation    Post pain: adequate analgesia    Post assessment: no apparent anesthetic complications    Post vital signs: stable    Level of consciousness: sedated    Nausea/Vomiting: no nausea/vomiting    Complications: none    Transfer of care protocol was followed      Last vitals:   Visit Vitals  BP (!) 104/53   Pulse 76   Temp 36.7 °C (98.1 °F)   Resp 16   Ht 5' 10.5" (1.791 m)   Wt 84.8 kg (187 lb)   SpO2 (!) 93%   BMI 26.45 kg/m²     "

## 2022-10-19 NOTE — PLAN OF CARE
Released from Pacu when criteria met pain controlled skin w+d No nausea No emesis dsg dry intact drowsybut ox4 encouraged deep breaths instr on IS and to  use often   Pt has all belongings   in post op denies pain at this time kiesha sips and  chips and dsg intact danuta drain with bloody drainage

## 2022-10-19 NOTE — ANESTHESIA PREPROCEDURE EVALUATION
10/19/2022  Toni Hyde is a 67 y.o., male.      Pre-op Assessment    I have reviewed the NPO Status.   I have reviewed the Medications.     Review of Systems  Anesthesia Hx:  No problems with previous Anesthesia    Social:  Non-Smoker    Cardiovascular:   Exercise tolerance: good Hypertension    Pulmonary:  Pulmonary Normal    Renal/:   renal calculi    Musculoskeletal:   Epidural mass Spine Disorders: lumbar    Neurological:   Neuromuscular Disease,   Peripheral Neuropathy    Endocrine:   Diabetes, type 2        Physical Exam  General: Well nourished    Airway:  Mallampati: II   Mouth Opening: Normal  TM Distance: Normal  Tongue: Normal  Neck ROM: Normal ROM    Dental:  Intact    Chest/Lungs:  Clear to auscultation, Normal Respiratory Rate        Anesthesia Plan  Type of Anesthesia, risks & benefits discussed:    Anesthesia Type: Gen Natural Airway  Intra-op Monitoring Plan: Standard ASA Monitors  Post Op Pain Control Plan: multimodal analgesia and IV/PO Opioids PRN  Induction:  IV  Airway Plan: Direct, Post-Induction  Informed Consent: Informed consent signed with the Patient and all parties understand the risks and agree with anesthesia plan.  All questions answered. Patient consented to blood products? Yes  ASA Score: 3    Ready For Surgery From Anesthesia Perspective.     .

## 2022-10-20 LAB
ALBUMIN SERPL BCP-MCNC: 3.9 G/DL (ref 3.5–5.2)
ALP SERPL-CCNC: 46 U/L (ref 55–135)
ALT SERPL W/O P-5'-P-CCNC: 16 U/L (ref 10–44)
ANION GAP SERPL CALC-SCNC: 11 MMOL/L (ref 8–16)
AST SERPL-CCNC: 24 U/L (ref 10–40)
BASOPHILS # BLD AUTO: 0.03 K/UL (ref 0–0.2)
BASOPHILS NFR BLD: 0.3 % (ref 0–1.9)
BILIRUB SERPL-MCNC: 1.1 MG/DL (ref 0.1–1)
BILIRUB UR QL STRIP: NEGATIVE
BUN SERPL-MCNC: 18 MG/DL (ref 8–23)
CALCIUM SERPL-MCNC: 8.5 MG/DL (ref 8.7–10.5)
CHLORIDE SERPL-SCNC: 104 MMOL/L (ref 95–110)
CLARITY UR: CLEAR
CO2 SERPL-SCNC: 23 MMOL/L (ref 23–29)
COLOR UR: YELLOW
CREAT SERPL-MCNC: 1.1 MG/DL (ref 0.5–1.4)
DIFFERENTIAL METHOD: ABNORMAL
EOSINOPHIL # BLD AUTO: 0 K/UL (ref 0–0.5)
EOSINOPHIL NFR BLD: 0 % (ref 0–8)
ERYTHROCYTE [DISTWIDTH] IN BLOOD BY AUTOMATED COUNT: 12.3 % (ref 11.5–14.5)
EST. GFR  (NO RACE VARIABLE): >60 ML/MIN/1.73 M^2
GLUCOSE SERPL-MCNC: 169 MG/DL (ref 70–110)
GLUCOSE UR QL STRIP: NEGATIVE
HCT VFR BLD AUTO: 45.5 % (ref 40–54)
HGB BLD-MCNC: 15.4 G/DL (ref 14–18)
HGB UR QL STRIP: NEGATIVE
IMM GRANULOCYTES # BLD AUTO: 0.04 K/UL (ref 0–0.04)
IMM GRANULOCYTES NFR BLD AUTO: 0.4 % (ref 0–0.5)
KETONES UR QL STRIP: NEGATIVE
LEUKOCYTE ESTERASE UR QL STRIP: NEGATIVE
LYMPHOCYTES # BLD AUTO: 0.8 K/UL (ref 1–4.8)
LYMPHOCYTES NFR BLD: 7.9 % (ref 18–48)
MCH RBC QN AUTO: 31.7 PG (ref 27–31)
MCHC RBC AUTO-ENTMCNC: 33.8 G/DL (ref 32–36)
MCV RBC AUTO: 94 FL (ref 82–98)
MONOCYTES # BLD AUTO: 0.7 K/UL (ref 0.3–1)
MONOCYTES NFR BLD: 6.4 % (ref 4–15)
NEUTROPHILS # BLD AUTO: 9 K/UL (ref 1.8–7.7)
NEUTROPHILS NFR BLD: 85 % (ref 38–73)
NITRITE UR QL STRIP: NEGATIVE
NRBC BLD-RTO: 0 /100 WBC
PH UR STRIP: 6 [PH] (ref 5–8)
PLATELET # BLD AUTO: 159 K/UL (ref 150–450)
PMV BLD AUTO: 10.3 FL (ref 9.2–12.9)
POCT GLUCOSE: 112 MG/DL (ref 70–110)
POCT GLUCOSE: 198 MG/DL (ref 70–110)
POTASSIUM SERPL-SCNC: 4.1 MMOL/L (ref 3.5–5.1)
PROT SERPL-MCNC: 6.5 G/DL (ref 6–8.4)
PROT UR QL STRIP: NEGATIVE
RBC # BLD AUTO: 4.86 M/UL (ref 4.6–6.2)
SODIUM SERPL-SCNC: 138 MMOL/L (ref 136–145)
SP GR UR STRIP: 1.02 (ref 1–1.03)
URN SPEC COLLECT METH UR: NORMAL
UROBILINOGEN UR STRIP-ACNC: NEGATIVE EU/DL
WBC # BLD AUTO: 10.54 K/UL (ref 3.9–12.7)

## 2022-10-20 PROCEDURE — 94799 UNLISTED PULMONARY SVC/PX: CPT

## 2022-10-20 PROCEDURE — 94761 N-INVAS EAR/PLS OXIMETRY MLT: CPT

## 2022-10-20 PROCEDURE — 36415 COLL VENOUS BLD VENIPUNCTURE: CPT

## 2022-10-20 PROCEDURE — 25000003 PHARM REV CODE 250: Performed by: NURSE PRACTITIONER

## 2022-10-20 PROCEDURE — 80053 COMPREHEN METABOLIC PANEL: CPT

## 2022-10-20 PROCEDURE — 81003 URINALYSIS AUTO W/O SCOPE: CPT | Performed by: NURSE PRACTITIONER

## 2022-10-20 PROCEDURE — 97165 OT EVAL LOW COMPLEX 30 MIN: CPT

## 2022-10-20 PROCEDURE — 85025 COMPLETE CBC W/AUTO DIFF WBC: CPT

## 2022-10-20 PROCEDURE — 97161 PT EVAL LOW COMPLEX 20 MIN: CPT

## 2022-10-20 RX ORDER — TAMSULOSIN HYDROCHLORIDE 0.4 MG/1
0.4 CAPSULE ORAL DAILY
Status: DISCONTINUED | OUTPATIENT
Start: 2022-10-20 | End: 2022-10-20 | Stop reason: HOSPADM

## 2022-10-20 RX ADMIN — ATORVASTATIN CALCIUM 80 MG: 40 TABLET, FILM COATED ORAL at 08:10

## 2022-10-20 RX ADMIN — CEPHALEXIN 500 MG: 500 CAPSULE ORAL at 05:10

## 2022-10-20 RX ADMIN — DOCUSATE SODIUM AND SENNOSIDES 1 TABLET: 8.6; 5 TABLET, FILM COATED ORAL at 08:10

## 2022-10-20 RX ADMIN — METHOCARBAMOL TABLETS 750 MG: 750 TABLET, COATED ORAL at 08:10

## 2022-10-20 RX ADMIN — TAMSULOSIN HYDROCHLORIDE 0.4 MG: 0.4 CAPSULE ORAL at 01:10

## 2022-10-20 NOTE — NURSING
Bladder scanned for around 400cc. Patient felt the urge to void. Notified NP, orders received. Patient ambulated to bathroom,again and  voided, states felt like he emptied his bladder. Bladder scanned for 275..  Flomax given as ordered. Will continue to monitor. Does not want to be catherized  at this time.

## 2022-10-20 NOTE — PT/OT/SLP EVAL
Occupational Therapy   Evaluation and Discharge Note    Name: Toni Hyde  MRN: [id number]  Admitting Diagnosis:  Lumbosacral radiculopathy   Recent Surgery: Procedure(s) (LRB):  BIOPSY, MASS, SPINE, LUMBAR (N/A) 1 Day Post-Op    Recommendations:     Discharge Recommendations: home  Discharge Equipment Recommendations:  None  Barriers to discharge:  None    Assessment:     Toni Hyde is a 67 y.o. male with a medical diagnosis of Lumbosacral radiculopathy. At this time, patient is functioning at their prior level of function and does not require further acute OT services.     Plan:     During this hospitalization, patient does not require further acute OT services.  Please re-consult if situation changes.      Subjective     Chief Complaint: None  Patient/Family Comments/goals: To go home    Occupational Profile:  Living Environment: Pt lives alone.   Previous level of function: Independent  Equipment Used at home:  None  Assistance upon Discharge: Pt will have assistance from friends.     Pain/Comfort:  Pain Rating 1: 0/10    Patients cultural, spiritual, Muslim conflicts given the current situation: yes    Objective:     Communicated with: nurse prior to session.  Patient found HOB elevated upon OT entry to room.    General Precautions: Standard   Orthopedic Precautions: N/A  Braces: N/A  Respiratory Status: Room air     Occupational Performance:    Bed Mobility:    Patient completed Supine to Sit with independence  Patient completed Sit to Supine with independence    Functional Mobility/Transfers:  Patient completed Sit <> Stand Transfer with independence with no assistive device     Activities of Daily Living:  Feeding:  independence  Grooming: independence  Upper Body Dressing: independence  Lower Body Dressing: independence  Toileting: independence    Cognitive/Visual Perceptual:  Cognitive/Psychosocial Skills:  -       Oriented to: Person, Place, Time, and Situation   -       Follows  Commands/attention: Follows multistep commands  -       Communication: clear/fluent    Physical Exam:  Upper Extremity Range of Motion:  -       Right Upper Extremity: WFL  -       Left Upper Extremity: WFL  Upper Extremity Strength: -       Right Upper Extremity: WFL  -       Left Upper Extremity: WFL    AMPAC 6 Click ADL:  AMPAC Total Score: 24      Patient left sitting edge of bed with all lines intact and call button in reach.    GOALS:   Multidisciplinary Problems       Occupational Therapy Goals       Not on file                    History:     Past Medical History:   Diagnosis Date    Arthritis     Cataract     Cataract of left eye     Diabetes mellitus type II     on po meds    Hyperlipidemia     not on meds at present    Hypertension     Kidney stones 04/2017    Left ureteral stone     Plantar fasciitis of right foot 10/2017    Dr Colorado     Tendonitis 01/2018    right foot Dr Colorado    Wears glasses          Past Surgical History:   Procedure Laterality Date    ADENOIDECTOMY      BIOPSY OF MASS OF LUMBAR SPINE N/A 10/19/2022    Procedure: BIOPSY, MASS, SPINE, LUMBAR;  Surgeon: Marvel Matthews DO;  Location: [facility];  Service: Neurosurgery;  Laterality: N/A;  Right L5-S1 Resection of Extradural Mass Using Transfacet Approach    CATARACT EXTRACTION      COLONOSCOPY  3/23/2010    Dr. Blackburn, hyperplastic polyps, otherwise negative, 10 year recheck    COLONOSCOPY N/A 11/19/2021    Procedure: COLONOSCOPY;  Surgeon: Marcus Sexton MD;  Location: [facility];  Service: Endoscopy;  Laterality: N/A;    CYSTOSCOPY Left 04/12/2017    with ureteral stent-Dr. Noel    CYSTOSCOPY Left 5/17/2022    Procedure: CYSTOSCOPY with stent removal;  Surgeon: Ronal Benitez MD;  Location: [facility] OR;  Service: Urology;  Laterality: Left;    CYSTOSCOPY WITH URETEROSCOPY, RETROGRADE PYELOGRAPHY, AND INSERTION OF STENT Left 5/5/2022    Procedure: CYSTOSCOPY, WITH RETROGRADE PYELOGRAM AND URETERAL STENT INSERTION;  Surgeon:  Ronal Benitez MD;  Location: [facility] OR;  Service: Urology;  Laterality: Left;    EYE SURGERY  05/06/2013    Dr Nielsen    kidney stent   04/2017    KNEE ARTHROSCOPY W/ DEBRIDEMENT      right with lateral release     LITHOTRIPSY  04/19/2017    NASAL SINUS SURGERY      retna surgery   5/2013    Dr Peralta    TONSILLECTOMY      TRANSFORAMINAL EPIDURAL INJECTION OF STEROID Right 12/7/2020    Procedure: Injection,steroid,epidural,transforaminal approach L5- S1;  Surgeon: Kermit Davalos MD;  Location: [facility] OR;  Service: Pain Management;  Laterality: Right;  L5-s1    TRANSFORAMINAL EPIDURAL INJECTION OF STEROID Right 6/23/2022    Procedure: Injection,steroid,epidural,transforaminal approach;  Surgeon: Christos Saul MD;  Location: [facility] OR;  Service: Pain Management;  Laterality: Right;  L5-S1 and cyst aspiration L5-S1    URETEROSCOPIC REMOVAL OF URETERIC CALCULUS Left 5/5/2022    Procedure: REMOVAL, CALCULUS, URETER, URETEROSCOPIC;  Surgeon: Ronal Benitez MD;  Location: [facility] OR;  Service: Urology;  Laterality: Left;       Time Tracking:     OT Date of Treatment: 10/20/22  OT Start Time: 1045  OT Stop Time: 1100  OT Total Time (min): 15 min    Billable Minutes:Evaluation 15    10/20/2022

## 2022-10-20 NOTE — ASSESSMENT & PLAN NOTE
S/p Right L5-S1 laminectomy, resection extradural mass using trans facet approach  Post op care as per NSGY  PT/OT consult  Pain control

## 2022-10-20 NOTE — ASSESSMENT & PLAN NOTE
Patient's FSGs are controlled on current medication regimen.  Last A1c reviewed-   Lab Results   Component Value Date    HGBA1C 6.3 (H) 10/07/2022     Most recent fingerstick glucose reviewed- No results for input(s): POCTGLUCOSE in the last 24 hours.  Current correctional scale  Low  Maintain anti-hyperglycemic dose as follows-   Antihyperglycemics (From admission, onward)    Start     Stop Route Frequency Ordered    10/20/22 0014  insulin aspart U-100 pen 0-5 Units         -- SubQ Before meals & nightly PRN 10/19/22 2314        Hold Oral hypoglycemics while patient is in the hospital.

## 2022-10-20 NOTE — NURSING
Patient ambulated to bathroom to void, not measured. Bladder scanned for around 200cc. Refusing to be catherized at this time for residual. Will continue to monitor.

## 2022-10-20 NOTE — CARE UPDATE
10/20/22 1035   Patient Assessment/Suction   Level of Consciousness (AVPU) alert   Rhythm/Pattern, Respiratory unlabored   PRE-TX-O2   O2 Device (Oxygen Therapy) room air   SpO2 (!) 92 %   Pulse Oximetry Type Intermittent   $ Pulse Oximetry - Multiple Charge Pulse Oximetry - Multiple   Incentive Spirometer   $ Incentive Spirometer Charges done with encouragement   Administration (IS) proper technique demonstrated;self-administered   Number of Repetitions (IS) 7   Level Incentive Spirometer (mL) 2000   Patient Tolerance (IS) good;no adverse signs/symptoms present

## 2022-10-20 NOTE — PT/OT/SLP EVAL
Physical Therapy Evaluation and Discharge Note    Patient Name:  Toni Hyde   MRN:  [id number]    Recommendations:     Discharge Recommendations:  home   Discharge Equipment Recommendations: none   Barriers to discharge: None    Assessment:     Toni Hyde is a 67 y.o. male admitted with a medical diagnosis of Lumbosacral radiculopathy. .  At this time, patient is functioning at their prior level of function and does not require further acute PT services.     Recent Surgery: Procedure(s) (LRB):  BIOPSY, MASS, SPINE, LUMBAR (N/A) 1 Day Post-Op    Plan:     During this hospitalization, patient does not require further acute PT services.  Please re-consult if situation changes.      Subjective     Chief Complaint: minimalpain  Patient/Family Comments/goals: go home  Pain/Comfort:  Pain Rating 1: 1/10  Location - Side 1: Bilateral  Location - Orientation 1: posterior  Location 1: lumbar spine  Pain Addressed 1: Reposition, Cessation of Activity  Pain Rating Post-Intervention 1: 1/10    Patients cultural, spiritual, Episcopalian conflicts given the current situation:      Living Environment:  Currently lives alone in a 1 story home but reports he will have a neighbor who can help as needed.  Prior to admission, patients level of function was independent.  Equipment used at home: none.  DME owned (not currently used): none.  Upon discharge, patient will have assistance from friends.    Objective:     Communicated with nurse prior to session.  Patient found supine with bed alarm upon PT entry to room.    General Precautions: Standard, fall   Orthopedic Precautions:N/A   Braces:     Respiratory Status: Room air    Exams:  RLE ROM: WFL  RLE Strength: WFL  LLE ROM: WFL  LLE Strength: WFL    Functional Mobility:  Bed Mobility:     Supine to Sit: independence  Sit to Supine: independence  Transfers:     Sit to Stand:  independence with no AD  Gait: x 250 feet no AD independence  Stairs:  Pt ascended/descended 5  stair(s) with No Assistive Device with right handrail with Independent.     AM-PAC 6 CLICK MOBILITY  Total Score:24       Treatment and Education:   None given    AM-PAC 6 CLICK MOBILITY  Total Score:24     Patient left supine with call button in reach, bed alarm on, and nurse notified.    GOALS:   Multidisciplinary Problems       Physical Therapy Goals       Not on file                    History:     Past Medical History:   Diagnosis Date    Arthritis     Cataract     Cataract of left eye     Diabetes mellitus type II     on po meds    Hyperlipidemia     not on meds at present    Hypertension     Kidney stones 04/2017    Left ureteral stone     Plantar fasciitis of right foot 10/2017    Dr Colorado     Tendonitis 01/2018    right foot Dr Colorado    Wears glasses        Past Surgical History:   Procedure Laterality Date    ADENOIDECTOMY      CATARACT EXTRACTION      COLONOSCOPY  3/23/2010    Dr. Blackburn, hyperplastic polyps, otherwise negative, 10 year recheck    COLONOSCOPY N/A 11/19/2021    Procedure: COLONOSCOPY;  Surgeon: Marcus Sexton MD;  Location: [facility];  Service: Endoscopy;  Laterality: N/A;    CYSTOSCOPY Left 04/12/2017    with ureteral stent-Dr. Noel    CYSTOSCOPY Left 5/17/2022    Procedure: CYSTOSCOPY with stent removal;  Surgeon: Ronal Benitez MD;  Location: [facility] OR;  Service: Urology;  Laterality: Left;    CYSTOSCOPY WITH URETEROSCOPY, RETROGRADE PYELOGRAPHY, AND INSERTION OF STENT Left 5/5/2022    Procedure: CYSTOSCOPY, WITH RETROGRADE PYELOGRAM AND URETERAL STENT INSERTION;  Surgeon: Ronal Benitez MD;  Location: [facility] OR;  Service: Urology;  Laterality: Left;    EYE SURGERY  05/06/2013    Dr Nielsen    kidney stent   04/2017    KNEE ARTHROSCOPY W/ DEBRIDEMENT      right with lateral release     LITHOTRIPSY  04/19/2017    NASAL SINUS SURGERY      retna surgery   5/2013    Dr Peralta    TONSILLECTOMY      TRANSFORAMINAL EPIDURAL INJECTION OF STEROID Right 12/7/2020    Procedure:  Injection,steroid,epidural,transforaminal approach L5- S1;  Surgeon: Kermit Davalos MD;  Location: [facility] OR;  Service: Pain Management;  Laterality: Right;  L5-s1    TRANSFORAMINAL EPIDURAL INJECTION OF STEROID Right 6/23/2022    Procedure: Injection,steroid,epidural,transforaminal approach;  Surgeon: Christos Saul MD;  Location: [facility] OR;  Service: Pain Management;  Laterality: Right;  L5-S1 and cyst aspiration L5-S1    URETEROSCOPIC REMOVAL OF URETERIC CALCULUS Left 5/5/2022    Procedure: REMOVAL, CALCULUS, URETER, URETEROSCOPIC;  Surgeon: Ronal Benitez MD;  Location: [facility] OR;  Service: Urology;  Laterality: Left;       Time Tracking:     PT Received On: 10/20/22  PT Start Time: 0910     PT Stop Time: 0919  PT Total Time (min): 9 min     Billable Minutes: Evaluation 9      10/20/2022

## 2022-10-20 NOTE — SUBJECTIVE & OBJECTIVE
Past Medical History:   Diagnosis Date    Arthritis     Cataract     Cataract of left eye     Diabetes mellitus type II     on po meds    Hyperlipidemia     not on meds at present    Hypertension     Kidney stones 04/2017    Left ureteral stone     Plantar fasciitis of right foot 10/2017    Dr Colorado     Tendonitis 01/2018    right foot Dr Colorado    Wears glasses        Past Surgical History:   Procedure Laterality Date    ADENOIDECTOMY      CATARACT EXTRACTION      COLONOSCOPY  3/23/2010    Dr. Blackburn, hyperplastic polyps, otherwise negative, 10 year recheck    COLONOSCOPY N/A 11/19/2021    Procedure: COLONOSCOPY;  Surgeon: Marcus Sexton MD;  Location: [facility];  Service: Endoscopy;  Laterality: N/A;    CYSTOSCOPY Left 04/12/2017    with ureteral stent-Dr. Noel    CYSTOSCOPY Left 5/17/2022    Procedure: CYSTOSCOPY with stent removal;  Surgeon: Ronal Benitez MD;  Location: [facility];  Service: Urology;  Laterality: Left;    CYSTOSCOPY WITH URETEROSCOPY, RETROGRADE PYELOGRAPHY, AND INSERTION OF STENT Left 5/5/2022    Procedure: CYSTOSCOPY, WITH RETROGRADE PYELOGRAM AND URETERAL STENT INSERTION;  Surgeon: Ronal Benitez MD;  Location: [facility] OR;  Service: Urology;  Laterality: Left;    EYE SURGERY  05/06/2013    Dr Nielsen    kidney stent   04/2017    KNEE ARTHROSCOPY W/ DEBRIDEMENT      right with lateral release     LITHOTRIPSY  04/19/2017    NASAL SINUS SURGERY      retna surgery   5/2013    Dr Peralta    TONSILLECTOMY      TRANSFORAMINAL EPIDURAL INJECTION OF STEROID Right 12/7/2020    Procedure: Injection,steroid,epidural,transforaminal approach L5- S1;  Surgeon: Kermit Davalos MD;  Location: [facility] OR;  Service: Pain Management;  Laterality: Right;  L5-s1    TRANSFORAMINAL EPIDURAL INJECTION OF STEROID Right 6/23/2022    Procedure: Injection,steroid,epidural,transforaminal approach;  Surgeon: Christos Saul MD;  Location: [facility] OR;  Service: Pain Management;  Laterality: Right;  L5-S1 and cyst aspiration L5-S1     URETEROSCOPIC REMOVAL OF URETERIC CALCULUS Left 5/5/2022    Procedure: REMOVAL, CALCULUS, URETER, URETEROSCOPIC;  Surgeon: Ronal Benitez MD;  Location: [facility];  Service: Urology;  Laterality: Left;       Review of patient's allergies indicates:   Allergen Reactions    Shrimp Itching and Swelling     Per testing   States no ivp or betadine allergies.        Current Facility-Administered Medications on File Prior to Encounter   Medication    lidocaine (PF) 10 mg/ml (1%) injection 10 mg     Current Outpatient Medications on File Prior to Encounter   Medication Sig    alfuzosin (UROXATRAL) 10 mg Tb24 Take 1 tablet (10 mg total) by mouth daily with breakfast.    amLODIPine (NORVASC) 2.5 MG tablet Take 1 tablet (2.5 mg total) by mouth once daily. (Patient taking differently: Take 2.5 mg by mouth every evening.)    cetirizine (ZYRTEC) 10 MG tablet Take 10 mg by mouth daily as needed. 1 Tablet(s) Oral PRN .    diphenhydrAMINE (BENADRYL) 25 mg capsule Take 25 mg by mouth nightly as needed for Itching.    guaifenesin (MUCINEX ORAL) Take 400 mg by mouth 2 (two) times daily as needed for Congestion.    metFORMIN (GLUCOPHAGE-XR) 750 MG ER 24hr tablet TAKE 1 TABLET TWICE A DAY WITH MEALS.    potassium citrate (UROCIT-K 15) 15 mEq TbSR Take 15 mEq by mouth 2 (two) times a day.    rosuvastatin (CRESTOR) 20 MG tablet TAKE 1 TABLET DAILY (REPLACES PRAVASTATIN) (Patient taking differently: every evening. TAKE 1 TABLET DAILY (REPLACES PRAVASTATIN))    fluticasone (FLONASE) 50 mcg/actuation nasal spray 2 sprays by Each Nare route daily as needed.     [DISCONTINUED] ibuprofen (ADVIL,MOTRIN) 200 MG tablet Take 400 mg by mouth every 6 (six) hours as needed. 2 Tablet(s) Oral PRN Every day.    [DISCONTINUED] triamcinolone acetonide 0.5% (KENALOG) 0.5 % Crea Apply topically 2 (two) times daily.     Family History       Problem Relation (Age of Onset)    Cancer Paternal Grandfather    Cataracts Mother    Diabetes Maternal Grandmother     Hypertension Father    Lung cancer Paternal Grandfather    Neuropathy Father    No Known Problems Sister, Maternal Aunt    Stroke Paternal Grandmother          Tobacco Use    Smoking status: Former     Types: Cigarettes     Quit date: 1978     Years since quittin.5    Smokeless tobacco: Never    Tobacco comments:     Occ cigar   Substance and Sexual Activity    Alcohol use: Yes     Alcohol/week: 2.5 standard drinks     Types: 3 Standard drinks or equivalent per week     Comment: 3 drinks per week    Drug use: No    Sexual activity: Not on file     Review of Systems   Constitutional:  Negative for chills, diaphoresis, fatigue and fever.   HENT:  Negative for congestion, ear pain, sore throat and trouble swallowing.    Eyes:  Negative for pain, discharge and visual disturbance.   Respiratory:  Negative for cough, chest tightness, shortness of breath and wheezing.    Cardiovascular:  Negative for chest pain, palpitations and leg swelling.   Gastrointestinal:  Negative for abdominal distention, abdominal pain, blood in stool, constipation, diarrhea, nausea and vomiting.   Endocrine: Negative for polydipsia, polyphagia and polyuria.   Genitourinary:  Negative for dysuria, flank pain, frequency and urgency.   Musculoskeletal:  Positive for back pain. Negative for joint swelling, neck pain and neck stiffness.   Skin:  Negative for rash and wound.   Allergic/Immunologic: Negative for immunocompromised state.   Neurological:  Negative for dizziness, syncope, speech difficulty, weakness, light-headedness, numbness and headaches.   Hematological:  Negative for adenopathy.   Psychiatric/Behavioral:  Negative for confusion and suicidal ideas. The patient is not nervous/anxious.    All other systems reviewed and are negative.  Objective:     Vital Signs (Most Recent):  Temp: 96.1 °F (35.6 °C) (10/19/22 2302)  Pulse: 68 (10/19/22 2302)  Resp: 17 (10/19/22 2302)  BP: 113/63 (10/19/22 2302)  SpO2: (!) 92 % (10/19/22  2302)   Vital Signs (24h Range):  Temp:  [96.1 °F (35.6 °C)-98.1 °F (36.7 °C)] 96.1 °F (35.6 °C)  Pulse:  [55-86] 68  Resp:  [14-20] 17  SpO2:  [92 %-97 %] 92 %  BP: ()/(53-71) 113/63     Weight: 84.8 kg (187 lb)  Body mass index is 26.45 kg/m².    Physical Exam  Vitals and nursing note reviewed.   Constitutional:       Appearance: He is well-developed.   HENT:      Head: Normocephalic and atraumatic.   Eyes:      Conjunctiva/sclera: Conjunctivae normal.      Pupils: Pupils are equal, round, and reactive to light.   Cardiovascular:      Rate and Rhythm: Normal rate and regular rhythm.      Pulses: Normal pulses.      Heart sounds: Normal heart sounds.   Pulmonary:      Effort: Pulmonary effort is normal.      Breath sounds: Normal breath sounds.   Abdominal:      General: Bowel sounds are normal.      Palpations: Abdomen is soft.   Musculoskeletal:         General: Normal range of motion.      Cervical back: Normal range of motion and neck supple.   Skin:     General: Skin is warm and dry.      Capillary Refill: Capillary refill takes less than 2 seconds.      Comments: Rt lower back dressing clean dry intact with out drainage or surrounding erythema, LOCO drain in place with small amount of sanguineous drainage.    Neurological:      General: No focal deficit present.      Mental Status: He is alert and oriented to person, place, and time.   Psychiatric:         Behavior: Behavior normal.         Thought Content: Thought content normal.         Judgment: Judgment normal.         CRANIAL NERVES     CN III, IV, VI   Pupils are equal, round, and reactive to light.

## 2022-10-20 NOTE — HOSPITAL COURSE
This patient was observed by hospital medicine after undergoing s/p Right L5-S1 laminectomy, resection extradural mass using trans facet approach by Dr. Matthews on 10/19. His pain was controlled with oral narcotics as prescribed by neurosurgeon.  Patient was discharged home after cleared by surgeon. Fall precautions were discussed.  Return precautions were discussed at length.  Patient follow-up with primary care provider on discharge for any medical needs. Follow-up with neurosurgery as planned.  Patient was seen on day of discharge.

## 2022-10-20 NOTE — H&P
[facility] Medicine  History & Physical    Patient Name: Toni Hyde  MRN: [id number]  Patient Class: OP- Outpatient Recovery  Admission Date: 10/19/2022  Attending Physician: Dr. Barraza  Primary Care Provider: Candido Mon MD         Patient information was obtained from patient and past medical records.     Subjective:     Principal Problem:Lumbosacral radiculopathy    Chief Complaint:   Chief Complaint   Patient presents with    Back Pain        HPI: Mr. Hyde is a 67 year old male with a history of HTN, NIDDM2, HLD, and kidney stones who presents today s/p Right L5-S1 laminectomy, resection extradural mass using trans facet approach by Dr. Matthews. He is seen post-op and has already ambulated to the bathroom. He reports mild back pain and is complaining of dry mouth and hunger. He denies N/V/D or numbness or tingling. He has a drain with small amount of sanguineous drainage. Dressing to the rt low back is clean dry and intact without drainage or erythema. Hospital medicine is consulted for medical management.       Past Medical History:   Diagnosis Date    Arthritis     Cataract     Cataract of left eye     Diabetes mellitus type II     on po meds    Hyperlipidemia     not on meds at present    Hypertension     Kidney stones 04/2017    Left ureteral stone     Plantar fasciitis of right foot 10/2017    Dr Colorado     Tendonitis 01/2018    right foot Dr Colorado    Wears glasses        Past Surgical History:   Procedure Laterality Date    ADENOIDECTOMY      CATARACT EXTRACTION      COLONOSCOPY  3/23/2010    Dr. Blackburn, hyperplastic polyps, otherwise negative, 10 year recheck    COLONOSCOPY N/A 11/19/2021    Procedure: COLONOSCOPY;  Surgeon: Marcus Sexton MD;  Location: [facility];  Service: Endoscopy;  Laterality: N/A;    CYSTOSCOPY Left 04/12/2017    with ureteral stent-Dr. Noel    CYSTOSCOPY Left 5/17/2022    Procedure: CYSTOSCOPY with stent removal;   Surgeon: Ronal Benitez MD;  Location: [facility] OR;  Service: Urology;  Laterality: Left;    CYSTOSCOPY WITH URETEROSCOPY, RETROGRADE PYELOGRAPHY, AND INSERTION OF STENT Left 5/5/2022    Procedure: CYSTOSCOPY, WITH RETROGRADE PYELOGRAM AND URETERAL STENT INSERTION;  Surgeon: Ronal Benitez MD;  Location: [facility] OR;  Service: Urology;  Laterality: Left;    EYE SURGERY  05/06/2013    Dr Nielsen    kidney stent   04/2017    KNEE ARTHROSCOPY W/ DEBRIDEMENT      right with lateral release     LITHOTRIPSY  04/19/2017    NASAL SINUS SURGERY      retna surgery   5/2013    Dr Peralta    TONSILLECTOMY      TRANSFORAMINAL EPIDURAL INJECTION OF STEROID Right 12/7/2020    Procedure: Injection,steroid,epidural,transforaminal approach L5- S1;  Surgeon: Kermit Davalos MD;  Location: [facility] OR;  Service: Pain Management;  Laterality: Right;  L5-s1    TRANSFORAMINAL EPIDURAL INJECTION OF STEROID Right 6/23/2022    Procedure: Injection,steroid,epidural,transforaminal approach;  Surgeon: Christos Saul MD;  Location: [facility] OR;  Service: Pain Management;  Laterality: Right;  L5-S1 and cyst aspiration L5-S1    URETEROSCOPIC REMOVAL OF URETERIC CALCULUS Left 5/5/2022    Procedure: REMOVAL, CALCULUS, URETER, URETEROSCOPIC;  Surgeon: Ronal Benitez MD;  Location: [facility] OR;  Service: Urology;  Laterality: Left;       Review of patient's allergies indicates:   Allergen Reactions    Shrimp Itching and Swelling     Per testing   States no ivp or betadine allergies.        Current Facility-Administered Medications on File Prior to Encounter   Medication    lidocaine (PF) 10 mg/ml (1%) injection 10 mg     Current Outpatient Medications on File Prior to Encounter   Medication Sig    alfuzosin (UROXATRAL) 10 mg Tb24 Take 1 tablet (10 mg total) by mouth daily with breakfast.    amLODIPine (NORVASC) 2.5 MG tablet Take 1 tablet (2.5 mg total) by mouth once daily. (Patient taking differently: Take 2.5 mg by mouth every evening.)    cetirizine  (ZYRTEC) 10 MG tablet Take 10 mg by mouth daily as needed. 1 Tablet(s) Oral PRN .    diphenhydrAMINE (BENADRYL) 25 mg capsule Take 25 mg by mouth nightly as needed for Itching.    guaifenesin (MUCINEX ORAL) Take 400 mg by mouth 2 (two) times daily as needed for Congestion.    metFORMIN (GLUCOPHAGE-XR) 750 MG ER 24hr tablet TAKE 1 TABLET TWICE A DAY WITH MEALS.    potassium citrate (UROCIT-K 15) 15 mEq TbSR Take 15 mEq by mouth 2 (two) times a day.    rosuvastatin (CRESTOR) 20 MG tablet TAKE 1 TABLET DAILY (REPLACES PRAVASTATIN) (Patient taking differently: every evening. TAKE 1 TABLET DAILY (REPLACES PRAVASTATIN))    fluticasone (FLONASE) 50 mcg/actuation nasal spray 2 sprays by Each Nare route daily as needed.     [DISCONTINUED] ibuprofen (ADVIL,MOTRIN) 200 MG tablet Take 400 mg by mouth every 6 (six) hours as needed. 2 Tablet(s) Oral PRN Every day.    [DISCONTINUED] triamcinolone acetonide 0.5% (KENALOG) 0.5 % Crea Apply topically 2 (two) times daily.     Family History       Problem Relation (Age of Onset)    Cancer Paternal Grandfather    Cataracts Mother    Diabetes Maternal Grandmother    Hypertension Father    Lung cancer Paternal Grandfather    Neuropathy Father    No Known Problems Sister, Maternal Aunt    Stroke Paternal Grandmother          Tobacco Use    Smoking status: Former     Types: Cigarettes     Quit date: 1978     Years since quittin.5    Smokeless tobacco: Never    Tobacco comments:     Occ cigar   Substance and Sexual Activity    Alcohol use: Yes     Alcohol/week: 2.5 standard drinks     Types: 3 Standard drinks or equivalent per week     Comment: 3 drinks per week    Drug use: No    Sexual activity: Not on file     Review of Systems   Constitutional:  Negative for chills, diaphoresis, fatigue and fever.   HENT:  Negative for congestion, ear pain, sore throat and trouble swallowing.    Eyes:  Negative for pain, discharge and visual disturbance.   Respiratory:  Negative  for cough, chest tightness, shortness of breath and wheezing.    Cardiovascular:  Negative for chest pain, palpitations and leg swelling.   Gastrointestinal:  Negative for abdominal distention, abdominal pain, blood in stool, constipation, diarrhea, nausea and vomiting.   Endocrine: Negative for polydipsia, polyphagia and polyuria.   Genitourinary:  Negative for dysuria, flank pain, frequency and urgency.   Musculoskeletal:  Positive for back pain. Negative for joint swelling, neck pain and neck stiffness.   Skin:  Negative for rash and wound.   Allergic/Immunologic: Negative for immunocompromised state.   Neurological:  Negative for dizziness, syncope, speech difficulty, weakness, light-headedness, numbness and headaches.   Hematological:  Negative for adenopathy.   Psychiatric/Behavioral:  Negative for confusion and suicidal ideas. The patient is not nervous/anxious.    All other systems reviewed and are negative.  Objective:     Vital Signs (Most Recent):  Temp: 96.1 °F (35.6 °C) (10/19/22 2302)  Pulse: 68 (10/19/22 2302)  Resp: 17 (10/19/22 2302)  BP: 113/63 (10/19/22 2302)  SpO2: (!) 92 % (10/19/22 2302)   Vital Signs (24h Range):  Temp:  [96.1 °F (35.6 °C)-98.1 °F (36.7 °C)] 96.1 °F (35.6 °C)  Pulse:  [55-86] 68  Resp:  [14-20] 17  SpO2:  [92 %-97 %] 92 %  BP: ()/(53-71) 113/63     Weight: 84.8 kg (187 lb)  Body mass index is 26.45 kg/m².    Physical Exam  Vitals and nursing note reviewed.   Constitutional:       Appearance: He is well-developed.   HENT:      Head: Normocephalic and atraumatic.   Eyes:      Conjunctiva/sclera: Conjunctivae normal.      Pupils: Pupils are equal, round, and reactive to light.   Cardiovascular:      Rate and Rhythm: Normal rate and regular rhythm.      Pulses: Normal pulses.      Heart sounds: Normal heart sounds.   Pulmonary:      Effort: Pulmonary effort is normal.      Breath sounds: Normal breath sounds.   Abdominal:      General: Bowel sounds are normal.       Palpations: Abdomen is soft.   Musculoskeletal:         General: Normal range of motion.      Cervical back: Normal range of motion and neck supple.   Skin:     General: Skin is warm and dry.      Capillary Refill: Capillary refill takes less than 2 seconds.      Comments: Rt lower back dressing clean dry intact with out drainage or surrounding erythema, LOCO drain in place with small amount of sanguineous drainage.    Neurological:      General: No focal deficit present.      Mental Status: He is alert and oriented to person, place, and time.   Psychiatric:         Behavior: Behavior normal.         Thought Content: Thought content normal.         Judgment: Judgment normal.         CRANIAL NERVES     CN III, IV, VI   Pupils are equal, round, and reactive to light.       Assessment/Plan:     * Lumbosacral radiculopathy  S/p Right L5-S1 laminectomy, resection extradural mass using trans facet approach  Post op care as per NSGY  PT/OT consult  Pain control         Hyperlipidemia associated with type 2 diabetes mellitus  Continue appropriate home meds    Hypertension associated with diabetes  BP on low end  Will hold valsartan   Continue amlodipine with parameters to hold for hypotension    Diabetes mellitus, type II  Patient's FSGs are controlled on current medication regimen.  Last A1c reviewed-   Lab Results   Component Value Date    HGBA1C 6.3 (H) 10/07/2022     Most recent fingerstick glucose reviewed- No results for input(s): POCTGLUCOSE in the last 24 hours.  Current correctional scale  Low  Maintain anti-hyperglycemic dose as follows-   Antihyperglycemics (From admission, onward)    Start     Stop Route Frequency Ordered    10/20/22 0014  insulin aspart U-100 pen 0-5 Units         -- SubQ Before meals & nightly PRN 10/19/22 2314        Hold Oral hypoglycemics while patient is in the hospital.      VTE Risk Mitigation (From admission, onward)         Ordered     IP VTE HIGH RISK PATIENT  Once         10/19/22 0537      Place sequential compression device  Until discontinued         10/19/22 2030     Place SHANAE hose  Until discontinued         10/19/22 1148                   Rachel De La Vega NP  Department of Hospital Medicine   [facility]

## 2022-10-20 NOTE — PLAN OF CARE
Patient cleared for discharge from case management standpoint.       10/20/22 1031   Final Note   Assessment Type Final Discharge Note   Anticipated Discharge Disposition Home   Hospital Resources/Appts/Education Provided Provided education on problems/symptoms using teachback;Appointments scheduled and added to AVS;Provided patient/caregiver with written discharge plan information

## 2022-10-20 NOTE — HPI
Mr. Hyde is a 67 year old male with a history of HTN, NIDDM2, HLD, and kidney stones who presents today s/p Right L5-S1 laminectomy, resection extradural mass using trans facet approach by Dr. Matthews. He is seen post-op and has already ambulated to the bathroom. He reports mild back pain and is complaining of dry mouth and hunger. He denies N/V/D or numbness or tingling. He has a drain with small amount of sanguineous drainage. Dressing to the rt low back is clean dry and intact without drainage or erythema. Hospital medicine is consulted for medical management.

## 2022-10-20 NOTE — PLAN OF CARE
POC discussed with patient, verbalized understanding. Patient with uneventful night,slept off and on between care. VS stable. Patient voids per urinal, yet retains urine. No complaints of pain. Surgical dressing to back CDI with drain in place. Call light at bedside.

## 2022-10-20 NOTE — DISCHARGE SUMMARY
[facility] Medicine  Discharge Summary      Patient Name: Toni Hyde  MRN: [id number]  Patient Class: OP- Outpatient Recovery  Admission Date: 10/19/2022  Hospital Length of Stay: 0 days  Discharge Date and Time:  10/20/2022 10:47 AM  Attending Physician: Marvel Matthews DO   Discharging Provider: Nancie Wheatley PA-C  Primary Care Provider: Candido Mon MD      HPI:   Mr. Hyde is a 67 year old male with a history of HTN, NIDDM2, HLD, and kidney stones who presents today s/p Right L5-S1 laminectomy, resection extradural mass using trans facet approach by Dr. Matthews. He is seen post-op and has already ambulated to the bathroom. He reports mild back pain and is complaining of dry mouth and hunger. He denies N/V/D or numbness or tingling. He has a drain with small amount of sanguineous drainage. Dressing to the rt low back is clean dry and intact without drainage or erythema. Hospital medicine is consulted for medical management.       Procedure(s) (LRB):  BIOPSY, MASS, SPINE, LUMBAR (N/A)      Hospital Course:   This patient was observed by hospital medicine after undergoing s/p Right L5-S1 laminectomy, resection extradural mass using trans facet approach by Dr. Matthews on 10/19. His pain was controlled with oral narcotics as prescribed by neurosurgeon.  Patient was discharged home after cleared by surgeon. Fall precautions were discussed.  Return precautions were discussed at length.  Patient follow-up with primary care provider on discharge for any medical needs. Follow-up with neurosurgery as planned.  Patient was seen on day of discharge.                   Goals of Care Treatment Preferences:  Code Status: Full Code      Consults:     No new Assessment & Plan notes have been filed under this hospital service since the last note was generated.  Service: Hospital Medicine    Final Active Diagnoses:    Diagnosis Date Noted POA    PRINCIPAL PROBLEM:  Lumbosacral radiculopathy  [M54.17] 12/07/2020 Yes    Hyperlipidemia associated with type 2 diabetes mellitus [E11.69, E78.5] 03/04/2019 Yes    Hypertension associated with diabetes [E11.59, I15.2]  Yes    Diabetes mellitus, type II [E11.9]  Yes      Problems Resolved During this Admission:       Discharged Condition: good    Disposition: Home or Self Care    Follow Up:   Follow-up Information     Marvel Matthews, DO Follow up.    Specialty: Neurosurgery  Why: Nov 3, 2022 at 9:30pm  Contact information:  [address] [place] [address]   [address]  [place] [address]  [phone]                       Patient Instructions:      Diet Cardiac     Notify your health care provider if you experience any of the following:  temperature >100.4     Notify your health care provider if you experience any of the following:  persistent nausea and vomiting or diarrhea     Notify your health care provider if you experience any of the following:  severe uncontrolled pain     Notify your health care provider if you experience any of the following:  difficulty breathing or increased cough     Notify your health care provider if you experience any of the following:  redness, tenderness, or signs of infection (pain, swelling, redness, odor or green/yellow discharge around incision site)     Other restrictions (specify):   Order Comments: Per surgery recommendations.       Significant Diagnostic Studies: Labs:   CMP   Recent Labs   Lab 10/20/22  0753      K 4.1      CO2 23   *   BUN 18   CREATININE 1.1   CALCIUM 8.5*   PROT 6.5   ALBUMIN 3.9   BILITOT 1.1*   ALKPHOS 46*   AST 24   ALT 16   ANIONGAP 11    and CBC   Recent Labs   Lab 10/20/22  0753   WBC 10.54   HGB 15.4   HCT 45.5          Pending Diagnostic Studies:     Procedure Component Value Units Date/Time    Specimen to Pathology, Surgery Neurosurgery [[id number]] Collected: 10/19/22 1723    Order Status: Sent Lab Status: In process Updated: 10/20/22 0725    Specimen: Tissue           Medications:  Reconciled Home Medications:      Medication List      START taking these medications    cephALEXin 500 MG capsule  Commonly known as: KEFLEX  Take 1 capsule (500 mg total) by mouth every 6 (six) hours.     methocarbamoL 750 MG Tab  Commonly known as: ROBAXIN  Take 1 tablet (750 mg total) by mouth 4 (four) times daily. for 10 days     oxyCODONE-acetaminophen 5-325 mg per tablet  Commonly known as: PERCOCET  Take 1 tablet by mouth every 4 (four) hours as needed for Pain.        CHANGE how you take these medications    amLODIPine 2.5 MG tablet  Commonly known as: NORVASC  Take 1 tablet (2.5 mg total) by mouth once daily.  What changed: when to take this     rosuvastatin 20 MG tablet  Commonly known as: CRESTOR  TAKE 1 TABLET DAILY (REPLACES PRAVASTATIN)  What changed: when to take this        CONTINUE taking these medications    alfuzosin 10 mg Tb24  Commonly known as: UROXATRAL  Take 1 tablet (10 mg total) by mouth daily with breakfast.     cetirizine 10 MG tablet  Commonly known as: ZYRTEC  Take 10 mg by mouth daily as needed. 1 Tablet(s) Oral PRN .     diphenhydrAMINE 25 mg capsule  Commonly known as: BENADRYL  Take 25 mg by mouth nightly as needed for Itching.     fluticasone propionate 50 mcg/actuation nasal spray  Commonly known as: FLONASE  2 sprays by Each Nare route daily as needed.     metFORMIN 750 MG ER 24hr tablet  Commonly known as: GLUCOPHAGE-XR  TAKE 1 TABLET TWICE A DAY WITH MEALS.     MUCINEX ORAL  Take 400 mg by mouth 2 (two) times daily as needed for Congestion.     potassium citrate 15 mEq Tbsr  Commonly known as: UROCIT-K 15  Take 15 mEq by mouth 2 (two) times a day.     valsartan 320 MG tablet  Commonly known as: DIOVAN  TAKE 1 TABLET DAILY        STOP taking these medications    ibuprofen 200 MG tablet  Commonly known as: ADVILMOTRIN        ASK your doctor about these medications    ondansetron 4 MG Tbdl  Commonly known as: ZOFRAN-ODT  Take 1 tablet (4 mg total) by mouth once.  for 1 dose  Ask about: Should I take this medication?            Indwelling Lines/Drains at time of discharge:   Lines/Drains/Airways     Drain  Duration                Ureteral Drain/Stent 05/05/22 1208 Left ureter 6 Fr. 167 days         Closed/Suction Drain 10/19/22 1659 Inferior;Medial Back Bulb 10 Fr. <1 day                Time spent on the discharge of patient: 37 minutes         Nancie Wheatley PA-C  Department of Hospital Medicine  [facility]

## 2022-10-20 NOTE — DISCHARGE INSTRUCTIONS
Discharge Instructions, [facility] Medicine    Thank you for choosing [facility] for your medical care. The primary doctor who is taking care of you at the time of your discharge is Marvel Matthews DO.     You were admitted to the hospital with Lumbosacral radiculopathy.     Please note your discharge instructions, including diet/activity restrictions, follow-up appointments, and medication changes.  If you have any questions about your medical issues, prescriptions, or any other questions, please feel free to contact the [facility] Medicine Dept at [phone] and we will help.    If you are previously with Home health, outpatient PT/OT or under a therapy program, you are cleared to return to those programs.    Please direct all long term medication refills and follow up to your primary care provider, Candido Mon MD. Thank you again for letting us take care of your health care needs.    Please note the following discharge instructions per your discharging physician-  Nancie Wheatley PA-C     Epistaxis

## 2022-10-20 NOTE — PLAN OF CARE
[facility]  Initial Discharge Assessment       Primary Care Provider: Candido Mon MD    Admission Diagnosis: Synovial cyst of lumbar spine [M71.38]    Admission Date: 10/19/2022  Expected Discharge Date: 10/20/2022    Discharge Barriers Identified: None    Payor: MEDICARE / Plan: MEDICARE PART A & B / Product Type: Government /     Extended Emergency Contact Information  Primary Emergency Contact: Rabia Florian  Mobile Phone: [phone]  Relation: Sister  Preferred language: English   needed? No    Discharge Plan A: Home  Discharge Plan B: Home with family      [id number] - [place], [place] - [address] [facility] [address]  [address]  [place] [address]  Phone: [phone] Fax: [phone]    [facility] - [place], [place] - [address] [facility]  [address] [facility] [address]  Phone: [phone] Fax: [phone]    SW met with patient at bedside to complete discharge planning assessment.  Patient alert and oriented xs 4.  Patient verified all demographic information on facesheet is correct.  Patient verified PCP is Dr. Mon.  Patient verified primary health insurance is Medicare and secondary is  for Life.  Patient with NO home health or DME.  Patient friend will provide transportation upon discharge from facility.  Patient independent with ADLs, live alone, drives self.      Initial Assessment (most recent)       Adult Discharge Assessment - 10/20/22 1027          Discharge Assessment    Assessment Type Discharge Planning Assessment     Confirmed/corrected address, phone number and insurance Yes     Confirmed Demographics Correct on Facesheet     Source of Information patient     Does patient/caregiver understand observation status Yes     Lives With alone     Facility Arrived From: home     Do you expect to return to your current living situation? Yes     Do you have help at home or someone to help  you manage your care at home? Yes     Who are your caregiver(s) and their phone number(s)? friend     Prior to hospitilization cognitive status: Alert/Oriented     Current cognitive status: Alert/Oriented     Walking or Climbing Stairs Difficulty none     Dressing/Bathing Difficulty none     Equipment Currently Used at Home none     Readmission within 30 days? No     Patient currently being followed by outpatient case management? No     Do you currently have service(s) that help you manage your care at home? No     Do you take prescription medications? Yes     Do you have prescription coverage? Yes     Do you have any problems affording any of your prescribed medications? No     Is the patient taking medications as prescribed? yes     Who is going to help you get home at discharge? friend     How do you get to doctors appointments? car, drives self     Are you on dialysis? No     Do you take coumadin? No     Discharge Plan A Home     Discharge Plan B Home with family     DME Needed Upon Discharge  none     Discharge Plan discussed with: Patient     Discharge Barriers Identified None

## 2022-10-20 NOTE — PROGRESS NOTES
No issues overnight.  Reports minimal incision pain.  Reports resolved right leg pain.  Denies weakness or numbness.  Ambulated with PT.  Tolerating p.o. intake    Vitals:    10/20/22 0317 10/20/22 0743 10/20/22 0825 10/20/22 1035   BP: 107/65  113/64    BP Location: Right arm      Patient Position: Lying  Lying    Pulse: (!) 50 67 68    Resp: 18 18 18    Temp: 97.9 °F (36.6 °C)  97.9 °F (36.6 °C)    TempSrc: Oral  Oral    SpO2: 96% (!) 93% (!) 93% (!) 92%   Weight:       Height:         I/O last 3 completed shifts:  In: 2120 [P.O.:120; I.V.:2000]  Out: 492 [Urine:412; Drains:80]  I/O this shift:  In: 200 [P.O.:200]  Out: 170 [Urine:170]   Exam:    Awake, alert, oriented to person place and time.    Cranial nerves 2-12 grossly intact.    Strength is graded 5/5 in all muscle groups.    Sensation is grossly intact throughout.    Gait and stance are normal  Dressing clean dry intact.  LOCO in place     Impression:     Status post right L5-S1 resection of extradural mass.  Doing well     Plan:    Discharge home  Outpatient prescriptions sent.  Detailed activity wound care instructions provided  Drain removal in office tomorrow.  Nursing staff will contact patient.

## 2022-10-20 NOTE — NURSING
Patient arrived from surgery via bed. VS stable. Oriented to room, orders explained. Dressing to back CDI with LOCO noted. No complaints voiced. HL. Call light at bedside. Teds in place. Ambulated to bathroom, voided.

## 2022-10-20 NOTE — OP NOTE
Date of procedure:  October 19, 2022     Preoperative diagnosis:    1. Lumbar radiculopathy    2. Lumbar degenerative disc disease    3. Lumbar extradural mass, right L5-S1 foramen    4. Hypertension    5. Diabetes mellitus     Postoperative diagnosis:    1. Same     Procedure:     Right L5-S1 laminectomy, foraminotomy, and resection extradural mass using right L5-S1 trans facet approach    Surgeon: Marvel Matthews D.O., MBA    Assistant:  See medical record     Anesthesia:  General endotracheal     Estimated blood loss:  50 cc     Specimens:  Right L5-S1 extradural mass     Complications:  None      Operative procedure:  The patient was brought to the operating room and adequate IV access was established.  Upon achieving adequate sedation the patient was gently endotracheally intubated.  Following general anesthesia induction a Allen catheter was placed.  The patient was then positioned prone on a Mark table.  The head and face were supported in a prone view head quezada.  The eyes were inspected.  The arms were abducted at 90°.  All bony prominences were padded.  Additional padding was placed under the axilla bilaterally.  The lumbar region was prepped and draped in sterile fashion.  Antibiotics were infused within 1 hour incision time.  AP and lateral fluoroscopy was used to identify the L5-S1 spinal segments.  Surgical time-out was performed.  Marcaine with epinephrine was injected at the incision site.  Train of 4 was confirmed with the anesthesia team for indirect neuro monitoring purposes.  A right paramedian incision was made overlying the L5-S1 segment through the subcutaneous tissue and fascia.  A tubular retractor was placed sequentially overlying the left L5-S1 posterior elements with fluoroscopic guidance.  Subperiosteal dissection was carried out with electrocautery.  The microscope was draped and brought to the operative field.  A partial right L5 hemilaminectomy and partial right L5-S1  facetectomy with resection of the upper half of the superior and inferior articular processes was performed with a high-speed bur.  The ligamentum flavum was elevated and resected.  A pearly white extradural mass was identified overlying the exiting right L5 nerve root extending from the ventral upper aspect of the right L5-S1 foramen.  The nerve root was displaced inferiorly and ventrally.  The mass was carefully  from the underlying nerve root and resected in piecemeal fashion.  Specimen was sent to pathology.  The proximal and distal course of the nerve root was further decompressed circumferentially.  Islands of non compressive, but densely adherent to the nerve root, were coagulated with bipolar cautery. A ball ended probe was used to palpate for areas of other compression and none was identified.  Meticulous hemostasis was obtained.  The wound was copiously irrigated.  40 mg of Depo-Medrol was placed overlying the neural elements.  A subfascial drain was placed and tunneled remote incision site and secured.  The fascia was reapproximated with resorbable suture.  The subcutaneous tissue was reapproximated with absorbable suture.  Surgical staples were placed.  A sterile surgical dressing was placed.  The patient was positioned supine on a stretcher.  The patient was extubated in operating room and transferred to the recovery room in stable condition.  Sponge and needle counts were correct.  There were no intraoperative complications.

## 2022-10-20 NOTE — PLAN OF CARE
Dr. Matthews notified that patient will be staying home alone tonight.  No one is able to stay with him.   Orders to stay overnight given per Dr. Matthews and nursing supervisor notified.  Room assignment given and will give bedside report to nurse on 4th floor.  Patient walked to bathroom with much assistance.  Able to urinate and LOCO drain emptied, 40 cc bloody drainage noted.  Patient back on stretcher and will be transferred to room 401A.  Trish rn

## 2022-10-21 ENCOUNTER — TELEPHONE (OUTPATIENT)
Dept: MEDSURG UNIT | Facility: HOSPITAL | Age: 67
End: 2022-10-21
Payer: MEDICARE

## 2022-10-21 ENCOUNTER — CLINICAL SUPPORT (OUTPATIENT)
Dept: NEUROSURGERY | Facility: CLINIC | Age: 67
End: 2022-10-21
Payer: MEDICARE

## 2022-10-21 VITALS
TEMPERATURE: 98 F | HEIGHT: 71 IN | OXYGEN SATURATION: 92 % | RESPIRATION RATE: 18 BRPM | DIASTOLIC BLOOD PRESSURE: 64 MMHG | WEIGHT: 187 LBS | BODY MASS INDEX: 26.18 KG/M2 | HEART RATE: 68 BPM | SYSTOLIC BLOOD PRESSURE: 113 MMHG

## 2022-10-21 DIAGNOSIS — Z98.890 POSTOPERATIVE STATE: Primary | ICD-10-CM

## 2022-10-21 PROCEDURE — 99024 POSTOP FOLLOW-UP VISIT: CPT | Mod: S$GLB,POP,, | Performed by: NEUROLOGICAL SURGERY

## 2022-10-21 PROCEDURE — 99024 PR POST-OP FOLLOW-UP VISIT: ICD-10-PCS | Mod: S$GLB,POP,, | Performed by: NEUROLOGICAL SURGERY

## 2022-10-21 NOTE — PROGRESS NOTES
Pt presented to clinic for LOCO drain removal with drain set to suction and 5ml serosanguinous fluid present. Suture removed and drain pulled with no resistance met. No drainage or bleeding noted. Clean and dry dressing applied. Pt tolerated well and ambulated from clinic without difficulty.

## 2022-10-26 LAB
FINAL PATHOLOGIC DIAGNOSIS: NORMAL
GROSS: NORMAL
Lab: NORMAL
MICROSCOPIC EXAM: NORMAL

## 2022-11-08 ENCOUNTER — OFFICE VISIT (OUTPATIENT)
Dept: NEUROSURGERY | Facility: CLINIC | Age: 67
End: 2022-11-08
Payer: MEDICARE

## 2022-11-08 DIAGNOSIS — Z98.890 POSTOPERATIVE STATE: Primary | ICD-10-CM

## 2022-11-08 PROCEDURE — 99024 POSTOP FOLLOW-UP VISIT: CPT | Mod: S$GLB,POP,, | Performed by: NEUROLOGICAL SURGERY

## 2022-11-08 PROCEDURE — 99024 PR POST-OP FOLLOW-UP VISIT: ICD-10-PCS | Mod: S$GLB,POP,, | Performed by: NEUROLOGICAL SURGERY

## 2022-11-08 NOTE — PROGRESS NOTES
The patient is status post right L5-S1 laminectomy, resection of extradural mass performed uneventfully on October 19, 2022.  He reports significant improvement of his right lower extremity pain which is now essentially resolved.  He notes mild incisional discomfort.  He is anxious to resume an exercise regimen.  He denies weakness or numbness.      Exam:    Awake, alert, oriented to person place and time.    Cranial nerves 2-12 grossly intact.    Strength is graded 5/5 in all muscle groups.    Sensation is grossly intact throughout.    Gait and stance are normal  Incision well healed.  Staples removed     Analysis:  He is doing well postoperatively.  He inquires about using an elliptical at home.  This is reasonable from my standpoint.  He inquires about using ibuprofen as needed.  This is also reasonable from my standpoint.  I provided him with detailed wound care and activity instructions.  We will see him back in 6 weeks for re-evaluation.    Toni was seen today for back pain.    Diagnoses and all orders for this visit:    Postoperative state

## 2022-12-02 ENCOUNTER — LAB VISIT (OUTPATIENT)
Dept: LAB | Facility: HOSPITAL | Age: 67
End: 2022-12-02
Attending: FAMILY MEDICINE
Payer: MEDICARE

## 2022-12-02 DIAGNOSIS — E78.5 HYPERLIPIDEMIA ASSOCIATED WITH TYPE 2 DIABETES MELLITUS: ICD-10-CM

## 2022-12-02 DIAGNOSIS — E11.69 HYPERLIPIDEMIA ASSOCIATED WITH TYPE 2 DIABETES MELLITUS: ICD-10-CM

## 2022-12-02 DIAGNOSIS — I15.2 HYPERTENSION ASSOCIATED WITH DIABETES: ICD-10-CM

## 2022-12-02 DIAGNOSIS — E11.59 HYPERTENSION ASSOCIATED WITH DIABETES: ICD-10-CM

## 2022-12-02 DIAGNOSIS — E11.65 TYPE 2 DIABETES MELLITUS WITH HYPERGLYCEMIA, WITHOUT LONG-TERM CURRENT USE OF INSULIN: ICD-10-CM

## 2022-12-02 LAB
ALBUMIN SERPL BCP-MCNC: 3.9 G/DL (ref 3.5–5.2)
ALP SERPL-CCNC: 52 U/L (ref 55–135)
ALT SERPL W/O P-5'-P-CCNC: 17 U/L (ref 10–44)
ANION GAP SERPL CALC-SCNC: 7 MMOL/L (ref 8–16)
AST SERPL-CCNC: 21 U/L (ref 10–40)
BASOPHILS # BLD AUTO: 0.07 K/UL (ref 0–0.2)
BASOPHILS NFR BLD: 1 % (ref 0–1.9)
BILIRUB SERPL-MCNC: 1 MG/DL (ref 0.1–1)
BUN SERPL-MCNC: 26 MG/DL (ref 8–23)
CALCIUM SERPL-MCNC: 9.5 MG/DL (ref 8.7–10.5)
CHLORIDE SERPL-SCNC: 103 MMOL/L (ref 95–110)
CHOLEST SERPL-MCNC: 97 MG/DL (ref 120–199)
CHOLEST/HDLC SERPL: 2.4 {RATIO} (ref 2–5)
CO2 SERPL-SCNC: 27 MMOL/L (ref 23–29)
CREAT SERPL-MCNC: 1.2 MG/DL (ref 0.5–1.4)
DIFFERENTIAL METHOD: NORMAL
EOSINOPHIL # BLD AUTO: 0.2 K/UL (ref 0–0.5)
EOSINOPHIL NFR BLD: 3 % (ref 0–8)
ERYTHROCYTE [DISTWIDTH] IN BLOOD BY AUTOMATED COUNT: 12.2 % (ref 11.5–14.5)
EST. GFR  (NO RACE VARIABLE): >60 ML/MIN/1.73 M^2
ESTIMATED AVG GLUCOSE: 137 MG/DL (ref 68–131)
GLUCOSE SERPL-MCNC: 115 MG/DL (ref 70–110)
HBA1C MFR BLD: 6.4 % (ref 4–5.6)
HCT VFR BLD AUTO: 47.7 % (ref 40–54)
HDLC SERPL-MCNC: 41 MG/DL (ref 40–75)
HDLC SERPL: 42.3 % (ref 20–50)
HGB BLD-MCNC: 15.6 G/DL (ref 14–18)
IMM GRANULOCYTES # BLD AUTO: 0.02 K/UL (ref 0–0.04)
IMM GRANULOCYTES NFR BLD AUTO: 0.3 % (ref 0–0.5)
LDLC SERPL CALC-MCNC: 46.2 MG/DL (ref 63–159)
LYMPHOCYTES # BLD AUTO: 1.3 K/UL (ref 1–4.8)
LYMPHOCYTES NFR BLD: 18.7 % (ref 18–48)
MCH RBC QN AUTO: 31 PG (ref 27–31)
MCHC RBC AUTO-ENTMCNC: 32.7 G/DL (ref 32–36)
MCV RBC AUTO: 95 FL (ref 82–98)
MONOCYTES # BLD AUTO: 0.8 K/UL (ref 0.3–1)
MONOCYTES NFR BLD: 11.4 % (ref 4–15)
NEUTROPHILS # BLD AUTO: 4.5 K/UL (ref 1.8–7.7)
NEUTROPHILS NFR BLD: 65.6 % (ref 38–73)
NONHDLC SERPL-MCNC: 56 MG/DL
NRBC BLD-RTO: 0 /100 WBC
PLATELET # BLD AUTO: 169 K/UL (ref 150–450)
PMV BLD AUTO: 11.3 FL (ref 9.2–12.9)
POTASSIUM SERPL-SCNC: 4 MMOL/L (ref 3.5–5.1)
PROT SERPL-MCNC: 6.5 G/DL (ref 6–8.4)
RBC # BLD AUTO: 5.03 M/UL (ref 4.6–6.2)
SODIUM SERPL-SCNC: 137 MMOL/L (ref 136–145)
TRIGL SERPL-MCNC: 49 MG/DL (ref 30–150)
WBC # BLD AUTO: 6.91 K/UL (ref 3.9–12.7)

## 2022-12-02 PROCEDURE — 83036 HEMOGLOBIN GLYCOSYLATED A1C: CPT | Performed by: FAMILY MEDICINE

## 2022-12-02 PROCEDURE — 80053 COMPREHEN METABOLIC PANEL: CPT | Performed by: FAMILY MEDICINE

## 2022-12-02 PROCEDURE — 85025 COMPLETE CBC W/AUTO DIFF WBC: CPT | Performed by: FAMILY MEDICINE

## 2022-12-02 PROCEDURE — 80061 LIPID PANEL: CPT | Performed by: FAMILY MEDICINE

## 2022-12-02 PROCEDURE — 36415 COLL VENOUS BLD VENIPUNCTURE: CPT | Mod: PO | Performed by: FAMILY MEDICINE

## 2022-12-07 ENCOUNTER — OFFICE VISIT (OUTPATIENT)
Dept: FAMILY MEDICINE | Facility: CLINIC | Age: 67
End: 2022-12-07
Attending: FAMILY MEDICINE
Payer: MEDICARE

## 2022-12-07 ENCOUNTER — PATIENT MESSAGE (OUTPATIENT)
Dept: FAMILY MEDICINE | Facility: CLINIC | Age: 67
End: 2022-12-07

## 2022-12-07 VITALS
HEIGHT: 71 IN | BODY MASS INDEX: 27.85 KG/M2 | HEART RATE: 60 BPM | DIASTOLIC BLOOD PRESSURE: 88 MMHG | TEMPERATURE: 98 F | WEIGHT: 198.94 LBS | SYSTOLIC BLOOD PRESSURE: 128 MMHG | OXYGEN SATURATION: 94 % | RESPIRATION RATE: 18 BRPM

## 2022-12-07 DIAGNOSIS — Z53.20 PROCEDURE NOT CARRIED OUT BECAUSE OF PATIENT'S DECISION: Primary | ICD-10-CM

## 2022-12-07 PROCEDURE — 99999 PR PBB SHADOW E&M-EST. PATIENT-LVL III: ICD-10-PCS | Mod: PBBFAC,,, | Performed by: FAMILY MEDICINE

## 2022-12-07 PROCEDURE — 99499 NO LOS: ICD-10-PCS | Mod: S$PBB,,, | Performed by: FAMILY MEDICINE

## 2022-12-07 PROCEDURE — 99999 PR PBB SHADOW E&M-EST. PATIENT-LVL III: CPT | Mod: PBBFAC,,, | Performed by: FAMILY MEDICINE

## 2022-12-07 PROCEDURE — 99499 UNLISTED E&M SERVICE: CPT | Mod: S$PBB,,, | Performed by: FAMILY MEDICINE

## 2022-12-07 PROCEDURE — 99213 OFFICE O/P EST LOW 20 MIN: CPT | Mod: PBBFAC,PN | Performed by: FAMILY MEDICINE

## 2022-12-07 NOTE — PROGRESS NOTES
The patient left the office before the visit was finished due to an extended regional communication and power failure

## 2023-01-09 ENCOUNTER — OFFICE VISIT (OUTPATIENT)
Dept: NEUROSURGERY | Facility: CLINIC | Age: 68
End: 2023-01-09
Payer: MEDICARE

## 2023-01-09 VITALS — SYSTOLIC BLOOD PRESSURE: 129 MMHG | DIASTOLIC BLOOD PRESSURE: 82 MMHG | HEART RATE: 66 BPM

## 2023-01-09 DIAGNOSIS — Z98.890 POSTOPERATIVE STATE: Primary | ICD-10-CM

## 2023-01-09 PROCEDURE — 99024 PR POST-OP FOLLOW-UP VISIT: ICD-10-PCS | Mod: S$GLB,POP,, | Performed by: NEUROLOGICAL SURGERY

## 2023-01-09 PROCEDURE — 99024 POSTOP FOLLOW-UP VISIT: CPT | Mod: S$GLB,POP,, | Performed by: NEUROLOGICAL SURGERY

## 2023-01-09 NOTE — PROGRESS NOTES
Mr. Hyde continues to do well following right L5-S1 laminectomy resection synovial cyst on October 19, 2022.  He denies incision pain, leg pain, weakness, paresthesias.  He notes that recently twisted his knee when getting out of a car and believes he may have meniscus tear.  He reports right knee arthroscopy for meniscal tear 20 years ago.  He notes that the knee pain has improved with NSAIDs.    Exam:    Awake, alert, oriented to person place and time.    Cranial nerves 2-12 grossly intact.    Strength is graded 5/5 in all muscle groups.    Sensation is grossly intact throughout.    Gait and stance are normal  Lumbar incision well healed    Analysis:  He is doing well postoperatively.  No role for additional surgical intervention at this time.  We will be glad to see him back with new or recurrent symptoms.    Toni was seen today for follow-up.    Diagnoses and all orders for this visit:    Postoperative state

## 2023-01-17 ENCOUNTER — PATIENT MESSAGE (OUTPATIENT)
Dept: FAMILY MEDICINE | Facility: CLINIC | Age: 68
End: 2023-01-17
Payer: MEDICARE

## 2023-01-23 ENCOUNTER — OFFICE VISIT (OUTPATIENT)
Dept: FAMILY MEDICINE | Facility: CLINIC | Age: 68
End: 2023-01-23
Attending: FAMILY MEDICINE
Payer: MEDICARE

## 2023-01-23 VITALS
DIASTOLIC BLOOD PRESSURE: 75 MMHG | HEART RATE: 76 BPM | SYSTOLIC BLOOD PRESSURE: 125 MMHG | OXYGEN SATURATION: 95 % | HEIGHT: 71 IN | TEMPERATURE: 98 F | WEIGHT: 202.94 LBS | RESPIRATION RATE: 17 BRPM | BODY MASS INDEX: 28.41 KG/M2

## 2023-01-23 DIAGNOSIS — E78.5 HYPERLIPIDEMIA ASSOCIATED WITH TYPE 2 DIABETES MELLITUS: ICD-10-CM

## 2023-01-23 DIAGNOSIS — E11.59 HYPERTENSION ASSOCIATED WITH DIABETES: ICD-10-CM

## 2023-01-23 DIAGNOSIS — M23.306 MENISCUS DEGENERATION, RIGHT: ICD-10-CM

## 2023-01-23 DIAGNOSIS — H25.9 SENILE CATARACT OF RIGHT EYE, UNSPECIFIED AGE-RELATED CATARACT TYPE: Primary | ICD-10-CM

## 2023-01-23 DIAGNOSIS — E11.69 TYPE 2 DIABETES MELLITUS WITH OTHER SPECIFIED COMPLICATION, WITHOUT LONG-TERM CURRENT USE OF INSULIN: ICD-10-CM

## 2023-01-23 DIAGNOSIS — I15.2 HYPERTENSION ASSOCIATED WITH DIABETES: ICD-10-CM

## 2023-01-23 DIAGNOSIS — I70.0 AORTIC ATHEROSCLEROSIS: ICD-10-CM

## 2023-01-23 DIAGNOSIS — N40.0 BENIGN PROSTATIC HYPERPLASIA WITHOUT LOWER URINARY TRACT SYMPTOMS: ICD-10-CM

## 2023-01-23 DIAGNOSIS — Z01.818 PREOP EXAMINATION: ICD-10-CM

## 2023-01-23 DIAGNOSIS — E11.69 HYPERLIPIDEMIA ASSOCIATED WITH TYPE 2 DIABETES MELLITUS: ICD-10-CM

## 2023-01-23 PROCEDURE — 99214 OFFICE O/P EST MOD 30 MIN: CPT | Mod: PBBFAC,PN | Performed by: FAMILY MEDICINE

## 2023-01-23 PROCEDURE — 99999 PR PBB SHADOW E&M-EST. PATIENT-LVL IV: ICD-10-PCS | Mod: PBBFAC,,, | Performed by: FAMILY MEDICINE

## 2023-01-23 PROCEDURE — 99214 OFFICE O/P EST MOD 30 MIN: CPT | Mod: S$PBB,,, | Performed by: FAMILY MEDICINE

## 2023-01-23 PROCEDURE — 99214 PR OFFICE/OUTPT VISIT, EST, LEVL IV, 30-39 MIN: ICD-10-PCS | Mod: S$PBB,,, | Performed by: FAMILY MEDICINE

## 2023-01-23 PROCEDURE — 99999 PR PBB SHADOW E&M-EST. PATIENT-LVL IV: CPT | Mod: PBBFAC,,, | Performed by: FAMILY MEDICINE

## 2023-01-23 NOTE — PROGRESS NOTES
Subjective:       Patient ID: Toni Hyde is a 67 y.o. male.    Chief Complaint: Diabetes (Pt states that he is here for his fu ) and Pre-op Exam (Pt has a cataract surgery coming up that he needs to have clearance for)    67-year-old male comes in for follow-up on diabetes and hypertension with lab previously done December 2nd showing an A1c of 6.4 an excellent cholesterol control.  He will be due for a microalbumin and repeat A1c as well as a foot exam in May.  He has a cataract surgery planned in the near future with Dr. Gamboa for his right eye, the left eye having been done sometime ago.  He needs a preoperative clearance and has already been told to discontinue his Advil.  He is on no other anticoagulants but he also needs to discontinue the alfuzosin two weeks before surgery.  He has been having pain in his right knee and thinks he has another meniscus tear having had surgery on one previously.  Pain is aggravated by turning actions on weight-bearing in his aggravated by climbing ladders.  These are symptoms he had previously.  Is previous orthopedist has left the area and he will need a referral.  History includes lumbosacral radiculopathy with removal of a mass from the lumbar spine and essential resolution.  He has type 2 diabetes on metformin, hypertension on Norvasc and Diovan, hyperlipidemia on Crestor, kidney stones status post ureteral stent and lithotripsy and BPH.    Past Medical History:  No date: Arthritis  No date: Cataract  No date: Cataract of left eye  No date: Diabetes mellitus type II      Comment:  on po meds  No date: Hyperlipidemia      Comment:  not on meds at present  No date: Hypertension  04/2017: Kidney stones  No date: Left ureteral stone  10/2017: Plantar fasciitis of right foot      Comment:  Dr Colorado   01/2018: Tendonitis      Comment:  right foot Dr Colorado  No date: Wears glasses    Past Surgical History:  No date: ADENOIDECTOMY  10/19/2022: BIOPSY OF MASS OF LUMBAR  SPINE; N/A      Comment:  Procedure: BIOPSY, MASS, SPINE, LUMBAR;  Surgeon:                Marvel Matthews DO;  Location: [facility] OR;  Service:                Neurosurgery;  Laterality: N/A;  Right L5-S1 Resection of               Extradural Mass Using Transfacet Approach  No date: CATARACT EXTRACTION  03/23/2010: COLONOSCOPY      Comment:  Dr. Blackburn, hyperplastic polyps, otherwise negative, 10                year recheck  11/19/2021: COLONOSCOPY; N/A      Comment:  Procedure: COLONOSCOPY;  Surgeon: Marcus Sexton MD;                Location: [facility];  Service: Endoscopy;  Laterality:                N/A;  04/12/2017: CYSTOSCOPY; Left      Comment:  with ureteral stent-Dr. Noel  05/17/2022: CYSTOSCOPY; Left      Comment:  Procedure: CYSTOSCOPY with stent removal;  Surgeon:                Ronal Benitez MD;  Location: [facility];  Service:                Urology;  Laterality: Left;  05/05/2022: CYSTOSCOPY WITH URETEROSCOPY, RETROGRADE PYELOGRAPHY, AND   INSERTION OF STENT; Left      Comment:  Procedure: CYSTOSCOPY, WITH RETROGRADE PYELOGRAM AND                URETERAL STENT INSERTION;  Surgeon: Ronal Benitez MD;  Location: [facility];  Service: Urology;  Laterality:                Left;  05/06/2013: EYE SURGERY      Comment:  Dr Nielsen  04/2017: kidney stent   No date: KNEE ARTHROSCOPY W/ DEBRIDEMENT      Comment:  right with lateral release   04/19/2017: LITHOTRIPSY  No date: NASAL SINUS SURGERY  05/2013: retna surgery       Comment:  Dr Peralta  No date: SPINE SURGERY  No date: TONSILLECTOMY  12/07/2020: TRANSFORAMINAL EPIDURAL INJECTION OF STEROID; Right      Comment:  Procedure: Injection,steroid,epidural,transforaminal                approach L5- S1;  Surgeon: Kermit Davalos MD;  Location:                [facility];  Service: Pain Management;  Laterality: Right;                 L5-s1  06/23/2022: TRANSFORAMINAL EPIDURAL INJECTION OF STEROID; Right      Comment:  Procedure:  Injection,steroid,epidural,transforaminal                approach;  Surgeon: Christos Saul MD;  Location: [facility] OR;                 Service: Pain Management;  Laterality: Right;  L5-S1 and                cyst aspiration L5-S1  2022: URETEROSCOPIC REMOVAL OF URETERIC CALCULUS; Left      Comment:  Procedure: REMOVAL, CALCULUS, URETER, URETEROSCOPIC;                 Surgeon: Ronal Benitez MD;  Location: [facility] OR;                 Service: Urology;  Laterality: Left;    Review of patient's family history indicates:    Social History    Tobacco Use      Smoking status: Former        Types: Cigarettes        Quit date: 1978        Years since quittin.8      Smokeless tobacco: Never      Tobacco comments: Occ cigar    Alcohol use: Yes      Alcohol/week: 2.5 standard drinks      Types: 3 Standard drinks or equivalent per week      Comment: 3 drinks per week    Drug use: No    Current Outpatient Medications on File Prior to Visit:  alfuzosin (UROXATRAL) 10 mg Tb24, Take 1 tablet (10 mg total) by mouth daily with breakfast., Disp: 30 tablet, Rfl: 11  amLODIPine (NORVASC) 2.5 MG tablet, Take 1 tablet (2.5 mg total) by mouth once daily. (Patient taking differently: Take 2.5 mg by mouth every evening.), Disp: 90 tablet, Rfl: 3  cetirizine (ZYRTEC) 10 MG tablet, Take 10 mg by mouth daily as needed. 1 Tablet(s) Oral PRN ., Disp: , Rfl:   diphenhydrAMINE (BENADRYL) 25 mg capsule, Take 25 mg by mouth nightly as needed for Itching., Disp: , Rfl:   fluticasone (FLONASE) 50 mcg/actuation nasal spray, 2 sprays by Each Nare route daily as needed. , Disp: , Rfl:   guaifenesin (MUCINEX ORAL), Take 400 mg by mouth 2 (two) times daily as needed for Congestion., Disp: , Rfl:   metFORMIN (GLUCOPHAGE-XR) 750 MG ER 24hr tablet, TAKE 1 TABLET TWICE A DAY WITH MEALS., Disp: 180 tablet, Rfl: 1  potassium citrate (UROCIT-K 15) 15 mEq TbSR, Take 15 mEq by mouth 2 (two) times a day., Disp: 180 tablet, Rfl: 3  rosuvastatin (CRESTOR) 20  MG tablet, TAKE 1 TABLET DAILY (REPLACES PRAVASTATIN) (Patient taking differently: every evening. TAKE 1 TABLET DAILY (REPLACES PRAVASTATIN)), Disp: 90 tablet, Rfl: 3  valsartan (DIOVAN) 320 MG tablet, TAKE 1 TABLET DAILY, Disp: 90 tablet, Rfl: 2  [DISCONTINUED] triamcinolone acetonide 0.5% (KENALOG) 0.5 % Crea, Apply topically 2 (two) times daily., Disp: 15 g, Rfl: 1    Current Facility-Administered Medications on File Prior to Visit:  lidocaine (PF) 10 mg/ml (1%) injection 10 mg, 1 mL, Intradermal, Once, Kermit Davalos MD          Review of Systems   Constitutional:  Negative for activity change, chills, fatigue and fever.   HENT:  Negative for congestion, ear pain, rhinorrhea and sore throat.    Eyes:  Positive for visual disturbance (Glare problems while driving at night).   Respiratory:  Negative for cough, chest tightness and shortness of breath.    Cardiovascular:  Negative for chest pain and palpitations.   Gastrointestinal:  Negative for abdominal pain, blood in stool, constipation, diarrhea, nausea and vomiting.   Genitourinary:  Negative for difficulty urinating, dysuria and hematuria.   Musculoskeletal:  Positive for arthralgias and gait problem. Negative for joint swelling and neck pain.   Skin:  Negative for rash.   Neurological:  Negative for dizziness and headaches.   Psychiatric/Behavioral:  Negative for sleep disturbance.      Objective:      Physical Exam  Vitals and nursing note reviewed.   Constitutional:       General: He is not in acute distress.     Appearance: Normal appearance. He is well-developed. He is not ill-appearing, toxic-appearing or diaphoretic.      Comments: Good blood pressure control  Normal pulse with regular rhythm  Mildly overweight with a BMI of 28.7 he is up 14.8 lb from his May 31, 2022 visit.   HENT:      Head: Normocephalic and atraumatic.      Right Ear: Tympanic membrane, ear canal and external ear normal. There is no impacted cerumen.      Left Ear: Tympanic membrane,  ear canal and external ear normal. There is no impacted cerumen.      Nose: Nose normal. No congestion or rhinorrhea.      Mouth/Throat:      Mouth: Mucous membranes are moist.      Pharynx: Oropharynx is clear. No oropharyngeal exudate or posterior oropharyngeal erythema.   Eyes:      General: No scleral icterus.     Extraocular Movements: Extraocular movements intact.      Conjunctiva/sclera: Conjunctivae normal.      Pupils: Pupils are equal, round, and reactive to light.      Comments: Cataract right side   Neck:      Thyroid: No thyromegaly.      Vascular: No carotid bruit or JVD.      Trachea: No tracheal deviation.   Cardiovascular:      Rate and Rhythm: Normal rate and regular rhythm.      Heart sounds: Normal heart sounds. No murmur heard.    No friction rub. No gallop.   Pulmonary:      Effort: Pulmonary effort is normal. No respiratory distress.      Breath sounds: Normal breath sounds. No stridor. No wheezing, rhonchi or rales.   Chest:      Chest wall: No tenderness.   Abdominal:      General: Bowel sounds are normal. There is no distension.      Palpations: Abdomen is soft. There is no mass.      Tenderness: There is no abdominal tenderness. There is no guarding or rebound.      Hernia: No hernia is present.   Musculoskeletal:         General: No swelling, tenderness, deformity or signs of injury. Normal range of motion.      Cervical back: Normal range of motion and neck supple. No rigidity or tenderness.      Right knee: Crepitus present. No swelling, deformity, effusion, erythema or bony tenderness. Normal range of motion. No LCL laxity, MCL laxity, ACL laxity or PCL laxity. Abnormal meniscus. Normal alignment and normal patellar mobility.   Lymphadenopathy:      Cervical: No cervical adenopathy.   Skin:     General: Skin is warm and dry.      Coloration: Skin is not jaundiced or pale.      Findings: No bruising, erythema, lesion or rash.   Neurological:      General: No focal deficit present.       Mental Status: He is alert and oriented to person, place, and time. Mental status is at baseline.      Cranial Nerves: No cranial nerve deficit.      Sensory: No sensory deficit.      Motor: No weakness.      Coordination: Coordination normal.      Gait: Gait normal.      Deep Tendon Reflexes: Reflexes are normal and symmetric. Reflexes normal.   Psychiatric:         Mood and Affect: Mood normal.         Behavior: Behavior normal.         Thought Content: Thought content normal.         Judgment: Judgment normal.       Assessment:       1. Senile cataract of right eye, unspecified age-related cataract type    2. Preop examination    3. Meniscus degeneration, right    4. Type 2 diabetes mellitus with other specified complication, without long-term current use of insulin    5. Hypertension associated with diabetes    6. Hyperlipidemia associated with type 2 diabetes mellitus    7. Benign prostatic hyperplasia without lower urinary tract symptoms    8. Aortic atherosclerosis    9. BMI 28.0-28.9,adult          Plan:       1. Senile cataract of right eye, unspecified age-related cataract type  Followed by Dr. Guerrier    2. Preop examination  Patient clear for surgery at low risk.  Form completed and faxed to Dr. Guerrier's office.  Original returned to patient    3. Meniscus degeneration, right  Suspect long-term medial meniscus tear with recent worsening in symptoms in limitation in activity  - MRI Knee Without Contrast Right; Future  - Ambulatory referral/consult to Orthopedics; Future    4. Type 2 diabetes mellitus with other specified complication, without long-term current use of insulin  Lab Results   Component Value Date    HGBA1C 6.4 (H) 12/02/2022     Recheck six months will need foot exam at that time  - Basic Metabolic Panel; Future  - Hemoglobin A1C; Future  - Microalbumin/Creatinine Ratio, Urine; Future    5. Hypertension associated with diabetes  Good control no changes needed in Norvasc or Diovan  - Basic  Metabolic Panel; Future    6. Hyperlipidemia associated with type 2 diabetes mellitus  Lab Results   Component Value Date    CHOL 97 (L) 12/02/2022    CHOL 97 (L) 10/04/2021    CHOL 99 (L) 10/02/2020     Lab Results   Component Value Date    HDL 41 12/02/2022    HDL 43 10/04/2021    HDL 39 (L) 10/02/2020     Lab Results   Component Value Date    LDLCALC 46.2 (L) 12/02/2022    LDLCALC 42.2 (L) 10/04/2021    LDLCALC 47.6 (L) 10/02/2020     Lab Results   Component Value Date    TRIG 49 12/02/2022    TRIG 59 10/04/2021    TRIG 62 10/02/2020     Lab Results   Component Value Date    CHOLHDL 42.3 12/02/2022    CHOLHDL 44.3 10/04/2021    CHOLHDL 39.4 10/02/2020     Good control, no changes needed in Crestor 20 mg    7. Benign prostatic hyperplasia without lower urinary tract symptoms  Hold alfuzosin until given permission to resume by Dr. Guerrier    8. Aortic atherosclerosis  Stay    9. BMI 28.0-28.9,adult

## 2023-01-31 ENCOUNTER — HOSPITAL ENCOUNTER (OUTPATIENT)
Dept: RADIOLOGY | Facility: HOSPITAL | Age: 68
Discharge: HOME | End: 2023-01-31
Attending: FAMILY MEDICINE
Payer: MEDICARE

## 2023-01-31 DIAGNOSIS — M23.306 MENISCUS DEGENERATION, RIGHT: ICD-10-CM

## 2023-01-31 PROCEDURE — 73721 MRI JNT OF LWR EXTRE W/O DYE: CPT | Mod: 26,RT,, | Performed by: RADIOLOGY

## 2023-01-31 PROCEDURE — 73721 MRI JNT OF LWR EXTRE W/O DYE: CPT | Mod: TC,RT

## 2023-01-31 PROCEDURE — 73721 MRI KNEE WITHOUT CONTRAST RIGHT: ICD-10-PCS | Mod: 26,RT,, | Performed by: RADIOLOGY

## 2023-02-01 ENCOUNTER — PATIENT MESSAGE (OUTPATIENT)
Dept: FAMILY MEDICINE | Facility: CLINIC | Age: 68
End: 2023-02-01
Payer: MEDICARE

## 2023-02-02 DIAGNOSIS — M25.561 ACUTE PAIN OF RIGHT KNEE: Primary | ICD-10-CM

## 2023-02-03 ENCOUNTER — HOSPITAL ENCOUNTER (OUTPATIENT)
Dept: RADIOLOGY | Facility: HOSPITAL | Age: 68
Discharge: HOME | End: 2023-02-03
Attending: ORTHOPAEDIC SURGERY
Payer: MEDICARE

## 2023-02-03 ENCOUNTER — OFFICE VISIT (OUTPATIENT)
Dept: ORTHOPEDICS | Facility: CLINIC | Age: 68
End: 2023-02-03
Attending: FAMILY MEDICINE
Payer: MEDICARE

## 2023-02-03 VITALS — HEIGHT: 70 IN | BODY MASS INDEX: 28.92 KG/M2 | RESPIRATION RATE: 18 BRPM | WEIGHT: 202 LBS

## 2023-02-03 DIAGNOSIS — M23.306 MENISCUS DEGENERATION, RIGHT: ICD-10-CM

## 2023-02-03 DIAGNOSIS — M17.11 PRIMARY OSTEOARTHRITIS OF RIGHT KNEE: Primary | ICD-10-CM

## 2023-02-03 DIAGNOSIS — M25.561 ACUTE PAIN OF RIGHT KNEE: ICD-10-CM

## 2023-02-03 PROCEDURE — 73564 XR KNEE ORTHO RIGHT WITH FLEXION: ICD-10-PCS | Mod: 26,RT,, | Performed by: RADIOLOGY

## 2023-02-03 PROCEDURE — 99204 OFFICE O/P NEW MOD 45 MIN: CPT | Mod: S$PBB,,, | Performed by: ORTHOPAEDIC SURGERY

## 2023-02-03 PROCEDURE — 99213 OFFICE O/P EST LOW 20 MIN: CPT | Mod: PBBFAC,PN | Performed by: ORTHOPAEDIC SURGERY

## 2023-02-03 PROCEDURE — 73562 XR KNEE ORTHO RIGHT WITH FLEXION: ICD-10-PCS | Mod: 26,LT,, | Performed by: RADIOLOGY

## 2023-02-03 PROCEDURE — 73564 X-RAY EXAM KNEE 4 OR MORE: CPT | Mod: TC,PN,RT

## 2023-02-03 PROCEDURE — 99999 PR PBB SHADOW E&M-EST. PATIENT-LVL III: ICD-10-PCS | Mod: PBBFAC,,, | Performed by: ORTHOPAEDIC SURGERY

## 2023-02-03 PROCEDURE — 99999 PR PBB SHADOW E&M-EST. PATIENT-LVL III: CPT | Mod: PBBFAC,,, | Performed by: ORTHOPAEDIC SURGERY

## 2023-02-03 PROCEDURE — 99204 PR OFFICE/OUTPT VISIT, NEW, LEVL IV, 45-59 MIN: ICD-10-PCS | Mod: S$PBB,,, | Performed by: ORTHOPAEDIC SURGERY

## 2023-02-03 PROCEDURE — 73562 X-RAY EXAM OF KNEE 3: CPT | Mod: 26,LT,, | Performed by: RADIOLOGY

## 2023-02-03 PROCEDURE — 73564 X-RAY EXAM KNEE 4 OR MORE: CPT | Mod: 26,RT,, | Performed by: RADIOLOGY

## 2023-02-03 RX ORDER — KETOROLAC TROMETHAMINE 5 MG/ML
SOLUTION OPHTHALMIC
COMMUNITY
Start: 2023-02-02 | End: 2023-04-14 | Stop reason: ALTCHOICE

## 2023-02-03 RX ORDER — PREDNISOLONE ACETATE 10 MG/ML
SUSPENSION/ DROPS OPHTHALMIC
COMMUNITY
Start: 2023-02-02 | End: 2023-04-14 | Stop reason: ALTCHOICE

## 2023-02-03 RX ORDER — NEOMYCIN SULFATE, POLYMYXIN B SULFATE, AND DEXAMETHASONE 3.5; 10000; 1 MG/G; [USP'U]/G; MG/G
OINTMENT OPHTHALMIC
COMMUNITY
Start: 2023-02-02 | End: 2023-04-14 | Stop reason: ALTCHOICE

## 2023-02-03 RX ORDER — MOXIFLOXACIN 5 MG/ML
SOLUTION/ DROPS OPHTHALMIC
COMMUNITY
Start: 2023-02-02 | End: 2023-04-14 | Stop reason: ALTCHOICE

## 2023-02-03 NOTE — PROGRESS NOTES
Patient ID: Toni Hyde is a 67 y.o. male    Chief Complaint:   Chief Complaint   Patient presents with    Right Knee - Pain       History of Present Illness:    Pleasant 67-year-old male here for evaluation of right knee pain.  He reports acute on chronic right knee pain since he visited his family in [place].  He denies any known trauma or falls however he states he may have injured his knee while doing a curling class.  Pain is mostly medial.  He denies any mechanical symptoms.  He does have history of right knee arthroscopy and meniscal debridement several years ago.  Denies any clicking catching locking sensation denies any significant effusion.  Pain 3/10 today.    PAST MEDICAL HISTORY:   Past Medical History:   Diagnosis Date    Arthritis     Cataract     Cataract of left eye     Diabetes mellitus type II     on po meds    Hyperlipidemia     not on meds at present    Hypertension     Kidney stones 04/2017    Left ureteral stone     Plantar fasciitis of right foot 10/2017    Dr [place]     Tendonitis 01/2018    right foot Dr Colorado    Wears glasses      PAST SURGICAL HISTORY:   Past Surgical History:   Procedure Laterality Date    ADENOIDECTOMY      BIOPSY OF MASS OF LUMBAR SPINE N/A 10/19/2022    Procedure: BIOPSY, MASS, SPINE, LUMBAR;  Surgeon: Marvel Matthews DO;  Location: [facility];  Service: Neurosurgery;  Laterality: N/A;  Right L5-S1 Resection of Extradural Mass Using Transfacet Approach    CATARACT EXTRACTION      COLONOSCOPY  03/23/2010    Dr. Blackburn, hyperplastic polyps, otherwise negative, 10 year recheck    COLONOSCOPY N/A 11/19/2021    Procedure: COLONOSCOPY;  Surgeon: Marcus Sexton MD;  Location: [facility];  Service: Endoscopy;  Laterality: N/A;    CYSTOSCOPY Left 04/12/2017    with ureteral stent-Dr. Noel    CYSTOSCOPY Left 05/17/2022    Procedure: CYSTOSCOPY with stent removal;  Surgeon: Ronal Benitez MD;  Location: [facility] OR;  Service: Urology;  Laterality: Left;    CYSTOSCOPY  WITH URETEROSCOPY, RETROGRADE PYELOGRAPHY, AND INSERTION OF STENT Left 2022    Procedure: CYSTOSCOPY, WITH RETROGRADE PYELOGRAM AND URETERAL STENT INSERTION;  Surgeon: Ronal Benitez MD;  Location: [facility] OR;  Service: Urology;  Laterality: Left;    EYE SURGERY  2013    Dr Nielsen    kidney stent   2017    KNEE ARTHROSCOPY W/ DEBRIDEMENT      right with lateral release     LITHOTRIPSY  2017    NASAL SINUS SURGERY      retna surgery   2013    Dr Peralta    SPINE SURGERY      TONSILLECTOMY      TRANSFORAMINAL EPIDURAL INJECTION OF STEROID Right 2020    Procedure: Injection,steroid,epidural,transforaminal approach L5- S1;  Surgeon: Kermit Davalos MD;  Location: [facility] OR;  Service: Pain Management;  Laterality: Right;  L5-s1    TRANSFORAMINAL EPIDURAL INJECTION OF STEROID Right 2022    Procedure: Injection,steroid,epidural,transforaminal approach;  Surgeon: Christos Saul MD;  Location: [facility] OR;  Service: Pain Management;  Laterality: Right;  L5-S1 and cyst aspiration L5-S1    URETEROSCOPIC REMOVAL OF URETERIC CALCULUS Left 2022    Procedure: REMOVAL, CALCULUS, URETER, URETEROSCOPIC;  Surgeon: Ronal Benitez MD;  Location: [facility] OR;  Service: Urology;  Laterality: Left;     FAMILY HISTORY:   Family History   Problem Relation Age of Onset    Hypertension Father     Neuropathy Father     Stroke Paternal Grandmother     Lung cancer Paternal Grandfather     Cancer Paternal Grandfather     Cataracts Mother     Diabetes Maternal Grandmother     No Known Problems Sister     No Known Problems Maternal Aunt      SOCIAL HISTORY:   Social History     Occupational History    Not on file   Tobacco Use    Smoking status: Former     Types: Cigarettes     Quit date: 1978     Years since quittin.8    Smokeless tobacco: Never    Tobacco comments:     Occ cigar   Substance and Sexual Activity    Alcohol use: Yes     Alcohol/week: 2.5 standard drinks     Types: 3 Standard drinks or equivalent  per week     Comment: 3 drinks per week    Drug use: No    Sexual activity: Not on file        MEDICATIONS:   Current Outpatient Medications:     alfuzosin (UROXATRAL) 10 mg Tb24, Take 1 tablet (10 mg total) by mouth daily with breakfast., Disp: 30 tablet, Rfl: 11    amLODIPine (NORVASC) 2.5 MG tablet, Take 1 tablet (2.5 mg total) by mouth once daily. (Patient taking differently: Take 2.5 mg by mouth every evening.), Disp: 90 tablet, Rfl: 3    cetirizine (ZYRTEC) 10 MG tablet, Take 10 mg by mouth daily as needed. 1 Tablet(s) Oral PRN ., Disp: , Rfl:     diphenhydrAMINE (BENADRYL) 25 mg capsule, Take 25 mg by mouth nightly as needed for Itching., Disp: , Rfl:     fluticasone (FLONASE) 50 mcg/actuation nasal spray, 2 sprays by Each Nare route daily as needed. , Disp: , Rfl:     guaifenesin (MUCINEX ORAL), Take 400 mg by mouth 2 (two) times daily as needed for Congestion., Disp: , Rfl:     ketorolac 0.5% (ACULAR) 0.5 % Drop, , Disp: , Rfl:     metFORMIN (GLUCOPHAGE-XR) 750 MG ER 24hr tablet, TAKE 1 TABLET TWICE A DAY WITH MEALS., Disp: 180 tablet, Rfl: 1    moxifloxacin (VIGAMOX) 0.5 % ophthalmic solution, , Disp: , Rfl:     neomycin-polymyxin-dexamethasone (DEXACINE) 3.5 mg/g-10,000 unit/g-0.1 % Oint, , Disp: , Rfl:     potassium citrate (UROCIT-K 15) 15 mEq TbSR, Take 15 mEq by mouth 2 (two) times a day., Disp: 180 tablet, Rfl: 3    prednisoLONE acetate (PRED FORTE) 1 % DrpS, , Disp: , Rfl:     rosuvastatin (CRESTOR) 20 MG tablet, TAKE 1 TABLET DAILY (REPLACES PRAVASTATIN) (Patient taking differently: every evening. TAKE 1 TABLET DAILY (REPLACES PRAVASTATIN)), Disp: 90 tablet, Rfl: 3    valsartan (DIOVAN) 320 MG tablet, TAKE 1 TABLET DAILY, Disp: 90 tablet, Rfl: 2  No current facility-administered medications for this visit.    Facility-Administered Medications Ordered in Other Visits:     lidocaine (PF) 10 mg/ml (1%) injection 10 mg, 1 mL, Intradermal, Once, Kermit Davalos MD  ALLERGIES:   Review of patient's  allergies indicates:   Allergen Reactions    Shrimp Itching and Swelling     Per testing   States no ivp or betadine allergies.          Physical Exam     Vitals:    02/03/23 1149   Resp: 18     Alert and oriented to person, place and time. No acute distress. Well-groomed, not ill appearing. Pupils round and reactive, normal respiratory effort, no audible wheezing.     GENERAL:  A well-developed, well-nourished 67 y.o. male who is alert and       oriented in no acute distress.      Gait: He  walks with a normal gait.                   EXTREMITIES:  Examination of lower extremities reveals there is no visible mass or deformity.        Right knee:  ROM 0-125    Ligamentously stable to varus/valgus stress.    Anterior and posterior drawers negative.    No pain over pes bursa.    No warmth    No erythema    Effusion No    medial joint line tenderness    Positive Patellofemoral grind/crepitus     The skin over both lower extremities is normal and unremarkable.  He has a  painless range of motion of the hips and ankles bilaterally.   Sensation is intact in both lower extremities.    There are no motor deficits in the lower extremities bilaterally.   Pedal pulses are palpable distally bilaterally.    He has no calf tenderness to palpation nor edema.       Imaging:       X-Ray: I have reviewed all pertinent results/findings and my personal findings are:  Moderate degenerative changes of the right knee.  Subchondral sclerosis and osteophytes present.  Medial femoral condyle chondral defect.  MRI: I have reviewed all pertinent results/findings and my personal findings are:  Diffuse tricompartmental degenerative changes most pronounced in the medial and patellofemoral compartment.  There is degenerative medial and lateral meniscus tears.  There is subchondral edema of the medial femoral condyle.      Assessment & Plan    Primary osteoarthritis of right knee  -     Prior authorization Order    Meniscus degeneration, right  -      Ambulatory referral/consult to Orthopedics         Treatment options were discussed with him in detail.  I went over his x-rays and MRI which were consistent with acute on chronic degenerative changes of the right knee.  His MRI consistent with tricompartmental changes and medial and lateral degenerative meniscus tears.  We discussed options for this including injections as well as surgery.  I think the majority of his pain is degenerative arthritis at this point.  I do not think a knee arthroscopy at this time is beneficial.  We discussed injections including steroid as well as viscosupplementation.  He would like to pursue viscosupplementation at this time.  If these do not improve his symptoms we can consider TKA    We have discussed a variety of treatment options including medications, injections, physical therapy and other alternative treatments including surgery. I also explained the indications, risks and benefits of surgery as well as the morbidity and mortality associated with surgery.  Patient's pain is refractory HEP, conservative management, and NSAIDs. Pt would like to proceed with visco-supplementation.    Medical Necessity for viscosupplementation use: After thorough evaluation of the patient, I have determined that viscosupplementation treatment is medically necessary. The patient has painful degenerative joint disease (DJD) of the knee(s) with failure of conservative treatments including lifestyle modifications and rehabilitation exercises. Oral analgesics including NSAIDs have not adequately controlled the patient's symptoms. There is radiographic evidence of Kellgren-Anil grade II (or greater) osteoarthritic (OA) changes, or if lack of radiographic evidence, there is arthroscopic or other evidence of chondrosis of the knee(s).     I made the decision to obtain old records of the patient including previous notes and imaging. I independently reviewed and interpreted lab results today as well as  prior imaging.      1. Pre-authorization placed for Visco injections.  2. Ice compress to the affected area 2-3x a day for 15-20 minutes as needed for pain management.  3. NSAIDs twice daily PRN for pain management.   4. RTC to see me for viscosupplementation injections.    All of the patient's questions were answered and the patient will contact us if they have any questions or concerns in the interim.    Follow up: in 1 week  X-rays next visit:  None

## 2023-02-10 ENCOUNTER — OFFICE VISIT (OUTPATIENT)
Dept: ORTHOPEDICS | Facility: CLINIC | Age: 68
End: 2023-02-10
Payer: MEDICARE

## 2023-02-10 VITALS — WEIGHT: 202 LBS | RESPIRATION RATE: 18 BRPM | HEIGHT: 70 IN | BODY MASS INDEX: 28.92 KG/M2

## 2023-02-10 DIAGNOSIS — M17.11 PRIMARY OSTEOARTHRITIS OF RIGHT KNEE: Primary | ICD-10-CM

## 2023-02-10 PROCEDURE — 20610 LARGE JOINT ASPIRATION/INJECTION: R KNEE: ICD-10-PCS | Mod: S$PBB,RT,, | Performed by: ORTHOPAEDIC SURGERY

## 2023-02-10 PROCEDURE — 20610 DRAIN/INJ JOINT/BURSA W/O US: CPT | Mod: PBBFAC,PN | Performed by: ORTHOPAEDIC SURGERY

## 2023-02-10 PROCEDURE — 99999 PR PBB SHADOW E&M-EST. PATIENT-LVL II: ICD-10-PCS | Mod: PBBFAC,,, | Performed by: ORTHOPAEDIC SURGERY

## 2023-02-10 PROCEDURE — 99999 PR PBB SHADOW E&M-EST. PATIENT-LVL II: CPT | Mod: PBBFAC,,, | Performed by: ORTHOPAEDIC SURGERY

## 2023-02-10 PROCEDURE — 99499 UNLISTED E&M SERVICE: CPT | Mod: S$PBB,,, | Performed by: ORTHOPAEDIC SURGERY

## 2023-02-10 PROCEDURE — 99499 NO LOS: ICD-10-PCS | Mod: S$PBB,,, | Performed by: ORTHOPAEDIC SURGERY

## 2023-02-10 PROCEDURE — 99212 OFFICE O/P EST SF 10 MIN: CPT | Mod: PBBFAC,PN,25 | Performed by: ORTHOPAEDIC SURGERY

## 2023-02-10 RX ADMIN — Medication 4 ML: at 01:02

## 2023-02-10 NOTE — PROCEDURES
Large Joint Aspiration/Injection: R knee    Date/Time: 2/10/2023 1:15 PM  Performed by: Davey Cabello MD  Authorized by: Davey Cabello MD     Consent Done?:  Yes (Verbal)  Indications:  Arthritis and pain  Site marked: the procedure site was marked    Timeout: prior to procedure the correct patient, procedure, and site was verified      Local anesthesia used?: Yes    Local anesthetic:  Lidocaine spray    Details:  Needle Size:  22 G  Approach:  Anterolateral  Location:  Knee  Site:  R knee  Medications:  4 mL hyaluronate sodium, stabilized 88 mg/4 mL  Patient tolerance:  Patient tolerated the procedure well with no immediate complications

## 2023-03-10 ENCOUNTER — HOSPITAL ENCOUNTER (OUTPATIENT)
Dept: RADIOLOGY | Facility: HOSPITAL | Age: 68
Discharge: HOME | End: 2023-03-10
Attending: NURSE PRACTITIONER
Payer: MEDICARE

## 2023-03-10 DIAGNOSIS — N20.0 RENAL STONES: ICD-10-CM

## 2023-03-10 PROCEDURE — 76770 US RETROPERITONEAL COMPLETE: ICD-10-PCS | Mod: 26,,, | Performed by: RADIOLOGY

## 2023-03-10 PROCEDURE — 74018 RADEX ABDOMEN 1 VIEW: CPT | Mod: TC,FY

## 2023-03-10 PROCEDURE — 74018 XR KUB: ICD-10-PCS | Mod: 26,,, | Performed by: RADIOLOGY

## 2023-03-10 PROCEDURE — 74018 RADEX ABDOMEN 1 VIEW: CPT | Mod: 26,,, | Performed by: RADIOLOGY

## 2023-03-10 PROCEDURE — 76770 US EXAM ABDO BACK WALL COMP: CPT | Mod: 26,,, | Performed by: RADIOLOGY

## 2023-03-10 PROCEDURE — 76770 US EXAM ABDO BACK WALL COMP: CPT | Mod: TC

## 2023-03-13 ENCOUNTER — LAB VISIT (OUTPATIENT)
Dept: LAB | Facility: HOSPITAL | Age: 68
End: 2023-03-13
Attending: NURSE PRACTITIONER
Payer: MEDICARE

## 2023-03-13 ENCOUNTER — TELEPHONE (OUTPATIENT)
Dept: UROLOGY | Facility: CLINIC | Age: 68
End: 2023-03-13

## 2023-03-13 ENCOUNTER — OFFICE VISIT (OUTPATIENT)
Dept: UROLOGY | Facility: CLINIC | Age: 68
End: 2023-03-13
Payer: MEDICARE

## 2023-03-13 VITALS
WEIGHT: 201.94 LBS | BODY MASS INDEX: 28.91 KG/M2 | DIASTOLIC BLOOD PRESSURE: 72 MMHG | HEIGHT: 70 IN | SYSTOLIC BLOOD PRESSURE: 109 MMHG | HEART RATE: 53 BPM

## 2023-03-13 DIAGNOSIS — Z12.5 SCREENING FOR PROSTATE CANCER: ICD-10-CM

## 2023-03-13 DIAGNOSIS — N40.1 BPH WITH OBSTRUCTION/LOWER URINARY TRACT SYMPTOMS: ICD-10-CM

## 2023-03-13 DIAGNOSIS — N20.0 NEPHROLITHIASIS: ICD-10-CM

## 2023-03-13 DIAGNOSIS — N13.8 BPH WITH OBSTRUCTION/LOWER URINARY TRACT SYMPTOMS: ICD-10-CM

## 2023-03-13 DIAGNOSIS — Z12.5 SCREENING FOR PROSTATE CANCER: Primary | ICD-10-CM

## 2023-03-13 LAB — COMPLEXED PSA SERPL-MCNC: 0.56 NG/ML (ref 0–4)

## 2023-03-13 PROCEDURE — 99214 PR OFFICE/OUTPT VISIT, EST, LEVL IV, 30-39 MIN: ICD-10-PCS | Mod: S$PBB,,, | Performed by: NURSE PRACTITIONER

## 2023-03-13 PROCEDURE — 99999 PR PBB SHADOW E&M-EST. PATIENT-LVL IV: CPT | Mod: PBBFAC,,, | Performed by: NURSE PRACTITIONER

## 2023-03-13 PROCEDURE — 36415 COLL VENOUS BLD VENIPUNCTURE: CPT | Performed by: NURSE PRACTITIONER

## 2023-03-13 PROCEDURE — 99214 OFFICE O/P EST MOD 30 MIN: CPT | Mod: PBBFAC,PN | Performed by: NURSE PRACTITIONER

## 2023-03-13 PROCEDURE — 84153 ASSAY OF PSA TOTAL: CPT | Performed by: NURSE PRACTITIONER

## 2023-03-13 PROCEDURE — 99999 PR PBB SHADOW E&M-EST. PATIENT-LVL IV: ICD-10-PCS | Mod: PBBFAC,,, | Performed by: NURSE PRACTITIONER

## 2023-03-13 PROCEDURE — 99214 OFFICE O/P EST MOD 30 MIN: CPT | Mod: S$PBB,,, | Performed by: NURSE PRACTITIONER

## 2023-03-13 NOTE — PROGRESS NOTES
CHIEF COMPLAINT:    Mr. Hyde is a 67 y.o. male presenting for f/u kidney stones  PRESENTING ILLNESS:    Toni Hyde is a 67 y.o. male who presents for f/u kidney stones. Last clinic visit was 9/12/22 with Dr. Benitez.    Last visit with Dr. Benitez  Last saw NP 11/2021 noting: He has been known to have uric acid stones he also underwent ESWL in 2017 by Dr. Noel.  He has not experienced any recent stone episodes and no complaints.  He does continue to take allopurinol 100 mg p.o. q.d. and potassium citrate 15meq BID with no problems.  CT 1/12/21:  Right lower pole calyx with 4 mm stone and a 1 mm stone.  Left kidney has a 2 mm, a 2 mm, a 3 mm and a 11 mm stone seen from top to bottom. Bilateral renal cysts, larger on left with some peripheral calc, diverticulosis, 4.2cm prostate  Correltaing KUB 1/12/21: There are least 2 rounded calcifications left mid abdomen centered at the L2 level largest 9 mm.  Vascular phleboliths left pelvic sidewall. Impression notes   4/25/17 L ureteral ESWL and stent removal. For 9mm mid ureteral stone. Cysto noted 4 cm with mild PEREIRA   Patient reports previously having calcium oxalate stones analyzed 20 years prior, and only started potassium citrate with findings of 100% uric acid stone on stone analysis from his ESWL past fragments in 2017. On chart review, I did find his stone analysis confirming 100% uric acid, as well as a 24 hour urine 1 month later on 5/22/17 which had good urine volume of 2 L and normal urine uric acid but high saturation of uric acid 3.4, and high saturation of calcium oxalate at 6.63 increasing the risk of both types of stones, with a very low pH of 4.981 despite good citrate at 1698, and high dietary oxalate at 46.   He denies any history of gout, or prior known uric acid issues prior to 2017.   Uric acid, serum, on file with primary care is 4.9 in October 2021, normal.  Creatinine 1.4/EGFR 52.7 in October 2021 as well.  On review, has had mild CKD 3  for quite some time with baseline GFR around 55-58  Not following low purine or low uric acid diet. Was told previously to reduce meat intake previously  Last PSA on file 0.6 in 2020.  Stable in the 0.40.6 range for all years prior.  No family history of prostate cancer.   Personal review of CT scan from January 2020 to consistent with report above with large left lower pole stone burden about 1-1.1 cm and scattered other small punctate stones throughout the left collecting system upper and lower pole.  In the right kidney there is a 4 mm lower pole stone and a smaller upper pole stone.  The left kidney has a central midpole cyst extending towards the collecting system as well as an upper pole cyst.  Reported as cyst stable from previous imaging, the upper pole lesion looks less fluid density on the non contrasted imaging to me   He does report intermittent stone fragment passage. More so dust/small fragments. Only know bc gets hung up in urethra.  A few times a year.  No significant pain or stone episodes.  Occasional flank or back pain which may or may not be related.  No blood in the urine.  Urinalysis is dipstick negative today.  Has been taking 100 mg allopurinol and 15 mEq daily, not b.i.d., of potassium citrate.   AUA SS: 15/3 (4 intermittency, 3: freq, urgency; 2 sleeping, 1: emptying, weak, strain)  Stops and starts a lot. If occupied easy to postpone, but there is some urgency when just sitting around. Occ NTF 2-3x/night, but if no fluids after 8pm can make it through the night  5/5/22 Procedure(s) Performed:   Left ureteroscopy with holmium laser lithotripsy and stone basket extraction  Cystoscopy with placement of left double-J ureteral stent 6 Telugu by 26 cm  Left retrograde pyelogram including injection of contrast  Fluoroscopy less than 1 hour including interpretation of fluoroscopic images  Findings:  scattered left stones in upper middle and lower pole with larger burden in lower pole, all  removed  5/17/22 Procedure(s) Performed:   Cystoscopy with left ureteral stent removal  Findings: ureteral stent, removed    9/12/22 patient presents to clinic for f/u kidney stones. He has been doing well since procedure. He increased urocit K to 1 tablet BID as instructed. He drinks ~ 1 L of water with crystal light lemonade per day. He is trying to increase water intake. Denies dysuria, gross hematuria, flank pain, f/c/n/v. He is taking alfusozin for BPH/LUTS. He reports his symptoms have mildly improved. Flomax worked better on LUTS but he was unable to tolerate d/t dizziness.    8/17/22   BMP: K 4.3, creatinine 1.2 / GFR >60  PTH intact 55.3  Uric acid 6.4    8/17/22 ANDREA  Impression:   Multiple bilateral intrarenal stones.  No hydronephrosis is noted.  Three cyst identified of each kidney.  A solid mass is not noted    3/13/23  Patient presents today for f/u kidney stones. He has been doing well since last clinic visit. He is taking urocit K 1 tablet BID and drinks 1-1.5 L water and lemonade per day. Also drinks 1-2 cups coffee per day. Denies dysuria, gross hematuria, flank pain, fever, chills, nausea or vomiting.   He is taking alfuzosin for BPH. He reports nocturia x 2 and occasional daytime frequency. Flomax worked better but he could not tolerate d/t dizziness.    12/2/22   K 4.0  Creatinine 1.2  GFR >60    3/10/23  ANDREA Impression:   1. Bilateral simple renal cysts.  2. Bilateral nephrolithiasis.  KUB: left renal stones       Urine cultures:   Lab Results   Component Value Date    LABURIN No growth 04/25/2022    LABURIN No growth 06/30/2017    LABURIN NO GROWTH AFTER 48 HOURS. 09/26/2012           REVIEW OF SYSTEMS:    Review of Systems    Constitutional: Negative for fever and chills.   HENT: Negative for hearing loss.   Eyes: Negative for visual disturbance.   Respiratory: Negative for shortness of breath.   Cardiovascular: Negative for chest pain.   Gastrointestinal: Negative for nausea, vomiting, and  constipation.   Genitourinary:  See above  Neurological: Negative for dizziness.   Hematological: Does not bruise/bleed easily.   Psychiatric/Behavioral: Negative for confusion.       PATIENT HISTORY:    Past Medical History:   Diagnosis Date    Arthritis     Cataract     Cataract of left eye     Diabetes mellitus type II     on po meds    Hyperlipidemia     not on meds at present    Hypertension     Kidney stones 04/2017    Left ureteral stone     Plantar fasciitis of right foot 10/2017    Dr Colorado     Tendonitis 01/2018    right foot Dr Colorado    Wears glasses        Past Surgical History:   Procedure Laterality Date    ADENOIDECTOMY      BIOPSY OF MASS OF LUMBAR SPINE N/A 10/19/2022    Procedure: BIOPSY, MASS, SPINE, LUMBAR;  Surgeon: Marvel Matthews DO;  Location: [facility];  Service: Neurosurgery;  Laterality: N/A;  Right L5-S1 Resection of Extradural Mass Using Transfacet Approach    CATARACT EXTRACTION      COLONOSCOPY  03/23/2010    Dr. Blackburn, hyperplastic polyps, otherwise negative, 10 year recheck    COLONOSCOPY N/A 11/19/2021    Procedure: COLONOSCOPY;  Surgeon: Marcus Sexton MD;  Location: [facility];  Service: Endoscopy;  Laterality: N/A;    CYSTOSCOPY Left 04/12/2017    with ureteral stent-Dr. Noel    CYSTOSCOPY Left 05/17/2022    Procedure: CYSTOSCOPY with stent removal;  Surgeon: Ronal Benitez MD;  Location: [facility];  Service: Urology;  Laterality: Left;    CYSTOSCOPY WITH URETEROSCOPY, RETROGRADE PYELOGRAPHY, AND INSERTION OF STENT Left 05/05/2022    Procedure: CYSTOSCOPY, WITH RETROGRADE PYELOGRAM AND URETERAL STENT INSERTION;  Surgeon: Ronal Benitez MD;  Location: [facility];  Service: Urology;  Laterality: Left;    EYE SURGERY  05/06/2013    Dr Nielsen    kidney stent   04/2017    KNEE ARTHROSCOPY W/ DEBRIDEMENT      right with lateral release     LITHOTRIPSY  04/19/2017    NASAL SINUS SURGERY      retna surgery   05/2013    Dr Peralta    SPINE SURGERY      TONSILLECTOMY       TRANSFORAMINAL EPIDURAL INJECTION OF STEROID Right 2020    Procedure: Injection,steroid,epidural,transforaminal approach L5- S1;  Surgeon: Kermit Davalos MD;  Location: [facility] OR;  Service: Pain Management;  Laterality: Right;  L5-s1    TRANSFORAMINAL EPIDURAL INJECTION OF STEROID Right 2022    Procedure: Injection,steroid,epidural,transforaminal approach;  Surgeon: Christos Saul MD;  Location: [facility] OR;  Service: Pain Management;  Laterality: Right;  L5-S1 and cyst aspiration L5-S1    URETEROSCOPIC REMOVAL OF URETERIC CALCULUS Left 2022    Procedure: REMOVAL, CALCULUS, URETER, URETEROSCOPIC;  Surgeon: Ronal Benitez MD;  Location: [facility] OR;  Service: Urology;  Laterality: Left;       Family History   Problem Relation Age of Onset    Hypertension Father     Neuropathy Father     Stroke Paternal Grandmother     Lung cancer Paternal Grandfather     Cancer Paternal Grandfather     Cataracts Mother     Diabetes Maternal Grandmother     No Known Problems Sister     No Known Problems Maternal Aunt        Social History     Socioeconomic History    Marital status:    Tobacco Use    Smoking status: Former     Types: Cigarettes     Quit date: 1978     Years since quittin.9    Smokeless tobacco: Never    Tobacco comments:     Occ cigar   Substance and Sexual Activity    Alcohol use: Yes     Alcohol/week: 2.5 standard drinks     Types: 3 Standard drinks or equivalent per week     Comment: 3 drinks per week    Drug use: No     Social Determinants of Health     Financial Resource Strain: Low Risk     Difficulty of Paying Living Expenses: Not hard at all   Food Insecurity: No Food Insecurity    Worried About Running Out of Food in the Last Year: Never true    Ran Out of Food in the Last Year: Never true   Transportation Needs: No Transportation Needs    Lack of Transportation (Medical): No    Lack of Transportation (Non-Medical): No   Physical Activity: Sufficiently Active    Days of Exercise per  Week: 5 days    Minutes of Exercise per Session: 40 min   Stress: No Stress Concern Present    Feeling of Stress : Only a little   Social Connections: Unknown    Frequency of Communication with Friends and Family: More than three times a week    Frequency of Social Gatherings with Friends and Family: Once a week    Active Member of Clubs or Organizations: No    Attends Club or Organization Meetings: Never    Marital Status:    Housing Stability: Low Risk     Unable to Pay for Housing in the Last Year: No    Number of Places Lived in the Last Year: 1    Unstable Housing in the Last Year: No       Allergies:  Shrimp    Medications:    Current Outpatient Medications:     amLODIPine (NORVASC) 2.5 MG tablet, Take 1 tablet (2.5 mg total) by mouth once daily. (Patient taking differently: Take 2.5 mg by mouth every evening.), Disp: 90 tablet, Rfl: 3    cetirizine (ZYRTEC) 10 MG tablet, Take 10 mg by mouth daily as needed. 1 Tablet(s) Oral PRN ., Disp: , Rfl:     diphenhydrAMINE (BENADRYL) 25 mg capsule, Take 25 mg by mouth nightly as needed for Itching., Disp: , Rfl:     fluticasone (FLONASE) 50 mcg/actuation nasal spray, 2 sprays by Each Nare route daily as needed. , Disp: , Rfl:     guaifenesin (MUCINEX ORAL), Take 400 mg by mouth 2 (two) times daily as needed for Congestion., Disp: , Rfl:     metFORMIN (GLUCOPHAGE-XR) 750 MG ER 24hr tablet, TAKE 1 TABLET TWICE A DAY WITH MEALS., Disp: 180 tablet, Rfl: 1    potassium citrate (UROCIT-K 15) 15 mEq TbSR, TAKE 1 TABLET TWICE A DAY, Disp: 100 tablet, Rfl: 6    prednisoLONE acetate (PRED FORTE) 1 % DrpS, , Disp: , Rfl:     rosuvastatin (CRESTOR) 20 MG tablet, TAKE 1 TABLET DAILY (REPLACES PRAVASTATIN) (Patient taking differently: every evening. TAKE 1 TABLET DAILY (REPLACES PRAVASTATIN)), Disp: 90 tablet, Rfl: 3    valsartan (DIOVAN) 320 MG tablet, TAKE 1 TABLET DAILY, Disp: 90 tablet, Rfl: 2    alfuzosin (UROXATRAL) 10 mg Tb24, Take 1 tablet (10 mg total) by mouth daily  with breakfast., Disp: 30 tablet, Rfl: 11    ketorolac 0.5% (ACULAR) 0.5 % Drop, , Disp: , Rfl:     moxifloxacin (VIGAMOX) 0.5 % ophthalmic solution, , Disp: , Rfl:     neomycin-polymyxin-dexamethasone (DEXACINE) 3.5 mg/g-10,000 unit/g-0.1 % Oint, , Disp: , Rfl:   No current facility-administered medications for this visit.    Facility-Administered Medications Ordered in Other Visits:     lidocaine (PF) 10 mg/ml (1%) injection 10 mg, 1 mL, Intradermal, Once, Kermit Davalos MD    PHYSICAL EXAMINATION:    Constitutional: He is oriented to person, place, and time. He appears well-developed and well-nourished.  He is in no apparent distress.    Neck: Normal ROM.     Cardiovascular: Normal rate.      Pulmonary/Chest: Effort normal. No respiratory distress.     Abdominal:  He exhibits no distension.  There is no CVA tenderness.     Neurological: He is alert and oriented to person, place, and time.     Skin: Skin is warm and dry.     Psych: Cooperative with normal affect.      Physical Exam      LABS:        Lab Results   Component Value Date    PSA 0.56 03/11/2022    PSA 0.59 04/06/2020    PSA 0.62 06/15/2016    PSADIAG 0.51 06/15/2018    PSADIAG 0.54 05/18/2017     Lab Results   Component Value Date    CREATININE 1.2 12/02/2022         IMPRESSION:    Encounter Diagnoses   Name Primary?    Screening for prostate cancer Yes    Nephrolithiasis     BPH with obstruction/lower urinary tract symptoms          PLAN:  -Reviewed KUB and ANDREA results with patient. Pt is interested in ESWL for larger stones. Will discuss with MD.  -PSA today  -Continue alfuzosin for BPH and urocit K for stones. No refills needed  -The patient was encouraged to drink at least 2 liters of water a day, limit iced tea and hunter as well as foods high in oxalate.  They were cautioned to try to limit salt and red meat intake. Low oxalate diet (limit spinach, rhubarb, nuts, beets, potatoes, chocolate).  No vitamin C supplements. We also discussed adding  citrate to the diet with the addition of jm or lemon juice to their water or alternatively with crystal light.    -RTC 6 months    I encouraged him or any of his family members to call or email me with questions and/or concerns.      30 minutes of total time spent on the encounter, which includes face to face time and non-face to face time preparing to see the patient (eg, review of tests), Obtaining and/or reviewing separately obtained history, Documenting clinical information in the electronic or other health record, Independently interpreting results (not separately reported) and communicating results to the patient/family/caregiver, or Care coordination (not separately reported).

## 2023-03-14 DIAGNOSIS — N20.0 LEFT NEPHROLITHIASIS: Primary | ICD-10-CM

## 2023-03-14 NOTE — TELEPHONE ENCOUNTER
As per below  Message and visit and imaging per NP reviewed  Current stone is lower pole but <1cm  Lower chance of success with eswl given location but as clearly radioopaque and has cleared previously, reasonable to proceed with eswl alone and would not need stent given size <1cm. Will give handout on inversion therapy postop to facilitate lower pole fragment clearance    I have eswl opening next thurs 3/23 if pt interested, and can preop this week.    Book if agreeable  Otherwise can offer 4th Thursday in april      ----- Message from Tequila Foster NP sent at 3/13/2023  9:09 AM CDT -----  Regarding: stones  Pt came today for f/u stones.  KUB/ANDREA done Friday. He prefers to have larger stones taken care of with ESWL if possible. I told him I would have you review and let me know and I will contact him.      Thanks  Tequila

## 2023-03-14 NOTE — TELEPHONE ENCOUNTER
Spoke w pt informed of Md recs regarding recent imaging   Pt declined eswl for 3/23 and accepted for 4/27 scheduled preop prior.  Pt agreed

## 2023-03-24 ENCOUNTER — OFFICE VISIT (OUTPATIENT)
Dept: ORTHOPEDICS | Facility: CLINIC | Age: 68
End: 2023-03-24
Payer: MEDICARE

## 2023-03-24 VITALS — WEIGHT: 201 LBS | HEIGHT: 70 IN | RESPIRATION RATE: 18 BRPM | BODY MASS INDEX: 28.77 KG/M2

## 2023-03-24 DIAGNOSIS — M17.11 PRIMARY OSTEOARTHRITIS OF RIGHT KNEE: Primary | ICD-10-CM

## 2023-03-24 PROCEDURE — 99999 PR PBB SHADOW E&M-EST. PATIENT-LVL III: ICD-10-PCS | Mod: PBBFAC,,, | Performed by: ORTHOPAEDIC SURGERY

## 2023-03-24 PROCEDURE — 99213 PR OFFICE/OUTPT VISIT, EST, LEVL III, 20-29 MIN: ICD-10-PCS | Mod: S$PBB,,, | Performed by: ORTHOPAEDIC SURGERY

## 2023-03-24 PROCEDURE — 99999 PR PBB SHADOW E&M-EST. PATIENT-LVL III: CPT | Mod: PBBFAC,,, | Performed by: ORTHOPAEDIC SURGERY

## 2023-03-24 PROCEDURE — 99213 OFFICE O/P EST LOW 20 MIN: CPT | Mod: S$PBB,,, | Performed by: ORTHOPAEDIC SURGERY

## 2023-03-24 PROCEDURE — 99213 OFFICE O/P EST LOW 20 MIN: CPT | Mod: PBBFAC,PN | Performed by: ORTHOPAEDIC SURGERY

## 2023-03-24 NOTE — PROGRESS NOTES
Patient ID: Toni Hyde is a 67 y.o. male    Chief Complaint:   Chief Complaint   Patient presents with    Right Knee - Follow-up       History of Present Illness:    Mr. Muñoz is here 6 weeks status post right knee viscosupplementation.  Overall doing very well.  He states his pain is much better.  He is able to go back to exercise and no longer having pain with steps or climbing ladders.  Overall very happy with his progress.    PAST MEDICAL HISTORY:   Past Medical History:   Diagnosis Date    Arthritis     Cataract     Cataract of left eye     Diabetes mellitus type II     on po meds    Hyperlipidemia     not on meds at present    Hypertension     Kidney stones 04/2017    Left ureteral stone     Plantar fasciitis of right foot 10/2017    Dr Colorado     Tendonitis 01/2018    right foot Dr Colorado    Wears glasses      PAST SURGICAL HISTORY:   Past Surgical History:   Procedure Laterality Date    ADENOIDECTOMY      BIOPSY OF MASS OF LUMBAR SPINE N/A 10/19/2022    Procedure: BIOPSY, MASS, SPINE, LUMBAR;  Surgeon: Marvel Matthews DO;  Location: [facility];  Service: Neurosurgery;  Laterality: N/A;  Right L5-S1 Resection of Extradural Mass Using Transfacet Approach    CATARACT EXTRACTION      COLONOSCOPY  03/23/2010    Dr. Blackburn, hyperplastic polyps, otherwise negative, 10 year recheck    COLONOSCOPY N/A 11/19/2021    Procedure: COLONOSCOPY;  Surgeon: Marcus Sexton MD;  Location: [facility];  Service: Endoscopy;  Laterality: N/A;    CYSTOSCOPY Left 04/12/2017    with ureteral stent-Dr. Noel    CYSTOSCOPY Left 05/17/2022    Procedure: CYSTOSCOPY with stent removal;  Surgeon: Ronal Benitez MD;  Location: [facility] OR;  Service: Urology;  Laterality: Left;    CYSTOSCOPY WITH URETEROSCOPY, RETROGRADE PYELOGRAPHY, AND INSERTION OF STENT Left 05/05/2022    Procedure: CYSTOSCOPY, WITH RETROGRADE PYELOGRAM AND URETERAL STENT INSERTION;  Surgeon: Ronal Benitez MD;  Location: [facility] OR;  Service: Urology;  Laterality:  Left;    EYE SURGERY  2013    Dr Nielsen    kidney stent   2017    KNEE ARTHROSCOPY W/ DEBRIDEMENT      right with lateral release     LITHOTRIPSY  2017    NASAL SINUS SURGERY      retna surgery   2013    Dr Peralta    SPINE SURGERY      TONSILLECTOMY      TRANSFORAMINAL EPIDURAL INJECTION OF STEROID Right 2020    Procedure: Injection,steroid,epidural,transforaminal approach L5- S1;  Surgeon: Kermit Davalos MD;  Location: [facility] OR;  Service: Pain Management;  Laterality: Right;  L5-s1    TRANSFORAMINAL EPIDURAL INJECTION OF STEROID Right 2022    Procedure: Injection,steroid,epidural,transforaminal approach;  Surgeon: Christos Saul MD;  Location: [facility] OR;  Service: Pain Management;  Laterality: Right;  L5-S1 and cyst aspiration L5-S1    URETEROSCOPIC REMOVAL OF URETERIC CALCULUS Left 2022    Procedure: REMOVAL, CALCULUS, URETER, URETEROSCOPIC;  Surgeon: Ronal Benitez MD;  Location: [facility] OR;  Service: Urology;  Laterality: Left;     FAMILY HISTORY:   Family History   Problem Relation Age of Onset    Hypertension Father     Neuropathy Father     Stroke Paternal Grandmother     Lung cancer Paternal Grandfather     Cancer Paternal Grandfather     Cataracts Mother     Diabetes Maternal Grandmother     No Known Problems Sister     No Known Problems Maternal Aunt      SOCIAL HISTORY:   Social History     Occupational History    Not on file   Tobacco Use    Smoking status: Former     Types: Cigarettes     Quit date: 1978     Years since quittin.9    Smokeless tobacco: Never    Tobacco comments:     Occ cigar   Substance and Sexual Activity    Alcohol use: Yes     Alcohol/week: 2.5 standard drinks     Types: 3 Standard drinks or equivalent per week     Comment: 3 drinks per week    Drug use: No    Sexual activity: Not on file        MEDICATIONS:   Current Outpatient Medications:     alfuzosin (UROXATRAL) 10 mg Tb24, Take 1 tablet (10 mg total) by mouth daily with breakfast., Disp: 30  tablet, Rfl: 11    amLODIPine (NORVASC) 2.5 MG tablet, Take 1 tablet (2.5 mg total) by mouth once daily. (Patient taking differently: Take 2.5 mg by mouth every evening.), Disp: 90 tablet, Rfl: 3    cetirizine (ZYRTEC) 10 MG tablet, Take 10 mg by mouth daily as needed. 1 Tablet(s) Oral PRN ., Disp: , Rfl:     diphenhydrAMINE (BENADRYL) 25 mg capsule, Take 25 mg by mouth nightly as needed for Itching., Disp: , Rfl:     fluticasone (FLONASE) 50 mcg/actuation nasal spray, 2 sprays by Each Nare route daily as needed. , Disp: , Rfl:     guaifenesin (MUCINEX ORAL), Take 400 mg by mouth 2 (two) times daily as needed for Congestion., Disp: , Rfl:     ketorolac 0.5% (ACULAR) 0.5 % Drop, , Disp: , Rfl:     metFORMIN (GLUCOPHAGE-XR) 750 MG ER 24hr tablet, TAKE 1 TABLET TWICE A DAY WITH MEALS., Disp: 180 tablet, Rfl: 1    moxifloxacin (VIGAMOX) 0.5 % ophthalmic solution, , Disp: , Rfl:     neomycin-polymyxin-dexamethasone (DEXACINE) 3.5 mg/g-10,000 unit/g-0.1 % Oint, , Disp: , Rfl:     potassium citrate (UROCIT-K 15) 15 mEq TbSR, TAKE 1 TABLET TWICE A DAY, Disp: 100 tablet, Rfl: 6    prednisoLONE acetate (PRED FORTE) 1 % DrpS, , Disp: , Rfl:     rosuvastatin (CRESTOR) 20 MG tablet, TAKE 1 TABLET DAILY (REPLACES PRAVASTATIN) (Patient taking differently: every evening. TAKE 1 TABLET DAILY (REPLACES PRAVASTATIN)), Disp: 90 tablet, Rfl: 3    valsartan (DIOVAN) 320 MG tablet, TAKE 1 TABLET DAILY, Disp: 90 tablet, Rfl: 2  No current facility-administered medications for this visit.    Facility-Administered Medications Ordered in Other Visits:     lidocaine (PF) 10 mg/ml (1%) injection 10 mg, 1 mL, Intradermal, Once, Kermit Davalos MD  ALLERGIES:   Review of patient's allergies indicates:   Allergen Reactions    Shrimp Itching and Swelling     Per testing   States no ivp or betadine allergies.          Physical Exam     Vitals:    03/24/23 1301   Resp: 18     Alert and oriented to person, place and time. No acute distress. Well-groomed,  not ill appearing. Pupils round and reactive, normal respiratory effort, no audible wheezing.     GENERAL:  A well-developed, well-nourished 67 y.o. male who is alert and       oriented in no acute distress.      Gait: He  walks with a normal gait.                   EXTREMITIES:  Examination of lower extremities reveals there is no visible mass or deformity.      Right knee:  ROM 5-125    Ligamentously stable to varus/valgus stress.    Anterior and posterior drawers negative.    No pain over pes bursa.    No warmth    No erythema    Effusion No    No significant joint line tenderness    Positive Patellofemoral grind/crepitus     The skin over both lower extremities is normal and unremarkable.  He has a  painless range of motion of the hips and ankles bilaterally.   Sensation is intact in both lower extremities.    There are no motor deficits in the lower extremities bilaterally.   Pedal pulses are palpable distally bilaterally.    He has no calf tenderness to palpation nor edema.           Assessment & Plan    Primary osteoarthritis of right knee         Overall doing well.  He had a good response to his viscosupplementation injections.  We discussed continuation of home exercise program, anti-inflammatories as needed and return to clinic as needed if his pain returns.    Follow up: if symptoms worsen or fail to improve

## 2023-04-14 ENCOUNTER — HOSPITAL ENCOUNTER (OUTPATIENT)
Dept: PREADMISSION TESTING | Facility: HOSPITAL | Age: 68
Discharge: HOME | End: 2023-04-14
Attending: UROLOGY
Payer: MEDICARE

## 2023-04-14 VITALS — HEIGHT: 70 IN | WEIGHT: 201 LBS | BODY MASS INDEX: 28.77 KG/M2

## 2023-04-14 DIAGNOSIS — Z01.810 PREOP CARDIOVASCULAR EXAM: ICD-10-CM

## 2023-04-14 DIAGNOSIS — N20.0 LEFT NEPHROLITHIASIS: ICD-10-CM

## 2023-04-14 PROCEDURE — 99900104 DSU ONLY-NO CHARGE-EA ADD'L HR (STAT)

## 2023-04-14 PROCEDURE — 93010 EKG 12-LEAD: ICD-10-PCS | Mod: ,,, | Performed by: INTERNAL MEDICINE

## 2023-04-14 PROCEDURE — 93010 ELECTROCARDIOGRAM REPORT: CPT | Mod: ,,, | Performed by: INTERNAL MEDICINE

## 2023-04-14 PROCEDURE — 93005 ELECTROCARDIOGRAM TRACING: CPT

## 2023-04-14 PROCEDURE — 99900103 DSU ONLY-NO CHARGE-INITIAL HR (STAT)

## 2023-04-14 RX ORDER — IBUPROFEN 200 MG
200 TABLET ORAL EVERY 8 HOURS PRN
COMMUNITY

## 2023-04-14 NOTE — DISCHARGE INSTRUCTIONS
To confirm, Your doctor has instructed you that surgery is scheduled for: 4/27/23    Please report to [facility], Registration the morning of surgery. You must check-in and receive a wristband before going to your procedure.    Pre-Op will call the afternoon prior to surgery between 1:00 and 6:00 PM with the final arrival time.  Phone number: [phone]    PLEASE NOTE:  The surgery schedule has many variables which may affect the time of your surgery case.  Family members should be available if your surgery time changes.  Plan to be here the day of your procedure between 4-6 hours.    MEDICATIONS:    SEE MED LIST        DO NOT TAKE THESE MEDICATIONS 5-7 DAYS PRIOR to your procedure or per your surgeon's request:   ASPIRIN, ALEVE, ADVIL, IBUPROFEN, FISH OIL VITAMIN E, HERBALS  (May take Tylenol)    ONLY if you are prescribed any types of blood thinners such as:  Aspirin, Coumadin, Plavix, Pradaxa, Xarelto, Aggrenox, Effient, Eliquis, Savasya, Brilinta, or any other, ask your surgeon whether you should stop taking them and how long before surgery you should stop.  You may also need to verify with the prescribing physician if it is ok to stop your medication.      INSTRUCTIONS IMPORTANT!!  Do not eat or drink anything between midnight and the time of your procedure- this includes gum, mints, and candy.  Do not smoke or drink alcoholic beverages 24 hours prior to your procedure.  Shower the night before AND the morning of your procedure with a Chlorhexidine wash such as Hibiclens or Dial antibacterial soap from the neck down.  Do not get it on your face or in your eyes.  You may use your own shampoo and face wash. This helps your skin to be as bacteria free as possible.    If you wear contact lenses, dentures, hearing aids or glasses, bring a container to put them in during surgery and give to a family member for safe keeping.  Please leave all jewelry, piercing's and valuables at home.   DO NOT  remove hair from the surgery site.  Do not shave the incision site unless you are given specific instructions to do so.    ONLY if you have been diagnosed with sleep apnea please bring your C-PAP machine.  ONLY if you wear home oxygen please bring your portable oxygen tank the day of your procedure.  ONLY if you have a history of OPEN HEART SURGERY you will need a clearance from your Cardiologist per Anesthesia.      ONLY for patients requiring bowel prep, written instructions will be given by your doctor's office.  ONLY if you have a neuro stimulator, please bring the controller with you the morning of surgery  ONLY if a type and screen test is needed before surgery, please return:  If your doctor has scheduled you for an overnight stay, bring a small overnight bag with any personal items you need.  Make arrangements in advance for transportation home by a responsible adult.  It is not safe to drive a vehicle during the 24 hours after anesthesia.      [facility] Visitor Policy    Effective September 26, 2022    [facility] will resume routine visitation for COVID-19 negative patients, including inpatients, outpatients, and procedural areas, in accordance with local campus procedures.    All [facility] facilities and properties are tobacco free.  Smoking is NOT allowed.   If you have any questions about these instructions, call Pre-Op Admit  Nursing at [phone] or the Pre-Op Day Surgery Unit at [phone].

## 2023-04-26 ENCOUNTER — ANESTHESIA EVENT (OUTPATIENT)
Dept: SURGERY | Facility: HOSPITAL | Age: 68
End: 2023-04-26
Payer: MEDICARE

## 2023-04-27 ENCOUNTER — HOSPITAL ENCOUNTER (OUTPATIENT)
Facility: HOSPITAL | Age: 68
Discharge: HOME | End: 2023-04-27
Attending: UROLOGY | Admitting: UROLOGY
Payer: MEDICARE

## 2023-04-27 ENCOUNTER — ANESTHESIA (OUTPATIENT)
Dept: SURGERY | Facility: HOSPITAL | Age: 68
End: 2023-04-27
Payer: MEDICARE

## 2023-04-27 DIAGNOSIS — N20.0 LEFT NEPHROLITHIASIS: ICD-10-CM

## 2023-04-27 DIAGNOSIS — N20.0 LEFT NEPHROLITHIASIS: Primary | ICD-10-CM

## 2023-04-27 PROCEDURE — 27200651 HC AIRWAY, LMA: Performed by: NURSE ANESTHETIST, CERTIFIED REGISTERED

## 2023-04-27 PROCEDURE — 37000008 HC ANESTHESIA 1ST 15 MINUTES: Performed by: UROLOGY

## 2023-04-27 PROCEDURE — 71000015 HC POSTOP RECOV 1ST HR: Performed by: UROLOGY

## 2023-04-27 PROCEDURE — D9220A PRA ANESTHESIA: Mod: CRNA,,, | Performed by: NURSE ANESTHETIST, CERTIFIED REGISTERED

## 2023-04-27 PROCEDURE — D9220A PRA ANESTHESIA: ICD-10-PCS | Mod: ANES,,, | Performed by: ANESTHESIOLOGY

## 2023-04-27 PROCEDURE — 25000003 PHARM REV CODE 250: Performed by: ANESTHESIOLOGY

## 2023-04-27 PROCEDURE — 50590 PR FRAGMENT KIDNEY STONE/ ESWL: ICD-10-PCS | Mod: LT,,, | Performed by: UROLOGY

## 2023-04-27 PROCEDURE — 99900103 DSU ONLY-NO CHARGE-INITIAL HR (STAT): Performed by: UROLOGY

## 2023-04-27 PROCEDURE — 37000009 HC ANESTHESIA EA ADD 15 MINS: Performed by: UROLOGY

## 2023-04-27 PROCEDURE — 63600175 PHARM REV CODE 636 W HCPCS: Performed by: NURSE ANESTHETIST, CERTIFIED REGISTERED

## 2023-04-27 PROCEDURE — 50590 FRAGMENTING OF KIDNEY STONE: CPT | Mod: LT,,, | Performed by: UROLOGY

## 2023-04-27 PROCEDURE — 63600175 PHARM REV CODE 636 W HCPCS: Performed by: UROLOGY

## 2023-04-27 PROCEDURE — 71000039 HC RECOVERY, EACH ADD'L HOUR: Performed by: UROLOGY

## 2023-04-27 PROCEDURE — 99900104 DSU ONLY-NO CHARGE-EA ADD'L HR (STAT): Performed by: UROLOGY

## 2023-04-27 PROCEDURE — 36000705 HC OR TIME LEV I EA ADD 15 MIN: Performed by: UROLOGY

## 2023-04-27 PROCEDURE — D9220A PRA ANESTHESIA: Mod: ANES,,, | Performed by: ANESTHESIOLOGY

## 2023-04-27 PROCEDURE — 36000704 HC OR TIME LEV I 1ST 15 MIN: Performed by: UROLOGY

## 2023-04-27 PROCEDURE — 71000016 HC POSTOP RECOV ADDL HR: Performed by: UROLOGY

## 2023-04-27 PROCEDURE — 71000033 HC RECOVERY, INTIAL HOUR: Performed by: UROLOGY

## 2023-04-27 PROCEDURE — D9220A PRA ANESTHESIA: ICD-10-PCS | Mod: CRNA,,, | Performed by: NURSE ANESTHETIST, CERTIFIED REGISTERED

## 2023-04-27 PROCEDURE — 94799 UNLISTED PULMONARY SVC/PX: CPT

## 2023-04-27 PROCEDURE — 25000003 PHARM REV CODE 250: Performed by: NURSE ANESTHETIST, CERTIFIED REGISTERED

## 2023-04-27 RX ORDER — DIPHENHYDRAMINE HYDROCHLORIDE 50 MG/ML
25 INJECTION INTRAMUSCULAR; INTRAVENOUS EVERY 6 HOURS PRN
Status: DISCONTINUED | OUTPATIENT
Start: 2023-04-27 | End: 2023-04-27 | Stop reason: HOSPADM

## 2023-04-27 RX ORDER — PHENYLEPHRINE HYDROCHLORIDE 10 MG/ML
INJECTION INTRAVENOUS
Status: DISCONTINUED | OUTPATIENT
Start: 2023-04-27 | End: 2023-04-27

## 2023-04-27 RX ORDER — MEPERIDINE HYDROCHLORIDE 50 MG/ML
12.5 INJECTION INTRAMUSCULAR; INTRAVENOUS; SUBCUTANEOUS ONCE
Status: DISCONTINUED | OUTPATIENT
Start: 2023-04-27 | End: 2023-04-27 | Stop reason: HOSPADM

## 2023-04-27 RX ORDER — MIDAZOLAM HYDROCHLORIDE 1 MG/ML
INJECTION INTRAMUSCULAR; INTRAVENOUS
Status: DISCONTINUED | OUTPATIENT
Start: 2023-04-27 | End: 2023-04-27

## 2023-04-27 RX ORDER — OXYCODONE HYDROCHLORIDE 5 MG/1
5 TABLET ORAL
Status: DISCONTINUED | OUTPATIENT
Start: 2023-04-27 | End: 2023-04-27 | Stop reason: HOSPADM

## 2023-04-27 RX ORDER — ONDANSETRON 2 MG/ML
4 INJECTION INTRAMUSCULAR; INTRAVENOUS ONCE
Status: DISCONTINUED | OUTPATIENT
Start: 2023-04-27 | End: 2023-04-27 | Stop reason: HOSPADM

## 2023-04-27 RX ORDER — ONDANSETRON 2 MG/ML
INJECTION INTRAMUSCULAR; INTRAVENOUS
Status: DISCONTINUED | OUTPATIENT
Start: 2023-04-27 | End: 2023-04-27

## 2023-04-27 RX ORDER — SODIUM CHLORIDE, SODIUM LACTATE, POTASSIUM CHLORIDE, CALCIUM CHLORIDE 600; 310; 30; 20 MG/100ML; MG/100ML; MG/100ML; MG/100ML
INJECTION, SOLUTION INTRAVENOUS CONTINUOUS
Status: DISCONTINUED | OUTPATIENT
Start: 2023-04-27 | End: 2023-04-27 | Stop reason: HOSPADM

## 2023-04-27 RX ORDER — DEXAMETHASONE SODIUM PHOSPHATE 4 MG/ML
INJECTION, SOLUTION INTRA-ARTICULAR; INTRALESIONAL; INTRAMUSCULAR; INTRAVENOUS; SOFT TISSUE
Status: DISCONTINUED | OUTPATIENT
Start: 2023-04-27 | End: 2023-04-27

## 2023-04-27 RX ORDER — EPHEDRINE SULFATE 50 MG/ML
INJECTION, SOLUTION INTRAVENOUS
Status: DISCONTINUED | OUTPATIENT
Start: 2023-04-27 | End: 2023-04-27

## 2023-04-27 RX ORDER — ACETAMINOPHEN 10 MG/ML
INJECTION, SOLUTION INTRAVENOUS
Status: DISCONTINUED | OUTPATIENT
Start: 2023-04-27 | End: 2023-04-27

## 2023-04-27 RX ORDER — PROPOFOL 10 MG/ML
VIAL (ML) INTRAVENOUS
Status: DISCONTINUED | OUTPATIENT
Start: 2023-04-27 | End: 2023-04-27

## 2023-04-27 RX ORDER — LIDOCAINE HYDROCHLORIDE 10 MG/ML
0.5 INJECTION, SOLUTION EPIDURAL; INFILTRATION; INTRACAUDAL; PERINEURAL ONCE
Status: DISCONTINUED | OUTPATIENT
Start: 2023-04-27 | End: 2023-04-27 | Stop reason: HOSPADM

## 2023-04-27 RX ORDER — FENTANYL CITRATE 50 UG/ML
INJECTION, SOLUTION INTRAMUSCULAR; INTRAVENOUS
Status: DISCONTINUED | OUTPATIENT
Start: 2023-04-27 | End: 2023-04-27

## 2023-04-27 RX ORDER — KETOROLAC TROMETHAMINE 30 MG/ML
INJECTION, SOLUTION INTRAMUSCULAR; INTRAVENOUS
Status: DISCONTINUED | OUTPATIENT
Start: 2023-04-27 | End: 2023-04-27

## 2023-04-27 RX ORDER — CEFAZOLIN SODIUM 2 G/50ML
2 SOLUTION INTRAVENOUS
Status: COMPLETED | OUTPATIENT
Start: 2023-04-27 | End: 2023-04-27

## 2023-04-27 RX ORDER — HYDROMORPHONE HYDROCHLORIDE 2 MG/ML
0.2 INJECTION, SOLUTION INTRAMUSCULAR; INTRAVENOUS; SUBCUTANEOUS EVERY 5 MIN PRN
Status: DISCONTINUED | OUTPATIENT
Start: 2023-04-27 | End: 2023-04-27 | Stop reason: HOSPADM

## 2023-04-27 RX ORDER — FENTANYL CITRATE 50 UG/ML
25 INJECTION, SOLUTION INTRAMUSCULAR; INTRAVENOUS EVERY 5 MIN PRN
Status: DISCONTINUED | OUTPATIENT
Start: 2023-04-27 | End: 2023-04-27 | Stop reason: HOSPADM

## 2023-04-27 RX ORDER — LIDOCAINE HYDROCHLORIDE 20 MG/ML
INJECTION INTRAVENOUS
Status: DISCONTINUED | OUTPATIENT
Start: 2023-04-27 | End: 2023-04-27

## 2023-04-27 RX ADMIN — LIDOCAINE HYDROCHLORIDE 80 MG: 20 INJECTION, SOLUTION INTRAVENOUS at 07:04

## 2023-04-27 RX ADMIN — ONDANSETRON 4 MG: 2 INJECTION INTRAMUSCULAR; INTRAVENOUS at 07:04

## 2023-04-27 RX ADMIN — OXYCODONE HYDROCHLORIDE 5 MG: 5 TABLET ORAL at 09:04

## 2023-04-27 RX ADMIN — ACETAMINOPHEN 1000 MG: 10 INJECTION, SOLUTION INTRAVENOUS at 07:04

## 2023-04-27 RX ADMIN — MIDAZOLAM HYDROCHLORIDE 2 MG: 1 INJECTION INTRAMUSCULAR; INTRAVENOUS at 07:04

## 2023-04-27 RX ADMIN — EPHEDRINE SULFATE 10 MG: 50 INJECTION, SOLUTION INTRAMUSCULAR; INTRAVENOUS; SUBCUTANEOUS at 08:04

## 2023-04-27 RX ADMIN — PHENYLEPHRINE HYDROCHLORIDE 100 MCG: 10 INJECTION INTRAVENOUS at 07:04

## 2023-04-27 RX ADMIN — DEXAMETHASONE SODIUM PHOSPHATE 4 MG: 4 INJECTION, SOLUTION INTRA-ARTICULAR; INTRALESIONAL; INTRAMUSCULAR; INTRAVENOUS; SOFT TISSUE at 07:04

## 2023-04-27 RX ADMIN — EPHEDRINE SULFATE 10 MG: 50 INJECTION, SOLUTION INTRAMUSCULAR; INTRAVENOUS; SUBCUTANEOUS at 07:04

## 2023-04-27 RX ADMIN — PROPOFOL 140 MG: 10 INJECTION, EMULSION INTRAVENOUS at 07:04

## 2023-04-27 RX ADMIN — KETOROLAC TROMETHAMINE 15 MG: 30 INJECTION, SOLUTION INTRAMUSCULAR; INTRAVENOUS at 08:04

## 2023-04-27 RX ADMIN — PROPOFOL 60 MG: 10 INJECTION, EMULSION INTRAVENOUS at 07:04

## 2023-04-27 RX ADMIN — FENTANYL CITRATE 25 MCG: 50 INJECTION, SOLUTION INTRAMUSCULAR; INTRAVENOUS at 07:04

## 2023-04-27 RX ADMIN — CEFAZOLIN SODIUM 2 G: 2 SOLUTION INTRAVENOUS at 07:04

## 2023-04-27 RX ADMIN — GLYCOPYRROLATE 0.2 MG: 0.2 INJECTION, SOLUTION INTRAMUSCULAR; INTRAVITREAL at 07:04

## 2023-04-27 RX ADMIN — SODIUM CHLORIDE, SODIUM GLUCONATE, SODIUM ACETATE, POTASSIUM CHLORIDE AND MAGNESIUM CHLORIDE: 526; 502; 368; 37; 30 INJECTION, SOLUTION INTRAVENOUS at 06:04

## 2023-04-27 NOTE — TRANSFER OF CARE
"Anesthesia Transfer of Care Note    Patient: Toni Hyde    Procedure(s) Performed: Procedure(s) (LRB):  LITHOTRIPSY, ESWL (Left)    Patient location: PACU    Anesthesia Type: general    Transport from OR: Transported from OR on 2-3 L/min O2 by NC with adequate spontaneous ventilation    Post pain: adequate analgesia    Post assessment: no apparent anesthetic complications    Post vital signs: stable    Level of consciousness: responds to stimulation and sedated    Nausea/Vomiting: no nausea/vomiting    Complications: none    Transfer of care protocol was followed      Last vitals:   Visit Vitals  /80 (BP Location: Left arm, Patient Position: Sitting)   Pulse (!) 57   Temp 36.4 °C (97.5 °F) (Skin)   Resp 18   Ht 5' 10" (1.778 m)   Wt 91.2 kg (201 lb)   SpO2 96%   BMI 28.84 kg/m²     "

## 2023-04-27 NOTE — ANESTHESIA POSTPROCEDURE EVALUATION
Anesthesia Post Evaluation    Patient: Toni Hyde    Procedure(s) Performed: Procedure(s) (LRB):  LITHOTRIPSY, ESWL (Left)    Final Anesthesia Type: general      Patient location during evaluation: PACU  Patient participation: Yes- Able to Participate  Level of consciousness: awake and alert  Post-procedure vital signs: reviewed and stable  Pain management: adequate  Airway patency: patent    PONV status at discharge: No PONV  Anesthetic complications: no      Cardiovascular status: hemodynamically stable  Respiratory status: unassisted and room air  Hydration status: euvolemic  Follow-up not needed.          Vitals Value Taken Time   /74 04/27/23 0940   Temp 36.3 °C (97.3 °F) 04/27/23 0930   Pulse 69 04/27/23 0940   Resp 16 04/27/23 0940   SpO2 95 % 04/27/23 0940         Event Time   Out of Recovery 09:40:00         Pain/Mallika Score: Pain Rating Prior to Med Admin: 2 (4/27/2023  9:10 AM)  Mallika Score: 10 (4/27/2023  9:15 AM)  Modified Mallika Score: 19 (4/27/2023  9:40 AM)

## 2023-04-27 NOTE — PLAN OF CARE
Patient voided with no problems and urine strained.  No fragments noted.  Patient discharged with strainer, specimen cup and inversion therapy handout.  Discharge instructions given to pt and family/friend, verbalized understanding and questions answered. Handouts provided. Belongings given back to pt. IV removed- catheter intact. Discharge via wheelchair.

## 2023-04-27 NOTE — H&P
CHIEF COMPLAINT:     Mr. Hyde is a 67 y.o. male presenting for f/u kidney stones  PRESENTING ILLNESS:     Toni Hyde is a 67 y.o. male who presents for f/u kidney stones. Last clinic visit was 9/12/22 with Dr. Benitez.     Last visit with Dr. Benitez  Last saw NP 11/2021 noting: He has been known to have uric acid stones he also underwent ESWL in 2017 by Dr. Noel.  He has not experienced any recent stone episodes and no complaints.  He does continue to take allopurinol 100 mg p.o. q.d. and potassium citrate 15meq BID with no problems.  CT 1/12/21:  Right lower pole calyx with 4 mm stone and a 1 mm stone.  Left kidney has a 2 mm, a 2 mm, a 3 mm and a 11 mm stone seen from top to bottom. Bilateral renal cysts, larger on left with some peripheral calc, diverticulosis, 4.2cm prostate  Correltaing KUB 1/12/21: There are least 2 rounded calcifications left mid abdomen centered at the L2 level largest 9 mm.  Vascular phleboliths left pelvic sidewall. Impression notes   4/25/17 L ureteral ESWL and stent removal. For 9mm mid ureteral stone. Cysto noted 4 cm with mild PEREIRA   Patient reports previously having calcium oxalate stones analyzed 20 years prior, and only started potassium citrate with findings of 100% uric acid stone on stone analysis from his ESWL past fragments in 2017. On chart review, I did find his stone analysis confirming 100% uric acid, as well as a 24 hour urine 1 month later on 5/22/17 which had good urine volume of 2 L and normal urine uric acid but high saturation of uric acid 3.4, and high saturation of calcium oxalate at 6.63 increasing the risk of both types of stones, with a very low pH of 4.981 despite good citrate at 1698, and high dietary oxalate at 46.   He denies any history of gout, or prior known uric acid issues prior to 2017.   Uric acid, serum, on file with primary care is 4.9 in October 2021, normal.  Creatinine 1.4/EGFR 52.7 in October 2021 as well.  On review, has had mild CKD  3 for quite some time with baseline GFR around 55-58  Not following low purine or low uric acid diet. Was told previously to reduce meat intake previously  Last PSA on file 0.6 in 2020.  Stable in the 0.40.6 range for all years prior.  No family history of prostate cancer.   Personal review of CT scan from January 2020 to consistent with report above with large left lower pole stone burden about 1-1.1 cm and scattered other small punctate stones throughout the left collecting system upper and lower pole.  In the right kidney there is a 4 mm lower pole stone and a smaller upper pole stone.  The left kidney has a central midpole cyst extending towards the collecting system as well as an upper pole cyst.  Reported as cyst stable from previous imaging, the upper pole lesion looks less fluid density on the non contrasted imaging to me   He does report intermittent stone fragment passage. More so dust/small fragments. Only know bc gets hung up in urethra.  A few times a year.  No significant pain or stone episodes.  Occasional flank or back pain which may or may not be related.  No blood in the urine.  Urinalysis is dipstick negative today.  Has been taking 100 mg allopurinol and 15 mEq daily, not b.i.d., of potassium citrate.   AUA SS: 15/3 (4 intermittency, 3: freq, urgency; 2 sleeping, 1: emptying, weak, strain)  Stops and starts a lot. If occupied easy to postpone, but there is some urgency when just sitting around. Occ NTF 2-3x/night, but if no fluids after 8pm can make it through the night  5/5/22 Procedure(s) Performed:   Left ureteroscopy with holmium laser lithotripsy and stone basket extraction  Cystoscopy with placement of left double-J ureteral stent 6 Liberian by 26 cm  Left retrograde pyelogram including injection of contrast  Fluoroscopy less than 1 hour including interpretation of fluoroscopic images  Findings:  scattered left stones in upper middle and lower pole with larger burden in lower pole, all  removed  5/17/22 Procedure(s) Performed:   Cystoscopy with left ureteral stent removal  Findings: ureteral stent, removed     9/12/22 patient presents to clinic for f/u kidney stones. He has been doing well since procedure. He increased urocit K to 1 tablet BID as instructed. He drinks ~ 1 L of water with crystal light lemonade per day. He is trying to increase water intake. Denies dysuria, gross hematuria, flank pain, f/c/n/v. He is taking alfusozin for BPH/LUTS. He reports his symptoms have mildly improved. Flomax worked better on LUTS but he was unable to tolerate d/t dizziness.     8/17/22   BMP: K 4.3, creatinine 1.2 / GFR >60  PTH intact 55.3  Uric acid 6.4     8/17/22 ANDREA  Impression:   Multiple bilateral intrarenal stones.  No hydronephrosis is noted.  Three cyst identified of each kidney.  A solid mass is not noted     3/13/23  Patient presents today for f/u kidney stones. He has been doing well since last clinic visit. He is taking urocit K 1 tablet BID and drinks 1-1.5 L water and lemonade per day. Also drinks 1-2 cups coffee per day. Denies dysuria, gross hematuria, flank pain, fever, chills, nausea or vomiting.   He is taking alfuzosin for BPH. He reports nocturia x 2 and occasional daytime frequency. Flomax worked better but he could not tolerate d/t dizziness.     12/2/22   K 4.0  Creatinine 1.2  GFR >60     3/10/23  ANDREA Impression:   1. Bilateral simple renal cysts.  2. Bilateral nephrolithiasis.  KUB: left renal stones        Urine cultures:         Lab Results   Component Value Date     LABURIN No growth 04/25/2022     LABURIN No growth 06/30/2017     LABURIN NO GROWTH AFTER 48 HOURS. 09/26/2012               REVIEW OF SYSTEMS:     Review of Systems     Constitutional: Negative for fever and chills.   HENT: Negative for hearing loss.   Eyes: Negative for visual disturbance.   Respiratory: Negative for shortness of breath.   Cardiovascular: Negative for chest pain.   Gastrointestinal: Negative for  nausea, vomiting, and constipation.   Genitourinary:  See above  Neurological: Negative for dizziness.   Hematological: Does not bruise/bleed easily.   Psychiatric/Behavioral: Negative for confusion.         PATIENT HISTORY:          Past Medical History:   Diagnosis Date    Arthritis      Cataract      Cataract of left eye      Diabetes mellitus type II       on po meds    Hyperlipidemia       not on meds at present    Hypertension      Kidney stones 04/2017    Left ureteral stone      Plantar fasciitis of right foot 10/2017     Dr Colorado     Tendonitis 01/2018     right foot Dr Colorado    Wears glasses                 Past Surgical History:   Procedure Laterality Date    ADENOIDECTOMY        BIOPSY OF MASS OF LUMBAR SPINE N/A 10/19/2022     Procedure: BIOPSY, MASS, SPINE, LUMBAR;  Surgeon: Marvel Matthews DO;  Location: [facility];  Service: Neurosurgery;  Laterality: N/A;  Right L5-S1 Resection of Extradural Mass Using Transfacet Approach    CATARACT EXTRACTION        COLONOSCOPY   03/23/2010     Dr. Blackburn, hyperplastic polyps, otherwise negative, 10 year recheck    COLONOSCOPY N/A 11/19/2021     Procedure: COLONOSCOPY;  Surgeon: Marcus Sexton MD;  Location: [facility];  Service: Endoscopy;  Laterality: N/A;    CYSTOSCOPY Left 04/12/2017     with ureteral stent-Dr. Noel    CYSTOSCOPY Left 05/17/2022     Procedure: CYSTOSCOPY with stent removal;  Surgeon: Ronal Benitez MD;  Location: [facility] OR;  Service: Urology;  Laterality: Left;    CYSTOSCOPY WITH URETEROSCOPY, RETROGRADE PYELOGRAPHY, AND INSERTION OF STENT Left 05/05/2022     Procedure: CYSTOSCOPY, WITH RETROGRADE PYELOGRAM AND URETERAL STENT INSERTION;  Surgeon: Ronal Benitez MD;  Location: [facility];  Service: Urology;  Laterality: Left;    EYE SURGERY   05/06/2013     Dr Nielsen    kidney stent    04/2017    KNEE ARTHROSCOPY W/ DEBRIDEMENT         right with lateral release     LITHOTRIPSY   04/19/2017    NASAL SINUS SURGERY        retna surgery     2013     Dr Peralta    SPINE SURGERY        TONSILLECTOMY        TRANSFORAMINAL EPIDURAL INJECTION OF STEROID Right 2020     Procedure: Injection,steroid,epidural,transforaminal approach L5- S1;  Surgeon: Kermit Davalos MD;  Location: [facility] OR;  Service: Pain Management;  Laterality: Right;  L5-s1    TRANSFORAMINAL EPIDURAL INJECTION OF STEROID Right 2022     Procedure: Injection,steroid,epidural,transforaminal approach;  Surgeon: Christos Saul MD;  Location: [facility] OR;  Service: Pain Management;  Laterality: Right;  L5-S1 and cyst aspiration L5-S1    URETEROSCOPIC REMOVAL OF URETERIC CALCULUS Left 2022     Procedure: REMOVAL, CALCULUS, URETER, URETEROSCOPIC;  Surgeon: Ronal Benitez MD;  Location: [facility] OR;  Service: Urology;  Laterality: Left;               Family History   Problem Relation Age of Onset    Hypertension Father      Neuropathy Father      Stroke Paternal Grandmother      Lung cancer Paternal Grandfather      Cancer Paternal Grandfather      Cataracts Mother      Diabetes Maternal Grandmother      No Known Problems Sister      No Known Problems Maternal Aunt           Social History               Socioeconomic History    Marital status:    Tobacco Use    Smoking status: Former       Types: Cigarettes       Quit date: 1978       Years since quittin.9    Smokeless tobacco: Never    Tobacco comments:       Occ cigar   Substance and Sexual Activity    Alcohol use: Yes       Alcohol/week: 2.5 standard drinks       Types: 3 Standard drinks or equivalent per week       Comment: 3 drinks per week    Drug use: No      Social Determinants of Health          Financial Resource Strain: Low Risk     Difficulty of Paying Living Expenses: Not hard at all   Food Insecurity: No Food Insecurity    Worried About Running Out of Food in the Last Year: Never true    Ran Out of Food in the Last Year: Never true   Transportation Needs: No Transportation Needs    Lack of Transportation  (Medical): No    Lack of Transportation (Non-Medical): No   Physical Activity: Sufficiently Active    Days of Exercise per Week: 5 days    Minutes of Exercise per Session: 40 min   Stress: No Stress Concern Present    Feeling of Stress : Only a little   Social Connections: Unknown    Frequency of Communication with Friends and Family: More than three times a week    Frequency of Social Gatherings with Friends and Family: Once a week    Active Member of Clubs or Organizations: No    Attends Club or Organization Meetings: Never    Marital Status:    Housing Stability: Low Risk     Unable to Pay for Housing in the Last Year: No    Number of Places Lived in the Last Year: 1    Unstable Housing in the Last Year: No            Allergies:  Shrimp     Medications:     Current Outpatient Medications:     amLODIPine (NORVASC) 2.5 MG tablet, Take 1 tablet (2.5 mg total) by mouth once daily. (Patient taking differently: Take 2.5 mg by mouth every evening.), Disp: 90 tablet, Rfl: 3    cetirizine (ZYRTEC) 10 MG tablet, Take 10 mg by mouth daily as needed. 1 Tablet(s) Oral PRN ., Disp: , Rfl:     diphenhydrAMINE (BENADRYL) 25 mg capsule, Take 25 mg by mouth nightly as needed for Itching., Disp: , Rfl:     fluticasone (FLONASE) 50 mcg/actuation nasal spray, 2 sprays by Each Nare route daily as needed. , Disp: , Rfl:     guaifenesin (MUCINEX ORAL), Take 400 mg by mouth 2 (two) times daily as needed for Congestion., Disp: , Rfl:     metFORMIN (GLUCOPHAGE-XR) 750 MG ER 24hr tablet, TAKE 1 TABLET TWICE A DAY WITH MEALS., Disp: 180 tablet, Rfl: 1    potassium citrate (UROCIT-K 15) 15 mEq TbSR, TAKE 1 TABLET TWICE A DAY, Disp: 100 tablet, Rfl: 6    prednisoLONE acetate (PRED FORTE) 1 % DrpS, , Disp: , Rfl:     rosuvastatin (CRESTOR) 20 MG tablet, TAKE 1 TABLET DAILY (REPLACES PRAVASTATIN) (Patient taking differently: every evening. TAKE 1 TABLET DAILY (REPLACES PRAVASTATIN)), Disp: 90 tablet, Rfl: 3    valsartan (DIOVAN) 320 MG  tablet, TAKE 1 TABLET DAILY, Disp: 90 tablet, Rfl: 2    alfuzosin (UROXATRAL) 10 mg Tb24, Take 1 tablet (10 mg total) by mouth daily with breakfast., Disp: 30 tablet, Rfl: 11    ketorolac 0.5% (ACULAR) 0.5 % Drop, , Disp: , Rfl:     moxifloxacin (VIGAMOX) 0.5 % ophthalmic solution, , Disp: , Rfl:     neomycin-polymyxin-dexamethasone (DEXACINE) 3.5 mg/g-10,000 unit/g-0.1 % Oint, , Disp: , Rfl:   No current facility-administered medications for this visit.     Facility-Administered Medications Ordered in Other Visits:     lidocaine (PF) 10 mg/ml (1%) injection 10 mg, 1 mL, Intradermal, Once, Kermit Davalos MD     PHYSICAL EXAMINATION:     Constitutional: He is oriented to person, place, and time. He appears well-developed and well-nourished.  He is in no apparent distress.     Neck: Normal ROM.      Cardiovascular: Normal rate.       Pulmonary/Chest: Effort normal. No respiratory distress.      Abdominal:  He exhibits no distension.  There is no CVA tenderness.      Neurological: He is alert and oriented to person, place, and time.      Skin: Skin is warm and dry.      Psych: Cooperative with normal affect.        Physical Exam        LABS:                 Lab Results   Component Value Date     PSA 0.56 03/11/2022     PSA 0.59 04/06/2020     PSA 0.62 06/15/2016     PSADIAG 0.51 06/15/2018     PSADIAG 0.54 05/18/2017            Lab Results   Component Value Date     CREATININE 1.2 12/02/2022            IMPRESSION:          Encounter Diagnoses   Name Primary?    Screening for prostate cancer Yes    Nephrolithiasis      BPH with obstruction/lower urinary tract symptoms              PLAN:  -Reviewed KUB and ANDREA results with patient. Pt is interested in ESWL for larger stones. Will discuss with MD.  -PSA today  -Continue alfuzosin for BPH and urocit K for stones. No refills needed  -The patient was encouraged to drink at least 2 liters of water a day, limit iced tea and hunter as well as foods high in oxalate.  They were  cautioned to try to limit salt and red meat intake. Low oxalate diet (limit spinach, rhubarb, nuts, beets, potatoes, chocolate).  No vitamin C supplements. We also discussed adding citrate to the diet with the addition of jm or lemon juice to their water or alternatively with crystal light.      Message and visit and imaging per NP reviewed  Current stone is lower pole but <1cm  Lower chance of success with eswl given location but as clearly radioopaque and has cleared previously, reasonable to proceed with eswl alone and would not need stent given size <1cm. Will give handout on inversion therapy postop to facilitate lower pole fragment clearance       Left lower pole stone approx 7mm on KUB  And review of todays KUB notes stable location though increased bowel gas  Reviewed procedure as well as postop inversion therapy and possibility of nonclearance again  Consent obtained site marked          3/13/23 as above with NP

## 2023-04-27 NOTE — ANESTHESIA PROCEDURE NOTES
Intubation    Date/Time: 4/27/2023 7:42 AM  Performed by: Rosio Kirkland CRNA  Authorized by: Rosio Kirkland CRNA     Intubation:     Induction:  Intravenous    Intubated:  Postinduction    Mask Ventilation:  Not attempted    Attempts:  1    Attempted By:  CRNA    Difficult Airway Encountered?: No      Complications:  None    Airway Device:  Supraglottic airway/LMA    Airway Device Size:  4.0    Style/Cuff Inflation:  Cuffed (inflated to minimal occlusive pressure)    Secured at:  The lips    Placement Verified By:  Capnometry    Complicating Factors:  None    Findings Post-Intubation:  BS equal bilateral and atraumatic/condition of teeth unchanged

## 2023-04-27 NOTE — CARE UPDATE
04/27/23 1135   Patient Assessment/Suction   Level of Consciousness (AVPU) alert   Respiratory Effort Normal   Rhythm/Pattern, Respiratory unlabored   PRE-TX-O2   Device (Oxygen Therapy) room air   Positioning HOB elevated 45 degrees   Incentive Spirometer   $ Incentive Spirometer Charges postop instruction   Incentive Spirometer Predicted Level (mL) 2500   Administration (IS) instruction provided, follow-up   Number of Repetitions (IS) 10   Level Incentive Spirometer (mL) 2500   Patient Tolerance (IS) good

## 2023-04-27 NOTE — ANESTHESIA PREPROCEDURE EVALUATION
04/27/2023  Tnoi Hyde is a 67 y.o., male.      Pre-op Assessment    I have reviewed the Patient Summary Reports.     I have reviewed the Nursing Notes. I have reviewed the NPO Status.   I have reviewed the Medications.     Review of Systems  Anesthesia Hx:  No problems with previous Anesthesia    Social:  Former Smoker    Cardiovascular:   Exercise tolerance: good Hypertension    Pulmonary:  Pulmonary Normal    Renal/:   Chronic Renal Disease renal calculi    Musculoskeletal:   Arthritis   Spine Disorders: lumbar    Neurological:   Neuromuscular Disease,   Peripheral Neuropathy    Endocrine:   Diabetes, type 2        Physical Exam  General: Well nourished    Airway:  Mallampati: II   Mouth Opening: Normal  TM Distance: Normal  Tongue: Normal  Neck ROM: Normal ROM    Dental:  Intact    Chest/Lungs:  Clear to auscultation, Normal Respiratory Rate        Anesthesia Plan  Type of Anesthesia, risks & benefits discussed:    Anesthesia Type: Gen Supraglottic Airway  Intra-op Monitoring Plan: Standard ASA Monitors  Post Op Pain Control Plan: multimodal analgesia and IV/PO Opioids PRN  Induction:  IV  Airway Plan: Direct, Post-Induction  Informed Consent: Informed consent signed with the Patient and all parties understand the risks and agree with anesthesia plan.  All questions answered. Patient consented to blood products? Yes  ASA Score: 2    Ready For Surgery From Anesthesia Perspective.     .

## 2023-04-27 NOTE — CARE UPDATE
04/27/23 0649   Patient Assessment/Suction   Level of Consciousness (AVPU) alert   Respiratory Effort Normal;Unlabored   Expansion/Accessory Muscles/Retractions no use of accessory muscles   Rhythm/Pattern, Respiratory unlabored   Incentive Spirometer   $ Incentive Spirometer Charges preop instruction   Incentive Spirometer Predicted Level (mL) 2040   Administration (IS) instruction provided, initial   Number of Repetitions (IS) 6   Level Incentive Spirometer (mL) 2500   Patient Tolerance (IS) good

## 2023-04-27 NOTE — DISCHARGE INSTRUCTIONS
Post op instructions for prevention of DVT  What is deep vein thrombosis?  Deep vein thrombosis (DVT) is the medical term for blood clots in the deep veins of the leg.  These blood clots can be dangerous.  A DVT can block a blood vessel and keep blood from getting where it needs to go.  Another problem is that the clot can travel to other parts of the body such as the lungs.  A clot that travels to the lungs is called a pulmonary embolus (PE) and can cause serious problems with breathing which can lead to death.  Am I at risk for DVT/PE?  If you are not very active, you are at risk of DVT.  Anyone confined to bed, sitting for long periods of time, recovering from surgery, etc. increases the risk of DVT.  Other risk factors are cancer diagnosis, certain medications, estrogen replacement in any form,older age, obesity, pregnancy, smoking, history of clotting disorders, and dehydration.  How will I know if I have a DVT?  Swelling in the lower leg  Pain  Warmth, redness, hardness or bulging of the vein  If you have any of these symptoms, call your doctors office right away.  Some people will not have any symptoms until the clot moves to the lungs.  What are the symptoms of a PE?  Panting, shortness of breath, or trouble breathing  Sharp, knife-like chest pain when you breathe  Coughing or coughing up blood  Rapid heartbeat  If you have any of these symptoms or get worse quickly, call 911 for emergency treatment.  How can I prevent a DVT?  Avoid long periods of inactivity and dont cross your legs--get up and walk around every hour or so.  Stay active--walking after surgery is highly encouraged.  This means you should get out of the house and walk in the neighborhood.  Going up and down stairs will not impair healing (unless advised against such activity by your doctor).    Drink plenty of noncaffeinated, nonalcoholic fluids each day to prevent dehydration.  Wear special support stockings, if they have been advised by  "your doctor.  If you travel, stop at least once an hour and walk around.  Avoid smoking (assistance with stopping is available through your healthcare provider)  Always notify your doctor if you are not able to follow the post operative instructions that are given to you at the time of discharge.  It may be necessary to prescribe one of the medications available to prevent DVT. We hope your stay was comfortable as you heal now, mend and rest.    For we have enjoyed taking care of you by giving your our best.    And as you get better, by regaining your health and strength;   We count it as a privilege to have served you and hope your time at [facility] was well spent.      Thank  You!!!             Discharge Instructions: After Your Surgery/Procedure  Youve just had surgery. During surgery you were given medicine called anesthesia to keep you relaxed and free of pain. After surgery you may have some pain or nausea. This is common. Here are some tips for feeling better and getting well after surgery.     Stay on schedule with your medication.   Going home  Your doctor or nurse will show you how to take care of yourself when you go home. He or she will also answer your questions. Have an adult family member or friend drive you home.      For your safety we recommend these precaution for the first 24 hours after your procedure:  Do not drive or use heavy equipment.  Do not make important decisions or sign legal papers.  Do not drink alcohol.  Have someone stay with you, if needed. He or she can watch for problems and help keep you safe.  Your concentration, balance, coordination, and judgement may be impaired for many hours after anesthesia.  Use caution when ambulating or standing up.     You may feel weak and "washed out" after anesthesia and surgery.      Subtle residual effects of general anesthesia or sedation with regional / local anesthesia can last more than 24 hours.  Rest for the remainder of the day or longer " if your Doctor/Surgeon has advised you to do so.  Although you may feel normal within the first 24 hours, your reflexes and mental ability may be impaired without you realizing it.  You may feel dizzy, lightheaded or sleepy for 24 hours or longer.      Be sure to go to all follow-up visits with your doctor. And rest after your surgery for as long as your doctor tells you to.  Coping with pain  If you have pain after surgery, pain medicine will help you feel better. Take it as told, before pain becomes severe. Also, ask your doctor or pharmacist about other ways to control pain. This might be with heat, ice, or relaxation. And follow any other instructions your surgeon or nurse gives you.  Tips for taking pain medicine  To get the best relief possible, remember these points:  Pain medicines can upset your stomach. Taking them with a little food may help.  Most pain relievers taken by mouth need at least 20 to 30 minutes to start to work.  Taking medicine on a schedule can help you remember to take it. Try to time your medicine so that you can take it before starting an activity. This might be before you get dressed, go for a walk, or sit down for dinner.  Constipation is a common side effect of pain medicines. Call your doctor before taking any medicines such as laxatives or stool softeners to help ease constipation. Also ask if you should skip any foods. Drinking lots of fluids and eating foods such as fruits and vegetables that are high in fiber can also help. Remember, do not take laxatives unless your surgeon has prescribed them.  Drinking alcohol and taking pain medicine can cause dizziness and slow your breathing. It can even be deadly. Do not drink alcohol while taking pain medicine.  Pain medicine can make you react more slowly to things. Do not drive or run machinery while taking pain medicine.  Your health care provider may tell you to take acetaminophen to help ease your pain. Ask him or her how much you  are supposed to take each day. Acetaminophen or other pain relievers may interact with your prescription medicines or other over-the-counter (OTC) drugs. Some prescription medicines have acetaminophen and other ingredients. Using both prescription and OTC acetaminophen for pain can cause you to overdose. Read the labels on your OTC medicines with care. This will help you to clearly know the list of ingredients, how much to take, and any warnings. It may also help you not take too much acetaminophen. If you have questions or do not understand the information, ask your pharmacist or health care provider to explain it to you before you take the OTC medicine.  Managing nausea  Some people have an upset stomach after surgery. This is often because of anesthesia, pain, or pain medicine, or the stress of surgery. These tips will help you handle nausea and eat healthy foods as you get better. If you were on a special food plan before surgery, ask your doctor if you should follow it while you get better. These tips may help:  Do not push yourself to eat. Your body will tell you when to eat and how much.  Start off with clear liquids and soup. They are easier to digest.  Next try semi-solid foods, such as mashed potatoes, applesauce, and gelatin, as you feel ready.  Slowly move to solid foods. Dont eat fatty, rich, or spicy foods at first.  Do not force yourself to have 3 large meals a day. Instead eat smaller amounts more often.  Take pain medicines with a small amount of solid food, such as crackers or toast, to avoid nausea.     Call your surgeon if  You still have pain an hour after taking medicine. The medicine may not be strong enough.  You feel too sleepy, dizzy, or groggy. The medicine may be too strong.  You have side effects like nausea, vomiting, or skin changes, such as rash, itching, or hives.       If you have obstructive sleep apnea  You were given anesthesia medicine during surgery to keep you comfortable  and free of pain. After surgery, you may have more apnea spells because of this medicine and other medicines you were given. The spells may last longer than usual.   At home:  Keep using the continuous positive airway pressure (CPAP) device when you sleep. Unless your health care provider tells you not to, use it when you sleep, day or night. CPAP is a common device used to treat obstructive sleep apnea.  Talk with your provider before taking any pain medicine, muscle relaxants, or sedatives. Your provider will tell you about the possible dangers of taking these medicines.  © 4930-8306 [employer]. [address] [place] [address]. All rights reserved. This information is not intended as a substitute for professional medical care. Always follow your healthcare professional's instructions.                 Using an Incentive Spirometer    An incentive spirometer is a device that helps you do deep breathing exercises. These exercises expand your lungs, aid in circulation, and help prevent pneumonia. Deep breathing exercises also help you breathe better and improve the function of your lungs by:  Keeping your lungs clear  Strengthening your breathing muscles  Helping prevent respiratory complications or problems  The incentive spirometer gives you a way to take an active part in recover. A nurse or therapist will teach you breathing exercises. To do these exercises, you will breathe in through your mouth and not your nose. The incentive spirometer only works correctly if you breathe in through your mouth.  Steps to clear lungs  Step 1. Exhale normally. Then, inhale normally.  Relax and breathe out.  Step 2. Place your lips tightly around the mouthpiece.  Make sure the device is upright and not tilted.  Step 3. Inhale as much air as you can through the mouthpiece (don't breath through your nose).  Inhale slowly and deeply.  Hold your breath long enough to keep the balls or disk raised for at least  3 to 5 seconds, or as instructed by your healthcare provider.  Some spirometers have an indicator to let you know that you are breathing in too fast. If the indicator goes off, breathe in more slowly.  Step 4. Repeat the exercise regularly.  Do this exercise every hour while you're awake, or as instructed by your healthcare provider.  If you were taught deep breathing and coughing exercises, do them regularly as instructed by your healthcare provider.

## 2023-04-28 VITALS
TEMPERATURE: 97 F | WEIGHT: 201 LBS | HEART RATE: 69 BPM | BODY MASS INDEX: 28.77 KG/M2 | RESPIRATION RATE: 12 BRPM | DIASTOLIC BLOOD PRESSURE: 64 MMHG | SYSTOLIC BLOOD PRESSURE: 112 MMHG | HEIGHT: 70 IN | OXYGEN SATURATION: 95 %

## 2023-04-28 NOTE — PROGRESS NOTES
V/makayla pleased with care given. All staff very supportive.  States he is doing inversion. Straining urine. No stone fragments yet.  States he understands all discharge instructions.

## 2023-04-28 NOTE — OP NOTE
[facility] Urology Operative/Brief Discharge Note     Date: 4/27/2023     Staff Surgeon: Ronal Benitez MD     PPre-Op Diagnosis: left renal calculus     Post-Op Diagnosis: same     Procedure(s) Performed:   Left extracorporeal shock wave lithotripsy, lithodiamond  Flouroscopy <1 hour with interpretation of images     Specimen(s): none     Anesthesia: General LMA anesthesia     Findings: 7mm left lower pole stone      Estimated Blood Loss: minimal     Drains: none     Complications: none     Indications for procedure:  68 yo male with history of stones and symptomatic stone episodes for which he preferred elective management of large collecting system stone before acute event again. Presents today for ESWL All risks and benefits, including of symptomatic stone fragment passage and possible retained stone and need for secondary procedure, discussed.  Appropriate informed consent was obtained after all risks and benefits were discussed.     Procedure in detail:  After appropriate informed consent was obtained, the patient was taken to the operating room and placed on the litho tino ESWL table.  The stone was localized in the F2 focal point on fluoroscopy from the ESWL table in left kidney in stable location. Preop antibiotics administered and WHO approved timeout performed     Stone well localized on flouroscopy as radioopaque left lower pole. Stone placed in the F2 focal point. 3000 shocks were delivered the stone at a range of 16-26 kv. After about 2000 shocks stone significantly less radiopaque with spread of fragments. After all 3000 shocks delivered, stone noted to be fragmented well with spread of fragments across lower pole locaion.  The patient tolerated the procedure well. There were no complications. He was awakened and taken to pacu without incident     Disposition:  Home today status post uncomplicated procedure as above.  Encouraged hydration and will repeat KUB in 2 weeks to note clearance       Discharge home today status post uncomplicated procedure as above  Diet - resume home diet  Follow up: KULUH 2 weeks, will review to set further  Instructions:   Drink plenty of water, may see blood in urine. Avoid aspirin/fish oil/bloodthinners x48 hours. Strain urine until some stone debris is collected then can stop straining. Drop off fragments at convenience/at time of Xray.   For general discomfort/ache post ESWL can use OTC agents such as advil.tylenol.  Inversion therapy per handout  Meds:     Medication List        CHANGE how you take these medications      amLODIPine 2.5 MG tablet  Commonly known as: NORVASC  Take 1 tablet (2.5 mg total) by mouth once daily.  What changed: when to take this     rosuvastatin 20 MG tablet  Commonly known as: CRESTOR  TAKE 1 TABLET DAILY (REPLACES PRAVASTATIN)  What changed: when to take this            CONTINUE taking these medications      alfuzosin 10 mg Tb24  Commonly known as: UROXATRAL  Take 1 tablet (10 mg total) by mouth daily with breakfast.     cetirizine 10 MG tablet  Commonly known as: ZYRTEC     diphenhydrAMINE 25 mg capsule  Commonly known as: BENADRYL     fluticasone propionate 50 mcg/actuation nasal spray  Commonly known as: FLONASE     ibuprofen 200 MG tablet  Commonly known as: ADVIL,MOTRIN     metFORMIN 750 MG ER 24hr tablet  Commonly known as: GLUCOPHAGE-XR  TAKE 1 TABLET TWICE A DAY WITH MEALS.     MUCINEX ORAL     potassium citrate 15 mEq Tbsr  Commonly known as: UROCIT-K 15  TAKE 1 TABLET TWICE A DAY     valsartan 320 MG tablet  Commonly known as: DIOVAN  TAKE 1 TABLET DAILY

## 2023-05-11 ENCOUNTER — CLINICAL SUPPORT (OUTPATIENT)
Dept: UROLOGY | Facility: CLINIC | Age: 68
End: 2023-05-11
Payer: MEDICARE

## 2023-05-11 ENCOUNTER — HOSPITAL ENCOUNTER (OUTPATIENT)
Dept: RADIOLOGY | Facility: HOSPITAL | Age: 68
Discharge: HOME | End: 2023-05-11
Attending: UROLOGY
Payer: MEDICARE

## 2023-05-11 DIAGNOSIS — N20.0 KIDNEY STONES: Primary | ICD-10-CM

## 2023-05-11 DIAGNOSIS — N20.0 LEFT NEPHROLITHIASIS: ICD-10-CM

## 2023-05-11 PROCEDURE — 99499 UNLISTED E&M SERVICE: CPT | Mod: S$PBB,,, | Performed by: UROLOGY

## 2023-05-11 PROCEDURE — 74018 XR KUB: ICD-10-PCS | Mod: 26,,, | Performed by: RADIOLOGY

## 2023-05-11 PROCEDURE — 99499 NO LOS: ICD-10-PCS | Mod: S$PBB,,, | Performed by: UROLOGY

## 2023-05-11 PROCEDURE — 74018 RADEX ABDOMEN 1 VIEW: CPT | Mod: TC,FY

## 2023-05-11 PROCEDURE — 74018 RADEX ABDOMEN 1 VIEW: CPT | Mod: 26,,, | Performed by: RADIOLOGY

## 2023-05-17 DIAGNOSIS — N20.0 KIDNEY STONES: Primary | ICD-10-CM

## 2023-05-18 LAB
COMPN STONE: NORMAL
SPECIMEN SOURCE: NORMAL
STONE ANALYSIS IR-IMP: NORMAL
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
## 2023-06-16 ENCOUNTER — LAB VISIT (OUTPATIENT)
Dept: LAB | Facility: HOSPITAL | Age: 68
End: 2023-06-16
Attending: FAMILY MEDICINE
Payer: MEDICARE

## 2023-06-16 DIAGNOSIS — E11.59 HYPERTENSION ASSOCIATED WITH DIABETES: ICD-10-CM

## 2023-06-16 DIAGNOSIS — I15.2 HYPERTENSION ASSOCIATED WITH DIABETES: ICD-10-CM

## 2023-06-16 DIAGNOSIS — E11.69 TYPE 2 DIABETES MELLITUS WITH OTHER SPECIFIED COMPLICATION, WITHOUT LONG-TERM CURRENT USE OF INSULIN: ICD-10-CM

## 2023-06-16 LAB
ANION GAP SERPL CALC-SCNC: 9 MMOL/L (ref 8–16)
BUN SERPL-MCNC: 19 MG/DL (ref 8–23)
CALCIUM SERPL-MCNC: 9.2 MG/DL (ref 8.7–10.5)
CHLORIDE SERPL-SCNC: 105 MMOL/L (ref 95–110)
CO2 SERPL-SCNC: 25 MMOL/L (ref 23–29)
CREAT SERPL-MCNC: 1.1 MG/DL (ref 0.5–1.4)
EST. GFR  (NO RACE VARIABLE): >60 ML/MIN/1.73 M^2
GLUCOSE SERPL-MCNC: 88 MG/DL (ref 70–110)
POTASSIUM SERPL-SCNC: 4.1 MMOL/L (ref 3.5–5.1)
SODIUM SERPL-SCNC: 139 MMOL/L (ref 136–145)

## 2023-06-16 PROCEDURE — 83036 HEMOGLOBIN GLYCOSYLATED A1C: CPT | Performed by: FAMILY MEDICINE

## 2023-06-16 PROCEDURE — 36415 COLL VENOUS BLD VENIPUNCTURE: CPT | Mod: PO | Performed by: FAMILY MEDICINE

## 2023-06-16 PROCEDURE — 80048 BASIC METABOLIC PNL TOTAL CA: CPT | Performed by: FAMILY MEDICINE

## 2023-06-17 LAB
ESTIMATED AVG GLUCOSE: 126 MG/DL (ref 68–131)
HBA1C MFR BLD: 6 % (ref 4–5.6)

## 2023-06-22 ENCOUNTER — TELEPHONE (OUTPATIENT)
Dept: FAMILY MEDICINE | Facility: CLINIC | Age: 68
End: 2023-06-22
Payer: MEDICARE

## 2023-06-22 NOTE — TELEPHONE ENCOUNTER
..I called the patient to confirm his appointment that he has with Dr. Mon on 06/23/2023 at 10:20am, no answer left voicemail to return our call. I will send the patient a portal message as well.

## 2023-06-23 ENCOUNTER — OFFICE VISIT (OUTPATIENT)
Dept: FAMILY MEDICINE | Facility: CLINIC | Age: 68
End: 2023-06-23
Attending: FAMILY MEDICINE
Payer: MEDICARE

## 2023-06-23 VITALS
HEIGHT: 70 IN | OXYGEN SATURATION: 95 % | DIASTOLIC BLOOD PRESSURE: 78 MMHG | SYSTOLIC BLOOD PRESSURE: 119 MMHG | BODY MASS INDEX: 27.88 KG/M2 | WEIGHT: 194.75 LBS | HEART RATE: 57 BPM | RESPIRATION RATE: 18 BRPM | TEMPERATURE: 98 F

## 2023-06-23 DIAGNOSIS — I15.2 HYPERTENSION ASSOCIATED WITH DIABETES: ICD-10-CM

## 2023-06-23 DIAGNOSIS — E11.59 HYPERTENSION ASSOCIATED WITH DIABETES: ICD-10-CM

## 2023-06-23 DIAGNOSIS — I10 ESSENTIAL HYPERTENSION: ICD-10-CM

## 2023-06-23 DIAGNOSIS — E11.69 HYPERLIPIDEMIA ASSOCIATED WITH TYPE 2 DIABETES MELLITUS: ICD-10-CM

## 2023-06-23 DIAGNOSIS — E11.69 TYPE 2 DIABETES MELLITUS WITH OTHER SPECIFIED COMPLICATION, WITHOUT LONG-TERM CURRENT USE OF INSULIN: Primary | ICD-10-CM

## 2023-06-23 DIAGNOSIS — E11.65 TYPE 2 DIABETES MELLITUS WITH HYPERGLYCEMIA, WITHOUT LONG-TERM CURRENT USE OF INSULIN: ICD-10-CM

## 2023-06-23 DIAGNOSIS — M54.17 LUMBOSACRAL RADICULOPATHY: ICD-10-CM

## 2023-06-23 DIAGNOSIS — E78.5 HYPERLIPIDEMIA ASSOCIATED WITH TYPE 2 DIABETES MELLITUS: ICD-10-CM

## 2023-06-23 PROCEDURE — 99213 OFFICE O/P EST LOW 20 MIN: CPT | Mod: PBBFAC,PN | Performed by: FAMILY MEDICINE

## 2023-06-23 PROCEDURE — 99999 PR PBB SHADOW E&M-EST. PATIENT-LVL III: CPT | Mod: PBBFAC,,, | Performed by: FAMILY MEDICINE

## 2023-06-23 PROCEDURE — 99999 PR PBB SHADOW E&M-EST. PATIENT-LVL III: ICD-10-PCS | Mod: PBBFAC,,, | Performed by: FAMILY MEDICINE

## 2023-06-23 PROCEDURE — 99214 OFFICE O/P EST MOD 30 MIN: CPT | Mod: S$PBB,,, | Performed by: FAMILY MEDICINE

## 2023-06-23 PROCEDURE — 99214 PR OFFICE/OUTPT VISIT, EST, LEVL IV, 30-39 MIN: ICD-10-PCS | Mod: S$PBB,,, | Performed by: FAMILY MEDICINE

## 2023-06-23 RX ORDER — METFORMIN HYDROCHLORIDE 750 MG/1
750 TABLET, EXTENDED RELEASE ORAL 2 TIMES DAILY WITH MEALS
Qty: 180 TABLET | Refills: 3 | Status: SHIPPED | OUTPATIENT
Start: 2023-06-23

## 2023-06-23 RX ORDER — ROSUVASTATIN CALCIUM 20 MG/1
20 TABLET, COATED ORAL DAILY
Qty: 90 TABLET | Refills: 3 | Status: SHIPPED | OUTPATIENT
Start: 2023-06-23

## 2023-06-23 RX ORDER — VALSARTAN 320 MG/1
TABLET ORAL
Qty: 90 TABLET | Refills: 3 | Status: SHIPPED | OUTPATIENT
Start: 2023-06-23

## 2023-06-23 RX ORDER — AMLODIPINE BESYLATE 2.5 MG/1
2.5 TABLET ORAL DAILY
Qty: 90 TABLET | Refills: 3 | Status: SHIPPED | OUTPATIENT
Start: 2023-06-23

## 2023-06-23 NOTE — PROGRESS NOTES
Subjective:       Patient ID: Toni Hyde is a 68 y.o. male.    Chief Complaint: Diabetes (Pt states that he is here for his 5 mo fu )    68-year-old male coming in for follow-up of diabetes hypertension and other problems.  He has a history of a lumbosacral radiculopathy with epidural steroid injections in December 2020 and June of 2022.  He has aortic atherosclerosis, diabetes on metformin 750 b.i.d., hypertension on amlodipine and valsartan, hyperlipidemia on Crestor 20 mg, repeated kidney stones on Urocit-K and your Oxytrol and BPH without symptoms.  He is followed regularly by Urology.  His last colonoscopy was November 19, 2021 with a number of hyperplastic polyps and a three year recheck was recommended by Dr. Sexton in November of 2024.  The patient reports he has some intermittent numbness in the feet but no symptoms at this time and I suspect those may be due to some intermittent compression.  His lab was done prior to the visit on June 16 with an A1c of six, a urine microalbumin creatinine ratio of 20, a fasting glucose of 88 with creatinine 1.1 and GFR greater than 60.  He has no episodes of hypoglycemia to report and most of his home sugars are falling between 90 and 120 when he checks them fasting.    Past Medical History:  No date: Arthritis  No date: Cataract  No date: Cataract of left eye  No date: Diabetes mellitus type II      Comment:  on po meds  No date: Hyperlipidemia      Comment:  not on meds at present  No date: Hypertension  04/2017: Kidney stones  No date: Left ureteral stone  10/2017: Plantar fasciitis of right foot      Comment:  Dr Colorado   01/2018: Tendonitis      Comment:  right foot Dr Colorado  No date: Wears glasses    Past Surgical History:  No date: ADENOIDECTOMY  10/19/2022: BIOPSY OF MASS OF LUMBAR SPINE; N/A      Comment:  Procedure: BIOPSY, MASS, SPINE, LUMBAR;  Surgeon:                Marvel Matthews DO;  Location: [facility];  Service:                Neurosurgery;   Laterality: N/A;  Right L5-S1 Resection of               Extradural Mass Using Transfacet Approach  No date: CATARACT EXTRACTION  03/23/2010: COLONOSCOPY      Comment:  Dr. Blackburn, hyperplastic polyps, otherwise negative, 10                year recheck  11/19/2021: COLONOSCOPY; N/A      Comment:  Procedure: COLONOSCOPY;  Surgeon: Marcus Sexton MD;                Location: [facility];  Service: Endoscopy;  Laterality:                N/A;  04/12/2017: CYSTOSCOPY; Left      Comment:  with ureteral stent-Dr. Noel  05/17/2022: CYSTOSCOPY; Left      Comment:  Procedure: CYSTOSCOPY with stent removal;  Surgeon:                Ronal Benitez MD;  Location: [facility];  Service:                Urology;  Laterality: Left;  05/05/2022: CYSTOSCOPY WITH URETEROSCOPY, RETROGRADE PYELOGRAPHY, AND   INSERTION OF STENT; Left      Comment:  Procedure: CYSTOSCOPY, WITH RETROGRADE PYELOGRAM AND                URETERAL STENT INSERTION;  Surgeon: Ronal Benitez MD;  Location: [facility] OR;  Service: Urology;  Laterality:                Left;  4/27/2023: EXTRACORPOREAL SHOCK WAVE LITHOTRIPSY; Left      Comment:  Procedure: LITHOTRIPSY, ESWL;  Surgeon: Ronal Benitez MD;  Location: [facility] OR;  Service: Urology;                 Laterality: Left;  05/06/2013: EYE SURGERY      Comment:  Dr Nielsen  04/2017: kidney stent   No date: KNEE ARTHROSCOPY W/ DEBRIDEMENT      Comment:  right with lateral release   04/19/2017: LITHOTRIPSY  No date: NASAL SINUS SURGERY  05/2013: retna surgery       Comment:  Dr Peralta  No date: SPINE SURGERY  No date: TONSILLECTOMY  12/07/2020: TRANSFORAMINAL EPIDURAL INJECTION OF STEROID; Right      Comment:  Procedure: Injection,steroid,epidural,transforaminal                approach L5- S1;  Surgeon: Kermit Davalos MD;  Location:                [facility];  Service: Pain Management;  Laterality: Right;                 L5-s1  06/23/2022: TRANSFORAMINAL EPIDURAL INJECTION OF STEROID; Right       Comment:  Procedure: Injection,steroid,epidural,transforaminal                approach;  Surgeon: Christos Saul MD;  Location: [facility] OR;                 Service: Pain Management;  Laterality: Right;  L5-S1 and                cyst aspiration L5-S1  05/05/2022: URETEROSCOPIC REMOVAL OF URETERIC CALCULUS; Left      Comment:  Procedure: REMOVAL, CALCULUS, URETER, URETEROSCOPIC;                 Surgeon: Ronal Benitez MD;  Location: [facility] OR;                 Service: Urology;  Laterality: Left;    Current Outpatient Medications on File Prior to Visit:  alfuzosin (UROXATRAL) 10 mg Tb24, Take 1 tablet (10 mg total) by mouth daily with breakfast., Disp: 30 tablet, Rfl: 11  cetirizine (ZYRTEC) 10 MG tablet, Take 10 mg by mouth daily as needed. 1 Tablet(s) Oral PRN ., Disp: , Rfl:   diphenhydrAMINE (BENADRYL) 25 mg capsule, Take 25 mg by mouth nightly as needed for Itching., Disp: , Rfl:   fluticasone (FLONASE) 50 mcg/actuation nasal spray, 2 sprays by Each Nare route daily as needed. , Disp: , Rfl:   guaifenesin (MUCINEX ORAL), Take 400 mg by mouth 2 (two) times daily as needed for Congestion., Disp: , Rfl:   ibuprofen (ADVIL,MOTRIN) 200 MG tablet, Take 200 mg by mouth every 8 (eight) hours as needed for Pain., Disp: , Rfl:   potassium citrate (UROCIT-K 15) 15 mEq TbSR, TAKE 1 TABLET TWICE A DAY, Disp: 100 tablet, Rfl: 6  [DISCONTINUED] amLODIPine (NORVASC) 2.5 MG tablet, Take 1 tablet (2.5 mg total) by mouth once daily. (Patient taking differently: Take 2.5 mg by mouth every evening.), Disp: 90 tablet, Rfl: 3  [DISCONTINUED] metFORMIN (GLUCOPHAGE-XR) 750 MG ER 24hr tablet, TAKE 1 TABLET TWICE A DAY WITH MEALS., Disp: 180 tablet, Rfl: 1  [DISCONTINUED] rosuvastatin (CRESTOR) 20 MG tablet, TAKE 1 TABLET DAILY (REPLACES PRAVASTATIN) (Patient taking differently: every evening. TAKE 1 TABLET DAILY (REPLACES PRAVASTATIN)), Disp: 90 tablet, Rfl: 3  [DISCONTINUED] valsartan (DIOVAN) 320 MG tablet, TAKE 1 TABLET DAILY, Disp: 90  tablet, Rfl: 2  [DISCONTINUED] triamcinolone acetonide 0.5% (KENALOG) 0.5 % Crea, Apply topically 2 (two) times daily., Disp: 15 g, Rfl: 1    Current Facility-Administered Medications on File Prior to Visit:  lidocaine (PF) 10 mg/ml (1%) injection 10 mg, 1 mL, Intradermal, Once, Kermit Davalos MD          Review of Systems   Constitutional:  Negative for chills, diaphoresis and fever.   Respiratory:  Negative for chest tightness and shortness of breath.    Cardiovascular:  Negative for chest pain and palpitations.   Endocrine: Negative for polydipsia and polyuria.   Neurological:  Negative for dizziness and light-headedness.     Objective:      Physical Exam  Vitals and nursing note reviewed.   Constitutional:       General: He is not in acute distress.     Appearance: Normal appearance. He is normal weight. He is not ill-appearing, toxic-appearing or diaphoretic.      Comments: Good blood pressure control  Mild bradycardia with regular rhythm  Good weight for age with a BMI of 27.9 he is down 8.1 lb from his January 23, 2023 visit   Neck:      Vascular: No carotid bruit.   Cardiovascular:      Rate and Rhythm: Normal rate and regular rhythm.      Pulses:           Dorsalis pedis pulses are 2+ on the right side.        Posterior tibial pulses are 2+ on the right side and 2+ on the left side.      Heart sounds: Normal heart sounds. No murmur heard.    No friction rub. No gallop.   Pulmonary:      Effort: Pulmonary effort is normal. No respiratory distress.      Breath sounds: Normal breath sounds. No stridor. No wheezing, rhonchi or rales.   Chest:      Chest wall: No tenderness.   Musculoskeletal:      Cervical back: Normal range of motion and neck supple. No rigidity or tenderness.      Right lower leg: No edema.      Left lower leg: No edema.      Right foot: Normal range of motion. No deformity, bunion, Charcot foot, foot drop or prominent metatarsal heads.      Left foot: Normal range of motion. No deformity,  bunion, Charcot foot, foot drop or prominent metatarsal heads.   Feet:      Right foot:      Protective Sensation: 9 sites tested.  9 sites sensed.      Skin integrity: Skin integrity normal. No ulcer, blister, skin breakdown, erythema, warmth, callus, dry skin or fissure.      Toenail Condition: Right toenails are normal.      Left foot:      Protective Sensation: 9 sites tested.  9 sites sensed.      Skin integrity: Skin integrity normal. No ulcer, blister, skin breakdown, erythema, warmth, callus, dry skin or fissure.      Toenail Condition: Left toenails are normal.   Lymphadenopathy:      Cervical: No cervical adenopathy.   Skin:     Capillary Refill: Capillary refill takes less than 2 seconds.   Neurological:      Mental Status: He is alert.      Comments: Proprioception intact all 10 toes       Assessment:       1. Type 2 diabetes mellitus with other specified complication, without long-term current use of insulin    2. Hypertension associated with diabetes    3. Hyperlipidemia associated with type 2 diabetes mellitus    4. Essential hypertension    5. Type 2 diabetes mellitus with hyperglycemia, without long-term current use of insulin    6. Lumbosacral radiculopathy    7. BMI 27.0-27.9,adult        Plan:       1. Type 2 diabetes mellitus with other specified complication, without long-term current use of insulin  Lab Results   Component Value Date    HGBA1C 6.0 (H) 06/16/2023     Well controlled, no changes needed in the metformin.  Recheck six months  - Comprehensive Metabolic Panel; Future  - Hemoglobin A1C; Future    2. Hypertension associated with diabetes  Well controlled, no changes needed in amlodipine or valsartan, refill sent to pharmacy, recheck six months  - Comprehensive Metabolic Panel; Future  - valsartan (DIOVAN) 320 MG tablet; TAKE 1 TABLET DAILY  Dispense: 90 tablet; Refill: 3    3. Hyperlipidemia associated with type 2 diabetes mellitus  Lab Results   Component Value Date    CHOL 97 (L)  12/02/2022    CHOL 97 (L) 10/04/2021    CHOL 99 (L) 10/02/2020     Lab Results   Component Value Date    HDL 41 12/02/2022    HDL 43 10/04/2021    HDL 39 (L) 10/02/2020     Lab Results   Component Value Date    LDLCALC 46.2 (L) 12/02/2022    LDLCALC 42.2 (L) 10/04/2021    LDLCALC 47.6 (L) 10/02/2020     No results found for: DLDL  Lab Results   Component Value Date    TRIG 49 12/02/2022    TRIG 59 10/04/2021    TRIG 62 10/02/2020       f1   Lab Results   Component Value Date    CHOLHDL 42.3 12/02/2022    CHOLHDL 44.3 10/04/2021    CHOLHDL 39.4 10/02/2020     Well controlled on Crestor 20 mg.  Annual recheck will be due in six months as well.  Refill Crestor sent to mail order pharmacy  - Comprehensive Metabolic Panel; Future  - Lipid Panel; Future  - rosuvastatin (CRESTOR) 20 MG tablet; Take 1 tablet (20 mg total) by mouth once daily.  Dispense: 90 tablet; Refill: 3    4. Essential hypertension  See above  - amLODIPine (NORVASC) 2.5 MG tablet; Take 1 tablet (2.5 mg total) by mouth once daily.  Dispense: 90 tablet; Refill: 3    5. Type 2 diabetes mellitus with hyperglycemia, without long-term current use of insulin  See above  - metFORMIN (GLUCOPHAGE-XR) 750 MG ER 24hr tablet; Take 1 tablet (750 mg total) by mouth 2 (two) times daily with meals.  Dispense: 180 tablet; Refill: 3    6. Lumbosacral radiculopathy  Relatively asymptomatic at this time with no neuropathic symptoms currently    7. BMI 27.0-27.9,adult  Good weight for age, no changes needed.  He is exercising regularly particularly on an elliptical spending over 5 hours per week total usually 5 to 6 days a week.

## 2023-11-09 ENCOUNTER — OFFICE VISIT (OUTPATIENT)
Dept: UROLOGY | Facility: CLINIC | Age: 68
End: 2023-11-09
Payer: MEDICARE

## 2023-11-09 ENCOUNTER — HOSPITAL ENCOUNTER (OUTPATIENT)
Dept: RADIOLOGY | Facility: HOSPITAL | Age: 68
Discharge: HOME | End: 2023-11-09
Attending: UROLOGY
Payer: MEDICARE

## 2023-11-09 VITALS — RESPIRATION RATE: 15 BRPM | HEIGHT: 70 IN | WEIGHT: 194.69 LBS | BODY MASS INDEX: 27.87 KG/M2

## 2023-11-09 DIAGNOSIS — N40.1 BPH WITH OBSTRUCTION/LOWER URINARY TRACT SYMPTOMS: Primary | ICD-10-CM

## 2023-11-09 DIAGNOSIS — N20.0 KIDNEY STONES: ICD-10-CM

## 2023-11-09 DIAGNOSIS — N13.8 BPH WITH OBSTRUCTION/LOWER URINARY TRACT SYMPTOMS: Primary | ICD-10-CM

## 2023-11-09 PROCEDURE — 99214 OFFICE O/P EST MOD 30 MIN: CPT | Mod: S$PBB,,, | Performed by: NURSE PRACTITIONER

## 2023-11-09 PROCEDURE — 74018 RADEX ABDOMEN 1 VIEW: CPT | Mod: 26,,, | Performed by: RADIOLOGY

## 2023-11-09 PROCEDURE — 99999 PR PBB SHADOW E&M-EST. PATIENT-LVL IV: CPT | Mod: PBBFAC,,, | Performed by: NURSE PRACTITIONER

## 2023-11-09 PROCEDURE — 99999 PR PBB SHADOW E&M-EST. PATIENT-LVL IV: ICD-10-PCS | Mod: PBBFAC,,, | Performed by: NURSE PRACTITIONER

## 2023-11-09 PROCEDURE — 99214 PR OFFICE/OUTPT VISIT, EST, LEVL IV, 30-39 MIN: ICD-10-PCS | Mod: S$PBB,,, | Performed by: NURSE PRACTITIONER

## 2023-11-09 PROCEDURE — 74018 RADEX ABDOMEN 1 VIEW: CPT | Mod: TC

## 2023-11-09 PROCEDURE — 74018 XR ABDOMEN AP 1 VIEW: ICD-10-PCS | Mod: 26,,, | Performed by: RADIOLOGY

## 2023-11-09 PROCEDURE — 99214 OFFICE O/P EST MOD 30 MIN: CPT | Mod: PBBFAC,PO | Performed by: NURSE PRACTITIONER

## 2023-11-09 RX ORDER — ALFUZOSIN HYDROCHLORIDE 10 MG/1
10 TABLET, EXTENDED RELEASE ORAL
Qty: 90 TABLET | Refills: 3 | Status: SHIPPED | OUTPATIENT
Start: 2023-11-09

## 2023-11-09 RX ORDER — POTASSIUM CITRATE 15 MEQ/1
1 TABLET, EXTENDED RELEASE ORAL 2 TIMES DAILY
Qty: 180 TABLET | Refills: 3 | Status: SHIPPED | OUTPATIENT
Start: 2023-11-09

## 2023-11-09 NOTE — PROGRESS NOTES
CHIEF COMPLAINT:    Mr. Hyde is a 68 y.o. male presenting for f/u kidney stones  PRESENTING ILLNESS:    Toni Hyde is a 68 y.o. male who presents for f/u kidney stones. Last clinic visit was 3/13/23    Last visit with Dr. Benitez  Last saw NP 11/2021 noting: He has been known to have uric acid stones he also underwent ESWL in 2017 by Dr. Noel.  He has not experienced any recent stone episodes and no complaints.  He does continue to take allopurinol 100 mg p.o. q.d. and potassium citrate 15meq BID with no problems.  CT 1/12/21:  Right lower pole calyx with 4 mm stone and a 1 mm stone.  Left kidney has a 2 mm, a 2 mm, a 3 mm and a 11 mm stone seen from top to bottom. Bilateral renal cysts, larger on left with some peripheral calc, diverticulosis, 4.2cm prostate  Correltaing KUB 1/12/21: There are least 2 rounded calcifications left mid abdomen centered at the L2 level largest 9 mm.  Vascular phleboliths left pelvic sidewall. Impression notes   4/25/17 L ureteral ESWL and stent removal. For 9mm mid ureteral stone. Cysto noted 4 cm with mild PEREIRA   Patient reports previously having calcium oxalate stones analyzed 20 years prior, and only started potassium citrate with findings of 100% uric acid stone on stone analysis from his ESWL past fragments in 2017. On chart review, I did find his stone analysis confirming 100% uric acid, as well as a 24 hour urine 1 month later on 5/22/17 which had good urine volume of 2 L and normal urine uric acid but high saturation of uric acid 3.4, and high saturation of calcium oxalate at 6.63 increasing the risk of both types of stones, with a very low pH of 4.981 despite good citrate at 1698, and high dietary oxalate at 46.   He denies any history of gout, or prior known uric acid issues prior to 2017.   Uric acid, serum, on file with primary care is 4.9 in October 2021, normal.  Creatinine 1.4/EGFR 52.7 in October 2021 as well.  On review, has had mild CKD 3 for quite some  time with baseline GFR around 55-58  Not following low purine or low uric acid diet. Was told previously to reduce meat intake previously  Last PSA on file 0.6 in 2020.  Stable in the 0.40.6 range for all years prior.  No family history of prostate cancer.   Personal review of CT scan from January 2020 to consistent with report above with large left lower pole stone burden about 1-1.1 cm and scattered other small punctate stones throughout the left collecting system upper and lower pole.  In the right kidney there is a 4 mm lower pole stone and a smaller upper pole stone.  The left kidney has a central midpole cyst extending towards the collecting system as well as an upper pole cyst.  Reported as cyst stable from previous imaging, the upper pole lesion looks less fluid density on the non contrasted imaging to me   He does report intermittent stone fragment passage. More so dust/small fragments. Only know bc gets hung up in urethra.  A few times a year.  No significant pain or stone episodes.  Occasional flank or back pain which may or may not be related.  No blood in the urine.  Urinalysis is dipstick negative today.  Has been taking 100 mg allopurinol and 15 mEq daily, not b.i.d., of potassium citrate.   AUA SS: 15/3 (4 intermittency, 3: freq, urgency; 2 sleeping, 1: emptying, weak, strain)  Stops and starts a lot. If occupied easy to postpone, but there is some urgency when just sitting around. Occ NTF 2-3x/night, but if no fluids after 8pm can make it through the night  5/5/22 Procedure(s) Performed:   Left ureteroscopy with holmium laser lithotripsy and stone basket extraction  Cystoscopy with placement of left double-J ureteral stent 6 Syrian by 26 cm  Left retrograde pyelogram including injection of contrast  Fluoroscopy less than 1 hour including interpretation of fluoroscopic images  Findings:  scattered left stones in upper middle and lower pole with larger burden in lower pole, all removed  5/17/22  Procedure(s) Performed:   Cystoscopy with left ureteral stent removal  Findings: ureteral stent, removed    9/12/22 patient presents to clinic for f/u kidney stones. He has been doing well since procedure. He increased urocit K to 1 tablet BID as instructed. He drinks ~ 1 L of water with crystal light lemonade per day. He is trying to increase water intake. Denies dysuria, gross hematuria, flank pain, f/c/n/v. He is taking alfusozin for BPH/LUTS. He reports his symptoms have mildly improved. Flomax worked better on LUTS but he was unable to tolerate d/t dizziness.    8/17/22   BMP: K 4.3, creatinine 1.2 / GFR >60  PTH intact 55.3  Uric acid 6.4    8/17/22 ANDREA  Impression:   Multiple bilateral intrarenal stones.  No hydronephrosis is noted.  Three cyst identified of each kidney.  A solid mass is not noted    3/13/23  Patient presents today for f/u kidney stones. He has been doing well since last clinic visit. He is taking urocit K 1 tablet BID and drinks 1-1.5 L water and lemonade per day. Also drinks 1-2 cups coffee per day. Denies dysuria, gross hematuria, flank pain, fever, chills, nausea or vomiting.   He is taking alfuzosin for BPH. He reports nocturia x 2 and occasional daytime frequency. Flomax worked better but he could not tolerate d/t dizziness.    12/2/22   K 4.0  Creatinine 1.2  GFR >60    3/10/23  ANDREA Impression:   1. Bilateral simple renal cysts.  2. Bilateral nephrolithiasis.  KUB: left renal stones    11/9/23  S/p ESWL, left 4/27/23 6/16/23 creatinine 1.1 / GFR>60  KUB today  5/11/23 Stone analysis: 100% Calcium oxalate monohydrate    Drinks 1 L crystal light lemonade plus 2-3 true lime packets daily and ~ 0.5 L water per day.    Takes urocit K 15 mEq BID    He is taking alfuzosin for BPH. Voids every 2 hours during the day and nocturia x 1-2. Moderate urinary stream. Denies dysuria, gross hematuria, flank pain, fever, chills, nausea or vomiting. Flomax did work better but unable to tolerate d/t  dizziness.     24 Hour Urine Results From [facility]  Date: 10/11/23   Urine volume: 1.09  SS CaOx: 11.01  Ref 5-10  Urine Calcium (mg/day): 199  Ref M <250, F <200  Urine Oxalate (mg/day): 32  Ref 20-40  Urine Citrate (mg/day): 986  Ref M >450, F>550  SS CaP: 1.42  Ref 0.5-2  24 Hour Urine pH:5.930  Ref 5.8-6.2  SS Uric Acid: 1.21  Ref 0-1  Urine Uric Acid (g/day): 0.587  Ref M<0.8, F<0.75       Urine cultures:   Lab Results   Component Value Date    LABURIN No growth 04/14/2023    LABURIN No growth 04/25/2022    LABURIN No growth 06/30/2017    LABURIN NO GROWTH AFTER 48 HOURS. 09/26/2012           REVIEW OF SYSTEMS:    Review of Systems    Constitutional: Negative for fever and chills.   HENT: Negative for hearing loss.   Eyes: Negative for visual disturbance.   Respiratory: Negative for shortness of breath.   Cardiovascular: Negative for chest pain.   Gastrointestinal: Negative for nausea, vomiting  Genitourinary:  See above  Neurological: Negative for dizziness.   Hematological: Does not bruise/bleed easily.   Psychiatric/Behavioral: Negative for confusion.       PATIENT HISTORY:    Past Medical History:   Diagnosis Date    Arthritis     Cataract     Cataract of left eye     Diabetes mellitus type II     on po meds    Hyperlipidemia     not on meds at present    Hypertension     Kidney stones 04/2017    Left ureteral stone     Plantar fasciitis of right foot 10/2017    Dr Colorado     Tendonitis 01/2018    right foot Dr Colorado    Wears glasses        Past Surgical History:   Procedure Laterality Date    ADENOIDECTOMY      BIOPSY OF MASS OF LUMBAR SPINE N/A 10/19/2022    Procedure: BIOPSY, MASS, SPINE, LUMBAR;  Surgeon: Marvel Matthews DO;  Location: [facility];  Service: Neurosurgery;  Laterality: N/A;  Right L5-S1 Resection of Extradural Mass Using Transfacet Approach    CATARACT EXTRACTION      COLONOSCOPY  03/23/2010    Dr. Blackburn, hyperplastic polyps, otherwise negative, 10 year recheck    COLONOSCOPY N/A  11/19/2021    Procedure: COLONOSCOPY;  Surgeon: Marcus Sexton MD;  Location: [facility] ENDO;  Service: Endoscopy;  Laterality: N/A;    CYSTOSCOPY Left 04/12/2017    with ureteral stent-Dr. Noel    CYSTOSCOPY Left 05/17/2022    Procedure: CYSTOSCOPY with stent removal;  Surgeon: Ronal Benitez MD;  Location: [facility] OR;  Service: Urology;  Laterality: Left;    CYSTOSCOPY WITH URETEROSCOPY, RETROGRADE PYELOGRAPHY, AND INSERTION OF STENT Left 05/05/2022    Procedure: CYSTOSCOPY, WITH RETROGRADE PYELOGRAM AND URETERAL STENT INSERTION;  Surgeon: Ronal Benitez MD;  Location: [facility] OR;  Service: Urology;  Laterality: Left;    EXTRACORPOREAL SHOCK WAVE LITHOTRIPSY Left 4/27/2023    Procedure: LITHOTRIPSY, ESWL;  Surgeon: Ronal Benitez MD;  Location: [facility] OR;  Service: Urology;  Laterality: Left;    EYE SURGERY  05/06/2013    Dr Nielsen    kidney stent   04/2017    KNEE ARTHROSCOPY W/ DEBRIDEMENT      right with lateral release     LITHOTRIPSY  04/19/2017    NASAL SINUS SURGERY      [place]na surgery   05/2013    Dr Peralta    SPINE SURGERY      TONSILLECTOMY      TRANSFORAMINAL EPIDURAL INJECTION OF STEROID Right 12/07/2020    Procedure: Injection,steroid,epidural,transforaminal approach L5- S1;  Surgeon: Kermit Davalos MD;  Location: [facility] OR;  Service: Pain Management;  Laterality: Right;  L5-s1    TRANSFORAMINAL EPIDURAL INJECTION OF STEROID Right 06/23/2022    Procedure: Injection,steroid,epidural,transforaminal approach;  Surgeon: Christos Saul MD;  Location: [facility] OR;  Service: Pain Management;  Laterality: Right;  L5-S1 and cyst aspiration L5-S1    URETEROSCOPIC REMOVAL OF URETERIC CALCULUS Left 05/05/2022    Procedure: REMOVAL, CALCULUS, URETER, URETEROSCOPIC;  Surgeon: Ronal Benitez MD;  Location: [facility] OR;  Service: Urology;  Laterality: Left;       Family History   Problem Relation Age of Onset    Hypertension Father     Neuropathy Father     Stroke Paternal Grandmother     Lung cancer Paternal Grandfather      Cancer Paternal Grandfather     Cataracts Mother     Diabetes Maternal Grandmother     No Known Problems Sister     No Known Problems Maternal Aunt        Social History     Socioeconomic History    Marital status:    Tobacco Use    Smoking status: Former     Current packs/day: 0.00     Types: Cigarettes     Quit date: 1978     Years since quittin.6    Smokeless tobacco: Never    Tobacco comments:     Occ cigar   Substance and Sexual Activity    Alcohol use: Yes     Alcohol/week: 2.5 standard drinks of alcohol     Types: 3 Standard drinks or equivalent per week     Comment: 3 drinks per week    Drug use: No    Sexual activity: Not Currently     Social Determinants of Health     Financial Resource Strain: Low Risk  (2023)    Overall Financial Resource Strain (CARDIA)     Difficulty of Paying Living Expenses: Not hard at all   Food Insecurity: No Food Insecurity (2023)    Hunger Vital Sign     Worried About Running Out of Food in the Last Year: Never true     Ran Out of Food in the Last Year: Never true   Transportation Needs: No Transportation Needs (2023)    PRAPARE - Transportation     Lack of Transportation (Medical): No     Lack of Transportation (Non-Medical): No   Physical Activity: Sufficiently Active (2023)    Exercise Vital Sign     Days of Exercise per Week: 5 days     Minutes of Exercise per Session: 30 min   Stress: Stress Concern Present (2023)    [employer] - Occupational Stress Questionnaire     Feeling of Stress : To some extent   Social Connections: Unknown (2023)    Social Connection and Isolation Panel [NHANES]     Frequency of Communication with Friends and Family: More than three times a week     Frequency of Social Gatherings with Friends and Family: Once a week     Active Member of Clubs or Organizations: No     Attends Club or Organization Meetings: Never     Marital Status:    Housing Stability: Low Risk   (6/21/2023)    Housing Stability Vital Sign     Unable to Pay for Housing in the Last Year: No     Number of Places Lived in the Last Year: 1     Unstable Housing in the Last Year: No       Allergies:  Shrimp    Medications:    Current Outpatient Medications:     amLODIPine (NORVASC) 2.5 MG tablet, Take 1 tablet (2.5 mg total) by mouth once daily., Disp: 90 tablet, Rfl: 3    cetirizine (ZYRTEC) 10 MG tablet, Take 10 mg by mouth daily as needed. 1 Tablet(s) Oral PRN ., Disp: , Rfl:     diphenhydrAMINE (BENADRYL) 25 mg capsule, Take 25 mg by mouth nightly as needed for Itching., Disp: , Rfl:     fluticasone (FLONASE) 50 mcg/actuation nasal spray, 2 sprays by Each Nare route daily as needed. , Disp: , Rfl:     guaifenesin (MUCINEX ORAL), Take 400 mg by mouth 2 (two) times daily as needed for Congestion., Disp: , Rfl:     ibuprofen (ADVIL,MOTRIN) 200 MG tablet, Take 200 mg by mouth every 8 (eight) hours as needed for Pain., Disp: , Rfl:     metFORMIN (GLUCOPHAGE-XR) 750 MG ER 24hr tablet, Take 1 tablet (750 mg total) by mouth 2 (two) times daily with meals., Disp: 180 tablet, Rfl: 3    rosuvastatin (CRESTOR) 20 MG tablet, Take 1 tablet (20 mg total) by mouth once daily., Disp: 90 tablet, Rfl: 3    valsartan (DIOVAN) 320 MG tablet, TAKE 1 TABLET DAILY, Disp: 90 tablet, Rfl: 3    alfuzosin (UROXATRAL) 10 mg Tb24, Take 1 tablet (10 mg total) by mouth daily with breakfast., Disp: 90 tablet, Rfl: 3    potassium citrate (UROCIT-K 15) 15 mEq TbSR, Take 1 tablet by mouth 2 (two) times daily., Disp: 180 tablet, Rfl: 3  No current facility-administered medications for this visit.    Facility-Administered Medications Ordered in Other Visits:     lidocaine (PF) 10 mg/ml (1%) injection 10 mg, 1 mL, Intradermal, Once, Kermit Davalos MD    PHYSICAL EXAMINATION:    Constitutional: He is oriented to person, place, and time. He appears well-developed and well-nourished.  He is in no apparent distress.    Neck: Normal ROM.      Cardiovascular: Normal rate.      Pulmonary/Chest: Effort normal. No respiratory distress.     Abdominal:  He exhibits no distension.  There is no CVA tenderness.     Neurological: He is alert and oriented to person, place, and time.     Skin: Skin is warm and dry.     Psych: Cooperative with normal affect.      Physical Exam      LABS:        Lab Results   Component Value Date    PSA 0.56 03/13/2023    PSA 0.56 03/11/2022    PSA 0.59 04/06/2020    PSADIAG 0.51 06/15/2018    PSADIAG 0.54 05/18/2017     Lab Results   Component Value Date    CREATININE 1.1 06/16/2023         IMPRESSION:    Encounter Diagnoses   Name Primary?    BPH with obstruction/lower urinary tract symptoms Yes    Kidney stones            PLAN:  -Reviewed 24 hour urine results with pt  Increase water intake to improve urine output to at least 2.5 L per day  Information regarding citrate in diet given to pt.  Continue urocit K as ordered, refilled to pharmacy    -Continue uroxatral for BPH, refilled to pharmacy  Discussed conservative measures to control urgency and frequency including   1. Avoiding/minimizing bladder irritants (see below), especially in afternoon and evening hours  Discussed bladder irritants include coffe (even decaf), tea, alcohol, soda, spicy foods, acidic juices (orange, tomato), vinegar, and artificial sweeteners/sugary beverages.  2. timed voiding - empty on a schedule (approx ~2-3 hours) in spite of need to urinate, to get ahead of urge  3. dont postponing voiding - dont hold it on purpose   4. bowel regimen as distended bowel has extrinsic compressive effect on bladder.   - any or all of the following in any combination, titrate to soft daily bowel movement without pushing or straining  - colace/stool softener capsule - once to twice daily  - miralax - 1 capful daily to start, can increase to 2x daily (or decrease to 1/2 cap daily)  - increase dietary fibery  - fiber supplements, such as metamucil  - prunes, prune  juice  5. INCREASE water intake  6. Stop fluids 2 hours before bed, and urinate just before bed    -RTC 6 months, AUA/PVR    I encouraged him or any of his family members to call or email me with questions and/or concerns.      30 minutes of total time spent on the encounter, which includes face to face time and non-face to face time preparing to see the patient (eg, review of tests), Obtaining and/or reviewing separately obtained history, Documenting clinical information in the electronic or other health record, Independently interpreting results (not separately reported) and communicating results to the patient/family/caregiver, or Care coordination (not separately reported).

## 2023-11-09 NOTE — PATIENT INSTRUCTIONS
Increase water intake to at least 2 L per day, adding lemon to water     WHY ARE CITRATE LEVELS IMPORTANT IN  KIDNEY STONES    Low Citrate Causes: Low urinary citrate is most often associated with poor dietary choices, such as high meat and high sodium intake with low fruit (citrus) ingestion. Thiazides, ACE inhibitors, topiramate, and acetazolamide can all cause hypocitraturia. Kidney transplant history.     Health Benefits of Potassium Citrate  Benefits of Potassium Citrate for Kidney Stones  Potassium citrate in food and drinks can be good for kidney stones. Both potassium and citrate are beneficial for kidney stones. Lets break it down.  Citrate itself is also good for most kidney stones. Low levels of citrate in urine make calcium kidney stones more likely. (7)  Luckily, there are lots of things you can do to increase urine citrate! Potassium citrate pills are commonly prescribed, however eating more fruits and vegetables (of ANY kind) can increase urine citrate levels too! The goal is usually to just eat more fruits and vegetables in general, rather than focus on ones that are higher in potassium citrate.  Eating too much protein can reduce urine citrate. For some people, cutting back on protein can help increase urine citrate.    Potassium & Kidney Stones  Potassium itself is good for calcium kidney stones. Eating plenty of high potassium food makes it easier for your kidneys to keep calcium in the body, rather than get rid of potassium in urine. (2) (3) Less calcium in urine = less calcium kidney stones!  Potassium also can help balance urine pH levels. Too much acid (or, a low urine pH) is bad for oxalate and uric acid kidney stones. Eating more high potassium foods can help increase urine pH and make kidney stones less likely. (4)  People who eat higher potassium diets tend to have fewer kidney stones. This is regardless of the form of potassium. Potassium is likely one of the reasons why the Dietary  Approaches to Stop Hypertension (DASH) and Mediterranean diets are so good at preventing kidney stones. (5) (6)    Potassium Citrate in Food vs. Supplements  You may be wondering, why cant I just take potassium citrate as a supplement? Wouldnt that be easier that getting potassium citrate from food?  As with most supplements, the research just doesnt pan out like it does with food. Diets with lots of high potassium foods are linked with many health benefits. (13) However, potassium citrate (and other forms of potassium) just do not seem to have the same effect. There is clearly a synergistic effect of potassium citrate and all the other compounds in food. Eating potassium that is much better than taking a supplement.  One exception is potassium citrate and kidney stones. For people with low urine citrate levels on a 24-hour urine test, potassium citrate pills can help prevent kidney stones. (14)  Lemon juice is famous for its high citrate content. For comparison, a 1/2 cup of lemon juice has about 20 mEq of potassium citrate. This is the same amount as a typical prescription for potassium citrate for kidney stone prevention.  If you prefer to avoid pills, eating more fruits and vegetables (of ALL kinds) can increase urine citrate levels too! (15) Eating the right amount of protein can help maintain urine citrate levels as well. (15)        Warning: Potassium Citrate & Kidney Disease  Some people with chronic kidney disease (CKD) need to limit how much potassium they eat. Only people with high blood potassium need to limit potassium. For these people, potassium citrate, especially as a food additive or pill could be harmful.  Ask your doctor or dietitian how much potassium is right for you.        CITRATE SUPPLEMENTS - AVAILABLE OVER THE COUNTER OR ONLINE  [place] citrate: 4 capsules a day  (prefer)  https://[employer]/collections/all/products/kidney-health-capsules

## 2024-01-02 ENCOUNTER — LAB VISIT (OUTPATIENT)
Dept: LAB | Facility: HOSPITAL | Age: 69
End: 2024-01-02
Attending: FAMILY MEDICINE
Payer: MEDICARE

## 2024-01-02 DIAGNOSIS — E11.69 TYPE 2 DIABETES MELLITUS WITH OTHER SPECIFIED COMPLICATION, WITHOUT LONG-TERM CURRENT USE OF INSULIN: ICD-10-CM

## 2024-01-02 DIAGNOSIS — E78.5 HYPERLIPIDEMIA ASSOCIATED WITH TYPE 2 DIABETES MELLITUS: ICD-10-CM

## 2024-01-02 DIAGNOSIS — E11.59 HYPERTENSION ASSOCIATED WITH DIABETES: ICD-10-CM

## 2024-01-02 DIAGNOSIS — E11.69 HYPERLIPIDEMIA ASSOCIATED WITH TYPE 2 DIABETES MELLITUS: ICD-10-CM

## 2024-01-02 DIAGNOSIS — I15.2 HYPERTENSION ASSOCIATED WITH DIABETES: ICD-10-CM

## 2024-01-02 LAB
ALBUMIN SERPL BCP-MCNC: 4.1 G/DL (ref 3.5–5.2)
ALP SERPL-CCNC: 47 U/L (ref 55–135)
ALT SERPL W/O P-5'-P-CCNC: 19 U/L (ref 10–44)
ANION GAP SERPL CALC-SCNC: 9 MMOL/L (ref 8–16)
AST SERPL-CCNC: 23 U/L (ref 10–40)
BILIRUB SERPL-MCNC: 0.9 MG/DL (ref 0.1–1)
BUN SERPL-MCNC: 23 MG/DL (ref 8–23)
CALCIUM SERPL-MCNC: 9.4 MG/DL (ref 8.7–10.5)
CHLORIDE SERPL-SCNC: 107 MMOL/L (ref 95–110)
CHOLEST SERPL-MCNC: 108 MG/DL (ref 120–199)
CHOLEST/HDLC SERPL: 2.4 {RATIO} (ref 2–5)
CO2 SERPL-SCNC: 26 MMOL/L (ref 23–29)
CREAT SERPL-MCNC: 1.2 MG/DL (ref 0.5–1.4)
EST. GFR  (NO RACE VARIABLE): >60 ML/MIN/1.73 M^2
ESTIMATED AVG GLUCOSE: 126 MG/DL (ref 68–131)
GLUCOSE SERPL-MCNC: 97 MG/DL (ref 70–110)
HBA1C MFR BLD: 6 % (ref 4–5.6)
HDLC SERPL-MCNC: 45 MG/DL (ref 40–75)
HDLC SERPL: 41.7 % (ref 20–50)
LDLC SERPL CALC-MCNC: 51.4 MG/DL (ref 63–159)
NONHDLC SERPL-MCNC: 63 MG/DL
POTASSIUM SERPL-SCNC: 3.9 MMOL/L (ref 3.5–5.1)
PROT SERPL-MCNC: 7.1 G/DL (ref 6–8.4)
SODIUM SERPL-SCNC: 142 MMOL/L (ref 136–145)
TRIGL SERPL-MCNC: 58 MG/DL (ref 30–150)

## 2024-01-02 PROCEDURE — 83036 HEMOGLOBIN GLYCOSYLATED A1C: CPT | Performed by: FAMILY MEDICINE

## 2024-01-02 PROCEDURE — 80053 COMPREHEN METABOLIC PANEL: CPT | Performed by: FAMILY MEDICINE

## 2024-01-02 PROCEDURE — 36415 COLL VENOUS BLD VENIPUNCTURE: CPT | Mod: PO | Performed by: FAMILY MEDICINE

## 2024-01-02 PROCEDURE — 80061 LIPID PANEL: CPT | Performed by: FAMILY MEDICINE

## 2024-01-05 ENCOUNTER — OFFICE VISIT (OUTPATIENT)
Dept: FAMILY MEDICINE | Facility: CLINIC | Age: 69
End: 2024-01-05
Attending: FAMILY MEDICINE
Payer: MEDICARE

## 2024-01-05 VITALS
HEIGHT: 70 IN | BODY MASS INDEX: 27.9 KG/M2 | WEIGHT: 194.88 LBS | SYSTOLIC BLOOD PRESSURE: 120 MMHG | TEMPERATURE: 98 F | DIASTOLIC BLOOD PRESSURE: 74 MMHG | OXYGEN SATURATION: 96 % | HEART RATE: 58 BPM

## 2024-01-05 DIAGNOSIS — H34.8320 TRIBUTARY (BRANCH) RETINAL VEIN OCCLUSION, LEFT EYE, WITH MACULAR EDEMA: ICD-10-CM

## 2024-01-05 DIAGNOSIS — E11.69 TYPE 2 DIABETES MELLITUS WITH OTHER SPECIFIED COMPLICATION, WITHOUT LONG-TERM CURRENT USE OF INSULIN: ICD-10-CM

## 2024-01-05 DIAGNOSIS — E78.5 HYPERLIPIDEMIA ASSOCIATED WITH TYPE 2 DIABETES MELLITUS: ICD-10-CM

## 2024-01-05 DIAGNOSIS — N40.0 BENIGN PROSTATIC HYPERPLASIA WITHOUT LOWER URINARY TRACT SYMPTOMS: ICD-10-CM

## 2024-01-05 DIAGNOSIS — I70.0 AORTIC ATHEROSCLEROSIS: ICD-10-CM

## 2024-01-05 DIAGNOSIS — Z91.89 PNEUMOCOCCAL VACCINATION INDICATED: ICD-10-CM

## 2024-01-05 DIAGNOSIS — Z00.00 ENCOUNTER FOR PREVENTIVE HEALTH EXAMINATION: Primary | ICD-10-CM

## 2024-01-05 DIAGNOSIS — E11.59 HYPERTENSION ASSOCIATED WITH DIABETES: ICD-10-CM

## 2024-01-05 DIAGNOSIS — E11.69 HYPERLIPIDEMIA ASSOCIATED WITH TYPE 2 DIABETES MELLITUS: ICD-10-CM

## 2024-01-05 DIAGNOSIS — I15.2 HYPERTENSION ASSOCIATED WITH DIABETES: ICD-10-CM

## 2024-01-05 PROCEDURE — 99213 OFFICE O/P EST LOW 20 MIN: CPT | Mod: PBBFAC,PN | Performed by: FAMILY MEDICINE

## 2024-01-05 PROCEDURE — 99213 OFFICE O/P EST LOW 20 MIN: CPT | Mod: S$PBB,,, | Performed by: FAMILY MEDICINE

## 2024-01-05 PROCEDURE — 99999 PR PBB SHADOW E&M-EST. PATIENT-LVL III: CPT | Mod: PBBFAC,,, | Performed by: FAMILY MEDICINE

## 2024-01-05 PROCEDURE — 90677 PCV20 VACCINE IM: CPT | Mod: PBBFAC,PN

## 2024-01-05 PROCEDURE — 99999PBSHW PNEUMOCOCCAL CONJUGATE VACCINE 20-VALENT: Mod: PBBFAC,,,

## 2024-01-05 NOTE — PROGRESS NOTES
Subjective:       Patient ID: Toni Hyde is a 68 y.o. male.    Chief Complaint: Annual Exam    68-year-old male coming in for annual exam and follow-up of multiple medical problems.  He has a history of lumbosacral radiculopathy with back surgery to remove a cyst from the lumbar spine and also has had epidural steroid injections.  He is doing fairly well with that now.  He has type 2 diabetes taking metformin 750 mg b.i.d. and recent lab work done January 2nd indicated an A1c of 6.0 which is excellent control.  He has hypertension on low-dose amlodipine 2.5 mg plus valsartan 320 mg daily and blood pressure is well controlled today.  He has hyperlipidemia on Crestor 20 mg daily with recent lab on the 2nd also indicating a total cholesterol of 108, triglycerides 58, HDL of 45, and LDL of 51.  His chemistry panel looks good with a glucose of 97 creatinine of 1.2 and a GFR greater than 60.  He has aortic atherosclerosis, BPH without urinary complaints, and a history of kidney stones with lithotripsy on at least two occasions and ureteroscopic extraction.  His colonoscopy was done November 19, 2021 by Dr. Sexton with a three year recheck due at the end of this year.  He is due for a Pneumovax 23 which are unavailable and will get a Prevnar 20 in its place.  He feels well overall and has no significant complaints.    Past Medical History:  No date: Arthritis  No date: Cataract  No date: Cataract of left eye  No date: Diabetes mellitus type II      Comment:  on po meds  No date: Hyperlipidemia      Comment:  not on meds at present  No date: Hypertension  04/2017: Kidney stones  No date: Left ureteral stone  10/2017: Plantar fasciitis of right foot      Comment:  Dr Colorado   01/2018: Tendonitis      Comment:  right foot Dr Colorado  No date: Wears glasses    Past Surgical History:  No date: ADENOIDECTOMY  10/19/2022: BIOPSY OF MASS OF LUMBAR SPINE; N/A      Comment:  Procedure: BIOPSY, MASS, SPINE, LUMBAR;   Surgeon:                Marvel Matthews DO;  Location: [facility] OR;  Service:                Neurosurgery;  Laterality: N/A;  Right L5-S1 Resection of               Extradural Mass Using Transfacet Approach  No date: CATARACT EXTRACTION  03/23/2010: COLONOSCOPY      Comment:  Dr. Blackburn, hyperplastic polyps, otherwise negative, 10                year recheck  11/19/2021: COLONOSCOPY; N/A      Comment:  Procedure: COLONOSCOPY;  Surgeon: Marcus Sexton MD;                Location: [facility];  Service: Endoscopy;  Laterality:                N/A;  04/12/2017: CYSTOSCOPY; Left      Comment:  with ureteral stent-Dr. Noel  05/17/2022: CYSTOSCOPY; Left      Comment:  Procedure: CYSTOSCOPY with stent removal;  Surgeon:                Ronal Benitez MD;  Location: [facility] OR;  Service:                Urology;  Laterality: Left;  05/05/2022: CYSTOSCOPY WITH URETEROSCOPY, RETROGRADE PYELOGRAPHY, AND   INSERTION OF STENT; Left      Comment:  Procedure: CYSTOSCOPY, WITH RETROGRADE PYELOGRAM AND                URETERAL STENT INSERTION;  Surgeon: Ronal Benitez MD;  Location: [facility] OR;  Service: Urology;  Laterality:                Left;  4/27/2023: EXTRACORPOREAL SHOCK WAVE LITHOTRIPSY; Left      Comment:  Procedure: LITHOTRIPSY, ESWL;  Surgeon: Ronal Benitez MD;  Location: [facility] OR;  Service: Urology;                 Laterality: Left;  05/06/2013: EYE SURGERY      Comment:  Dr Nielsen  04/2017: kidney stent   No date: KNEE ARTHROSCOPY W/ DEBRIDEMENT      Comment:  right with lateral release   04/19/2017: LITHOTRIPSY  No date: NASAL SINUS SURGERY  05/2013: retna surgery       Comment:  Dr Peralta  No date: SPINE SURGERY  No date: TONSILLECTOMY  12/07/2020: TRANSFORAMINAL EPIDURAL INJECTION OF STEROID; Right      Comment:  Procedure: Injection,steroid,epidural,transforaminal                approach L5- S1;  Surgeon: Kermit Davalos MD;  Location:                [facility] OR;  Service: Pain Management;   Laterality: Right;                 L5-s1  2022: TRANSFORAMINAL EPIDURAL INJECTION OF STEROID; Right      Comment:  Procedure: Injection,steroid,epidural,transforaminal                approach;  Surgeon: Christos Saul MD;  Location: [facility] OR;                 Service: Pain Management;  Laterality: Right;  L5-S1 and                cyst aspiration L5-S1  2022: URETEROSCOPIC REMOVAL OF URETERIC CALCULUS; Left      Comment:  Procedure: REMOVAL, CALCULUS, URETER, URETEROSCOPIC;                 Surgeon: Ronal Benitez MD;  Location: [facility] OR;                 Service: Urology;  Laterality: Left;    Review of patient's family history indicates:  Problem: Hypertension      Relation: Father          Age of Onset: (Not Specified)  Problem: Neuropathy      Relation: Father          Age of Onset: (Not Specified)  Problem: Stroke      Relation: Paternal Grandmother          Age of Onset: (Not Specified)  Problem: Lung cancer      Relation: Paternal Grandfather          Age of Onset: (Not Specified)  Problem: Cancer      Relation: Paternal Grandfather          Age of Onset: (Not Specified)  Problem: Cataracts      Relation: Mother          Age of Onset: (Not Specified)  Problem: Diabetes      Relation: Maternal Grandmother          Age of Onset: (Not Specified)  Problem: No Known Problems      Relation: Sister          Age of Onset: (Not Specified)  Problem: No Known Problems      Relation: Maternal Aunt          Age of Onset: (Not Specified)    Social History    Tobacco Use      Smoking status: Former        Packs/day: 0.00        Types: Cigarettes        Quit date: 1978        Years since quittin.7      Smokeless tobacco: Never      Tobacco comments: Occ cigar    Alcohol use: Yes      Alcohol/week: 2.5 standard drinks of alcohol      Types: 3 Standard drinks or equivalent per week      Comment: 3 drinks per week    Drug use: No    Current Outpatient Medications on File Prior to Visit:  alfuzosin (UROXATRAL)  10 mg Tb24, Take 1 tablet (10 mg total) by mouth daily with breakfast., Disp: 90 tablet, Rfl: 3  amLODIPine (NORVASC) 2.5 MG tablet, Take 1 tablet (2.5 mg total) by mouth once daily., Disp: 90 tablet, Rfl: 3  cetirizine (ZYRTEC) 10 MG tablet, Take 10 mg by mouth daily as needed. 1 Tablet(s) Oral PRN ., Disp: , Rfl:   diphenhydrAMINE (BENADRYL) 25 mg capsule, Take 25 mg by mouth nightly as needed for Itching., Disp: , Rfl:   fluticasone (FLONASE) 50 mcg/actuation nasal spray, 2 sprays by Each Nare route daily as needed. , Disp: , Rfl:   ibuprofen (ADVIL,MOTRIN) 200 MG tablet, Take 200 mg by mouth every 8 (eight) hours as needed for Pain., Disp: , Rfl:   metFORMIN (GLUCOPHAGE-XR) 750 MG ER 24hr tablet, Take 1 tablet (750 mg total) by mouth 2 (two) times daily with meals., Disp: 180 tablet, Rfl: 3  potassium citrate (UROCIT-K 15) 15 mEq TbSR, Take 1 tablet by mouth 2 (two) times daily., Disp: 180 tablet, Rfl: 3  rosuvastatin (CRESTOR) 20 MG tablet, Take 1 tablet (20 mg total) by mouth once daily., Disp: 90 tablet, Rfl: 3  valsartan (DIOVAN) 320 MG tablet, TAKE 1 TABLET DAILY, Disp: 90 tablet, Rfl: 3  guaifenesin (MUCINEX ORAL), Take 400 mg by mouth 2 (two) times daily as needed for Congestion., Disp: , Rfl:   [DISCONTINUED] triamcinolone acetonide 0.5% (KENALOG) 0.5 % Crea, Apply topically 2 (two) times daily., Disp: 15 g, Rfl: 1    Current Facility-Administered Medications on File Prior to Visit:  lidocaine (PF) 10 mg/ml (1%) injection 10 mg, 1 mL, Intradermal, Once, Kermit Davalos MD            Review of Systems   Constitutional:  Negative for activity change, chills, fatigue and fever.   HENT:  Negative for congestion, ear pain, rhinorrhea and sore throat.    Eyes:  Negative for visual disturbance.   Respiratory:  Negative for cough, chest tightness and shortness of breath.    Cardiovascular:  Negative for chest pain and palpitations.   Gastrointestinal:  Negative for abdominal pain, blood in stool, constipation,  diarrhea, nausea and vomiting.   Genitourinary:  Negative for difficulty urinating, dysuria and hematuria.   Musculoskeletal:  Negative for arthralgias and neck pain.   Skin:  Negative for rash.   Neurological:  Negative for dizziness and headaches.   Psychiatric/Behavioral:  Negative for sleep disturbance.        Objective:      Physical Exam  Vitals and nursing note reviewed.   Constitutional:       General: He is not in acute distress.     Appearance: Normal appearance. He is well-developed and normal weight. He is not ill-appearing, toxic-appearing or diaphoretic.      Comments: Good blood pressure control  Mild bradycardia with a pulse of 58 regular rhythm   Good weight for age with a BMI of 27.9 he is down 8 lb from his last visit with me January 23, 2023   HENT:      Head: Normocephalic and atraumatic.      Right Ear: Tympanic membrane, ear canal and external ear normal. There is no impacted cerumen.      Left Ear: Tympanic membrane, ear canal and external ear normal. There is no impacted cerumen.      Nose: Nose normal. No congestion or rhinorrhea.      Mouth/Throat:      Mouth: Mucous membranes are moist.      Pharynx: Oropharynx is clear. No oropharyngeal exudate or posterior oropharyngeal erythema.   Eyes:      General: No scleral icterus.        Right eye: No discharge.         Left eye: No discharge.      Extraocular Movements: Extraocular movements intact.      Conjunctiva/sclera: Conjunctivae normal.      Pupils: Pupils are equal, round, and reactive to light.   Neck:      Thyroid: No thyromegaly.      Vascular: No carotid bruit or JVD.      Trachea: No tracheal deviation.   Cardiovascular:      Rate and Rhythm: Regular rhythm. Bradycardia present.      Pulses:           Dorsalis pedis pulses are 2+ on the right side and 2+ on the left side.        Posterior tibial pulses are 2+ on the right side and 2+ on the left side.      Heart sounds: Normal heart sounds. No murmur heard.     No friction rub. No  gallop.   Pulmonary:      Effort: Pulmonary effort is normal. No respiratory distress.      Breath sounds: Normal breath sounds. No stridor. No wheezing, rhonchi or rales.   Chest:      Chest wall: No tenderness.   Abdominal:      General: Bowel sounds are normal. There is no distension.      Palpations: Abdomen is soft. There is no mass.      Tenderness: There is no abdominal tenderness. There is no guarding or rebound.      Hernia: No hernia is present.   Genitourinary:     Comments: Declined digital rectal exam  Musculoskeletal:         General: No swelling, tenderness, deformity or signs of injury. Normal range of motion.      Cervical back: Normal range of motion and neck supple. No rigidity or tenderness.      Right lower leg: No edema.      Left lower leg: No edema.      Right foot: Normal range of motion. No deformity, bunion, Charcot foot, foot drop or prominent metatarsal heads.      Left foot: Normal range of motion. No deformity, bunion, Charcot foot, foot drop or prominent metatarsal heads.   Feet:      Right foot:      Protective Sensation: 9 sites tested.  9 sites sensed.      Skin integrity: Skin integrity normal. No ulcer, blister, skin breakdown, erythema, warmth, callus, dry skin or fissure.      Toenail Condition: Right toenails are normal.      Left foot:      Protective Sensation: 9 sites tested.  9 sites sensed.      Skin integrity: Skin integrity normal. No ulcer, blister, skin breakdown, erythema, warmth, callus, dry skin or fissure.      Toenail Condition: Left toenails are normal.   Lymphadenopathy:      Cervical: No cervical adenopathy.   Skin:     General: Skin is warm and dry.      Coloration: Skin is not jaundiced or pale.      Findings: No bruising, erythema, lesion or rash.   Neurological:      General: No focal deficit present.      Mental Status: He is alert and oriented to person, place, and time. Mental status is at baseline.      Cranial Nerves: No cranial nerve deficit.       Sensory: No sensory deficit.      Motor: No weakness.      Coordination: Coordination normal.      Gait: Gait normal.      Deep Tendon Reflexes: Reflexes are normal and symmetric. Reflexes normal.   Psychiatric:         Mood and Affect: Mood normal.         Behavior: Behavior normal.         Thought Content: Thought content normal.         Judgment: Judgment normal.         Assessment:       1. Encounter for preventive health examination    2. Pneumococcal vaccination indicated    3. Type 2 diabetes mellitus with other specified complication, without long-term current use of insulin    4. Hypertension associated with diabetes    5. Hyperlipidemia associated with type 2 diabetes mellitus    6. Aortic atherosclerosis    7. Benign prostatic hyperplasia without lower urinary tract symptoms    8. Tributary (branch) retinal vein occlusion, left eye, with macular edema    9. BMI 27.0-27.9,adult        Plan:       1. Encounter for preventive health examination    2. Pneumococcal vaccination indicated  Given  - (In Office Administered) Pneumococcal Conjugate Vaccine (20 Valent) (IM) (Preferred)    3. Type 2 diabetes mellitus with other specified complication, without long-term current use of insulin  Lab Results   Component Value Date    HGBA1C 6.0 (H) 01/02/2024     Well controlled, no changes needed in medications    4. Hypertension associated with diabetes  Well controlled, no changes needed in medications    5. Hyperlipidemia associated with type 2 diabetes mellitus  Lab Results   Component Value Date    CHOL 108 (L) 01/02/2024    CHOL 97 (L) 12/02/2022    CHOL 97 (L) 10/04/2021     Lab Results   Component Value Date    HDL 45 01/02/2024    HDL 41 12/02/2022    HDL 43 10/04/2021     Lab Results   Component Value Date    LDLCALC 51.4 (L) 01/02/2024    LDLCALC 46.2 (L) 12/02/2022    LDLCALC 42.2 (L) 10/04/2021     Lab Results   Component Value Date    TRIG 58 01/02/2024    TRIG 49 12/02/2022    TRIG 59 10/04/2021       Lab  Results   Component Value Date    CHOLHDL 41.7 01/02/2024    CHOLHDL 42.3 12/02/2022    CHOLHDL 44.3 10/04/2021     Well controlled, no changes needed in Crestor 20 mg daily    6. Aortic atherosclerosis  Maintain good control of blood pressure, diabetes, and cholesterol levels    7. Benign prostatic hyperplasia without lower urinary tract symptoms  Asymptomatic using Uroxattral    8. Tributary (branch) retinal vein occlusion, left eye, with macular edema  Followed by Dr. Guerrier    9. BMI 27.0-27.9,adult  Good weight for age,

## 2024-05-09 ENCOUNTER — OFFICE VISIT (OUTPATIENT)
Dept: UROLOGY | Facility: CLINIC | Age: 69
End: 2024-05-09
Payer: MEDICARE

## 2024-05-09 ENCOUNTER — TELEPHONE (OUTPATIENT)
Dept: UROLOGY | Facility: CLINIC | Age: 69
End: 2024-05-09

## 2024-05-09 ENCOUNTER — LAB VISIT (OUTPATIENT)
Dept: LAB | Facility: HOSPITAL | Age: 69
End: 2024-05-09
Attending: NURSE PRACTITIONER
Payer: MEDICARE

## 2024-05-09 DIAGNOSIS — N13.8 BPH WITH OBSTRUCTION/LOWER URINARY TRACT SYMPTOMS: Primary | ICD-10-CM

## 2024-05-09 DIAGNOSIS — Z12.5 SCREENING FOR PROSTATE CANCER: ICD-10-CM

## 2024-05-09 DIAGNOSIS — N40.1 BPH WITH OBSTRUCTION/LOWER URINARY TRACT SYMPTOMS: Primary | ICD-10-CM

## 2024-05-09 LAB
BILIRUBIN, UA POC OHS: NEGATIVE
BLOOD, UA POC OHS: NEGATIVE
CLARITY, UA POC OHS: CLEAR
COLOR, UA POC OHS: YELLOW
COMPLEXED PSA SERPL-MCNC: 0.53 NG/ML (ref 0–4)
GLUCOSE, UA POC OHS: NEGATIVE
KETONES, UA POC OHS: NEGATIVE
LEUKOCYTES, UA POC OHS: NEGATIVE
NITRITE, UA POC OHS: NEGATIVE
PH, UA POC OHS: 6.5
PROTEIN, UA POC OHS: NEGATIVE
SPECIFIC GRAVITY, UA POC OHS: 1.02
UROBILINOGEN, UA POC OHS: 0.2

## 2024-05-09 PROCEDURE — 99213 OFFICE O/P EST LOW 20 MIN: CPT | Mod: PBBFAC,PO | Performed by: NURSE PRACTITIONER

## 2024-05-09 PROCEDURE — 99214 OFFICE O/P EST MOD 30 MIN: CPT | Mod: S$PBB,,, | Performed by: NURSE PRACTITIONER

## 2024-05-09 PROCEDURE — 84153 ASSAY OF PSA TOTAL: CPT | Performed by: NURSE PRACTITIONER

## 2024-05-09 PROCEDURE — 81003 URINALYSIS AUTO W/O SCOPE: CPT | Mod: PBBFAC,PO | Performed by: NURSE PRACTITIONER

## 2024-05-09 PROCEDURE — 36415 COLL VENOUS BLD VENIPUNCTURE: CPT | Performed by: NURSE PRACTITIONER

## 2024-05-09 PROCEDURE — 99999 PR PBB SHADOW E&M-EST. PATIENT-LVL III: CPT | Mod: PBBFAC,,, | Performed by: NURSE PRACTITIONER

## 2024-05-09 PROCEDURE — 99999PBSHW POCT URINALYSIS(INSTRUMENT): Mod: PBBFAC,,,

## 2024-05-09 NOTE — PROGRESS NOTES
CHIEF COMPLAINT:    Mr. Hyde is a 69 y.o. male presenting for f/u BPH    PRESENTING ILLNESS:    Toni Hyde is a 69 y.o. male who presents for f/u BPH. Last clinic visit was 11/9/23    Last visit with Dr. Benitez  Last saw NP 11/2021 noting: He has been known to have uric acid stones he also underwent ESWL in 2017 by Dr. Noel.  He has not experienced any recent stone episodes and no complaints.  He does continue to take allopurinol 100 mg p.o. q.d. and potassium citrate 15meq BID with no problems.  CT 1/12/21:  Right lower pole calyx with 4 mm stone and a 1 mm stone.  Left kidney has a 2 mm, a 2 mm, a 3 mm and a 11 mm stone seen from top to bottom. Bilateral renal cysts, larger on left with some peripheral calc, diverticulosis, 4.2cm prostate  Correltaing KUB 1/12/21: There are least 2 rounded calcifications left mid abdomen centered at the L2 level largest 9 mm.  Vascular phleboliths left pelvic sidewall. Impression notes   4/25/17 L ureteral ESWL and stent removal. For 9mm mid ureteral stone. Cysto noted 4 cm with mild PEREIRA   Patient reports previously having calcium oxalate stones analyzed 20 years prior, and only started potassium citrate with findings of 100% uric acid stone on stone analysis from his ESWL past fragments in 2017. On chart review, I did find his stone analysis confirming 100% uric acid, as well as a 24 hour urine 1 month later on 5/22/17 which had good urine volume of 2 L and normal urine uric acid but high saturation of uric acid 3.4, and high saturation of calcium oxalate at 6.63 increasing the risk of both types of stones, with a very low pH of 4.981 despite good citrate at 1698, and high dietary oxalate at 46.   He denies any history of gout, or prior known uric acid issues prior to 2017.   Uric acid, serum, on file with primary care is 4.9 in October 2021, normal.  Creatinine 1.4/EGFR 52.7 in October 2021 as well.  On review, has had mild CKD 3 for quite some time with baseline  GFR around 55-58  Not following low purine or low uric acid diet. Was told previously to reduce meat intake previously  Last PSA on file 0.6 in 2020.  Stable in the 0.40.6 range for all years prior.  No family history of prostate cancer.   Personal review of CT scan from January 2020 to consistent with report above with large left lower pole stone burden about 1-1.1 cm and scattered other small punctate stones throughout the left collecting system upper and lower pole.  In the right kidney there is a 4 mm lower pole stone and a smaller upper pole stone.  The left kidney has a central midpole cyst extending towards the collecting system as well as an upper pole cyst.  Reported as cyst stable from previous imaging, the upper pole lesion looks less fluid density on the non contrasted imaging to me   He does report intermittent stone fragment passage. More so dust/small fragments. Only know bc gets hung up in urethra.  A few times a year.  No significant pain or stone episodes.  Occasional flank or back pain which may or may not be related.  No blood in the urine.  Urinalysis is dipstick negative today.  Has been taking 100 mg allopurinol and 15 mEq daily, not b.i.d., of potassium citrate.   AUA SS: 15/3 (4 intermittency, 3: freq, urgency; 2 sleeping, 1: emptying, weak, strain)  Stops and starts a lot. If occupied easy to postpone, but there is some urgency when just sitting around. Occ NTF 2-3x/night, but if no fluids after 8pm can make it through the night  5/5/22 Procedure(s) Performed:   Left ureteroscopy with holmium laser lithotripsy and stone basket extraction  Cystoscopy with placement of left double-J ureteral stent 6 [place]oc[place] by 26 cm  Left retrograde pyelogram including injection of contrast  Fluoroscopy less than 1 hour including interpretation of fluoroscopic images  Findings:  scattered left stones in upper middle and lower pole with larger burden in lower pole, all removed  5/17/22 Procedure(s) Performed:    Cystoscopy with left ureteral stent removal  Findings: ureteral stent, removed    9/12/22 patient presents to clinic for f/u kidney stones. He has been doing well since procedure. He increased urocit K to 1 tablet BID as instructed. He drinks ~ 1 L of water with crystal light lemonade per day. He is trying to increase water intake. Denies dysuria, gross hematuria, flank pain, f/c/n/v. He is taking alfusozin for BPH/LUTS. He reports his symptoms have mildly improved. Flomax worked better on LUTS but he was unable to tolerate d/t dizziness.    8/17/22   BMP: K 4.3, creatinine 1.2 / GFR >60  PTH intact 55.3  Uric acid 6.4    8/17/22 ANDREA  Impression:   Multiple bilateral intrarenal stones.  No hydronephrosis is noted.  Three cyst identified of each kidney.  A solid mass is not noted    3/13/23  Patient presents today for f/u kidney stones. He has been doing well since last clinic visit. He is taking urocit K 1 tablet BID and drinks 1-1.5 L water and lemonade per day. Also drinks 1-2 cups coffee per day. Denies dysuria, gross hematuria, flank pain, fever, chills, nausea or vomiting.   He is taking alfuzosin for BPH. He reports nocturia x 2 and occasional daytime frequency. Flomax worked better but he could not tolerate d/t dizziness.    12/2/22   K 4.0  Creatinine 1.2  GFR >60    3/10/23  ANDREA Impression:   1. Bilateral simple renal cysts.  2. Bilateral nephrolithiasis.  KUB: left renal stones    11/9/23  S/p ESWL, left 4/27/23 6/16/23 creatinine 1.1 / GFR>60  KUB today  5/11/23 Stone analysis: 100% Calcium oxalate monohydrate    Drinks 1 L crystal light lemonade plus 2-3 true lime packets daily and ~ 0.5 L water per day.    Takes urocit K 15 mEq BID    He is taking alfuzosin for BPH. Voids every 2 hours during the day and nocturia x 1-2. Moderate urinary stream. Denies dysuria, gross hematuria, flank pain, fever, chills, nausea or vomiting. Flomax did work better but unable to tolerate d/t dizziness.     24 Hour Urine  Results From [facility]  Date: 10/11/23   Urine volume: 1.09  SS CaOx: 11.01  Ref 5-10  Urine Calcium (mg/day): 199  Ref M <250, F <200  Urine Oxalate (mg/day): 32  Ref 20-40  Urine Citrate (mg/day): 986  Ref M >450, F>550  SS CaP: 1.42  Ref 0.5-2  24 Hour Urine pH:5.930  Ref 5.8-6.2  SS Uric Acid: 1.21  Ref 0-1  Urine Uric Acid (g/day): 0.587  Ref M<0.8, F<0.75    24  AUA SS: Feeling of incomplete Emptyin, Frequency: 4, Intermittency: 3, Urgency: 4, Weak Stream: 2, Strainin, Sleepin, Total SS: 20 , Quality of Life: 3  UA negative  PVR 17 ml  On uroxatral  Denies dysuria, gross hematuria, flank pain, fever, chills, nausea or vomiting    Taking urocit K as ordered for kidney stones  24 K 3.9, creatinine 1.2, GFR >60      Urine cultures:   Lab Results   Component Value Date    LABURIN No growth 2023    LABURIN No growth 2022    LABURIN No growth 2017    LABURIN NO GROWTH AFTER 48 HOURS. 2012           REVIEW OF SYSTEMS:    Review of Systems    Constitutional: Negative for fever and chills.   Gastrointestinal: Negative for nausea, vomiting  Genitourinary:  See above  Neurological: Negative for dizziness.   Psychiatric/Behavioral: Negative for confusion.       PATIENT HISTORY:    Past Medical History:   Diagnosis Date    Arthritis     Cataract     Cataract of left eye     Diabetes mellitus type II     on po meds    Hyperlipidemia     not on meds at present    Hypertension     Kidney stones 2017    Left ureteral stone     Plantar fasciitis of right foot 10/2017    Dr Colorado     Tendonitis 2018    right foot Dr Colorado    Wears glasses        Past Surgical History:   Procedure Laterality Date    ADENOIDECTOMY      BIOPSY OF MASS OF LUMBAR SPINE N/A 10/19/2022    Procedure: BIOPSY, MASS, SPINE, LUMBAR;  Surgeon: Marvel Matthews DO;  Location: [facility];  Service: Neurosurgery;  Laterality: N/A;  Right L5-S1 Resection of Extradural Mass Using Transfacet Approach    CATARACT  EXTRACTION      COLONOSCOPY  03/23/2010    Dr. Blackburn, hyperplastic polyps, otherwise negative, 10 year recheck    COLONOSCOPY N/A 11/19/2021    Procedure: COLONOSCOPY;  Surgeon: Marcus Sexton MD;  Location: [facility];  Service: Endoscopy;  Laterality: N/A;    CYSTOSCOPY Left 04/12/2017    with ureteral stent-Dr. Noel    CYSTOSCOPY Left 05/17/2022    Procedure: CYSTOSCOPY with stent removal;  Surgeon: Ronal Benitez MD;  Location: [facility] OR;  Service: Urology;  Laterality: Left;    CYSTOSCOPY WITH URETEROSCOPY, RETROGRADE PYELOGRAPHY, AND INSERTION OF STENT Left 05/05/2022    Procedure: CYSTOSCOPY, WITH RETROGRADE PYELOGRAM AND URETERAL STENT INSERTION;  Surgeon: Ronal Benitez MD;  Location: [facility] OR;  Service: Urology;  Laterality: Left;    EXTRACORPOREAL SHOCK WAVE LITHOTRIPSY Left 4/27/2023    Procedure: LITHOTRIPSY, ESWL;  Surgeon: Ronal Benitez MD;  Location: [facility];  Service: Urology;  Laterality: Left;    EYE SURGERY  05/06/2013    Dr Nielsen    kidney stent   04/2017    KNEE ARTHROSCOPY W/ DEBRIDEMENT      right with lateral release     LITHOTRIPSY  04/19/2017    NASAL SINUS SURGERY      retna surgery   05/2013    Dr Peralta    SPINE SURGERY      TONSILLECTOMY      TRANSFORAMINAL EPIDURAL INJECTION OF STEROID Right 12/07/2020    Procedure: Injection,steroid,epidural,transforaminal approach L5- S1;  Surgeon: Kermit Davalos MD;  Location: [facility] OR;  Service: Pain Management;  Laterality: Right;  L5-s1    TRANSFORAMINAL EPIDURAL INJECTION OF STEROID Right 06/23/2022    Procedure: Injection,steroid,epidural,transforaminal approach;  Surgeon: Christos Saul MD;  Location: [facility] OR;  Service: Pain Management;  Laterality: Right;  L5-S1 and cyst aspiration L5-S1    URETEROSCOPIC REMOVAL OF URETERIC CALCULUS Left 05/05/2022    Procedure: REMOVAL, CALCULUS, URETER, URETEROSCOPIC;  Surgeon: Ronal Benitez MD;  Location: [facility];  Service: Urology;  Laterality: Left;       PHYSICAL  EXAMINATION:    Constitutional: He is oriented to person, place, and time. He appears well-developed and well-nourished.  He is in no apparent distress.    Abdominal:  He exhibits no distension.  There is no CVA tenderness.     Neurological: He is alert and oriented to person, place, and time.     Psych: Cooperative with normal affect.      Physical Exam      LABS:        Lab Results   Component Value Date    PSA 0.56 03/13/2023    PSA 0.56 03/11/2022    PSA 0.59 04/06/2020    PSADIAG 0.51 06/15/2018    PSADIAG 0.54 05/18/2017     Lab Results   Component Value Date    CREATININE 1.2 01/02/2024         IMPRESSION:    Encounter Diagnoses   Name Primary?    BPH with obstruction/lower urinary tract symptoms Yes    Screening for prostate cancer      PLAN:  -PSA today, will call with results    -Discussed further evaluation of bladder/prostate with cysto/TRUS since LUTS has worsened. Pt is interested in possible BPH surgery  Will send message to MD regarding further work up  Continue uroxatral. No refill needed    Continue conservative measures to control urgency and frequency including   1. Avoiding/minimizing bladder irritants (see below), especially in afternoon and evening hours  Discussed bladder irritants include coffee (even decaf), tea, alcohol, soda, spicy foods, acidic juices (orange, tomato), vinegar, and artificial sweeteners/sugary beverages.  2. timed voiding - empty on a schedule (approx ~2-3 hours) in spite of need to urinate, to get ahead of urge  3. dont postponing voiding - dont hold it on purpose   4. bowel regimen as distended bowel has extrinsic compressive effect on bladder.   - any or all of the following in any combination, titrate to soft daily bowel movement without pushing or straining  - colace/stool softener capsule - once to twice daily  - miralax - 1 capful daily to start, can increase to 2x daily (or decrease to 1/2 cap daily)  - increase dietary fibery  - fiber supplements, such as  metamucil  - prunes, prune juice  5. INCREASE water intake  6. Stop fluids 2 hours before bed, and urinate just before bed    -Continue stone prevention and urocit K as ordered  ANDREA/KUB due 11/2024    -F/u for cysto / TRUS once scheduled    I encouraged him or any of his family members to call or email me with questions and/or concerns.      30 minutes of total time spent on the encounter, which includes face to face time and non-face to face time preparing to see the patient (eg, review of tests), Obtaining and/or reviewing separately obtained history, Documenting clinical information in the electronic or other health record, Independently interpreting results (not separately reported) and communicating results to the patient/family/caregiver, or Care coordination (not separately reported).

## 2024-05-09 NOTE — TELEPHONE ENCOUNTER
----- Message from Tequila Foster NP sent at 5/9/2024 10:55 AM CDT -----  Please notify patient his PSA is 0.53, normal  Repeat 1 year    Thanks  Tequila

## 2024-05-20 DIAGNOSIS — N40.1 BPH WITH OBSTRUCTION/LOWER URINARY TRACT SYMPTOMS: Primary | ICD-10-CM

## 2024-05-20 DIAGNOSIS — N13.8 BPH WITH OBSTRUCTION/LOWER URINARY TRACT SYMPTOMS: Primary | ICD-10-CM

## 2024-05-20 NOTE — PROGRESS NOTES
Procedure Order to Urology [[id number]]    Electronically signed by: Ronal Benitez MD on 05/19/24 1411 Status: Active   Ordering user: Ronal Benitez MD 05/19/24 1411 Authorized by: Ronal Benitez MD   Ordering mode: Standard   Frequency:  05/19/24 -     Diagnoses  BPH with obstruction/lower urinary tract symptoms [N40.1, N13.8]   Questionnaire    Question Answer   Procedure Cystoscopy Comment - 7/9  TRUS/Trans Rectal Ultrasound   Facility [facility]: [facility], Please order Urine POCT without micro . Local sedation/trus

## 2024-06-24 DIAGNOSIS — I10 ESSENTIAL HYPERTENSION: ICD-10-CM

## 2024-06-24 DIAGNOSIS — E11.65 TYPE 2 DIABETES MELLITUS WITH HYPERGLYCEMIA, WITHOUT LONG-TERM CURRENT USE OF INSULIN: ICD-10-CM

## 2024-06-24 DIAGNOSIS — E11.69 HYPERLIPIDEMIA ASSOCIATED WITH TYPE 2 DIABETES MELLITUS: ICD-10-CM

## 2024-06-24 DIAGNOSIS — E78.5 HYPERLIPIDEMIA ASSOCIATED WITH TYPE 2 DIABETES MELLITUS: ICD-10-CM

## 2024-06-24 RX ORDER — AMLODIPINE BESYLATE 2.5 MG/1
2.5 TABLET ORAL
Qty: 90 TABLET | Refills: 1 | Status: SHIPPED | OUTPATIENT
Start: 2024-06-24

## 2024-06-24 RX ORDER — ROSUVASTATIN CALCIUM 20 MG/1
20 TABLET, COATED ORAL
Qty: 90 TABLET | Refills: 1 | Status: SHIPPED | OUTPATIENT
Start: 2024-06-24

## 2024-06-25 RX ORDER — METFORMIN HYDROCHLORIDE 750 MG/1
750 TABLET, EXTENDED RELEASE ORAL 2 TIMES DAILY WITH MEALS
Qty: 180 TABLET | Refills: 0 | Status: SHIPPED | OUTPATIENT
Start: 2024-06-25

## 2024-06-25 NOTE — TELEPHONE ENCOUNTER
Care Due:                  Date            Visit Type   Department     Provider  --------------------------------------------------------------------------------                                EP -                              PRIMARY      [facility]  Last Visit: 01-      CARE ([facility])   PRACTICE       Candido Mon  Next Visit: None Scheduled  None         None Found                                                            Last  Test          Frequency    Reason                     Performed    Due Date  --------------------------------------------------------------------------------    HBA1C.......  6 months...  metFORMIN................  01- 06-    Health [facility] Embedded Care Due Messages. Reference number: [id number].   6/24/2024 9:14:48 PM CDT

## 2024-06-25 NOTE — TELEPHONE ENCOUNTER
Refill Routing Note   Medication(s) are not appropriate for processing by [facility] for the following reason(s):      Drug-disease interaction    [facility] action(s):  Defer  Approve Care Due:  Labs due     Medication Therapy Plan: metFORMIN and Ureteral stone with hydronephrosis    Pharmacist review requested: Yes     Appointments  past 12m or future 3m with PCP    Date Provider   Last Visit   1/5/2024 Candido Mon MD   Next Visit   Visit date not found Candido Mon MD   ED visits in past 90 days: 0        Note composed:11:38 PM 06/24/2024

## 2024-06-25 NOTE — TELEPHONE ENCOUNTER
Provider Staff:  Action required for this patient     Please see care gap opportunities below in Care Due Message.    Thanks!  [facility]     Appointments      Date Provider   Last Visit   1/5/2024 Candido Mon MD   Next Visit   Visit date not found Candido Mon MD      Refill Decision Note   Toni Hyde  is requesting a refill authorization.  Brief Assessment and Rationale for Refill:  Approve     Medication Therapy Plan:         Pharmacist review requested: Yes   Extended chart review required: Yes   Comments:     Note composed:1:52 AM 06/25/2024

## 2024-06-27 DIAGNOSIS — E11.9 TYPE 2 DIABETES MELLITUS WITHOUT COMPLICATION: ICD-10-CM

## 2024-07-03 DIAGNOSIS — E11.9 TYPE 2 DIABETES MELLITUS WITHOUT COMPLICATION: ICD-10-CM

## 2024-07-05 ENCOUNTER — PATIENT MESSAGE (OUTPATIENT)
Dept: ADMINISTRATIVE | Facility: HOSPITAL | Age: 69
End: 2024-07-05
Payer: MEDICARE

## 2024-07-05 ENCOUNTER — PATIENT OUTREACH (OUTPATIENT)
Dept: ADMINISTRATIVE | Facility: HOSPITAL | Age: 69
End: 2024-07-05
Payer: MEDICARE

## 2024-07-05 NOTE — PROGRESS NOTES
Population Health Chart Review & Patient Outreach Details      Additional [facility] Notes:               Updates Requested / Reviewed:        Health Maintenance Topics Overdue:      VB Score: 2     Urine Screening  Hemoglobin A1c    RSV Vaccine                  Health Maintenance Topic(s) Outreach Outcomes & Actions Taken:    Lab(s) - Outreach Outcomes & Actions Taken  : Overdue Lab(s) Ordered

## 2024-07-09 ENCOUNTER — HOSPITAL ENCOUNTER (OUTPATIENT)
Facility: HOSPITAL | Age: 69
Discharge: HOME | End: 2024-07-09
Attending: UROLOGY | Admitting: UROLOGY
Payer: MEDICARE

## 2024-07-09 DIAGNOSIS — N40.1 BPH WITH OBSTRUCTION/LOWER URINARY TRACT SYMPTOMS: Primary | ICD-10-CM

## 2024-07-09 DIAGNOSIS — N13.8 BPH WITH OBSTRUCTION/LOWER URINARY TRACT SYMPTOMS: ICD-10-CM

## 2024-07-09 DIAGNOSIS — N40.1 BPH WITH OBSTRUCTION/LOWER URINARY TRACT SYMPTOMS: ICD-10-CM

## 2024-07-09 DIAGNOSIS — N13.8 BPH WITH OBSTRUCTION/LOWER URINARY TRACT SYMPTOMS: Primary | ICD-10-CM

## 2024-07-09 LAB
BILIRUBIN, UA POC OHS: NEGATIVE
BLOOD, UA POC OHS: NEGATIVE
CLARITY, UA POC OHS: CLEAR
COLOR, UA POC OHS: ABNORMAL
GLUCOSE, UA POC OHS: NEGATIVE
KETONES, UA POC OHS: NEGATIVE
LEUKOCYTES, UA POC OHS: ABNORMAL
NITRITE, UA POC OHS: NEGATIVE
PH, UA POC OHS: 7.5
PROTEIN, UA POC OHS: NEGATIVE
SPECIFIC GRAVITY, UA POC OHS: 1.02
UROBILINOGEN, UA POC OHS: 1

## 2024-07-09 PROCEDURE — 52000 CYSTOURETHROSCOPY: CPT | Performed by: UROLOGY

## 2024-07-09 PROCEDURE — 52000 CYSTOURETHROSCOPY: CPT | Mod: ,,, | Performed by: UROLOGY

## 2024-07-09 PROCEDURE — A4217 STERILE WATER/SALINE, 500 ML: HCPCS | Performed by: UROLOGY

## 2024-07-09 PROCEDURE — 76872 US TRANSRECTAL: CPT | Mod: 26,,, | Performed by: UROLOGY

## 2024-07-09 PROCEDURE — 25000003 PHARM REV CODE 250: Performed by: UROLOGY

## 2024-07-09 PROCEDURE — 76872 US TRANSRECTAL: CPT | Performed by: UROLOGY

## 2024-07-09 RX ORDER — LIDOCAINE HYDROCHLORIDE 20 MG/ML
JELLY TOPICAL
Status: DISCONTINUED | OUTPATIENT
Start: 2024-07-09 | End: 2024-07-09 | Stop reason: HOSPADM

## 2024-07-09 RX ORDER — CEFAZOLIN SODIUM 2 G/50ML
2 SOLUTION INTRAVENOUS
OUTPATIENT
Start: 2024-07-09

## 2024-07-09 RX ORDER — CIPROFLOXACIN 500 MG/1
500 TABLET ORAL 2 TIMES DAILY
Qty: 4 TABLET | Refills: 0 | Status: SHIPPED | OUTPATIENT
Start: 2024-07-09

## 2024-07-09 RX ORDER — WATER 1 ML/ML
IRRIGANT IRRIGATION
Status: DISCONTINUED | OUTPATIENT
Start: 2024-07-09 | End: 2024-07-09 | Stop reason: HOSPADM

## 2024-07-09 NOTE — PLAN OF CARE
Reviewed AVS. Verbalized understanding. Denies further questions , needs or complaints. Able to void clear yellow urine without difficulty. Home via private vehicle. Ambulates well.      
clear

## 2024-07-09 NOTE — H&P
CHIEF COMPLAINT:     Mr. Hyde is a 69 y.o. male presenting for f/u BPH     PRESENTING ILLNESS:     Toni Hyde is a 69 y.o. male who presents for f/u BPH. Last clinic visit was 11/9/23     Last visit with Dr. Benitez  Last saw NP 11/2021 noting: He has been known to have uric acid stones he also underwent ESWL in 2017 by Dr. Noel.  He has not experienced any recent stone episodes and no complaints.  He does continue to take allopurinol 100 mg p.o. q.d. and potassium citrate 15meq BID with no problems.  CT 1/12/21:  Right lower pole calyx with 4 mm stone and a 1 mm stone.  Left kidney has a 2 mm, a 2 mm, a 3 mm and a 11 mm stone seen from top to bottom. Bilateral renal cysts, larger on left with some peripheral calc, diverticulosis, 4.2cm prostate  Correltaing KUB 1/12/21: There are least 2 rounded calcifications left mid abdomen centered at the L2 level largest 9 mm.  Vascular phleboliths left pelvic sidewall. Impression notes   4/25/17 L ureteral ESWL and stent removal. For 9mm mid ureteral stone. Cysto noted 4 cm with mild PEREIRA   Patient reports previously having calcium oxalate stones analyzed 20 years prior, and only started potassium citrate with findings of 100% uric acid stone on stone analysis from his ESWL past fragments in 2017. On chart review, I did find his stone analysis confirming 100% uric acid, as well as a 24 hour urine 1 month later on 5/22/17 which had good urine volume of 2 L and normal urine uric acid but high saturation of uric acid 3.4, and high saturation of calcium oxalate at 6.63 increasing the risk of both types of stones, with a very low pH of 4.981 despite good citrate at 1698, and high dietary oxalate at 46.   He denies any history of gout, or prior known uric acid issues prior to 2017.   Uric acid, serum, on file with primary care is 4.9 in October 2021, normal.  Creatinine 1.4/EGFR 52.7 in October 2021 as well.  On review, has had mild CKD 3 for quite some time with  baseline GFR around 55-58  Not following low purine or low uric acid diet. Was told previously to reduce meat intake previously  Last PSA on file 0.6 in 2020.  Stable in the 0.40.6 range for all years prior.  No family history of prostate cancer.   Personal review of CT scan from January 2020 to consistent with report above with large left lower pole stone burden about 1-1.1 cm and scattered other small punctate stones throughout the left collecting system upper and lower pole.  In the right kidney there is a 4 mm lower pole stone and a smaller upper pole stone.  The left kidney has a central midpole cyst extending towards the collecting system as well as an upper pole cyst.  Reported as cyst stable from previous imaging, the upper pole lesion looks less fluid density on the non contrasted imaging to me   He does report intermittent stone fragment passage. More so dust/small fragments. Only know bc gets hung up in urethra.  A few times a year.  No significant pain or stone episodes.  Occasional flank or back pain which may or may not be related.  No blood in the urine.  Urinalysis is dipstick negative today.  Has been taking 100 mg allopurinol and 15 mEq daily, not b.i.d., of potassium citrate.   AUA SS: 15/3 (4 intermittency, 3: freq, urgency; 2 sleeping, 1: emptying, weak, strain)  Stops and starts a lot. If occupied easy to postpone, but there is some urgency when just sitting around. Occ NTF 2-3x/night, but if no fluids after 8pm can make it through the night  5/5/22 Procedure(s) Performed:   Left ureteroscopy with holmium laser lithotripsy and stone basket extraction  Cystoscopy with placement of left double-J ureteral stent 6 Bulgarian by 26 cm  Left retrograde pyelogram including injection of contrast  Fluoroscopy less than 1 hour including interpretation of fluoroscopic images  Findings:  scattered left stones in upper middle and lower pole with larger burden in lower pole, all removed  5/17/22 Procedure(s)  Performed:   Cystoscopy with left ureteral stent removal  Findings: ureteral stent, removed     9/12/22 patient presents to clinic for f/u kidney stones. He has been doing well since procedure. He increased urocit K to 1 tablet BID as instructed. He drinks ~ 1 L of water with crystal light lemonade per day. He is trying to increase water intake. Denies dysuria, gross hematuria, flank pain, f/c/n/v. He is taking alfusozin for BPH/LUTS. He reports his symptoms have mildly improved. Flomax worked better on LUTS but he was unable to tolerate d/t dizziness.     8/17/22   BMP: K 4.3, creatinine 1.2 / GFR >60  PTH intact 55.3  Uric acid 6.4     8/17/22 ANDREA  Impression:   Multiple bilateral intrarenal stones.  No hydronephrosis is noted.  Three cyst identified of each kidney.  A solid mass is not noted     3/13/23  Patient presents today for f/u kidney stones. He has been doing well since last clinic visit. He is taking urocit K 1 tablet BID and drinks 1-1.5 L water and lemonade per day. Also drinks 1-2 cups coffee per day. Denies dysuria, gross hematuria, flank pain, fever, chills, nausea or vomiting.   He is taking alfuzosin for BPH. He reports nocturia x 2 and occasional daytime frequency. Flomax worked better but he could not tolerate d/t dizziness.     12/2/22   K 4.0  Creatinine 1.2  GFR >60     3/10/23  ANDREA Impression:   1. Bilateral simple renal cysts.  2. Bilateral nephrolithiasis.  KUB: left renal stones     11/9/23  S/p ESWL, left 4/27/23 6/16/23 creatinine 1.1 / GFR>60  KUB today  5/11/23 Stone analysis: 100% Calcium oxalate monohydrate     Drinks 1 L crystal light lemonade plus 2-3 true lime packets daily and ~ 0.5 L water per day.     Takes urocit K 15 mEq BID     He is taking alfuzosin for BPH. Voids every 2 hours during the day and nocturia x 1-2. Moderate urinary stream. Denies dysuria, gross hematuria, flank pain, fever, chills, nausea or vomiting. Flomax did work better but unable to tolerate d/t  dizziness.      24 Hour Urine Results From [facility]  Date: 10/11/23              Urine volume: 1.09  SS CaOx: 11.01  Ref 5-10  Urine Calcium (mg/day): 199  Ref M <250, F <200  Urine Oxalate (mg/day): 32  Ref 20-40  Urine Citrate (mg/day): 986  Ref M >450, F>550  SS CaP: 1.42  Ref 0.5-2  24 Hour Urine pH:5.930  Ref 5.8-6.2  SS Uric Acid: 1.21  Ref 0-1  Urine Uric Acid (g/day): 0.587  Ref M<0.8, F<0.75     24  AUA SS: Feeling of incomplete Emptyin, Frequency: 4, Intermittency: 3, Urgency: 4, Weak Stream: 2, Strainin, Sleepin, Total SS: 20 , Quality of Life: 3  UA negative  PVR 17 ml  On uroxatral  Denies dysuria, gross hematuria, flank pain, fever, chills, nausea or vomiting     Taking urocit K as ordered for kidney stones  24 K 3.9, creatinine 1.2, GFR >60        Urine cultures:         Lab Results   Component Value Date     LABURIN No growth 2023     LABURIN No growth 2022     LABURIN No growth 2017     LABURIN NO GROWTH AFTER 48 HOURS. 2012               REVIEW OF SYSTEMS:     Review of Systems     Constitutional: Negative for fever and chills.   Gastrointestinal: Negative for nausea, vomiting  Genitourinary:  See above  Neurological: Negative for dizziness.   Psychiatric/Behavioral: Negative for confusion.         PATIENT HISTORY:          Past Medical History:   Diagnosis Date    Arthritis      Cataract      Cataract of left eye      Diabetes mellitus type II       on po meds    Hyperlipidemia       not on meds at present    Hypertension      Kidney stones 2017    Left ureteral stone      Plantar fasciitis of right foot 10/2017     Dr Colorado     Tendonitis 2018     right foot Dr Colorado    Wears glasses                 Past Surgical History:   Procedure Laterality Date    ADENOIDECTOMY        BIOPSY OF MASS OF LUMBAR SPINE N/A 10/19/2022     Procedure: BIOPSY, MASS, SPINE, LUMBAR;  Surgeon: Marvel Matthews DO;  Location: [facility];  Service: Neurosurgery;   Laterality: N/A;  Right L5-S1 Resection of Extradural Mass Using Transfacet Approach    CATARACT EXTRACTION        COLONOSCOPY   03/23/2010     Dr. Blackburn, hyperplastic polyps, otherwise negative, 10 year recheck    COLONOSCOPY N/A 11/19/2021     Procedure: COLONOSCOPY;  Surgeon: Marcus Sexton MD;  Location: [facility];  Service: Endoscopy;  Laterality: N/A;    CYSTOSCOPY Left 04/12/2017     with ureteral stent-Dr. Noel    CYSTOSCOPY Left 05/17/2022     Procedure: CYSTOSCOPY with stent removal;  Surgeon: Ronal Benitez MD;  Location: [facility] OR;  Service: Urology;  Laterality: Left;    CYSTOSCOPY WITH URETEROSCOPY, RETROGRADE PYELOGRAPHY, AND INSERTION OF STENT Left 05/05/2022     Procedure: CYSTOSCOPY, WITH RETROGRADE PYELOGRAM AND URETERAL STENT INSERTION;  Surgeon: Ronal Benitez MD;  Location: [facility] OR;  Service: Urology;  Laterality: Left;    EXTRACORPOREAL SHOCK WAVE LITHOTRIPSY Left 4/27/2023     Procedure: LITHOTRIPSY, ESWL;  Surgeon: Ronal Benitez MD;  Location: [facility] OR;  Service: Urology;  Laterality: Left;    EYE SURGERY   05/06/2013     Dr Nielsen    kidney stent    04/2017    KNEE ARTHROSCOPY W/ DEBRIDEMENT         right with lateral release     LITHOTRIPSY   04/19/2017    NASAL SINUS SURGERY        retna surgery    05/2013     Dr Peralta    SPINE SURGERY        TONSILLECTOMY        TRANSFORAMINAL EPIDURAL INJECTION OF STEROID Right 12/07/2020     Procedure: Injection,steroid,epidural,transforaminal approach L5- S1;  Surgeon: Kermit Davalos MD;  Location: [facility] OR;  Service: Pain Management;  Laterality: Right;  L5-s1    TRANSFORAMINAL EPIDURAL INJECTION OF STEROID Right 06/23/2022     Procedure: Injection,steroid,epidural,transforaminal approach;  Surgeon: Christos Saul MD;  Location: [facility] OR;  Service: Pain Management;  Laterality: Right;  L5-S1 and cyst aspiration L5-S1    URETEROSCOPIC REMOVAL OF URETERIC CALCULUS Left 05/05/2022     Procedure: REMOVAL, CALCULUS, URETER, URETEROSCOPIC;  Surgeon:  Ronal Benitez MD;  Location: [facility];  Service: Urology;  Laterality: Left;         PHYSICAL EXAMINATION:     Constitutional: He is oriented to person, place, and time. He appears well-developed and well-nourished.  He is in no apparent distress.     Abdominal:  He exhibits no distension.  There is no CVA tenderness.      Neurological: He is alert and oriented to person, place, and time.      Psych: Cooperative with normal affect.        Physical Exam        LABS:                 Lab Results   Component Value Date     PSA 0.56 03/13/2023     PSA 0.56 03/11/2022     PSA 0.59 04/06/2020     PSADIAG 0.51 06/15/2018     PSADIAG 0.54 05/18/2017            Lab Results   Component Value Date     CREATININE 1.2 01/02/2024            IMPRESSION:          Encounter Diagnoses   Name Primary?    BPH with obstruction/lower urinary tract symptoms Yes    Screening for prostate cancer        PLAN:  -PSA today, will call with results     -Discussed further evaluation of bladder/prostate with cysto/TRUS since LUTS has worsened. Pt is interested in possible BPH surgery  Will send message to MD regarding further work up  Continue uroxatral. No refill needed     Continue conservative measures to control urgency and frequency including   1. Avoiding/minimizing bladder irritants (see below), especially in afternoon and evening hours  Discussed bladder irritants include coffee (even decaf), tea, alcohol, soda, spicy foods, acidic juices (orange, tomato), vinegar, and artificial sweeteners/sugary beverages.  2. timed voiding - empty on a schedule (approx ~2-3 hours) in spite of need to urinate, to get ahead of urge  3. dont postponing voiding - dont hold it on purpose   4. bowel regimen as distended bowel has extrinsic compressive effect on bladder.   - any or all of the following in any combination, titrate to soft daily bowel movement without pushing or straining  - colace/stool softener capsule - once to twice daily  - miralax - 1  capful daily to start, can increase to 2x daily (or decrease to 1/2 cap daily)  - increase dietary fibery  - fiber supplements, such as metamucil  - prunes, prune juice  5. INCREASE water intake  6. Stop fluids 2 hours before bed, and urinate just before bed     -Continue stone prevention and urocit K as ordered  ANDREA/KUB due 11/2024     -F/u for cysto / TRUS once scheduled     I encouraged him or any of his family members to call or email me with questions and/or concerns.    Agree with NP cysto/trus is next step  Booked at [facility] 7/9  Please call to confirm and can briefly review procedure/arrival/etc  Continue uroxatral in interim     ----- Message from Tequila Foster NP sent at 5/9/2024  9:51 AM CDT -----  Pt is interested in BPH surgery. Cysto/TRUS? Getting PSA today  Psa 0.53

## 2024-07-09 NOTE — PROGRESS NOTES
Procedure Order to Urology [[id number]]    Electronically signed by: Ronal Benitez MD on 07/09/24 1523 Status: Active   Ordering user: Ronal Benitez MD 07/09/24 1523 Ordering provider: Ronal Benitez MD   Authorized by: Ronal Benitez MD Ordering mode: Standard   Frequency:  07/09/24 -     Diagnoses  BPH with obstruction/lower urinary tract symptoms [N40.1, N13.8]   Questionnaire    Question Answer   Procedure Urolift Comment - 8/22   Facility Name: [facility], please order, CBC, BMP, PT/ INR ,U/A and culture ,EKG if over 40  IV start, NPO, general anesthesia, Ancef 2 gram ( alternative for pcn allergy is Cipro 400mg IV ) [id number] and [id number]

## 2024-07-12 VITALS
WEIGHT: 194.88 LBS | SYSTOLIC BLOOD PRESSURE: 123 MMHG | HEIGHT: 70 IN | DIASTOLIC BLOOD PRESSURE: 73 MMHG | OXYGEN SATURATION: 98 % | TEMPERATURE: 98 F | RESPIRATION RATE: 18 BRPM | HEART RATE: 68 BPM | BODY MASS INDEX: 27.9 KG/M2

## 2024-07-13 NOTE — OP NOTE
[facility] Operative/Brief Discharge Note     Date: 7/9/24     Staff Surgeon: Ronal Benitez MD     Pre-Op Diagnosis:   BPH with LUTS     Post-Op Diagnosis:   same     Procedure(s) Performed:   Cystoscopy, flexible  Transrectal ultrasound with volumetric measurement of prostate      Specimen(s): none     Anesthesia: local, 2% xylocaine jelly urojet     Findings:   TRUS volume:  55.8 cm3 (W:  52.4 mm, H:  36.7 mm, L:  55.6 mm), no TAYLOR  Cysto:  Significant kissing lateral lobe prostate obstruction.  More anterolateral proximally towards lateral distally especially with water off, with only minimal elevation of the bladder neck.  Prostatic calcifications.  No intravesical extension.. Moderate diffuse trabeculations with intervening cellules, and a left upper lateral wall diverticulum proximally 1 cm.  No median lobe.       Estimated Blood Loss: none     Drains: none     Complications: none     Indications for procedure:  70 yo M with significant BPH/LUTS progressive despite alpha blockers, noted on continued follow-up for his kidney stones and BPH.  He has been on Uroxatral for greater than 1 year and most recently presented noting AUA symptom score 20/3 with 4/5 incomplete emptying frequency and urgency. Presents for lower tract evaluation to help guide further recommendations        Procedure in detail:  After informed consent, the patient was placed in left lateral decubitus position. Transrectal ultrasound probe was passed into the rectum and the prostate was visualized on the screen.  Three-dimensional measurements taken as above with reported prostate volume as documented.  Pertinent ultrasound findings noted above, with absence of median lobe, and No ultrasonic abnormalities of prostate were visualized. Transverse and saggital image captured.  The ultrasound probe was removed     He was then placed in supine position and prepped and drapped in standard cystoscopic fashion and 2% xylocaine jelly was  instilled into the urethra.  A flexible cystoscope was passed into the bladder via the urethra.      Anterior urethra normal without narrowing. The prostatic urethra demonstrated significant obstruction as described above in findings, with lateral lobe obstruction present with the absence of median lobe, as confirmed on retroflexion      Bladder otherwise systematically inspected and no mucosal lesions or tumors seen.  Bladder was also assessed for signs of chronic obstruction such as trabeculations, cellules, diverticulum, of which pertinent findings are noted above with extensive signs of chronic obstruction.  Bilateral ureteral orifices seen in orthotopic position on trigone bilaterally with clear efflux.       Patient tolerated the procedure well. No complications     Disposition:  Reviewed in detail his cystoscopic findings of obstruction as well as procedures including urolift, rezum, turp, tuip  After extensive discussion of all options, as well as the benefit of relief of obstruction preventing further long-term changes at the bladder level and better relief of symptoms in the absence of medical therapy, he elected to proceed with UroLift.     Procedure in detail discussed. Discussed risks including but not limited to hematuria, postop retention, pain, infection, injury to bladder or urethra, and worsening urgency, latent pelvic pain, no guarantee of symptomatic relief, prostate regrowth, need for future procedures. We did discuss the non-incidence of ejaculatory dysfunction, as well as the 13% recurrence rate in 5 year studies. Also discussed about 20% of people may need a catheter after (due to retention or hematuria) but not required if voiding postop, though tend to leave villegas prophylactically overnight at minimum, which he is agreeable to.      Discussed that symptomatic relief may take longer to be fully appreciated, in the setting of longer standing obstruction, and period of symptomatic adjustment  postop. Also discussed transient increases in urgency frequency which may yield transient UUI in postop period.  As this already exists at baseline, may peak before it settles, and with relief of obstruction may need to manage persistent overactive bladder symptoms after recovery.     All questions patient had answered and  appropriate informed consent obtained.    Urolift scheduled 8/22/24     Discharge home today status post uncomplicated procedure as above  Diet - resume home diet  Follow up:8/22/24 Urolift  Instructions:  Drink plenty of water, may see blood in urine, complete prophylactic antibiotics.    Hold aspirin fish oil blood thinners one-week prior to UroLift.    Meds     Medication List        START taking these medications      ciprofloxacin HCl 500 MG tablet  Commonly known as: CIPRO  Take 1 tablet (500 mg total) by mouth 2 (two) times daily.            CONTINUE taking these medications      alfuzosin 10 mg Tb24  Commonly known as: UROXATRAL  Take 1 tablet (10 mg total) by mouth daily with breakfast.     amLODIPine 2.5 MG tablet  Commonly known as: NORVASC  TAKE 1 TABLET DAILY     cetirizine 10 MG tablet  Commonly known as: ZYRTEC     diphenhydrAMINE 25 mg capsule  Commonly known as: BENADRYL     fluticasone propionate 50 mcg/actuation nasal spray  Commonly known as: FLONASE     ibuprofen 200 MG tablet  Commonly known as: ADVIL,MOTRIN     metFORMIN 750 MG ER 24hr tablet  Commonly known as: GLUCOPHAGE-XR  TAKE 1 TABLET TWICE A DAY WITH MEALS     MUCINEX ORAL     potassium citrate 15 mEq Tbsr  Commonly known as: UROCIT-K 15  Take 1 tablet by mouth 2 (two) times daily.     rosuvastatin 20 MG tablet  Commonly known as: CRESTOR  TAKE 1 TABLET DAILY     valsartan 320 MG tablet  Commonly known as: DIOVAN  TAKE 1 TABLET DAILY               Where to Get Your Medications        These medications were sent to [id number] - [place], [place] - [address] AT [facility] [address]  [address]  [place], [place] [address]      Phone: [phone]   ciprofloxacin HCl 500 MG tablet

## 2024-08-02 ENCOUNTER — PATIENT MESSAGE (OUTPATIENT)
Dept: UROLOGY | Facility: CLINIC | Age: 69
End: 2024-08-02
Payer: MEDICARE

## 2024-08-02 NOTE — TELEPHONE ENCOUNTER
Spoke to pt  Moved back to 8/22    As well noted he passed stones, right sided  Recent stone analaysis on file last year so can discard

## 2024-08-08 DIAGNOSIS — I15.2 HYPERTENSION ASSOCIATED WITH DIABETES: ICD-10-CM

## 2024-08-08 DIAGNOSIS — E11.59 HYPERTENSION ASSOCIATED WITH DIABETES: ICD-10-CM

## 2024-08-08 RX ORDER — VALSARTAN 320 MG/1
TABLET ORAL
Qty: 90 TABLET | Refills: 1 | Status: SHIPPED | OUTPATIENT
Start: 2024-08-08

## 2024-08-12 ENCOUNTER — HOSPITAL ENCOUNTER (OUTPATIENT)
Dept: PREADMISSION TESTING | Facility: HOSPITAL | Age: 69
Discharge: HOME | End: 2024-08-12
Attending: UROLOGY
Payer: MEDICARE

## 2024-08-12 DIAGNOSIS — N13.8 BPH WITH OBSTRUCTION/LOWER URINARY TRACT SYMPTOMS: ICD-10-CM

## 2024-08-12 DIAGNOSIS — Z01.810 PREOP CARDIOVASCULAR EXAM: ICD-10-CM

## 2024-08-12 DIAGNOSIS — N40.1 BPH WITH OBSTRUCTION/LOWER URINARY TRACT SYMPTOMS: ICD-10-CM

## 2024-08-12 PROCEDURE — 93005 ELECTROCARDIOGRAM TRACING: CPT

## 2024-08-12 PROCEDURE — 93010 ELECTROCARDIOGRAM REPORT: CPT | Mod: ,,, | Performed by: GENERAL PRACTICE

## 2024-08-12 NOTE — DISCHARGE INSTRUCTIONS
To confirm, Your doctor has instructed you that surgery is scheduled for: 8/22/24    Please report to [facility], Registration the morning of surgery. You must check-in and receive a wristband before going to your procedure.  [address] [place] [address] [place] [id number]    Pre-Op will call the afternoon prior to surgery between 1:00 and 6:00 PM with the final arrival time.  Phone number: [phone]    PLEASE NOTE:  The surgery schedule has many variables which may affect the time of your surgery case.  Family members should be available if your surgery time changes.  Plan to be here the day of your procedure between 4-6 hours.    MEDICATIONS:  TAKE ONLY THESE MEDICATIONS WITH A SMALL SIP OF WATER THE MORNING OF YOUR PROCEDURE:    SEE MED LIST          DO NOT TAKE THESE MEDICATIONS 5-7 DAYS PRIOR to your procedure or per your surgeon's request:   ASPIRIN, ALEVE, ADVIL, IBUPROFEN, FISH OIL VITAMIN E, HERBALS  (May take Tylenol)    ONLY if you are prescribed any types of blood thinners such as:  Aspirin, Coumadin, Plavix, Pradaxa, Xarelto, Aggrenox, Effient, Eliquis, Savasya, Brilinta, or any other, ask your surgeon whether you should stop taking them and how long before surgery you should stop.  You may also need to verify with the prescribing physician if it is ok to stop your medication.      INSTRUCTIONS IMPORTANT!!  Do not eat or drink anything between midnight and the time of your procedure- this includes gum, mints, and candy.  Do not smoke or drink alcoholic beverages 24 hours prior to your procedure.  Shower the night before AND the morning of your procedure with a Chlorhexidine wash such as Hibiclens or Dial antibacterial soap from the neck down.  Do not get it on your face or in your eyes.  You may use your own shampoo and face wash. This helps your skin to be as bacteria free as possible.    If you wear contact lenses, dentures, hearing aids or glasses, bring a container to put them in  during surgery and give to a family member for safe keeping.  Please leave all jewelry, piercing's and valuables at home. You must remove your false eyelashes prior to surgery.    DO NOT remove hair from the surgery site.  Do not shave the incision site unless you are given specific instructions to do so.    ONLY if you have been diagnosed with sleep apnea please bring your C-PAP machine.  ONLY if you wear home oxygen please bring your portable oxygen tank the day of your procedure.  ONLY if you have a history of OPEN HEART SURGERY you will need a clearance from your Cardiologist per Anesthesia.      ONLY for patients requiring bowel prep, written instructions will be given by your doctor's office.  ONLY if you have a neuro stimulator, please bring the controller with you the morning of surgery  ONLY if a type and screen test is needed before surgery, please return:  If your doctor has scheduled you for an overnight stay, bring a small overnight bag with any personal items you need.  Make arrangements in advance for transportation home by a responsible adult. You can not go home in an uber or a cab per hospital policy.  It is not safe to drive a vehicle during the 24 hours after anesthesia.          All  facilities and properties are tobacco free.  Smoking is NOT allowed.   If you have any questions about these instructions, call Pre-Op Admit  Nursing at [phone] or the Pre-Op Day Surgery Unit at [phone].

## 2024-08-15 LAB
OHS QRS DURATION: 128 MS
OHS QTC CALCULATION: 412 MS

## 2024-08-21 ENCOUNTER — ANESTHESIA EVENT (OUTPATIENT)
Dept: SURGERY | Facility: HOSPITAL | Age: 69
End: 2024-08-21
Payer: MEDICARE

## 2024-08-21 NOTE — TELEPHONE ENCOUNTER
Spoke with patient and scheduled for right L5-S1 cyst aspiration and TFESI on 6/23. Advised [facility] would contact in days prior with arrival time. Verbalizes understanding.     Negative

## 2024-08-22 ENCOUNTER — HOSPITAL ENCOUNTER (OUTPATIENT)
Facility: HOSPITAL | Age: 69
Discharge: HOME | End: 2024-08-22
Attending: UROLOGY | Admitting: UROLOGY
Payer: MEDICARE

## 2024-08-22 ENCOUNTER — ANESTHESIA (OUTPATIENT)
Dept: SURGERY | Facility: HOSPITAL | Age: 69
End: 2024-08-22
Payer: MEDICARE

## 2024-08-22 VITALS
BODY MASS INDEX: 27.77 KG/M2 | TEMPERATURE: 98 F | OXYGEN SATURATION: 95 % | RESPIRATION RATE: 16 BRPM | SYSTOLIC BLOOD PRESSURE: 127 MMHG | DIASTOLIC BLOOD PRESSURE: 68 MMHG | WEIGHT: 194 LBS | HEIGHT: 70 IN | HEART RATE: 63 BPM

## 2024-08-22 DIAGNOSIS — N13.8 BPH WITH OBSTRUCTION/LOWER URINARY TRACT SYMPTOMS: ICD-10-CM

## 2024-08-22 DIAGNOSIS — N40.1 BPH WITH OBSTRUCTION/LOWER URINARY TRACT SYMPTOMS: ICD-10-CM

## 2024-08-22 PROCEDURE — 36000707: Performed by: UROLOGY

## 2024-08-22 PROCEDURE — L8699 PROSTHETIC IMPLANT NOS: HCPCS | Performed by: UROLOGY

## 2024-08-22 PROCEDURE — 63600175 PHARM REV CODE 636 W HCPCS: Performed by: NURSE ANESTHETIST, CERTIFIED REGISTERED

## 2024-08-22 PROCEDURE — 25000003 PHARM REV CODE 250: Performed by: NURSE ANESTHETIST, CERTIFIED REGISTERED

## 2024-08-22 PROCEDURE — 36000706: Performed by: UROLOGY

## 2024-08-22 PROCEDURE — 71000016 HC POSTOP RECOV ADDL HR: Performed by: UROLOGY

## 2024-08-22 PROCEDURE — 63600175 PHARM REV CODE 636 W HCPCS: Performed by: UROLOGY

## 2024-08-22 PROCEDURE — 71000015 HC POSTOP RECOV 1ST HR: Performed by: UROLOGY

## 2024-08-22 PROCEDURE — 37000009 HC ANESTHESIA EA ADD 15 MINS: Performed by: UROLOGY

## 2024-08-22 PROCEDURE — 52442 CYSTO INS TRNSPRSTC IMPLT EA: CPT | Mod: ,,, | Performed by: UROLOGY

## 2024-08-22 PROCEDURE — 94799 UNLISTED PULMONARY SVC/PX: CPT

## 2024-08-22 PROCEDURE — 52441 CYSTO INSJ TRNSPRSTC 1 IMPLT: CPT | Mod: ,,, | Performed by: UROLOGY

## 2024-08-22 PROCEDURE — 25000003 PHARM REV CODE 250: Performed by: ANESTHESIOLOGY

## 2024-08-22 PROCEDURE — 71000033 HC RECOVERY, INTIAL HOUR: Performed by: UROLOGY

## 2024-08-22 PROCEDURE — 71000039 HC RECOVERY, EACH ADD'L HOUR: Performed by: UROLOGY

## 2024-08-22 PROCEDURE — 25000003 PHARM REV CODE 250: Performed by: UROLOGY

## 2024-08-22 PROCEDURE — 27200651 HC AIRWAY, LMA: Performed by: NURSE ANESTHETIST, CERTIFIED REGISTERED

## 2024-08-22 PROCEDURE — 37000008 HC ANESTHESIA 1ST 15 MINUTES: Performed by: UROLOGY

## 2024-08-22 DEVICE — CARTRIDGE UROLIFT 2 IMPLANT: Type: IMPLANTABLE DEVICE | Site: PROSTATE | Status: FUNCTIONAL

## 2024-08-22 RX ORDER — MEPERIDINE HYDROCHLORIDE 50 MG/ML
12.5 INJECTION INTRAMUSCULAR; INTRAVENOUS; SUBCUTANEOUS ONCE
Status: DISCONTINUED | OUTPATIENT
Start: 2024-08-22 | End: 2024-08-22 | Stop reason: HOSPADM

## 2024-08-22 RX ORDER — SULFAMETHOXAZOLE AND TRIMETHOPRIM 800; 160 MG/1; MG/1
1 TABLET ORAL 2 TIMES DAILY
Qty: 10 TABLET | Refills: 0 | Status: SHIPPED | OUTPATIENT
Start: 2024-08-22 | End: 2024-08-27

## 2024-08-22 RX ORDER — ONDANSETRON HYDROCHLORIDE 2 MG/ML
4 INJECTION, SOLUTION INTRAVENOUS ONCE
Status: DISCONTINUED | OUTPATIENT
Start: 2024-08-22 | End: 2024-08-22 | Stop reason: HOSPADM

## 2024-08-22 RX ORDER — DIPHENHYDRAMINE HYDROCHLORIDE 50 MG/ML
25 INJECTION INTRAMUSCULAR; INTRAVENOUS EVERY 6 HOURS PRN
Status: DISCONTINUED | OUTPATIENT
Start: 2024-08-22 | End: 2024-08-22 | Stop reason: HOSPADM

## 2024-08-22 RX ORDER — FENTANYL CITRATE 50 UG/ML
25 INJECTION, SOLUTION INTRAMUSCULAR; INTRAVENOUS EVERY 5 MIN PRN
Status: DISCONTINUED | OUTPATIENT
Start: 2024-08-22 | End: 2024-08-22 | Stop reason: HOSPADM

## 2024-08-22 RX ORDER — GLUCAGON 1 MG
1 KIT INJECTION
Status: DISCONTINUED | OUTPATIENT
Start: 2024-08-22 | End: 2024-08-22 | Stop reason: HOSPADM

## 2024-08-22 RX ORDER — PHENYLEPHRINE HYDROCHLORIDE 10 MG/ML
INJECTION INTRAVENOUS
Status: DISCONTINUED | OUTPATIENT
Start: 2024-08-22 | End: 2024-08-22

## 2024-08-22 RX ORDER — MIDAZOLAM HYDROCHLORIDE 1 MG/ML
INJECTION INTRAMUSCULAR; INTRAVENOUS
Status: DISCONTINUED | OUTPATIENT
Start: 2024-08-22 | End: 2024-08-22

## 2024-08-22 RX ORDER — ACETAMINOPHEN 10 MG/ML
INJECTION, SOLUTION INTRAVENOUS
Status: DISCONTINUED | OUTPATIENT
Start: 2024-08-22 | End: 2024-08-22

## 2024-08-22 RX ORDER — SODIUM CHLORIDE, SODIUM LACTATE, POTASSIUM CHLORIDE, CALCIUM CHLORIDE 600; 310; 30; 20 MG/100ML; MG/100ML; MG/100ML; MG/100ML
INJECTION, SOLUTION INTRAVENOUS CONTINUOUS
Status: DISCONTINUED | OUTPATIENT
Start: 2024-08-22 | End: 2024-08-22 | Stop reason: HOSPADM

## 2024-08-22 RX ORDER — DEXAMETHASONE SODIUM PHOSPHATE 4 MG/ML
INJECTION, SOLUTION INTRA-ARTICULAR; INTRALESIONAL; INTRAMUSCULAR; INTRAVENOUS; SOFT TISSUE
Status: DISCONTINUED | OUTPATIENT
Start: 2024-08-22 | End: 2024-08-22

## 2024-08-22 RX ORDER — HYDROMORPHONE HYDROCHLORIDE 2 MG/ML
0.2 INJECTION, SOLUTION INTRAMUSCULAR; INTRAVENOUS; SUBCUTANEOUS EVERY 5 MIN PRN
Status: DISCONTINUED | OUTPATIENT
Start: 2024-08-22 | End: 2024-08-22 | Stop reason: HOSPADM

## 2024-08-22 RX ORDER — LIDOCAINE HYDROCHLORIDE 20 MG/ML
INJECTION INTRAVENOUS
Status: DISCONTINUED | OUTPATIENT
Start: 2024-08-22 | End: 2024-08-22

## 2024-08-22 RX ORDER — METHYLPREDNISOLONE 4 MG/1
TABLET ORAL
Qty: 21 EACH | Refills: 0 | Status: SHIPPED | OUTPATIENT
Start: 2024-08-22 | End: 2024-09-12

## 2024-08-22 RX ORDER — ONDANSETRON HYDROCHLORIDE 2 MG/ML
INJECTION, SOLUTION INTRAVENOUS
Status: DISCONTINUED | OUTPATIENT
Start: 2024-08-22 | End: 2024-08-22

## 2024-08-22 RX ORDER — PROCHLORPERAZINE EDISYLATE 5 MG/ML
5 INJECTION INTRAMUSCULAR; INTRAVENOUS EVERY 4 HOURS PRN
Status: DISCONTINUED | OUTPATIENT
Start: 2024-08-22 | End: 2024-08-22 | Stop reason: HOSPADM

## 2024-08-22 RX ORDER — PHENAZOPYRIDINE HYDROCHLORIDE 200 MG/1
200 TABLET, FILM COATED ORAL 3 TIMES DAILY PRN
Qty: 15 TABLET | Refills: 0 | Status: SHIPPED | OUTPATIENT
Start: 2024-08-22 | End: 2024-09-01

## 2024-08-22 RX ORDER — LIDOCAINE HYDROCHLORIDE 10 MG/ML
0.5 INJECTION, SOLUTION EPIDURAL; INFILTRATION; INTRACAUDAL; PERINEURAL ONCE
Status: DISCONTINUED | OUTPATIENT
Start: 2024-08-22 | End: 2024-08-22 | Stop reason: HOSPADM

## 2024-08-22 RX ORDER — PROPOFOL 10 MG/ML
VIAL (ML) INTRAVENOUS
Status: DISCONTINUED | OUTPATIENT
Start: 2024-08-22 | End: 2024-08-22

## 2024-08-22 RX ORDER — OXYCODONE HYDROCHLORIDE 5 MG/1
5 TABLET ORAL
Status: DISCONTINUED | OUTPATIENT
Start: 2024-08-22 | End: 2024-08-22 | Stop reason: HOSPADM

## 2024-08-22 RX ORDER — FENTANYL CITRATE 50 UG/ML
INJECTION, SOLUTION INTRAMUSCULAR; INTRAVENOUS
Status: DISCONTINUED | OUTPATIENT
Start: 2024-08-22 | End: 2024-08-22

## 2024-08-22 RX ORDER — EPHEDRINE SULFATE 50 MG/ML
INJECTION, SOLUTION INTRAVENOUS
Status: DISCONTINUED | OUTPATIENT
Start: 2024-08-22 | End: 2024-08-22

## 2024-08-22 RX ADMIN — FENTANYL CITRATE 25 MCG: 50 INJECTION, SOLUTION INTRAMUSCULAR; INTRAVENOUS at 07:08

## 2024-08-22 RX ADMIN — PROPOFOL 150 MG: 10 INJECTION, EMULSION INTRAVENOUS at 07:08

## 2024-08-22 RX ADMIN — EPHEDRINE SULFATE 10 MG: 50 INJECTION, SOLUTION INTRAMUSCULAR; INTRAVENOUS; SUBCUTANEOUS at 07:08

## 2024-08-22 RX ADMIN — GLYCOPYRROLATE 0.2 MG: 0.2 INJECTION, SOLUTION INTRAMUSCULAR; INTRAVITREAL at 07:08

## 2024-08-22 RX ADMIN — ONDANSETRON 8 MG: 2 INJECTION INTRAMUSCULAR; INTRAVENOUS at 07:08

## 2024-08-22 RX ADMIN — CEFAZOLIN 2 G: 2 INJECTION, POWDER, FOR SOLUTION INTRAMUSCULAR; INTRAVENOUS at 07:08

## 2024-08-22 RX ADMIN — OXYCODONE 5 MG: 5 TABLET ORAL at 08:08

## 2024-08-22 RX ADMIN — SODIUM CHLORIDE, SODIUM GLUCONATE, SODIUM ACETATE, POTASSIUM CHLORIDE AND MAGNESIUM CHLORIDE: 526; 502; 368; 37; 30 INJECTION, SOLUTION INTRAVENOUS at 08:08

## 2024-08-22 RX ADMIN — MIDAZOLAM HYDROCHLORIDE 2 MG: 1 INJECTION INTRAMUSCULAR; INTRAVENOUS at 06:08

## 2024-08-22 RX ADMIN — DEXAMETHASONE SODIUM PHOSPHATE 4 MG: 4 INJECTION, SOLUTION INTRA-ARTICULAR; INTRALESIONAL; INTRAMUSCULAR; INTRAVENOUS; SOFT TISSUE at 07:08

## 2024-08-22 RX ADMIN — PHENYLEPHRINE HYDROCHLORIDE 200 MCG: 10 INJECTION INTRAVENOUS at 07:08

## 2024-08-22 RX ADMIN — ACETAMINOPHEN 1000 MG: 10 INJECTION INTRAVENOUS at 07:08

## 2024-08-22 RX ADMIN — SODIUM CHLORIDE, SODIUM GLUCONATE, SODIUM ACETATE, POTASSIUM CHLORIDE AND MAGNESIUM CHLORIDE: 526; 502; 368; 37; 30 INJECTION, SOLUTION INTRAVENOUS at 06:08

## 2024-08-22 RX ADMIN — EPHEDRINE SULFATE 5 MG: 50 INJECTION, SOLUTION INTRAMUSCULAR; INTRAVENOUS; SUBCUTANEOUS at 07:08

## 2024-08-22 RX ADMIN — LIDOCAINE HYDROCHLORIDE 100 MG: 20 INJECTION, SOLUTION INTRAVENOUS at 07:08

## 2024-08-22 NOTE — DISCHARGE INSTRUCTIONS
"Discharge Instructions: After Your Surgery/Procedure  Youve just had surgery. During surgery you were given medicine called anesthesia to keep you relaxed and free of pain. After surgery you may have some pain or nausea. This is common. Here are some tips for feeling better and getting well after surgery.     Stay on schedule with your medication.   Going home  Your doctor or nurse will show you how to take care of yourself when you go home. He or she will also answer your questions. Have an adult family member or friend drive you home.      For your safety we recommend these precaution for the first 24 hours after your procedure:  Do not drive or use heavy equipment.  Do not make important decisions or sign legal papers.  Do not drink alcohol.  Have someone stay with you, if needed. He or she can watch for problems and help keep you safe.  Your concentration, balance, coordination, and judgement may be impaired for many hours after anesthesia.  Use caution when ambulating or standing up.     You may feel weak and "washed out" after anesthesia and surgery.      Subtle residual effects of general anesthesia or sedation with regional / local anesthesia can last more than 24 hours.  Rest for the remainder of the day or longer if your Doctor/Surgeon has advised you to do so.  Although you may feel normal within the first 24 hours, your reflexes and mental ability may be impaired without you realizing it.  You may feel dizzy, lightheaded or sleepy for 24 hours or longer.      Be sure to go to all follow-up visits with your doctor. And rest after your surgery for as long as your doctor tells you to.  Coping with pain  If you have pain after surgery, pain medicine will help you feel better. Take it as told, before pain becomes severe. Also, ask your doctor or pharmacist about other ways to control pain. This might be with heat, ice, or relaxation. And follow any other instructions your surgeon or nurse gives you.  Tips " for taking pain medicine  To get the best relief possible, remember these points:  Pain medicines can upset your stomach. Taking them with a little food may help.  Most pain relievers taken by mouth need at least 20 to 30 minutes to start to work.  Taking medicine on a schedule can help you remember to take it. Try to time your medicine so that you can take it before starting an activity. This might be before you get dressed, go for a walk, or sit down for dinner.  Constipation is a common side effect of pain medicines. Call your doctor before taking any medicines such as laxatives or stool softeners to help ease constipation. Also ask if you should skip any foods. Drinking lots of fluids and eating foods such as fruits and vegetables that are high in fiber can also help. Remember, do not take laxatives unless your surgeon has prescribed them.  Drinking alcohol and taking pain medicine can cause dizziness and slow your breathing. It can even be deadly. Do not drink alcohol while taking pain medicine.  Pain medicine can make you react more slowly to things. Do not drive or run machinery while taking pain medicine.  Your health care provider may tell you to take acetaminophen to help ease your pain. Ask him or her how much you are supposed to take each day. Acetaminophen or other pain relievers may interact with your prescription medicines or other over-the-counter (OTC) drugs. Some prescription medicines have acetaminophen and other ingredients. Using both prescription and OTC acetaminophen for pain can cause you to overdose. Read the labels on your OTC medicines with care. This will help you to clearly know the list of ingredients, how much to take, and any warnings. It may also help you not take too much acetaminophen. If you have questions or do not understand the information, ask your pharmacist or health care provider to explain it to you before you take the OTC medicine.  Managing nausea  Some people have an  upset stomach after surgery. This is often because of anesthesia, pain, or pain medicine, or the stress of surgery. These tips will help you handle nausea and eat healthy foods as you get better. If you were on a special food plan before surgery, ask your doctor if you should follow it while you get better. These tips may help:  Do not push yourself to eat. Your body will tell you when to eat and how much.  Start off with clear liquids and soup. They are easier to digest.  Next try semi-solid foods, such as mashed potatoes, applesauce, and gelatin, as you feel ready.  Slowly move to solid foods. Dont eat fatty, rich, or spicy foods at first.  Do not force yourself to have 3 large meals a day. Instead eat smaller amounts more often.  Take pain medicines with a small amount of solid food, such as crackers or toast, to avoid nausea.     Call your surgeon if  You still have pain an hour after taking medicine. The medicine may not be strong enough.  You feel too sleepy, dizzy, or groggy. The medicine may be too strong.  You have side effects like nausea, vomiting, or skin changes, such as rash, itching, or hives.       If you have obstructive sleep apnea  You were given anesthesia medicine during surgery to keep you comfortable and free of pain. After surgery, you may have more apnea spells because of this medicine and other medicines you were given. The spells may last longer than usual.   At home:  Keep using the continuous positive airway pressure (CPAP) device when you sleep. Unless your health care provider tells you not to, use it when you sleep, day or night. CPAP is a common device used to treat obstructive sleep apnea.  Talk with your provider before taking any pain medicine, muscle relaxants, or sedatives. Your provider will tell you about the possible dangers of taking these medicines.  © 2288-1675 [employer]. [address] [place] [address], [place] [address]. All rights reserved. This information is  not intended as a substitute for professional medical care. Always follow your healthcare professional's instructions.          Using an Incentive Spirometer    An incentive spirometer is a device that helps you do deep breathing exercises. These exercises expand your lungs, aid in circulation, and help prevent pneumonia. Deep breathing exercises also help you breathe better and improve the function of your lungs by:  Keeping your lungs clear  Strengthening your breathing muscles  Helping prevent respiratory complications or problems  The incentive spirometer gives you a way to take an active part in recover. A nurse or therapist will teach you breathing exercises. To do these exercises, you will breathe in through your mouth and not your nose. The incentive spirometer only works correctly if you breathe in through your mouth.  Steps to clear lungs  Step 1. Exhale normally. Then, inhale normally.  Relax and breathe out.  Step 2. Place your lips tightly around the mouthpiece.  Make sure the device is upright and not tilted.  Step 3. Inhale as much air as you can through the mouthpiece (don't breath through your nose).  Inhale slowly and deeply.  Hold your breath long enough to keep the balls or disk raised for at least 3 to 5 seconds, or as instructed by your healthcare provider.  Some spirometers have an indicator to let you know that you are breathing in too fast. If the indicator goes off, breathe in more slowly.  Step 4. Repeat the exercise regularly.  Do this exercise every hour while you're awake, or as instructed by your healthcare provider.  If you were taught deep breathing and coughing exercises, do them regularly as instructed by your healthcare provider.           Post op instructions for prevention of DVT  What is deep vein thrombosis?  Deep vein thrombosis (DVT) is the medical term for blood clots in the deep veins of the leg.  These blood clots can be dangerous.  A DVT can block a blood vessel and keep  blood from getting where it needs to go.  Another problem is that the clot can travel to other parts of the body such as the lungs.  A clot that travels to the lungs is called a pulmonary embolus (PE) and can cause serious problems with breathing which can lead to death.  Am I at risk for DVT/PE?  If you are not very active, you are at risk of DVT.  Anyone confined to bed, sitting for long periods of time, recovering from surgery, etc. increases the risk of DVT.  Other risk factors are cancer diagnosis, certain medications, estrogen replacement in any form,older age, obesity, pregnancy, smoking, history of clotting disorders, and dehydration.  How will I know if I have a DVT?  Swelling in the lower leg  Pain  Warmth, redness, hardness or bulging of the vein  If you have any of these symptoms, call your doctors office right away.  Some people will not have any symptoms until the clot moves to the lungs.  What are the symptoms of a PE?  Panting, shortness of breath, or trouble breathing  Sharp, knife-like chest pain when you breathe  Coughing or coughing up blood  Rapid heartbeat  If you have any of these symptoms or get worse quickly, call 911 for emergency treatment.  How can I prevent a DVT?  Avoid long periods of inactivity and dont cross your legs--get up and walk around every hour or so.  Stay active--walking after surgery is highly encouraged.  This means you should get out of the house and walk in the neighborhood.  Going up and down stairs will not impair healing (unless advised against such activity by your doctor).    Drink plenty of noncaffeinated, nonalcoholic fluids each day to prevent dehydration.  Wear special support stockings, if they have been advised by your doctor.  If you travel, stop at least once an hour and walk around.  Avoid smoking (assistance with stopping is available through your healthcare provider)  Always notify your doctor if you are not able to follow the post operative  instructions that are given to you at the time of discharge.  It may be necessary to prescribe one of the medications available to prevent DVT.          We hope your stay was comfortable as you heal now, mend and rest.    For we have enjoyed taking care of you by giving your our best.    And as you get better, by regaining your health and strength;   We count it as a privilege to have served you and hope your time at [facility] was well spent.      Thank  You!!!

## 2024-08-22 NOTE — OP NOTE
[facility] Urology Operative/Brief Discharge Note     Date: 08/22/2024     Staff Surgeon: Ronal Benitez MD     Pre-Op Diagnosis:   BPH with obstruction/LUTS       Post-Op Diagnosis: same     Procedure(s) Performed:   Urolift: cystoscopy with prostatic urethral lift/transprostatic tissue retraction (55742) with delivery of 7 total implants (52442 x 6)      Specimen(s):  none     Anesthesia: General LMA anesthesia     Findings:    Lateral lobe prostate obstruction, more anterolateral proximally, so stacked implants placed with proximal implants a bit more anterior, however bone strike at right proximal yielded non delivery of suture of 1 implant thus requiring 2 implants delivered to place 1 implant at the site (totalling 5 delivered to place 4 thus far)  - asymmetric residual ingrowth of left lateral lobe tissue proximally and distally, treated with one implant each, now totalling 7 delivered implants with 6 in place (2 right, 4 left(  - mild anterior channel vascularity requiring spot fulguration     Estimated Blood Loss: minimal     Drains: 22 fr villegas       Complications: none     Indications for procedure:  70 yo M with long-term significant obstructive lower urinary tract symptoms progressive despite alpha blockers found on lower tract evaluation to have 55 g prostate with significant lateral lobe obstruction and after extensive discussion of management options for symptoms refractory to alpha blocker, he elected to proceed with UroLift, especially given minimally invasive nature and not incidence of sexual side effects.     Procedure in detail:  After appropriate informed consent was obtained, the patient was taken to the cystoscopy suite in the operating room.  Preoperative antibiotics were given and a WHO proved timeout was performed.      20fr UroLift scope was passed per urethra and confirmed previous cystoscopic findings noted above, passed alongside guidewire, which was then discontinued.  Bilateral  ureteral orifices were in their orthotopic position on the trigone bilaterally and the bladder otherwise appeared normal with moderate trabeculations.     Cystoscopy bridge was then replaced with a urolift delivery device.  The first treatment site was at the level of the verumontanum in anterior channel on left given short prostatic urethra. The distal tip of the delivery device was then angled laterally, approximately 10° at this position, to compress the lateral lobe. The trigger was pulled to deploy a needle containing the implant through the capsule of the prostate, and additional 10°. The needle was then retracted allowing the implants to be delivered to the capsular surface of the prostate which was then tensioned to a short capsular tensioning and removal of the slack monofilament.  The device was then angled back towards midline and slowly advanced proximally about 3-4 mm until cystoscopic verification that the monofilament was centered in the delivery bay of the device.  Excess filament was then severed with suture cut maneuver of device. The delivery device was then readvanced into the bladder, and leaving the cystoscopic sheath in place, and implant cartridge removed and replaced with a second Urolift implant cartrdige.  This was repeated contralaterally.     After the above, this process was repeated bilaterally for the proximal implants, a proximally 2 cm distal to bladder neck, in the more anterior channel, essentially stacked more anteriorly above the initially placed 2 implants to help resolve the anterolateral bladder neck obstruction.  - as noted above the right proximal implant encountered bone strike on initial implant delivery such that no suture was seen in the keel, and this was removed and additional implant was placed with less compression successfully thus totalling 5 implants delivered to place 4 implants thus far      After 4 implants placed (5 delivered) as above,  view from  verumontanum had residual asymmetric ingrowth from the left lateral lobe both proximally and distally, proximal anterior with some bladder neck outlet obstruction, and distal tissue projecting centrally near the verumontanum, for which 1 additional implant was placed at each of the sites, a bit more anterior and stacked proximally, and more midgland distally.  After these additional 2 implants were placed, thus delivering 7 implants to place 6, four left-sided and 2 right-sided,  continuous open channel with open bladder neck at rest      There was mild anterior urethral and bladder neck bleeding for which the rigid cystoscope was passed back in and Bugbee monopolar electrocautery was used to spot fulgurate these areas, and once hemostatic and unobstructed and hemostatic with view from the verumontanum to the bladder neck with flow of irrigation off, and the scope off-loaded.      A 22  [place]  Villegas passed with 30cc in 10 cc balloon  and irrigated with 60 cc catheter tip syringe noting urine to be clear, and then placed to traction on thigh with mastasol and silk tape.  He tolerated procedure well without complication and was awakened and taken to PACU without incident.       Disposition:   He will be observed in PACU to ensure urine remains reasonably clear and does not require irrigation as he comes off traction with balloon returned to 10cc and  hydrates and meets pacu criteria, then will be discharged home. Prophylactic postoperative antibiotics Rxed. Will remove villegas at home SATURDAY AM, pod2,  and return in 1 month for reeval with NP with repeat symptom assessment and measurement of postvoid residual.  Medrol Dosepak was prescribed to utilize for acute relief of inflammation secondary to his friable prostatic urethral tissue and need for increased number of implants     Discharge:  Dispo: home  Diet: regular  FU: 1 month NP  Avoid strenuous activity until 3-5 days AFTER villegas removed  No riding mowers,  bicycles, motorcycles, tractors, or sexual activity/ejaculation for 2-3 weeks  Remove villegas at home with 10 cc syringe provided as instructed on SATURDAY AM between 8-9am   *if unable to void within 4-6 hours after removal present to [facility] ([facility] ER or call for MD on call for other concerns  Hold aspirin fish oil vitamin E etc 3-5 days   OTC meds for discomfort such as ibuprofen/advil, tylenol OK  Start Medrol dose pack today and use as directed to decrease inflammation postop   Continue uroxatral for ONE WEEK after villegas is removed then STOP IT  For burning with urination that persists AFTER villegas removed, use pyridium as needed  Pink/clear red (koolaid) urine ok as long as clear/translucent without dark blood or clots, and urinating well/draining well without difficulty - drink lots of water.  WATER WATER WATER!!  May see blood drip around villegas, is ok and normal within first few days  Avoid constipation, pushing/straining with BM. Use stool softener if needed  Will have increased urgency and frequency after removing catheter so avoid/minimize bladder irritants (coffee, soda, tea, alcohol) - this can mean worsening urge incontinence (urinating before you can make it there) which gets worse before it gets better  Complete antibiotics   While catheter is in, use leg bag when ambulatory and always switch to large bag at night.   Lubricate tip of penis around catheter as needed with plain KY or plain vaseline to avoid sticking  Catheter will move with you so may appear to be going in/out but it is not and the clip on the leg helps keep it from pulling tension.  Ok to shower with catheter in place. All catheter and bags can get wet. Towel dry. Use soap and water gently at end of penis and around catheter to keep clean. No soaks/baths until villegas removed.   Urolift implants are mri safe - will get safety card - likewise will not set off any metal detectors, including airport  Meds:     Medication  List        START taking these medications      methylPREDNISolone 4 mg tablet  Commonly known as: MEDROL DOSEPACK  use as directed     phenazopyridine 200 MG tablet  Commonly known as: PYRIDIUM  Take 1 tablet (200 mg total) by mouth 3 (three) times daily as needed (dysuria after villegas removal).     sulfamethoxazole-trimethoprim 800-160mg 800-160 mg Tab  Commonly known as: BACTRIM DS  Take 1 tablet by mouth 2 (two) times daily. for 5 days            CONTINUE taking these medications      alfuzosin 10 mg Tb24  Commonly known as: UROXATRAL  Take 1 tablet (10 mg total) by mouth daily with breakfast.     amLODIPine 2.5 MG tablet  Commonly known as: NORVASC  TAKE 1 TABLET DAILY     cetirizine 10 MG tablet  Commonly known as: ZYRTEC     diphenhydrAMINE 25 mg capsule  Commonly known as: BENADRYL     fluticasone propionate 50 mcg/actuation nasal spray  Commonly known as: FLONASE     ibuprofen 200 MG tablet  Commonly known as: ADVIL,MOTRIN     metFORMIN 750 MG ER 24hr tablet  Commonly known as: GLUCOPHAGE-XR  TAKE 1 TABLET TWICE A DAY WITH MEALS     MUCINEX ORAL     potassium citrate 15 mEq Tbsr  Commonly known as: UROCIT-K 15  Take 1 tablet by mouth 2 (two) times daily.     rosuvastatin 20 MG tablet  Commonly known as: CRESTOR  TAKE 1 TABLET DAILY     valsartan 320 MG tablet  Commonly known as: DIOVAN  TAKE 1 TABLET DAILY               Where to Get Your Medications        These medications were sent to [id number] - [place], [place] - [address] [place] [facility] [place] [address]  [address], [place] [address]      Phone: [phone]   methylPREDNISolone 4 mg tablet  phenazopyridine 200 MG tablet  sulfamethoxazole-trimethoprim 800-160mg 800-160 mg Tab

## 2024-08-22 NOTE — PLAN OF CARE
Patient awake, alert, oriented. Vital signs stable. Patient verbalizes readiness for discharge. Discharge instructions reviewed with patient, villegas leg and night bag teaching done and villegas removal teaching done--10 mL luer-yomaira syringe provided to patient per MD order. Patient verbalized understanding. IV removed, catheter intact. All belongings returned to patient. Patient transferred to friend's car via wheelchair per jose eduardo Villeda. Safety maintained.

## 2024-08-22 NOTE — PLAN OF CARE
Urine turning darker  no clots Dr Benitez called in procedure instr to irrigate cath per rn I did urine now draining grade 3 from 4   cont to observe and teach pt and no clots noted now a grade   3

## 2024-08-22 NOTE — TRANSFER OF CARE
"Anesthesia Transfer of Care Note    Patient: Toni Hyde    Procedure(s) Performed: Procedure(s) (LRB):  CYSTOSCOPY, WITH INSERTION OF UROLIFT IMPLANT (N/A)    Patient location: PACU    Anesthesia Type: general    Transport from OR: Transported from OR on 2-3 L/min O2 by NC with adequate spontaneous ventilation    Post pain: adequate analgesia    Post assessment: no apparent anesthetic complications    Post vital signs: stable    Level of consciousness: sedated and responds to stimulation    Nausea/Vomiting: no nausea/vomiting    Complications: none    Transfer of care protocol was followed      Last vitals: Visit Vitals  /72 (BP Location: Left arm, Patient Position: Lying)   Pulse (!) 54   Temp 36.6 °C (97.9 °F) (Skin)   Resp 18   Ht 5' 10" (1.778 m)   Wt 88 kg (194 lb)   SpO2 96%   BMI 27.84 kg/m²     "

## 2024-08-22 NOTE — H&P
CHIEF COMPLAINT:     Mr. Hyde is a 69 y.o. male presenting for f/u BPH     PRESENTING ILLNESS:     Toni Hyde is a 69 y.o. male who presents for f/u BPH. Last clinic visit was 11/9/23     Last visit with Dr. Benitez  Last saw NP 11/2021 noting: He has been known to have uric acid stones he also underwent ESWL in 2017 by Dr. Noel.  He has not experienced any recent stone episodes and no complaints.  He does continue to take allopurinol 100 mg p.o. q.d. and potassium citrate 15meq BID with no problems.  CT 1/12/21:  Right lower pole calyx with 4 mm stone and a 1 mm stone.  Left kidney has a 2 mm, a 2 mm, a 3 mm and a 11 mm stone seen from top to bottom. Bilateral renal cysts, larger on left with some peripheral calc, diverticulosis, 4.2cm prostate  Correltaing KUB 1/12/21: There are least 2 rounded calcifications left mid abdomen centered at the L2 level largest 9 mm.  Vascular phleboliths left pelvic sidewall. Impression notes   4/25/17 L ureteral ESWL and stent removal. For 9mm mid ureteral stone. Cysto noted 4 cm with mild PEREIRA   Patient reports previously having calcium oxalate stones analyzed 20 years prior, and only started potassium citrate with findings of 100% uric acid stone on stone analysis from his ESWL past fragments in 2017. On chart review, I did find his stone analysis confirming 100% uric acid, as well as a 24 hour urine 1 month later on 5/22/17 which had good urine volume of 2 L and normal urine uric acid but high saturation of uric acid 3.4, and high saturation of calcium oxalate at 6.63 increasing the risk of both types of stones, with a very low pH of 4.981 despite good citrate at 1698, and high dietary oxalate at 46.   He denies any history of gout, or prior known uric acid issues prior to 2017.   Uric acid, serum, on file with primary care is 4.9 in October 2021, normal.  Creatinine 1.4/EGFR 52.7 in October 2021 as well.  On review, has had mild CKD 3 for quite some time with  baseline GFR around 55-58  Not following low purine or low uric acid diet. Was told previously to reduce meat intake previously  Last PSA on file 0.6 in 2020.  Stable in the 0.40.6 range for all years prior.  No family history of prostate cancer.   Personal review of CT scan from January 2020 to consistent with report above with large left lower pole stone burden about 1-1.1 cm and scattered other small punctate stones throughout the left collecting system upper and lower pole.  In the right kidney there is a 4 mm lower pole stone and a smaller upper pole stone.  The left kidney has a central midpole cyst extending towards the collecting system as well as an upper pole cyst.  Reported as cyst stable from previous imaging, the upper pole lesion looks less fluid density on the non contrasted imaging to me   He does report intermittent stone fragment passage. More so dust/small fragments. Only know bc gets hung up in urethra.  A few times a year.  No significant pain or stone episodes.  Occasional flank or back pain which may or may not be related.  No blood in the urine.  Urinalysis is dipstick negative today.  Has been taking 100 mg allopurinol and 15 mEq daily, not b.i.d., of potassium citrate.   AUA SS: 15/3 (4 intermittency, 3: freq, urgency; 2 sleeping, 1: emptying, weak, strain)  Stops and starts a lot. If occupied easy to postpone, but there is some urgency when just sitting around. Occ NTF 2-3x/night, but if no fluids after 8pm can make it through the night  5/5/22 Procedure(s) Performed:   Left ureteroscopy with holmium laser lithotripsy and stone basket extraction  Cystoscopy with placement of left double-J ureteral stent 6 Uzbek by 26 cm  Left retrograde pyelogram including injection of contrast  Fluoroscopy less than 1 hour including interpretation of fluoroscopic images  Findings:  scattered left stones in upper middle and lower pole with larger burden in lower pole, all removed  5/17/22 Procedure(s)  Performed:   Cystoscopy with left ureteral stent removal  Findings: ureteral stent, removed     9/12/22 patient presents to clinic for f/u kidney stones. He has been doing well since procedure. He increased urocit K to 1 tablet BID as instructed. He drinks ~ 1 L of water with crystal light lemonade per day. He is trying to increase water intake. Denies dysuria, gross hematuria, flank pain, f/c/n/v. He is taking alfusozin for BPH/LUTS. He reports his symptoms have mildly improved. Flomax worked better on LUTS but he was unable to tolerate d/t dizziness.     8/17/22   BMP: K 4.3, creatinine 1.2 / GFR >60  PTH intact 55.3  Uric acid 6.4     8/17/22 ANDREA  Impression:   Multiple bilateral intrarenal stones.  No hydronephrosis is noted.  Three cyst identified of each kidney.  A solid mass is not noted     3/13/23  Patient presents today for f/u kidney stones. He has been doing well since last clinic visit. He is taking urocit K 1 tablet BID and drinks 1-1.5 L water and lemonade per day. Also drinks 1-2 cups coffee per day. Denies dysuria, gross hematuria, flank pain, fever, chills, nausea or vomiting.   He is taking alfuzosin for BPH. He reports nocturia x 2 and occasional daytime frequency. Flomax worked better but he could not tolerate d/t dizziness.     12/2/22   K 4.0  Creatinine 1.2  GFR >60     3/10/23  ANDREA Impression:   1. Bilateral simple renal cysts.  2. Bilateral nephrolithiasis.  KUB: left renal stones     11/9/23  S/p ESWL, left 4/27/23 6/16/23 creatinine 1.1 / GFR>60  KUB today  5/11/23 Stone analysis: 100% Calcium oxalate monohydrate     Drinks 1 L crystal light lemonade plus 2-3 true lime packets daily and ~ 0.5 L water per day.     Takes urocit K 15 mEq BID     He is taking alfuzosin for BPH. Voids every 2 hours during the day and nocturia x 1-2. Moderate urinary stream. Denies dysuria, gross hematuria, flank pain, fever, chills, nausea or vomiting. Flomax did work better but unable to tolerate d/t  dizziness.      24 Hour Urine Results From [facility]  Date: 10/11/23              Urine volume: 1.09  SS CaOx: 11.01  Ref 5-10  Urine Calcium (mg/day): 199  Ref M <250, F <200  Urine Oxalate (mg/day): 32  Ref 20-40  Urine Citrate (mg/day): 986  Ref M >450, F>550  SS CaP: 1.42  Ref 0.5-2  24 Hour Urine pH:5.930  Ref 5.8-6.2  SS Uric Acid: 1.21  Ref 0-1  Urine Uric Acid (g/day): 0.587  Ref M<0.8, F<0.75     24  AUA SS: Feeling of incomplete Emptyin, Frequency: 4, Intermittency: 3, Urgency: 4, Weak Stream: 2, Strainin, Sleepin, Total SS: 20 , Quality of Life: 3  UA negative  PVR 17 ml  On uroxatral  Denies dysuria, gross hematuria, flank pain, fever, chills, nausea or vomiting     Taking urocit K as ordered for kidney stones  24 K 3.9, creatinine 1.2, GFR >60        Urine cultures:             Lab Results   Component Value Date     LABURIN No growth 2023     LABURIN No growth 2022     LABURIN No growth 2017     LABURIN NO GROWTH AFTER 48 HOURS. 2012               REVIEW OF SYSTEMS:     Review of Systems     Constitutional: Negative for fever and chills.   Gastrointestinal: Negative for nausea, vomiting  Genitourinary:  See above  Neurological: Negative for dizziness.   Psychiatric/Behavioral: Negative for confusion.         PATIENT HISTORY:             Past Medical History:   Diagnosis Date    Arthritis      Cataract      Cataract of left eye      Diabetes mellitus type II       on po meds    Hyperlipidemia       not on meds at present    Hypertension      Kidney stones 2017    Left ureteral stone      Plantar fasciitis of right foot 10/2017     Dr Colorado     Tendonitis 2018     right foot Dr Colorado    Wears glasses                     Past Surgical History:   Procedure Laterality Date    ADENOIDECTOMY        BIOPSY OF MASS OF LUMBAR SPINE N/A 10/19/2022     Procedure: BIOPSY, MASS, SPINE, LUMBAR;  Surgeon: Marvel Matthews DO;  Location: [facility];  Service:  Neurosurgery;  Laterality: N/A;  Right L5-S1 Resection of Extradural Mass Using Transfacet Approach    CATARACT EXTRACTION        COLONOSCOPY   03/23/2010     Dr. Blackburn, hyperplastic polyps, otherwise negative, 10 year recheck    COLONOSCOPY N/A 11/19/2021     Procedure: COLONOSCOPY;  Surgeon: Marcus Sexton MD;  Location: [facility] ENDO;  Service: Endoscopy;  Laterality: N/A;    CYSTOSCOPY Left 04/12/2017     with ureteral stent-Dr. Noel    CYSTOSCOPY Left 05/17/2022     Procedure: CYSTOSCOPY with stent removal;  Surgeon: Ronal Benitez MD;  [facility];  Service: Urology;  Laterality: Left;    CYSTOSCOPY WITH URETEROSCOPY, RETROGRADE PYELOGRAPHY, AND INSERTION OF STENT Left 05/05/2022     Procedure: CYSTOSCOPY, WITH RETROGRADE PYELOGRAM AND URETERAL STENT INSERTION;  Surgeon: Ronal Benitez MD;  Location: [facility] OR;  Service: Urology;  Laterality: Left;    EXTRACORPOREAL SHOCK WAVE LITHOTRIPSY Left 4/27/2023     Procedure: LITHOTRIPSY, ESWL;  Surgeon: Ronal Benitez MD;  Location: [facility] OR;  Service: Urology;  Laterality: Left;    EYE SURGERY   05/06/2013     Dr Nielsen    kidney stent    04/2017    KNEE ARTHROSCOPY W/ DEBRIDEMENT         right with lateral release     LITHOTRIPSY   04/19/2017    NASAL SINUS SURGERY        retna surgery    05/2013     Dr Peralta    SPINE SURGERY        TONSILLECTOMY        TRANSFORAMINAL EPIDURAL INJECTION OF STEROID Right 12/07/2020     Procedure: Injection,steroid,epidural,transforaminal approach L5- S1;  Surgeon: Kermit Davalos MD;  [facility];  Service: Pain Management;  Laterality: Right;  L5-s1    TRANSFORAMINAL EPIDURAL INJECTION OF STEROID Right 06/23/2022     Procedure: Injection,steroid,epidural,transforaminal approach;  Surgeon: Christos Saul MD;  [facility];  Service: Pain Management;  Laterality: Right;  L5-S1 and cyst aspiration L5-S1    URETEROSCOPIC REMOVAL OF URETERIC CALCULUS Left 05/05/2022     Procedure: REMOVAL, CALCULUS, URETER,  URETEROSCOPIC;  Surgeon: Ronal Benitez MD;  Location: [facility];  Service: Urology;  Laterality: Left;         PHYSICAL EXAMINATION:     Constitutional: He is oriented to person, place, and time. He appears well-developed and well-nourished.  He is in no apparent distress.     Abdominal:  He exhibits no distension.  There is no CVA tenderness.      Neurological: He is alert and oriented to person, place, and time.      Psych: Cooperative with normal affect.        Physical Exam     RRR  CTAB       LABS:                     Lab Results   Component Value Date     PSA 0.56 03/13/2023     PSA 0.56 03/11/2022     PSA 0.59 04/06/2020     PSADIAG 0.51 06/15/2018     PSADIAG 0.54 05/18/2017                Lab Results   Component Value Date     CREATININE 1.2 01/02/2024            IMPRESSION:             Encounter Diagnoses   Name Primary?    BPH with obstruction/lower urinary tract symptoms Yes    Screening for prostate cancer        PLAN:  -PSA today, will call with results     -Discussed further evaluation of bladder/prostate with cysto/TRUS since LUTS has worsened. Pt is interested in possible BPH surgery  Will send message to MD regarding further work up  Continue uroxatral. No refill needed     Continue conservative measures to control urgency and frequency including   1. Avoiding/minimizing bladder irritants (see below), especially in afternoon and evening hours  Discussed bladder irritants include coffee (even decaf), tea, alcohol, soda, spicy foods, acidic juices (orange, tomato), vinegar, and artificial sweeteners/sugary beverages.  2. timed voiding - empty on a schedule (approx ~2-3 hours) in spite of need to urinate, to get ahead of urge  3. dont postponing voiding - dont hold it on purpose   4. bowel regimen as distended bowel has extrinsic compressive effect on bladder.   - any or all of the following in any combination, titrate to soft daily bowel movement without pushing or straining  - colace/stool  softener capsule - once to twice daily  - miralax - 1 capful daily to start, can increase to 2x daily (or decrease to 1/2 cap daily)  - increase dietary fibery  - fiber supplements, such as metamucil  - prunes, prune juice  5. INCREASE water intake  6. Stop fluids 2 hours before bed, and urinate just before bed     -Continue stone prevention and urocit K as ordered  ANDREA/KUB due 11/2024     -F/u for cysto / TRUS once scheduled     I encouraged him or any of his family members to call or email me with questions and/or concerns.     Agree with NP cysto/trus is next step  Booked at [facility] 7/9  Please call to confirm and can briefly review procedure/arrival/etc  Continue uroxatral in interim        Findings:   TRUS volume:  55.8 cm3 (W:  52.4 mm, H:  36.7 mm, L:  55.6 mm), no TAYLOR  Cysto:  Significant kissing lateral lobe prostate obstruction.  More anterolateral proximally towards lateral distally especially with water off, with only minimal elevation of the bladder neck.  Prostatic calcifications.  No intravesical extension.. Moderate diffuse trabeculations with intervening cellules, and a left upper lateral wall diverticulum proximally 1 cm.  No median lobe.     Disposition:  Reviewed in detail his cystoscopic findings of obstruction as well as procedures including urolift, rezum, turp, tuip  After extensive discussion of all options, as well as the benefit of relief of obstruction preventing further long-term changes at the bladder level and better relief of symptoms in the absence of medical therapy, he elected to proceed with UroLift.     Procedure in detail discussed. Discussed risks including but not limited to hematuria, postop retention, pain, infection, injury to bladder or urethra, and worsening urgency, latent pelvic pain, no guarantee of symptomatic relief, prostate regrowth, need for future procedures. We did discuss the non-incidence of ejaculatory dysfunction, as well as the 13% recurrence rate in 5 year  studies. Also discussed about 20% of people may need a catheter after (due to retention or hematuria) but not required if voiding postop, though tend to leave villegas prophylactically overnight at minimum, which he is agreeable to.      Discussed that symptomatic relief may take longer to be fully appreciated, in the setting of longer standing obstruction, and period of symptomatic adjustment postop. Also discussed transient increases in urgency frequency which may yield transient UUI in postop period.  As this already exists at baseline, may peak before it settles, and with relief of obstruction may need to manage persistent overactive bladder symptoms after recovery.     All questions patient had answered and  appropriate informed consent obtained.    Urolift scheduled 8/22/24

## 2024-08-22 NOTE — ANESTHESIA POSTPROCEDURE EVALUATION
Anesthesia Post Evaluation    Patient: Toni Hyde    Procedure(s) Performed: Procedure(s) (LRB):  CYSTOSCOPY, WITH INSERTION OF UROLIFT IMPLANT (N/A)    Final Anesthesia Type: general      Patient location during evaluation: PACU  Patient participation: Yes- Able to Participate  Level of consciousness: awake and alert  Post-procedure vital signs: reviewed and stable  Pain management: adequate  Airway patency: patent    PONV status at discharge: No PONV  Anesthetic complications: no      Cardiovascular status: blood pressure returned to baseline  Respiratory status: unassisted  Hydration status: euvolemic  Follow-up not needed.              Vitals Value Taken Time   /66 08/22/24 1112   Temp 36.7 °C (98.1 °F) 08/22/24 1045   Pulse 60 08/22/24 1112   Resp 16 08/22/24 1112   SpO2 95 % 08/22/24 1112         Event Time   Out of Recovery 11:12:00         Pain/Mallika Score: Pain Rating Prior to Med Admin: 2 (8/22/2024 11:12 AM)  Pain Rating Post Med Admin: 1 (8/22/2024 11:12 AM)  Mallika Score: 10 (8/22/2024 11:12 AM)  Modified Mallika Score: 20 (8/22/2024 11:25 AM)

## 2024-08-22 NOTE — PLAN OF CARE
0855 off traction and 20 cc out of balloon cont to assess urine right now draining lt clear with a hue of pink tinge cont to assess and educate pt

## 2024-08-22 NOTE — ANESTHESIA PROCEDURE NOTES
Intubation    Date/Time: 8/22/2024 7:05 AM    Performed by: Rosio Kirkland CRNA  Authorized by: Rosio Kirkland CRNA    Intubation:     Induction:  Intravenous    Intubated:  Postinduction    Mask Ventilation:  Not attempted    Attempts:  1    Attempted By:  CRNA    Difficult Airway Encountered?: No      Complications:  None    Airway Device:  Supraglottic airway/LMA    Airway Device Size:  4.0    Style/Cuff Inflation:  Cuffed (inflated to minimal occlusive pressure)    Secured at:  The lips    Placement Verified By:  Capnometry    Complicating Factors:  None    Findings Post-Intubation:  Atraumatic/condition of teeth unchanged and BS equal bilateral

## 2024-08-22 NOTE — PLAN OF CARE
Urine draining lt red no clots clear grade grade 3 currently cont to observe and educate pt pushing po fluids

## 2024-08-23 ENCOUNTER — NURSE TRIAGE (OUTPATIENT)
Dept: ADMINISTRATIVE | Facility: CLINIC | Age: 69
End: 2024-08-23
Payer: MEDICARE

## 2024-08-23 ENCOUNTER — PATIENT MESSAGE (OUTPATIENT)
Dept: UROLOGY | Facility: CLINIC | Age: 69
End: 2024-08-23
Payer: MEDICARE

## 2024-08-24 NOTE — TELEPHONE ENCOUNTER
Pt reports he had cystoscopy with insertion of urolift implant yesterday. Pt reports swelling to bilateral feet and ankles that began today. Catheter in place. Denies redness, pain, fever, decreased urine output, SOB, discoloration of feet, thigh/calf pain. Care advice to call surgeon now. On call urologist, KIEL Benitez, contacted regarding pt. MD verbalized to tell pt to elevate BLE at night and to make sure he is drinking fluids. Pt made aware and verbalizes understanding.   Reason for Disposition   [1] MILD swelling of both ankles (e.g., ankle joints look swollen; or bilateral mild pedal edema) AND [2] new-onset or getting worse  (Exceptions: Caused by hot weather, already seen by doctor or NP/PA for this)   [1] Caller has URGENT question AND [2] triager unable to answer question    Additional Information   Negative: Sounds like a life-threatening emergency to the triager   Negative: [1] Widespread rash AND [2] bright red, sunburn-like   Negative: [1] SEVERE headache AND [2] after spinal (epidural) anesthesia   Negative: [1] Vomiting AND [2] persists > 4 hours   Negative: [1] Vomiting AND [2] abdomen looks much more swollen than usual   Negative: [1] Drinking very little AND [2] dehydration suspected (e.g., no urine > 12 hours, very dry mouth, very lightheaded)   Negative: Patient sounds very sick or weak to the triager   Negative: Sounds like a serious complication to the triager   Negative: Fever > 100.4 F (38.0 C)   Negative: [1] SEVERE post-op pain (e.g., excruciating, pain scale 8-10) AND [2] not controlled with pain medications   Negative: Difficulty breathing   Negative: Entire foot is cool or blue in comparison to other side   Negative: [1] Ankle pain AND [2] fever   Negative: [1] Ankle redness AND [2] fever   Negative: Patient sounds very sick or weak to the triager   Negative: [1] SEVERE ankle pain (e.g., excruciating, unable to walk) AND [2] not improved after 2 hours of pain medicine   Negative: [1]  Redness AND [2] painful when touched AND [3] no fever   Negative: [1] Red area or streak AND [2] large (> 2 in. or 5 cm)   Negative: [1] Thigh or calf pain AND [2] only 1 side AND [3] present > 1 hour   Negative: [1] Thigh, calf, or ankle swelling AND [2] bilateral AND [3] 1 side is more swollen   Negative: [1] Thigh, calf, or ankle swelling AND [2] only 1 side  (Exceptions: Itchy, localized swelling; swelling is chronic.)   Negative: SEVERE ankle swelling (e.g., can't move swollen ankle at all)   Negative: Looks like a boil, infected sore, deep ulcer or other infected rash (spreading redness, pus)   Negative: MODERATE ankle swelling (e.g., interferes with normal activities, can't move joint normally) (Exceptions: Itchy, localized swelling; swelling is chronic.)    Protocols used: Post-Op Symptoms and [id number], Ankle Swelling-A-AH

## 2024-09-18 ENCOUNTER — OFFICE VISIT (OUTPATIENT)
Dept: UROLOGY | Facility: CLINIC | Age: 69
End: 2024-09-18
Payer: MEDICARE

## 2024-09-18 DIAGNOSIS — N13.8 BPH WITH OBSTRUCTION/LOWER URINARY TRACT SYMPTOMS: Primary | ICD-10-CM

## 2024-09-18 DIAGNOSIS — N20.0 KIDNEY STONES: ICD-10-CM

## 2024-09-18 DIAGNOSIS — N40.1 BPH WITH OBSTRUCTION/LOWER URINARY TRACT SYMPTOMS: Primary | ICD-10-CM

## 2024-09-18 PROCEDURE — G2211 COMPLEX E/M VISIT ADD ON: HCPCS | Mod: S$PBB,,, | Performed by: NURSE PRACTITIONER

## 2024-09-18 PROCEDURE — 99999 PR PBB SHADOW E&M-EST. PATIENT-LVL III: CPT | Mod: PBBFAC,,, | Performed by: NURSE PRACTITIONER

## 2024-09-18 PROCEDURE — 99213 OFFICE O/P EST LOW 20 MIN: CPT | Mod: PBBFAC,PO | Performed by: NURSE PRACTITIONER

## 2024-09-18 PROCEDURE — 99214 OFFICE O/P EST MOD 30 MIN: CPT | Mod: S$PBB,,, | Performed by: NURSE PRACTITIONER

## 2024-09-18 NOTE — PROGRESS NOTES
CHIEF COMPLAINT:    Mr. Hyde is a 69 y.o. male presenting for f/u BPH, urolift    PRESENTING ILLNESS:    Toni Hyde is a 69 y.o. male who presents for f/u BPH, urolift. Last clinic visit was 5/9/24    Last visit with Dr. Benitez  Last saw NP 11/2021 noting: He has been known to have uric acid stones he also underwent ESWL in 2017 by Dr. Noel.  He has not experienced any recent stone episodes and no complaints.  He does continue to take allopurinol 100 mg p.o. q.d. and potassium citrate 15meq BID with no problems.  CT 1/12/21:  Right lower pole calyx with 4 mm stone and a 1 mm stone.  Left kidney has a 2 mm, a 2 mm, a 3 mm and a 11 mm stone seen from top to bottom. Bilateral renal cysts, larger on left with some peripheral calc, diverticulosis, 4.2cm prostate  Correltaing KUB 1/12/21: There are least 2 rounded calcifications left mid abdomen centered at the L2 level largest 9 mm.  Vascular phleboliths left pelvic sidewall. Impression notes   4/25/17 L ureteral ESWL and stent removal. For 9mm mid ureteral stone. Cysto noted 4 cm with mild PEREIRA   Patient reports previously having calcium oxalate stones analyzed 20 years prior, and only started potassium citrate with findings of 100% uric acid stone on stone analysis from his ESWL past fragments in 2017. On chart review, I did find his stone analysis confirming 100% uric acid, as well as a 24 hour urine 1 month later on 5/22/17 which had good urine volume of 2 L and normal urine uric acid but high saturation of uric acid 3.4, and high saturation of calcium oxalate at 6.63 increasing the risk of both types of stones, with a very low pH of 4.981 despite good citrate at 1698, and high dietary oxalate at 46.   He denies any history of gout, or prior known uric acid issues prior to 2017.   Uric acid, serum, on file with primary care is 4.9 in October 2021, normal.  Creatinine 1.4/EGFR 52.7 in October 2021 as well.  On review, has had mild CKD 3 for quite some time  with baseline GFR around 55-58  Not following low purine or low uric acid diet. Was told previously to reduce meat intake previously  Last PSA on file 0.6 in 2020.  Stable in the 0.40.6 range for all years prior.  No family history of prostate cancer.   Personal review of CT scan from January 2020 to consistent with report above with large left lower pole stone burden about 1-1.1 cm and scattered other small punctate stones throughout the left collecting system upper and lower pole.  In the right kidney there is a 4 mm lower pole stone and a smaller upper pole stone.  The left kidney has a central midpole cyst extending towards the collecting system as well as an upper pole cyst.  Reported as cyst stable from previous imaging, the upper pole lesion looks less fluid density on the non contrasted imaging to me   He does report intermittent stone fragment passage. More so dust/small fragments. Only know bc gets hung up in urethra.  A few times a year.  No significant pain or stone episodes.  Occasional flank or back pain which may or may not be related.  No blood in the urine.  Urinalysis is dipstick negative today.  Has been taking 100 mg allopurinol and 15 mEq daily, not b.i.d., of potassium citrate.   AUA SS: 15/3 (4 intermittency, 3: freq, urgency; 2 sleeping, 1: emptying, weak, strain)  Stops and starts a lot. If occupied easy to postpone, but there is some urgency when just sitting around. Occ NTF 2-3x/night, but if no fluids after 8pm can make it through the night  5/5/22 Procedure(s) Performed:   Left ureteroscopy with holmium laser lithotripsy and stone basket extraction  Cystoscopy with placement of left double-J ureteral stent 6 Sinhala by 26 cm  Left retrograde pyelogram including injection of contrast  Fluoroscopy less than 1 hour including interpretation of fluoroscopic images  Findings:  scattered left stones in upper middle and lower pole with larger burden in lower pole, all removed  5/17/22  Procedure(s) Performed:   Cystoscopy with left ureteral stent removal  Findings: ureteral stent, removed    9/12/22 patient presents to clinic for f/u kidney stones. He has been doing well since procedure. He increased urocit K to 1 tablet BID as instructed. He drinks ~ 1 L of water with crystal light lemonade per day. He is trying to increase water intake. Denies dysuria, gross hematuria, flank pain, f/c/n/v. He is taking alfusozin for BPH/LUTS. He reports his symptoms have mildly improved. Flomax worked better on LUTS but he was unable to tolerate d/t dizziness.    8/17/22   BMP: K 4.3, creatinine 1.2 / GFR >60  PTH intact 55.3  Uric acid 6.4    8/17/22 ANDREA  Impression:   Multiple bilateral intrarenal stones.  No hydronephrosis is noted.  Three cyst identified of each kidney.  A solid mass is not noted    3/13/23  Patient presents today for f/u kidney stones. He has been doing well since last clinic visit. He is taking urocit K 1 tablet BID and drinks 1-1.5 L water and lemonade per day. Also drinks 1-2 cups coffee per day. Denies dysuria, gross hematuria, flank pain, fever, chills, nausea or vomiting.   He is taking alfuzosin for BPH. He reports nocturia x 2 and occasional daytime frequency. Flomax worked better but he could not tolerate d/t dizziness.    12/2/22   K 4.0  Creatinine 1.2  GFR >60    3/10/23  ANDREA Impression:   1. Bilateral simple renal cysts.  2. Bilateral nephrolithiasis.  KUB: left renal stones    11/9/23  S/p ESWL, left 4/27/23 6/16/23 creatinine 1.1 / GFR>60  KUB today  5/11/23 Stone analysis: 100% Calcium oxalate monohydrate    Drinks 1 L crystal light lemonade plus 2-3 true lime packets daily and ~ 0.5 L water per day.    Takes urocit K 15 mEq BID    He is taking alfuzosin for BPH. Voids every 2 hours during the day and nocturia x 1-2. Moderate urinary stream. Denies dysuria, gross hematuria, flank pain, fever, chills, nausea or vomiting. Flomax did work better but unable to tolerate d/t  dizziness.     24 Hour Urine Results From [facility]  Date: 10/11/23   Urine volume: 1.09  SS CaOx: 11.01  Ref 5-10  Urine Calcium (mg/day): 199  Ref M <250, F <200  Urine Oxalate (mg/day): 32  Ref 20-40  Urine Citrate (mg/day): 986  Ref M >450, F>550  SS CaP: 1.42  Ref 0.5-2  24 Hour Urine pH:5.930  Ref 5.8-6.2  SS Uric Acid: 1.21  Ref 0-1  Urine Uric Acid (g/day): 0.587  Ref M<0.8, F<0.75    24  AUA SS: Feeling of incomplete Emptyin, Frequency: 4, Intermittency: 3, Urgency: 4, Weak Stream: 2, Strainin, Sleepin, Total SS: 20 , Quality of Life: 3  UA negative  PVR 17 ml  On uroxatral  Denies dysuria, gross hematuria, flank pain, fever, chills, nausea or vomiting    Taking urocit K as ordered for kidney stones  24 K 3.9, creatinine 1.2, GFR >60    24 Procedure(s) Performed:   Cystoscopy, flexible  Transrectal ultrasound with volumetric measurement of prostate     24  S/p urolift 24  cystoscopy with prostatic urethral lift/transprostatic tissue retraction with delivery of 7 total implants     AUA SS: Feeling of incomplete Emptyin, Frequency: 2, Intermittency: 1, Urgency: 2, Weak Stream: 1, Strainin, Sleepin, Total SS: 7 , Quality of Life: 2  Stopped uroxatral as directed  Denies dysuria, gross hematuria, flank pain, fever, chills, nausea or vomiting  PVR 4 ml  Pt is pleased with results of urolift and improvement to LUTS    24 PSA 0.53    Urine cultures:   Lab Results   Component Value Date    LABURIN No growth 2024    LABURIN No growth 2023    LABURIN No growth 2022    LABURIN No growth 2017    LABURIN NO GROWTH AFTER 48 HOURS. 2012           REVIEW OF SYSTEMS:    Review of Systems    Constitutional: Negative for fever and chills.   Gastrointestinal: Negative for nausea, vomiting  Genitourinary:  See above  Psychiatric/Behavioral: Negative for confusion.     PATIENT HISTORY:    Past Medical History:   Diagnosis Date    Arthritis      Cataract     Cataract of left eye     Diabetes mellitus type II     on po meds    Hyperlipidemia     not on meds at present    Hypertension     Kidney stones 04/2017    Left ureteral stone     Plantar fasciitis of right foot 10/2017    Dr Colorado     Tendonitis 01/2018    right foot Dr Colorado    Wears glasses        Past Surgical History:   Procedure Laterality Date    ADENOIDECTOMY      BIOPSY OF MASS OF LUMBAR SPINE N/A 10/19/2022    Procedure: BIOPSY, MASS, SPINE, LUMBAR;  Surgeon: Marvel Matthews DO;  Location: [facility] OR;  Service: Neurosurgery;  Laterality: N/A;  Right L5-S1 Resection of Extradural Mass Using Transfacet Approach    CATARACT EXTRACTION      COLONOSCOPY  03/23/2010    Dr. Blackburn, hyperplastic polyps, otherwise negative, 10 year recheck    COLONOSCOPY N/A 11/19/2021    Procedure: COLONOSCOPY;  Surgeon: Marcus Sexton MD;  Location: [facility];  Service: Endoscopy;  Laterality: N/A;    CYSTOSCOPY Left 04/12/2017    with ureteral stent-Dr. Noel    CYSTOSCOPY Left 05/17/2022    Procedure: CYSTOSCOPY with stent removal;  Surgeon: Ronal Benitez MD;  Location: [facility] OR;  Service: Urology;  Laterality: Left;    CYSTOSCOPY N/A 7/9/2024    Procedure: CYSTOSCOPY;  Surgeon: Ronal Benitez MD;  Location: [facility] OR;  Service: Urology;  Laterality: N/A;    CYSTOSCOPY WITH INSERTION OF MINIMALLY INVASIVE IMPLANT TO ENLARGE PROSTATIC URETHRA N/A 8/22/2024    Procedure: CYSTOSCOPY, WITH INSERTION OF UROLIFT IMPLANT;  Surgeon: Ronal Benitez MD;  Location: [facility] OR;  Service: Urology;  Laterality: N/A;    CYSTOSCOPY WITH URETEROSCOPY, RETROGRADE PYELOGRAPHY, AND INSERTION OF STENT Left 05/05/2022    Procedure: CYSTOSCOPY, WITH RETROGRADE PYELOGRAM AND URETERAL STENT INSERTION;  Surgeon: Ronal Benitez MD;  Location: [facility] OR;  Service: Urology;  Laterality: Left;    EXTRACORPOREAL SHOCK WAVE LITHOTRIPSY Left 4/27/2023    Procedure: LITHOTRIPSY, ESWL;  Surgeon: Ronal Benitez MD;  Location:  [facility] OR;  Service: Urology;  Laterality: Left;    EYE SURGERY  05/06/2013    Dr Nielsen    kidney stent   04/2017    KNEE ARTHROSCOPY W/ DEBRIDEMENT      right with lateral release     LITHOTRIPSY  04/19/2017    NASAL SINUS SURGERY      retna surgery   05/2013    Dr Peralta    SPINE SURGERY      TONSILLECTOMY      TRANSFORAMINAL EPIDURAL INJECTION OF STEROID Right 12/07/2020    Procedure: Injection,steroid,epidural,transforaminal approach L5- S1;  Surgeon: Kermit Davalos MD;  Location: [facility];  Service: Pain Management;  Laterality: Right;  L5-s1    TRANSFORAMINAL EPIDURAL INJECTION OF STEROID Right 06/23/2022    Procedure: Injection,steroid,epidural,transforaminal approach;  Surgeon: Christos Saul MD;  Location: [facility]placed by [facility] OR;  Service: Pain Management;  Laterality: Right;  L5-S1 and cyst aspiration L5-S1    TRANSRECTAL ULTRASOUND EXAMINATION N/A 7/9/2024    Procedure: ULTRASOUND, RECTAL APPROACH;  Surgeon: Ronal Benitez MD;  Location: [facility] OR;  Service: Urology;  Laterality: N/A;    URETEROSCOPIC REMOVAL OF URETERIC CALCULUS Left 05/05/2022    Procedure: REMOVAL, CALCULUS, URETER, URETEROSCOPIC;  Surgeon: Ronal Benitez MD;  Location: [facility] OR;  Service: Urology;  Laterality: Left;       PHYSICAL EXAMINATION:    Constitutional: He is oriented to person, place, and time. He appears well-developed and well-nourished.  He is in no apparent distress.    Abdominal:  He exhibits no distension.  There is no CVA tenderness.     Neurological: He is alert and oriented to person, place, and time.     Psych: Cooperative with normal affect.      Physical Exam      LABS:        Lab Results   Component Value Date    PSA 0.53 05/09/2024    PSA 0.56 03/13/2023    PSA 0.56 03/11/2022    PSADIAG 0.51 06/15/2018    PSADIAG 0.54 05/18/2017     Lab Results   Component Value Date    CREATININE 1.3 08/12/2024       IMPRESSION:    Encounter Diagnoses   Name Primary?    BPH with obstruction/lower urinary tract symptoms Yes    Kidney stones         PLAN:  -PSA due 5/2025    -Pt is doing well s/p urolift. Pleased with results at this time.  Uroxatral discontinued  Continue conservative measures to control urgency and frequency including   1. Avoiding/minimizing bladder irritants (see below), especially in afternoon and evening hours  Discussed bladder irritants include coffee (even decaf), tea, alcohol, soda, spicy foods, acidic juices (orange, tomato), vinegar, and artificial sweeteners/sugary beverages.  2. timed voiding - empty on a schedule (approx ~2-3 hours) in spite of need to urinate, to get ahead of urge  3. dont postponing voiding - dont hold it on purpose   4. bowel regimen as distended bowel has extrinsic compressive effect on bladder.   - any or all of the following in any combination, titrate to soft daily bowel movement without pushing or straining  - colace/stool softener capsule - once to twice daily  - miralax - 1 capful daily to start, can increase to 2x daily (or decrease to 1/2 cap daily)  - increase dietary fibery  - fiber supplements, such as metamucil  - prunes, prune juice  5. INCREASE water intake  6. Stop fluids 2 hours before bed, and urinate just before bed    -ANDREA/KUB scheduled prior to next appt to monitor kidney stones    -RTC 3 months with imaging prior (AUASS, PVR at visit)    I encouraged him or any of his family members to call or email me with questions and/or concerns.    Visit today is associated with current or anticipated ongoing medical care related to this patient's single serious condition/complex condition, BPH, kidney stones      30 minutes of total time spent on the encounter, which includes face to face time and non-face to face time preparing to see the patient (eg, review of tests), Obtaining and/or reviewing separately obtained history, Documenting clinical information in the electronic or other health record, Independently interpreting results (not separately reported) and communicating results to the  patient/family/caregiver, or Care coordination (not separately reported).

## 2024-09-18 NOTE — PATIENT INSTRUCTIONS
Conservative measures to control urgency and frequency including   1. Avoiding/minimizing bladder irritants (see below), especially in afternoon and evening hours    Discussed bladder irritants include coffee (even decaf), tea, alcohol, soda, spicy foods, acidic juices (orange, tomato), vinegar, and artificial sweeteners/sugary beverages.    2. timed voiding - empty on a schedule (approx ~2-3 hours) in spite of need to urinate, to get ahead of urge    3. dont postponing voiding - dont hold it on purpose     4. bowel regimen as distended bowel has extrinsic compressive effect on bladder.   - any or all of the following in any combination, titrate to soft daily bowel movement without pushing or straining  - colace/stool softener capsule - once to twice daily  - miralax - 1 capful daily to start, can increase to 2x daily (or decrease to 1/2 cap daily)  - increase dietary fibery  - fiber supplements, such as metamucil  - prunes, prune juice    5. INCREASE water intake    6. Stop fluids 2 hours before bed, and urinate just before bed

## 2024-09-30 ENCOUNTER — PATIENT MESSAGE (OUTPATIENT)
Dept: ADMINISTRATIVE | Facility: HOSPITAL | Age: 69
End: 2024-09-30
Payer: MEDICARE

## 2024-10-13 DIAGNOSIS — E11.65 TYPE 2 DIABETES MELLITUS WITH HYPERGLYCEMIA, WITHOUT LONG-TERM CURRENT USE OF INSULIN: ICD-10-CM

## 2024-10-13 NOTE — TELEPHONE ENCOUNTER
Care Due:                  Date            Visit Type   Department     Provider  --------------------------------------------------------------------------------                                EP -                              PRIMARY      [facility]  Last Visit: 01-      CARE ([facility])   PRACTICE       Candido Mon  Next Visit: None Scheduled  None         None Found                                                            Last  Test          Frequency    Reason                     Performed    Due Date  --------------------------------------------------------------------------------    CMP.........  12 months..  rosuvastatin.............  01- 12-    HBA1C.......  6 months...  metFORMIN................  01- 06-    Lipid Panel.  12 months..  rosuvastatin.............  01- 12-    [employer] Embedded Care Due Messages. Reference number: [id number].   10/13/2024 10:23:50 AM CDT

## 2024-10-14 RX ORDER — METFORMIN HYDROCHLORIDE 750 MG/1
750 TABLET, EXTENDED RELEASE ORAL 2 TIMES DAILY WITH MEALS
Qty: 180 TABLET | Refills: 0 | Status: SHIPPED | OUTPATIENT
Start: 2024-10-14

## 2024-10-14 NOTE — TELEPHONE ENCOUNTER
Refill Routing Note   Medication(s) are not appropriate for processing by [facility] for the following reason(s):        Required labs outdated    [facility] action(s):  Defer     Requires labs : Yes             Appointments  past 12m or future 3m with PCP    Date Provider   Last Visit   1/5/2024 Candido Mon MD   Next Visit   Visit date not found Candido Mon MD   ED visits in past 90 days: 0        Note composed:7:19 PM 10/13/2024

## 2024-10-21 LAB
LEFT EYE DM RETINOPATHY: NEGATIVE
RIGHT EYE DM RETINOPATHY: NEGATIVE

## 2024-10-22 ENCOUNTER — PATIENT OUTREACH (OUTPATIENT)
Dept: ADMINISTRATIVE | Facility: HOSPITAL | Age: 69
End: 2024-10-22
Payer: MEDICARE

## 2024-10-22 NOTE — PROGRESS NOTES
Population Health Chart Review & Patient Outreach Details      Additional [facility] Health Notes:               Updates Requested / Reviewed:      Updated Care Coordination Note, Care Everywhere, and Immunizations Reconciliation Completed or Queried: [facility] Topics Overdue:      [facility] Score: 3     Urine Screening  Eye Exam  Hemoglobin A1c    Influenza Vaccine  RSV Vaccine                  Health Maintenance Topic(s) Outreach Outcomes & Actions Taken:    Eye Exam - Outreach Outcomes & Actions Taken  : Diabetic Eye External Records Uploaded, Care Team & History Updated if Applicable

## 2024-11-28 DIAGNOSIS — N20.0 KIDNEY STONES: ICD-10-CM

## 2024-11-29 RX ORDER — POTASSIUM CITRATE 15 MEQ/1
1 TABLET, EXTENDED RELEASE ORAL 2 TIMES DAILY
Qty: 180 TABLET | Refills: 3 | Status: SHIPPED | OUTPATIENT
Start: 2024-11-29

## 2024-12-16 ENCOUNTER — HOSPITAL ENCOUNTER (OUTPATIENT)
Dept: RADIOLOGY | Facility: HOSPITAL | Age: 69
Discharge: HOME | End: 2024-12-16
Attending: NURSE PRACTITIONER
Payer: MEDICARE

## 2024-12-16 DIAGNOSIS — N20.0 KIDNEY STONES: ICD-10-CM

## 2024-12-16 PROCEDURE — 74018 RADEX ABDOMEN 1 VIEW: CPT | Mod: TC

## 2024-12-16 PROCEDURE — 74018 RADEX ABDOMEN 1 VIEW: CPT | Mod: 26,,, | Performed by: RADIOLOGY

## 2024-12-16 PROCEDURE — 76770 US EXAM ABDO BACK WALL COMP: CPT | Mod: 26,,, | Performed by: RADIOLOGY

## 2024-12-16 PROCEDURE — 76770 US EXAM ABDO BACK WALL COMP: CPT | Mod: TC

## 2024-12-23 DIAGNOSIS — N20.0 KIDNEY STONES: Primary | ICD-10-CM

## 2024-12-30 ENCOUNTER — HOSPITAL ENCOUNTER (OUTPATIENT)
Dept: RADIOLOGY | Facility: HOSPITAL | Age: 69
Discharge: HOME | End: 2024-12-30
Attending: NURSE PRACTITIONER
Payer: MEDICARE

## 2024-12-30 ENCOUNTER — OFFICE VISIT (OUTPATIENT)
Dept: UROLOGY | Facility: CLINIC | Age: 69
End: 2024-12-30
Payer: MEDICARE

## 2024-12-30 VITALS — HEIGHT: 70 IN | WEIGHT: 194 LBS | BODY MASS INDEX: 27.77 KG/M2

## 2024-12-30 DIAGNOSIS — N20.0 KIDNEY STONES: ICD-10-CM

## 2024-12-30 DIAGNOSIS — N40.1 BPH WITH OBSTRUCTION/LOWER URINARY TRACT SYMPTOMS: Primary | ICD-10-CM

## 2024-12-30 DIAGNOSIS — N13.8 BPH WITH OBSTRUCTION/LOWER URINARY TRACT SYMPTOMS: Primary | ICD-10-CM

## 2024-12-30 LAB
BILIRUBIN, UA POC OHS: NEGATIVE
BLOOD, UA POC OHS: NEGATIVE
CLARITY, UA POC OHS: CLEAR
COLOR, UA POC OHS: YELLOW
GLUCOSE, UA POC OHS: NEGATIVE
KETONES, UA POC OHS: NEGATIVE
LEUKOCYTES, UA POC OHS: NEGATIVE
NITRITE, UA POC OHS: NEGATIVE
PH, UA POC OHS: 6.5
POC RESIDUAL URINE VOLUME: 8 ML (ref 0–100)
PROTEIN, UA POC OHS: ABNORMAL
SPECIFIC GRAVITY, UA POC OHS: 1.02
UROBILINOGEN, UA POC OHS: 2

## 2024-12-30 PROCEDURE — 99999 PR PBB SHADOW E&M-EST. PATIENT-LVL III: CPT | Mod: PBBFAC,,, | Performed by: NURSE PRACTITIONER

## 2024-12-30 PROCEDURE — 99214 OFFICE O/P EST MOD 30 MIN: CPT | Mod: S$PBB,,, | Performed by: NURSE PRACTITIONER

## 2024-12-30 PROCEDURE — G2211 COMPLEX E/M VISIT ADD ON: HCPCS | Mod: S$PBB,,, | Performed by: NURSE PRACTITIONER

## 2024-12-30 PROCEDURE — 81003 URINALYSIS AUTO W/O SCOPE: CPT | Mod: PBBFAC,PO | Performed by: NURSE PRACTITIONER

## 2024-12-30 PROCEDURE — 99999PBSHW POCT BLADDER SCAN: Mod: PBBFAC,,,

## 2024-12-30 PROCEDURE — 74176 CT ABD & PELVIS W/O CONTRAST: CPT | Mod: TC

## 2024-12-30 PROCEDURE — 99999PBSHW POCT URINALYSIS(INSTRUMENT): Mod: PBBFAC,,,

## 2024-12-30 PROCEDURE — 99213 OFFICE O/P EST LOW 20 MIN: CPT | Mod: PBBFAC,PO,25 | Performed by: NURSE PRACTITIONER

## 2024-12-30 PROCEDURE — 51798 US URINE CAPACITY MEASURE: CPT | Mod: PBBFAC,PO | Performed by: NURSE PRACTITIONER

## 2024-12-30 PROCEDURE — 74176 CT ABD & PELVIS W/O CONTRAST: CPT | Mod: 26,,, | Performed by: RADIOLOGY

## 2024-12-30 NOTE — Clinical Note
"Awaiting CT results Worsening intermittent stream and feeling of incomplete bladder emptying. Also feels "something is in bladder" when he does anything strenuous, no pain or pressure."

## 2024-12-30 NOTE — PROGRESS NOTES
CHIEF COMPLAINT:    Mr. Hyde is a 69 y.o. male presenting for f/u BPH, kidney stones    PRESENTING ILLNESS:    Toni Hyde is a 69 y.o. male who presents for f/u BPH, kidney stones. Last clinic visit was 9/18/24    Last visit with Dr. Benitez  Last saw NP 11/2021 noting: He has been known to have uric acid stones he also underwent ESWL in 2017 by Dr. Noel.  He has not experienced any recent stone episodes and no complaints.  He does continue to take allopurinol 100 mg p.o. q.d. and potassium citrate 15meq BID with no problems.  CT 1/12/21:  Right lower pole calyx with 4 mm stone and a 1 mm stone.  Left kidney has a 2 mm, a 2 mm, a 3 mm and a 11 mm stone seen from top to bottom. Bilateral renal cysts, larger on left with some peripheral calc, diverticulosis, 4.2cm prostate  Correltaing KUB 1/12/21: There are least 2 rounded calcifications left mid abdomen centered at the L2 level largest 9 mm.  Vascular phleboliths left pelvic sidewall. Impression notes   4/25/17 L ureteral ESWL and stent removal. For 9mm mid ureteral stone. Cysto noted 4 cm with mild PEREIRA   Patient reports previously having calcium oxalate stones analyzed 20 years prior, and only started potassium citrate with findings of 100% uric acid stone on stone analysis from his ESWL past fragments in 2017. On chart review, I did find his stone analysis confirming 100% uric acid, as well as a 24 hour urine 1 month later on 5/22/17 which had good urine volume of 2 L and normal urine uric acid but high saturation of uric acid 3.4, and high saturation of calcium oxalate at 6.63 increasing the risk of both types of stones, with a very low pH of 4.981 despite good citrate at 1698, and high dietary oxalate at 46.   He denies any history of gout, or prior known uric acid issues prior to 2017.   Uric acid, serum, on file with primary care is 4.9 in October 2021, normal.  Creatinine 1.4/EGFR 52.7 in October 2021 as well.  On review, has had mild CKD 3 for  quite some time with baseline GFR around 55-58  Not following low purine or low uric acid diet. Was told previously to reduce meat intake previously  Last PSA on file 0.6 in 2020.  Stable in the 0.40.6 range for all years prior.  No family history of prostate cancer.   Personal review of CT scan from January 2020 to consistent with report above with large left lower pole stone burden about 1-1.1 cm and scattered other small punctate stones throughout the left collecting system upper and lower pole.  In the right kidney there is a 4 mm lower pole stone and a smaller upper pole stone.  The left kidney has a central midpole cyst extending towards the collecting system as well as an upper pole cyst.  Reported as cyst stable from previous imaging, the upper pole lesion looks less fluid density on the non contrasted imaging to me   He does report intermittent stone fragment passage. More so dust/small fragments. Only know bc gets hung up in urethra.  A few times a year.  No significant pain or stone episodes.  Occasional flank or back pain which may or may not be related.  No blood in the urine.  Urinalysis is dipstick negative today.  Has been taking 100 mg allopurinol and 15 mEq daily, not b.i.d., of potassium citrate.   AUA SS: 15/3 (4 intermittency, 3: freq, urgency; 2 sleeping, 1: emptying, weak, strain)  Stops and starts a lot. If occupied easy to postpone, but there is some urgency when just sitting around. Occ NTF 2-3x/night, but if no fluids after 8pm can make it through the night  5/5/22 Procedure(s) Performed:   Left ureteroscopy with holmium laser lithotripsy and stone basket extraction  Cystoscopy with placement of left double-J ureteral stent 6 [place] by 26 cm  Left retrograde pyelogram including injection of contrast  Fluoroscopy less than 1 hour including interpretation of fluoroscopic images  Findings:  scattered left stones in upper middle and lower pole with larger burden in lower pole, all  removed  5/17/22 Procedure(s) Performed:   Cystoscopy with left ureteral stent removal  Findings: ureteral stent, removed    9/12/22 patient presents to clinic for f/u kidney stones. He has been doing well since procedure. He increased urocit K to 1 tablet BID as instructed. He drinks ~ 1 L of water with crystal light lemonade per day. He is trying to increase water intake. Denies dysuria, gross hematuria, flank pain, f/c/n/v. He is taking alfusozin for BPH/LUTS. He reports his symptoms have mildly improved. Flomax worked better on LUTS but he was unable to tolerate d/t dizziness.    8/17/22   BMP: K 4.3, creatinine 1.2 / GFR >60  PTH intact 55.3  Uric acid 6.4    8/17/22 ANDREA  Impression:   Multiple bilateral intrarenal stones.  No hydronephrosis is noted.  Three cyst identified of each kidney.  A solid mass is not noted    3/13/23  Patient presents today for f/u kidney stones. He has been doing well since last clinic visit. He is taking urocit K 1 tablet BID and drinks 1-1.5 L water and lemonade per day. Also drinks 1-2 cups coffee per day. Denies dysuria, gross hematuria, flank pain, fever, chills, nausea or vomiting.   He is taking alfuzosin for BPH. He reports nocturia x 2 and occasional daytime frequency. Flomax worked better but he could not tolerate d/t dizziness.    12/2/22   K 4.0  Creatinine 1.2  GFR >60    3/10/23  ANDREA Impression:   1. Bilateral simple renal cysts.  2. Bilateral nephrolithiasis.  KUB: left renal stones    11/9/23  S/p ESWL, left 4/27/23 6/16/23 creatinine 1.1 / GFR>60  KUB today  5/11/23 Stone analysis: 100% Calcium oxalate monohydrate    Drinks 1 L crystal light lemonade plus 2-3 true lime packets daily and ~ 0.5 L water per day.    Takes urocit K 15 mEq BID    He is taking alfuzosin for BPH. Voids every 2 hours during the day and nocturia x 1-2. Moderate urinary stream. Denies dysuria, gross hematuria, flank pain, fever, chills, nausea or vomiting. Flomax did work better but unable to  "tolerate d/t dizziness.     24 Hour Urine Results From [facility]  Date: 10/11/23   Urine volume: 1.09  SS CaOx: 11.01  Ref 5-10  Urine Calcium (mg/day): 199  Ref M <250, F <200  Urine Oxalate (mg/day): 32  Ref 20-40  Urine Citrate (mg/day): 986  Ref M >450, F>550  SS CaP: 1.42  Ref 0.5-2  24 Hour Urine pH:5.930  Ref 5.8-6.2  SS Uric Acid: 1.21  Ref 0-1  Urine Uric Acid (g/day): 0.587  Ref M<0.8, F<0.75    24  AUA SS: Feeling of incomplete Emptyin, Frequency: 4, Intermittency: 3, Urgency: 4, Weak Stream: 2, Strainin, Sleepin, Total SS: 20 , Quality of Life: 3  UA negative  PVR 17 ml  On uroxatral  Denies dysuria, gross hematuria, flank pain, fever, chills, nausea or vomiting    Taking urocit K as ordered for kidney stones  24 K 3.9, creatinine 1.2, GFR >60    24 Procedure(s) Performed:   Cystoscopy, flexible  Transrectal ultrasound with volumetric measurement of prostate     24  S/p urolift 24  cystoscopy with prostatic urethral lift/transprostatic tissue retraction with delivery of 7 total implants     AUA SS: Feeling of incomplete Emptyin, Frequency: 2, Intermittency: 1, Urgency: 2, Weak Stream: 1, Strainin, Sleepin, Total SS: 7 , Quality of Life: 2  Stopped uroxatral as directed  Denies dysuria, gross hematuria, flank pain, fever, chills, nausea or vomiting  PVR 4 ml  Pt is pleased with results of urolift and improvement to LUTS    24 PSA 0.53    24  AUA SS: Feeling of incomplete Emptying: 3, Frequency: 2, Intermittency: 4, Urgency: 2, Weak Stream: 2, Strainin, Sleepin, Total SS: 13 , Quality of Life: 3  PVR 8 ml  UA neg blood, leuk or nitrite  Denies dysuria, gross hematuria, flank pain, fever, chills, nausea or vomiting  Pt feels "like something is inside bladder" if he does anything strenuous or on elliptical. No pain or pressure.    Taking urocit-K 15 meq 1 tablet BID for kidney stones  24 K 4.6    24 ANDREA: possible bladder " stone/calcification.   CT RSS today but not read by radiology yet    Urine cultures:   Lab Results   Component Value Date    LABURIN No growth 08/12/2024    LABURIN No growth 04/14/2023    LABURIN No growth 04/25/2022    LABURIN No growth 06/30/2017    LABURIN NO GROWTH AFTER 48 HOURS. 09/26/2012           REVIEW OF SYSTEMS:    Review of Systems    Constitutional: Negative for fever and chills.   Gastrointestinal: Negative for nausea, vomiting  Genitourinary:  See above  Psychiatric/Behavioral: Negative for confusion.     PATIENT HISTORY:    Past Medical History:   Diagnosis Date    Arthritis     Cataract     Cataract of left eye     Diabetes mellitus type II     on po meds    Hyperlipidemia     not on meds at present    Hypertension     Kidney stones 04/2017    Left ureteral stone     Plantar fasciitis of right foot 10/2017    Dr Colorado     Tendonitis 01/2018    right foot Dr Colorado    Wears glasses        Past Surgical History:   Procedure Laterality Date    ADENOIDECTOMY      BIOPSY OF MASS OF LUMBAR SPINE N/A 10/19/2022    Procedure: BIOPSY, MASS, SPINE, LUMBAR;  Surgeon: Marvel Matthews DO;  Location: [facility] OR;  Service: Neurosurgery;  Laterality: N/A;  Right L5-S1 Resection of Extradural Mass Using Transfacet Approach    CATARACT EXTRACTION      COLONOSCOPY  03/23/2010    Dr. Blackburn, hyperplastic polyps, otherwise negative, 10 year recheck    COLONOSCOPY N/A 11/19/2021    Procedure: COLONOSCOPY;  Surgeon: Marcus Sexton MD;  Location: [facility];  Service: Endoscopy;  Laterality: N/A;    CYSTOSCOPY Left 04/12/2017    with ureteral stent-Dr. Noel    CYSTOSCOPY Left 05/17/2022    Procedure: CYSTOSCOPY with stent removal;  Surgeon: Ronal Benitez MD;  [facility];  Service: Urology;  Laterality: Left;    CYSTOSCOPY N/A 7/9/2024    Procedure: CYSTOSCOPY;  Surgeon: Ronal Benitez MD;  Location: [facility] OR;  Service: Urology;  Laterality: N/A;    CYSTOSCOPY WITH INSERTION OF MINIMALLY INVASIVE  IMPLANT TO ENLARGE PROSTATIC URETHRA N/A 8/22/2024    Procedure: CYSTOSCOPY, WITH INSERTION OF UROLIFT IMPLANT;  Surgeon: Ronal Benitez MD;  Location: [facility] OR;  Service: Urology;  Laterality: N/A;    CYSTOSCOPY WITH URETEROSCOPY, RETROGRADE PYELOGRAPHY, AND INSERTION OF STENT Left 05/05/2022    Procedure: CYSTOSCOPY, WITH RETROGRADE PYELOGRAM AND URETERAL STENT INSERTION;  Surgeon: Ronal Benitez MD;  Location: [facility] OR;  Service: Urology;  Laterality: Left;    EXTRACORPOREAL SHOCK WAVE LITHOTRIPSY Left 4/27/2023    Procedure: LITHOTRIPSY, ESWL;  Surgeon: Ronal Benitez MD;  Location: [facility] OR;  Service: Urology;  Laterality: Left;    EYE SURGERY  05/06/2013    Dr Nielsen    kidney stent   04/2017    KNEE ARTHROSCOPY W/ DEBRIDEMENT      right with lateral release     LITHOTRIPSY  04/19/2017    NASAL SINUS SURGERY      retna surgery   05/2013    Dr Peralta    SPINE SURGERY      TONSILLECTOMY      TRANSFORAMINAL EPIDURAL INJECTION OF STEROID Right 12/07/2020    Procedure: Injection,steroid,epidural,transforaminal approach L5- S1;  Surgeon: Kermit Davalos MD;  Location: [facility] OR;  Service: Pain Management;  Laterality: Right;  L5-s1    TRANSFORAMINAL EPIDURAL INJECTION OF STEROID Right 06/23/2022    Procedure: Injection,steroid,epidural,transforaminal approach;  Surgeon: Christos Saul MD;  Location: [facility] OR;  Service: Pain Management;  Laterality: Right;  L5-S1 and cyst aspiration L5-S1    TRANSRECTAL ULTRASOUND EXAMINATION N/A 7/9/2024    Procedure: ULTRASOUND, RECTAL APPROACH;  Surgeon: Ronal Benitez MD;  Location: [facility] OR;  Service: Urology;  Laterality: N/A;    URETEROSCOPIC REMOVAL OF URETERIC CALCULUS Left 05/05/2022    Procedure: REMOVAL, CALCULUS, URETER, URETEROSCOPIC;  Surgeon: Ronal Benitez MD;  Location: [facility] OR;  Service: Urology;  Laterality: Left;       PHYSICAL EXAMINATION:    Constitutional: He is oriented to person, place, and time. He appears well-developed and well-nourished.  He is  in no apparent distress.    Abdominal:  He exhibits no distension.  There is no CVA tenderness.     Neurological: He is alert and oriented to person, place, and time.     Psych: Cooperative with normal affect.      Physical Exam      LABS:        Lab Results   Component Value Date    PSA 0.53 05/09/2024    PSA 0.56 03/13/2023    PSA 0.56 03/11/2022    PSADIAG 0.51 06/15/2018    PSADIAG 0.54 05/18/2017     Lab Results   Component Value Date    CREATININE 1.3 08/12/2024       IMPRESSION:    Encounter Diagnoses   Name Primary?    BPH with obstruction/lower urinary tract symptoms Yes    Kidney stones          PLAN:  -PSA due 5/2025    -Will call with CT results once completed.    -LUTS has worsened since last visit, mainly feeling of incomplete bladder emptying and intermittent stream. Will send message to MD s/p urolift 8/22/24  Continue conservative measures to control urgency and frequency including   1. Avoiding/minimizing bladder irritants (see below), especially in afternoon and evening hours  Discussed bladder irritants include coffee (even decaf), tea, alcohol, soda, spicy foods, acidic juices (orange, tomato), vinegar, and artificial sweeteners/sugary beverages.  2. timed voiding - empty on a schedule (approx ~2-3 hours) in spite of need to urinate, to get ahead of urge  3. dont postponing voiding - dont hold it on purpose   4. bowel regimen as distended bowel has extrinsic compressive effect on bladder.   - any or all of the following in any combination, titrate to soft daily bowel movement without pushing or straining  - colace/stool softener capsule - once to twice daily  - miralax - 1 capful daily to start, can increase to 2x daily (or decrease to 1/2 cap daily)  - increase dietary fibery  - fiber supplements, such as metamucil  - prunes, prune juice  5. INCREASE water intake  6. Stop fluids 2 hours before bed, and urinate just before bed    -RTC based on imaging results/MD recommendations.    I encouraged  him or any of his family members to call or email me with questions and/or concerns.    Visit today is associated with current or anticipated ongoing medical care related to this patient's single serious condition/complex condition, BPH, kidney stones      30 minutes of total time spent on the encounter, which includes face to face time and non-face to face time preparing to see the patient (eg, review of tests), Obtaining and/or reviewing separately obtained history, Documenting clinical information in the electronic or other health record, Independently interpreting results (not separately reported) and communicating results to the patient/family/caregiver, or Care coordination (not separately reported).

## 2025-01-02 ENCOUNTER — TELEPHONE (OUTPATIENT)
Dept: UROLOGY | Facility: CLINIC | Age: 70
End: 2025-01-02
Payer: MEDICARE

## 2025-01-02 NOTE — TELEPHONE ENCOUNTER
"Pt informed of further recs  Uroflow scheduled for next avaiable   1/27   Pt explained in detail of uroflow instructions  Pt vu          Tequila Foster, MG  P Emmanuel Calero set up for uroflow per Dr PATEL          Previous Messages       ----- Message -----  From: Ronal Benitez MD  Sent: 1/1/2025   1:13 PM CST  To: Tequila Foster NP  Subject: RE: CT results                                  No bladder stones  No implants near bladder  Was doing great at 1 month  And saw note of now - Pt feels "like something is inside bladder" if he does anything strenuous or on elliptical. No pain or pressure, and also noted 4/5 intermittency  Would be quite early for recurrence - consider eval for stricture  Could uroflow with full bladder and see if any obstructed pattern 1st and cysto if so, or go right to cysto.  ----- Message -----  From: Tequila Foster NP  Sent: 12/31/2024  10:24 AM CST  To: Roanl Benitez MD  Subject: CT results                                      No bladder stones on CT  Pt does have worsening LUTS yesterday, mainly stream issues (s/p urolift)  Let me know plan and I can call him with recommendations when I call CT results Thursday    thanks  "

## 2025-01-02 NOTE — TELEPHONE ENCOUNTER
"Spoke with pt regarding CT results  Reviewed by Dr Benitez  No bladder stones  Bilateral renal stones wo hydro or ureteral stones, will monitor  F/u with PCP for additional findings not  related  Uroflow scheduled.       ----- Message from Ronal Benitez MD sent at 1/1/2025  1:11 PM CST -----  Regarding: RE: CT results  No bladder stones  No implants near bladder  Was doing great at 1 month  And saw note of now - Pt feels "like something is inside bladder" if he does anything strenuous or on elliptical. No pain or pressure, and also noted 4/5 intermittency  Would be quite early for recurrence - consider eval for stricture  Could uroflow with full bladder and see if any obstructed pattern 1st and cysto if so, or go right to cysto.  ----- Message -----  From: Tequila Foster NP  Sent: 12/31/2024  10:24 AM CST  To: Ronal Benitez MD  Subject: CT results                                       No bladder stones on CT  Pt does have worsening LUTS yesterday, mainly stream issues (s/p urolift)  Let me know plan and I can call him with recommendations when I call CT results Thursday    thanks  "

## 2025-01-07 ENCOUNTER — OFFICE VISIT (OUTPATIENT)
Dept: FAMILY MEDICINE | Facility: CLINIC | Age: 70
End: 2025-01-07
Payer: MEDICARE

## 2025-01-07 ENCOUNTER — PATIENT MESSAGE (OUTPATIENT)
Dept: GASTROENTEROLOGY | Facility: CLINIC | Age: 70
End: 2025-01-07
Payer: MEDICARE

## 2025-01-07 ENCOUNTER — LAB VISIT (OUTPATIENT)
Dept: LAB | Facility: HOSPITAL | Age: 70
End: 2025-01-07
Payer: MEDICARE

## 2025-01-07 VITALS
RESPIRATION RATE: 18 BRPM | HEIGHT: 70 IN | OXYGEN SATURATION: 95 % | WEIGHT: 195.56 LBS | BODY MASS INDEX: 28 KG/M2 | DIASTOLIC BLOOD PRESSURE: 78 MMHG | SYSTOLIC BLOOD PRESSURE: 124 MMHG | HEART RATE: 58 BPM

## 2025-01-07 DIAGNOSIS — E11.59 HYPERTENSION ASSOCIATED WITH DIABETES: ICD-10-CM

## 2025-01-07 DIAGNOSIS — E78.5 HYPERLIPIDEMIA ASSOCIATED WITH TYPE 2 DIABETES MELLITUS: ICD-10-CM

## 2025-01-07 DIAGNOSIS — E11.65 TYPE 2 DIABETES MELLITUS WITH HYPERGLYCEMIA, WITHOUT LONG-TERM CURRENT USE OF INSULIN: ICD-10-CM

## 2025-01-07 DIAGNOSIS — Z76.89 ENCOUNTER TO ESTABLISH CARE: Primary | ICD-10-CM

## 2025-01-07 DIAGNOSIS — I70.0 AORTIC ATHEROSCLEROSIS: ICD-10-CM

## 2025-01-07 DIAGNOSIS — I15.2 HYPERTENSION ASSOCIATED WITH DIABETES: ICD-10-CM

## 2025-01-07 DIAGNOSIS — Z12.11 COLON CANCER SCREENING: ICD-10-CM

## 2025-01-07 DIAGNOSIS — E11.69 HYPERLIPIDEMIA ASSOCIATED WITH TYPE 2 DIABETES MELLITUS: ICD-10-CM

## 2025-01-07 DIAGNOSIS — N20.0 KIDNEY STONES: ICD-10-CM

## 2025-01-07 DIAGNOSIS — Z12.5 SCREENING FOR MALIGNANT NEOPLASM OF PROSTATE: ICD-10-CM

## 2025-01-07 DIAGNOSIS — H34.8320 TRIBUTARY (BRANCH) RETINAL VEIN OCCLUSION, LEFT EYE, WITH MACULAR EDEMA: ICD-10-CM

## 2025-01-07 LAB
ALBUMIN SERPL BCP-MCNC: 4.2 G/DL (ref 3.5–5.2)
ALP SERPL-CCNC: 48 U/L (ref 40–150)
ALT SERPL W/O P-5'-P-CCNC: 24 U/L (ref 10–44)
ANION GAP SERPL CALC-SCNC: 10 MMOL/L (ref 8–16)
AST SERPL-CCNC: 28 U/L (ref 10–40)
BASOPHILS # BLD AUTO: 0.07 K/UL (ref 0–0.2)
BASOPHILS NFR BLD: 1 % (ref 0–1.9)
BILIRUB SERPL-MCNC: 1 MG/DL (ref 0.1–1)
BUN SERPL-MCNC: 29 MG/DL (ref 8–23)
CALCIUM SERPL-MCNC: 9.7 MG/DL (ref 8.7–10.5)
CHLORIDE SERPL-SCNC: 104 MMOL/L (ref 95–110)
CHOLEST SERPL-MCNC: 109 MG/DL (ref 120–199)
CHOLEST/HDLC SERPL: 2.9 {RATIO} (ref 2–5)
CO2 SERPL-SCNC: 25 MMOL/L (ref 23–29)
CREAT SERPL-MCNC: 1.2 MG/DL (ref 0.5–1.4)
DIFFERENTIAL METHOD BLD: NORMAL
EOSINOPHIL # BLD AUTO: 0.2 K/UL (ref 0–0.5)
EOSINOPHIL NFR BLD: 2.7 % (ref 0–8)
ERYTHROCYTE [DISTWIDTH] IN BLOOD BY AUTOMATED COUNT: 12.1 % (ref 11.5–14.5)
EST. GFR  (NO RACE VARIABLE): >60 ML/MIN/1.73 M^2
GLUCOSE SERPL-MCNC: 137 MG/DL (ref 70–110)
HCT VFR BLD AUTO: 52.8 % (ref 40–54)
HDLC SERPL-MCNC: 37 MG/DL (ref 40–75)
HDLC SERPL: 33.9 % (ref 20–50)
HGB BLD-MCNC: 16.9 G/DL (ref 14–18)
IMM GRANULOCYTES # BLD AUTO: 0.02 K/UL (ref 0–0.04)
IMM GRANULOCYTES NFR BLD AUTO: 0.3 % (ref 0–0.5)
LDLC SERPL CALC-MCNC: 55.8 MG/DL (ref 63–159)
LYMPHOCYTES # BLD AUTO: 1.6 K/UL (ref 1–4.8)
LYMPHOCYTES NFR BLD: 22.1 % (ref 18–48)
MCH RBC QN AUTO: 30.5 PG (ref 27–31)
MCHC RBC AUTO-ENTMCNC: 32 G/DL (ref 32–36)
MCV RBC AUTO: 95 FL (ref 82–98)
MONOCYTES # BLD AUTO: 0.9 K/UL (ref 0.3–1)
MONOCYTES NFR BLD: 12.8 % (ref 4–15)
NEUTROPHILS # BLD AUTO: 4.4 K/UL (ref 1.8–7.7)
NEUTROPHILS NFR BLD: 61.1 % (ref 38–73)
NONHDLC SERPL-MCNC: 72 MG/DL
NRBC BLD-RTO: 0 /100 WBC
PLATELET # BLD AUTO: 171 K/UL (ref 150–450)
PMV BLD AUTO: 10.9 FL (ref 9.2–12.9)
POTASSIUM SERPL-SCNC: 4.3 MMOL/L (ref 3.5–5.1)
PROT SERPL-MCNC: 7.6 G/DL (ref 6–8.4)
RBC # BLD AUTO: 5.55 M/UL (ref 4.6–6.2)
SODIUM SERPL-SCNC: 139 MMOL/L (ref 136–145)
TRIGL SERPL-MCNC: 81 MG/DL (ref 30–150)
WBC # BLD AUTO: 7.11 K/UL (ref 3.9–12.7)

## 2025-01-07 PROCEDURE — 80053 COMPREHEN METABOLIC PANEL: CPT

## 2025-01-07 PROCEDURE — 80061 LIPID PANEL: CPT

## 2025-01-07 PROCEDURE — 85025 COMPLETE CBC W/AUTO DIFF WBC: CPT

## 2025-01-07 PROCEDURE — 36415 COLL VENOUS BLD VENIPUNCTURE: CPT | Mod: PO

## 2025-01-07 PROCEDURE — 99214 OFFICE O/P EST MOD 30 MIN: CPT | Mod: PBBFAC,PO

## 2025-01-07 PROCEDURE — 99214 OFFICE O/P EST MOD 30 MIN: CPT | Mod: S$PBB,,,

## 2025-01-07 PROCEDURE — 99999 PR PBB SHADOW E&M-EST. PATIENT-LVL IV: CPT | Mod: PBBFAC,,,

## 2025-01-07 PROCEDURE — G2211 COMPLEX E/M VISIT ADD ON: HCPCS | Mod: S$PBB,,,

## 2025-01-07 RX ORDER — AMLODIPINE BESYLATE 2.5 MG/1
2.5 TABLET ORAL DAILY
Qty: 90 TABLET | Refills: 3 | Status: SHIPPED | OUTPATIENT
Start: 2025-01-07

## 2025-01-07 RX ORDER — VALSARTAN 320 MG/1
TABLET ORAL
Qty: 90 TABLET | Refills: 3 | Status: SHIPPED | OUTPATIENT
Start: 2025-01-07

## 2025-01-07 RX ORDER — ROSUVASTATIN CALCIUM 20 MG/1
20 TABLET, COATED ORAL DAILY
Qty: 90 TABLET | Refills: 3 | Status: SHIPPED | OUTPATIENT
Start: 2025-01-07

## 2025-01-07 RX ORDER — METFORMIN HYDROCHLORIDE 750 MG/1
750 TABLET, EXTENDED RELEASE ORAL 2 TIMES DAILY WITH MEALS
Qty: 180 TABLET | Refills: 3 | Status: SHIPPED | OUTPATIENT
Start: 2025-01-07

## 2025-01-07 NOTE — PROGRESS NOTES
Subjective:       Patient ID: Toni Hyde is a 69 y.o. male.    Chief Complaint: Establish Care and Annual Exam    HPI    History of Present Illness    CHIEF COMPLAINT:  Patient presents today for establish care as well as follow-up regarding kidney stones and diabetes management.    KIDNEY STONES:  He has bilateral kidney stones (one calcium stone and one uric acid stone). Previous lithotripsy was unsuccessful. CT was performed to evaluate. He is scheduled for urinary flow studies to assess further.     DIABETES:  He reports A1C of 6.8 and acknowledges it is elevated. He exercises regularly on elliptical for 2.5 hours per week, completing 48 weeks of five-day workout sessions in the past year. He plans to reduce weight through dietary modifications, including elimination of Coca-Cola and late-night ice cream intake. He reports previous abdominal pain with GLP medications and expresses concern about long-term effects of diabetic medications on pancreas.    OPHTHALMOLOGIC:  He follows with retina specialist Dr. Nielsen for retinal vein occlusion, alternating visits with Dr. Nesbitt every six months. Last visit with Dr. Nesbitt was 2-3 months ago for annual exam.    MUSCULOSKELETAL:  He reports a cyst on right foot that causes discomfort depending on footwear. He has history of prior surgery for spinal cyst that was affecting nerve function. He denies current numbness, tingling, ulcers, or nail problems.    FAMILY HISTORY:  His wife passed away from colon cancer 2.5 years ago.      ROS:  Gastrointestinal: positive abdominal pain  Musculoskeletal: -joint pain  Neurological: -numbness          Past Medical History:   Diagnosis Date    Arthritis     Cataract     Cataract of left eye     Diabetes mellitus type II     on po meds    Hyperlipidemia     not on meds at present    Hypertension     Kidney stones 04/2017    Left ureteral stone     Plantar fasciitis of right foot 10/2017    Dr [place]     Tendonitis 01/2018     "right foot  Stainslav    Wears glasses        Review of patient's allergies indicates:   Allergen Reactions    Shrimp Itching and Swelling     Per testing   States no ivp or betadine allergies.          Current Outpatient Medications:     cetirizine (ZYRTEC) 10 MG tablet, Take 10 mg by mouth daily as needed. 1 Tablet(s) Oral PRN ., Disp: , Rfl:     diphenhydrAMINE (BENADRYL) 25 mg capsule, Take 25 mg by mouth nightly as needed for Itching., Disp: , Rfl:     fluticasone (FLONASE) 50 mcg/actuation nasal spray, 2 sprays by Each Nare route daily as needed. , Disp: , Rfl:     guaifenesin (MUCINEX ORAL), Take 400 mg by mouth 2 (two) times daily as needed for Congestion., Disp: , Rfl:     ibuprofen (ADVIL,MOTRIN) 200 MG tablet, Take 200 mg by mouth every 8 (eight) hours as needed for Pain., Disp: , Rfl:     potassium citrate (UROCIT-K 15) 15 mEq TbSR, TAKE 1 TABLET TWICE A DAY, Disp: 180 tablet, Rfl: 3    amLODIPine (NORVASC) 2.5 MG tablet, Take 1 tablet (2.5 mg total) by mouth once daily., Disp: 90 tablet, Rfl: 3    metFORMIN (GLUCOPHAGE-XR) 750 MG ER 24hr tablet, Take 1 tablet (750 mg total) by mouth 2 (two) times daily with meals., Disp: 180 tablet, Rfl: 3    rosuvastatin (CRESTOR) 20 MG tablet, Take 1 tablet (20 mg total) by mouth once daily., Disp: 90 tablet, Rfl: 3    valsartan (DIOVAN) 320 MG tablet, TAKE 1 TABLET DAILY, Disp: 90 tablet, Rfl: 3  No current facility-administered medications for this visit.    Facility-Administered Medications Ordered in Other Visits:     lidocaine (PF) 10 mg/ml (1%) injection 10 mg, 1 mL, Intradermal, Once, Kermit Davalos MD    Review of Systems    Objective:      /78 (BP Location: Right arm, Patient Position: Sitting)   Pulse (!) 58   Resp 18   Ht 5' 10" (1.778 m)   Wt 88.7 kg (195 lb 8.8 oz)   SpO2 95%   BMI 28.06 kg/m²   Physical Exam  Vitals reviewed.   Constitutional:       General: He is not in acute distress.     Appearance: Normal appearance. He is normal weight. " He is not ill-appearing, toxic-appearing or diaphoretic.   HENT:      Head: Normocephalic.      Right Ear: External ear normal.      Left Ear: External ear normal.      Nose: Nose normal. No congestion or rhinorrhea.      Mouth/Throat:      Mouth: Mucous membranes are moist.      Pharynx: Oropharynx is clear.   Eyes:      General: No scleral icterus.        Right eye: No discharge.         Left eye: No discharge.      Extraocular Movements: Extraocular movements intact.      Conjunctiva/sclera: Conjunctivae normal.   Cardiovascular:      Rate and Rhythm: Regular rhythm. Bradycardia present.      Pulses: Normal pulses.      Heart sounds: Normal heart sounds. No murmur heard.     No friction rub. No gallop.   Pulmonary:      Effort: Pulmonary effort is normal. No respiratory distress.      Breath sounds: Normal breath sounds. No wheezing, rhonchi or rales.   Chest:      Chest wall: No tenderness.   Musculoskeletal:         General: No swelling, tenderness or deformity. Normal range of motion.      Cervical back: Normal range of motion.      Right lower leg: No edema.      Left lower leg: No edema.   Skin:     General: Skin is warm and dry.      Capillary Refill: Capillary refill takes less than 2 seconds.      Coloration: Skin is not jaundiced.      Findings: No bruising, erythema, lesion or rash.   Neurological:      Mental Status: He is alert and oriented to person, place, and time.      Gait: Gait normal.   Psychiatric:         Mood and Affect: Mood normal.         Behavior: Behavior normal.         Thought Content: Thought content normal.         Judgment: Judgment normal.           Protective Sensation (w/ 10 gram monofilament):  Right: Intact  Left: Intact    Visual Inspection:  Normal -  Bilateral    Pedal Pulses:   Right: Present  Left: Present    Posterior Tibialis Pulses:   Right:Present  Left: Present    Assessment:       1. Encounter to establish care    2. Hypertension associated with diabetes    3.  Hyperlipidemia associated with type 2 diabetes mellitus    4. Type 2 diabetes mellitus with hyperglycemia, without long-term current use of insulin    5. Tributary (branch) retinal vein occlusion, left eye, with macular edema    6. Aortic atherosclerosis    7. Screening for malignant neoplasm of prostate    8. Colon cancer screening    9. Kidney stones          Assessment & Plan    IMPRESSION:  - Assessed A1C at 6.8, indicating suboptimal glycemic control  - Noted well-controlled blood pressure  - Reviewed kidney function, showing slight decline likely due to recent kidney stones  - Evaluated microalbumin creatinine ratio, improved from 2 years ago  - Considered but ruled out weight loss medications due to patient's history of abdominal complaints  - Reviewed patient's eye care with Dr. Nesbitt and Dr. Sinclair for retinal issues  - Assessed foot health, noting a potential cyst on right foot    PLAN SUMMARY:  - Order urinalysis, metabolic panel, CBC, and non-fasting lipid panel  - Schedule next colonoscopy later in the year  - Reduce Coca-Cola and ice cream consumption  - Limit calorie intake to 1,200 per day for weight loss and A1C improvement  - Schedule annual visits with Dr. Sinclair (retina specialist)  - Alternate visits every 6 months between Dr. Sinclair and Dr. Nesbitt (ophthalmologist)  - Recommend comfortable shoes like Skechers for foot discomfort  - Follow up in 6 months for lab recheck and A1C reassessment    (BRANCH) RETINAL VEIN OCCLUSION, LEFT EYE, WITH MACULAR EDEMA:  - Implement a comprehensive follow-up care plan for the patient's retinal vein occlusion and associated macular edema: - Schedule annual visits with Dr. Sinclair, the retina specialist, to monitor the condition. - Alternate visits every 6 months between Dr. Sinclair and Dr. Nesbitt (ophthalmologist) for thorough eye care. - Continue monitoring the macular pucker observed by the retina specialist during these visits.  - Emphasize to the patient the  importance of adhering to this consistent follow-up schedule for optimal management of their eye condition.    STONES:  - Monitor kidney stones in both kidneys (calcium and uric acid).  - Note unsuccessful lithotripsy and slight decline in kidney function, likely due to large stones.  - Acknowledge improvement in microalbumin creatinine ratio compared to two years ago.  - Schedule urinalysis, order metabolic panel and CBC to evaluate kidney function.  - Follow up in 6 months for lab recheck.    DIABETES:  - Current A1C level of 6.8 is controlled but needs improvement.  - Recommend reducing Coca-Cola and ice cream consumption, and limiting calorie intake to 1,200 per day for weight loss and A1C improvement.  - Acknowledge regular cardio exercise (2.5 hours/week on elliptical).  - Discuss diabetic medications for weight loss, noting patient's decline due to previous side effects.  - Emphasize importance of regular eye exams and proper footwear for diabetic patients.  - Explain non-fasting lipid panel rationale.  - Order CMP, CBC, and lipid panel (non-fasting).  - Follow up in 6 months to reassess A1C.    PROSTATE:  - Note Uro-Lift procedure performed for enlarged prostate, which did not provide expected results.    POLYPS:  - Acknowledge history of colon polyps found during previous colonoscopies.  - Discuss need for more frequent colonoscopies.  - Schedule next colonoscopy later in the year, preferably during summer.    MANAGEMENT:  - Address weight gain due to excessive Coca-Cola and ice cream consumption.  - Recommend returning to 1,200 calorie per day diet for weight loss.  - Encourage continuation of regular cardio exercise (2.5 hours/week on elliptical).    DISCOMFORT:  - Note possible cyst on right foot causing discomfort depending on shoe type.  - Observe protrusion during exam.  - Assess as part of diabetic foot care evaluation.  - Recommend comfortable shoes like Skechers to alleviate discomfort.    UP:  -  Contact office via message or phone call if needed.          Plan:       Encounter to establish care  Discussed health maintenance guidelines appropriate for age.      Hypertension associated with diabetes  -     amLODIPine (NORVASC) 2.5 MG tablet; Take 1 tablet (2.5 mg total) by mouth once daily.  Dispense: 90 tablet; Refill: 3  -     valsartan (DIOVAN) 320 MG tablet; TAKE 1 TABLET DAILY  Dispense: 90 tablet; Refill: 3  -     Comprehensive Metabolic Panel; Future; Expected date: 01/07/2025  -     CBC Auto Differential; Future; Expected date: 01/07/2025        -    Stable. Continue norvasc, valsartan. Will continue to monitor.     Hyperlipidemia associated with type 2 diabetes mellitus  -     rosuvastatin (CRESTOR) 20 MG tablet; Take 1 tablet (20 mg total) by mouth once daily.  Dispense: 90 tablet; Refill: 3  -     Lipid Panel; Future; Expected date: 01/07/2025  -     COMPREHENSIVE METABOLIC PANEL; Future; Expected date: 01/07/2025  -     CBC W/ AUTO DIFFERENTIAL; Future; Expected date: 01/07/2025  -     LIPID PANEL; Future; Expected date: 01/07/2025        -    Stable. Continue statin. Will continue to monitor.     Type 2 diabetes mellitus with hyperglycemia, without long-term current use of insulin  -     metFORMIN (GLUCOPHAGE-XR) 750 MG ER 24hr tablet; Take 1 tablet (750 mg total) by mouth 2 (two) times daily with meals.  Dispense: 180 tablet; Refill: 3  -     Comprehensive Metabolic Panel; Future; Expected date: 01/07/2025  -     Hemoglobin A1C; Future; Expected date: 01/07/2025  -     Lipid Panel; Future; Expected date: 01/07/2025        -    Stable. Continue metformin. Will continue to monitor.     Tributary (branch) retinal vein occlusion, left eye, with macular edema        -    Stable. Continue following with retinal specialist annually. Will continue to monitor.     Aortic atherosclerosis        -    Stable. No alarming features currently. Continue statin and BP control. Will continue to monitor.      Screening for malignant neoplasm of prostate  -     PSA, SCREENING; Future; Expected date: 07/07/2025    Colon cancer screening  -     Case Request Endoscopy: COLONOSCOPY    Kidney stones       -    As above. Following with urology.                  Sal Solares PA-C  Family Medicine Physician Assistant       Future Appointments       Date Provider Specialty Appt Notes    1/7/2025  Lab Hyperlipidemia associated with type 2 diabetes mellitus    1/27/2025  Urology uroflow    7/1/2025  Lab     7/8/2025 Sal Solares PA-C Family Medicine 6 month f/u               I spent a total of 20 minutes on the day of the visit.This includes face to face time and non-face to face time preparing to see the patient (eg, review of tests), obtaining and/or reviewing separately obtained history, documenting clinical information in the electronic or other health record, independently interpreting results and communicating results to the patient/family/caregiver, or care coordinator.      We have addressed [4] Moderate: 1 or more chronic illnesses with exacerbation, progression, or side effects of treatment / 2 or more stable chronic illnesses / 1 undiagnosed new problem with uncertain prognosis / 1 acute illness with systemic symptoms / 1 acute complicated injury  The complexity of the data reviewed and analyzed for this visit was [3] Limited (Reviewed prior external note, ordered unique testing or reviewed the results of each unique test)   The risk of complications and/or morbidity or mortality are [4] Moderate risk (I.e. prescription drug management / decision regarding minor surgery with identified pt or procedure risk factors / decision regarding elective major surgery without identified pt or procedure risk factors / diagnosis or treatment significantly limited by social determinants of health)   The level of Medical Decision Making for this visit is [4] Moderate     This note may have been generated with the assistance  of ambient listening technology. If used, verbal consent was obtained by the patient and accompanying visitor(s) for the recording of patient appointment to facilitate this note. I attest to having reviewed and edited the generated note for accuracy, though some syntax or spelling errors may persist. Please contact the author of this note for any clarification.

## 2025-01-14 ENCOUNTER — TELEPHONE (OUTPATIENT)
Dept: GASTROENTEROLOGY | Facility: CLINIC | Age: 70
End: 2025-01-14
Payer: MEDICARE

## 2025-01-27 ENCOUNTER — CLINICAL SUPPORT (OUTPATIENT)
Dept: UROLOGY | Facility: CLINIC | Age: 70
End: 2025-01-27
Payer: MEDICARE

## 2025-01-27 DIAGNOSIS — N40.1 BPH WITH OBSTRUCTION/LOWER URINARY TRACT SYMPTOMS: Primary | ICD-10-CM

## 2025-01-27 DIAGNOSIS — N13.8 BPH WITH OBSTRUCTION/LOWER URINARY TRACT SYMPTOMS: Primary | ICD-10-CM

## 2025-01-27 PROCEDURE — 99499 UNLISTED E&M SERVICE: CPT | Mod: S$PBB,,, | Performed by: UROLOGY

## 2025-01-27 NOTE — PROGRESS NOTES
Uroflow results   Voiding time: 83.4s,   Flow time: 56.5s,   TTP flow: 8.0s,   Peak flowrate: 9.2 mL/s,   Average flowrate: 3.5mL/s,   Intervals: 7,   Voided volume: 195 mL,  Pattern of curve: to be determined by physician.

## 2025-02-04 ENCOUNTER — TELEPHONE (OUTPATIENT)
Dept: GASTROENTEROLOGY | Facility: CLINIC | Age: 70
End: 2025-02-04
Payer: MEDICARE

## 2025-02-04 NOTE — TELEPHONE ENCOUNTER
----- Message from [employer] sent at 2/4/2025  4:07 PM CST -----  Type:  Needs Medical Advice    Who Called: the patient  Symptoms (please be specific):colonoscopy  How long has patient had these symptoms:    Pharmacy name and phone #:    Would the patient rather a call back or a response via [facility]? call back  Best Call Back Number: [phone]   Additional Information: Schedule colonoscopy on 2/17  Please advise  Thanks

## 2025-02-06 ENCOUNTER — NURSE TRIAGE (OUTPATIENT)
Dept: ADMINISTRATIVE | Facility: CLINIC | Age: 70
End: 2025-02-06
Payer: MEDICARE

## 2025-02-07 ENCOUNTER — NURSE TRIAGE (OUTPATIENT)
Dept: ADMINISTRATIVE | Facility: CLINIC | Age: 70
End: 2025-02-07
Payer: MEDICARE

## 2025-02-07 ENCOUNTER — HOSPITAL ENCOUNTER (OUTPATIENT)
Dept: RADIOLOGY | Facility: HOSPITAL | Age: 70
Discharge: HOME | DRG: 392 | End: 2025-02-07
Attending: STUDENT IN AN ORGANIZED HEALTH CARE EDUCATION/TRAINING PROGRAM
Payer: MEDICARE

## 2025-02-07 ENCOUNTER — HOSPITAL ENCOUNTER (INPATIENT)
Facility: HOSPITAL | Age: 70
LOS: 2 days | Discharge: HOME | DRG: 392 | End: 2025-02-09
Payer: MEDICARE

## 2025-02-07 ENCOUNTER — OFFICE VISIT (OUTPATIENT)
Dept: FAMILY MEDICINE | Facility: CLINIC | Age: 70
End: 2025-02-07
Payer: MEDICARE

## 2025-02-07 VITALS
WEIGHT: 191.81 LBS | DIASTOLIC BLOOD PRESSURE: 64 MMHG | OXYGEN SATURATION: 94 % | BODY MASS INDEX: 27.46 KG/M2 | SYSTOLIC BLOOD PRESSURE: 102 MMHG | HEIGHT: 70 IN | HEART RATE: 87 BPM

## 2025-02-07 DIAGNOSIS — R07.9 CHEST PAIN: ICD-10-CM

## 2025-02-07 DIAGNOSIS — K57.92 DIVERTICULITIS: Primary | ICD-10-CM

## 2025-02-07 DIAGNOSIS — R10.32 LEFT LOWER QUADRANT PAIN: ICD-10-CM

## 2025-02-07 DIAGNOSIS — K57.92 DIVERTICULITIS: ICD-10-CM

## 2025-02-07 PROBLEM — I10 PRIMARY HYPERTENSION: Status: ACTIVE | Noted: 2025-02-07

## 2025-02-07 LAB
CRP SERPL-MCNC: 112.5 MG/L (ref 0–8.2)
LACTATE SERPL-SCNC: 1.1 MMOL/L (ref 0.5–2.2)
MAGNESIUM SERPL-MCNC: 1.8 MG/DL (ref 1.6–2.6)
POCT GLUCOSE: 126 MG/DL (ref 70–110)

## 2025-02-07 PROCEDURE — 74176 CT ABD & PELVIS W/O CONTRAST: CPT | Mod: 26,,, | Performed by: RADIOLOGY

## 2025-02-07 PROCEDURE — 94761 N-INVAS EAR/PLS OXIMETRY MLT: CPT

## 2025-02-07 PROCEDURE — 83735 ASSAY OF MAGNESIUM: CPT

## 2025-02-07 PROCEDURE — 74176 CT ABD & PELVIS W/O CONTRAST: CPT | Mod: TC

## 2025-02-07 PROCEDURE — 25500020 PHARM REV CODE 255

## 2025-02-07 PROCEDURE — 83605 ASSAY OF LACTIC ACID: CPT

## 2025-02-07 PROCEDURE — 11000001 HC ACUTE MED/SURG PRIVATE ROOM

## 2025-02-07 PROCEDURE — 63600175 PHARM REV CODE 636 W HCPCS

## 2025-02-07 PROCEDURE — 99900031 HC PATIENT EDUCATION (STAT)

## 2025-02-07 PROCEDURE — 25000003 PHARM REV CODE 250

## 2025-02-07 PROCEDURE — 36415 COLL VENOUS BLD VENIPUNCTURE: CPT | Performed by: SURGERY

## 2025-02-07 PROCEDURE — 94760 N-INVAS EAR/PLS OXIMETRY 1: CPT

## 2025-02-07 PROCEDURE — 99214 OFFICE O/P EST MOD 30 MIN: CPT | Mod: PBBFAC,PO | Performed by: STUDENT IN AN ORGANIZED HEALTH CARE EDUCATION/TRAINING PROGRAM

## 2025-02-07 PROCEDURE — 94799 UNLISTED PULMONARY SVC/PX: CPT

## 2025-02-07 PROCEDURE — 99900035 HC TECH TIME PER 15 MIN (STAT)

## 2025-02-07 PROCEDURE — 99215 OFFICE O/P EST HI 40 MIN: CPT | Mod: S$PBB,,, | Performed by: STUDENT IN AN ORGANIZED HEALTH CARE EDUCATION/TRAINING PROGRAM

## 2025-02-07 PROCEDURE — 86140 C-REACTIVE PROTEIN: CPT | Performed by: SURGERY

## 2025-02-07 PROCEDURE — 99999 PR PBB SHADOW E&M-EST. PATIENT-LVL IV: CPT | Mod: PBBFAC,,, | Performed by: STUDENT IN AN ORGANIZED HEALTH CARE EDUCATION/TRAINING PROGRAM

## 2025-02-07 RX ORDER — HYDROCODONE BITARTRATE AND ACETAMINOPHEN 10; 325 MG/1; MG/1
1 TABLET ORAL EVERY 6 HOURS PRN
Status: DISCONTINUED | OUTPATIENT
Start: 2025-02-07 | End: 2025-02-09 | Stop reason: HOSPADM

## 2025-02-07 RX ORDER — PANTOPRAZOLE SODIUM 40 MG/10ML
40 INJECTION, POWDER, LYOPHILIZED, FOR SOLUTION INTRAVENOUS DAILY
Status: DISCONTINUED | OUTPATIENT
Start: 2025-02-07 | End: 2025-02-09 | Stop reason: HOSPADM

## 2025-02-07 RX ORDER — INSULIN ASPART 100 [IU]/ML
0-5 INJECTION, SOLUTION INTRAVENOUS; SUBCUTANEOUS
Status: DISCONTINUED | OUTPATIENT
Start: 2025-02-07 | End: 2025-02-09 | Stop reason: HOSPADM

## 2025-02-07 RX ORDER — LANOLIN ALCOHOL/MO/W.PET/CERES
800 CREAM (GRAM) TOPICAL
Status: DISCONTINUED | OUTPATIENT
Start: 2025-02-07 | End: 2025-02-09 | Stop reason: HOSPADM

## 2025-02-07 RX ORDER — GLUCAGON 1 MG
1 KIT INJECTION
Status: DISCONTINUED | OUTPATIENT
Start: 2025-02-07 | End: 2025-02-09 | Stop reason: HOSPADM

## 2025-02-07 RX ORDER — CIPROFLOXACIN 500 MG/1
500 TABLET ORAL 2 TIMES DAILY
Qty: 14 TABLET | Refills: 0 | Status: ON HOLD | OUTPATIENT
Start: 2025-02-07 | End: 2025-02-09 | Stop reason: HOSPADM

## 2025-02-07 RX ORDER — NALOXONE HCL 0.4 MG/ML
0.02 VIAL (ML) INJECTION
Status: DISCONTINUED | OUTPATIENT
Start: 2025-02-07 | End: 2025-02-09 | Stop reason: HOSPADM

## 2025-02-07 RX ORDER — SODIUM CHLORIDE 0.9 % (FLUSH) 0.9 %
10 SYRINGE (ML) INJECTION EVERY 12 HOURS PRN
Status: DISCONTINUED | OUTPATIENT
Start: 2025-02-07 | End: 2025-02-09 | Stop reason: HOSPADM

## 2025-02-07 RX ORDER — IBUPROFEN 200 MG
16 TABLET ORAL
Status: DISCONTINUED | OUTPATIENT
Start: 2025-02-07 | End: 2025-02-09 | Stop reason: HOSPADM

## 2025-02-07 RX ORDER — VALSARTAN 80 MG/1
320 TABLET ORAL DAILY
Status: DISCONTINUED | OUTPATIENT
Start: 2025-02-08 | End: 2025-02-09 | Stop reason: HOSPADM

## 2025-02-07 RX ORDER — ACETAMINOPHEN 500 MG/1
CAPSULE, LIQUID FILLED ORAL
COMMUNITY
Start: 2024-08-01

## 2025-02-07 RX ORDER — ONDANSETRON HYDROCHLORIDE 2 MG/ML
4 INJECTION, SOLUTION INTRAVENOUS EVERY 8 HOURS PRN
Status: DISCONTINUED | OUTPATIENT
Start: 2025-02-07 | End: 2025-02-09 | Stop reason: HOSPADM

## 2025-02-07 RX ORDER — SODIUM,POTASSIUM PHOSPHATES 280-250MG
2 POWDER IN PACKET (EA) ORAL
Status: DISCONTINUED | OUTPATIENT
Start: 2025-02-07 | End: 2025-02-09 | Stop reason: HOSPADM

## 2025-02-07 RX ORDER — IPRATROPIUM BROMIDE AND ALBUTEROL SULFATE 2.5; .5 MG/3ML; MG/3ML
3 SOLUTION RESPIRATORY (INHALATION) EVERY 6 HOURS PRN
Status: DISCONTINUED | OUTPATIENT
Start: 2025-02-07 | End: 2025-02-09 | Stop reason: HOSPADM

## 2025-02-07 RX ORDER — METRONIDAZOLE 500 MG/100ML
500 INJECTION, SOLUTION INTRAVENOUS
Status: DISCONTINUED | OUTPATIENT
Start: 2025-02-07 | End: 2025-02-09 | Stop reason: HOSPADM

## 2025-02-07 RX ORDER — HYDROCODONE BITARTRATE AND ACETAMINOPHEN 5; 325 MG/1; MG/1
1 TABLET ORAL EVERY 6 HOURS PRN
Status: DISCONTINUED | OUTPATIENT
Start: 2025-02-07 | End: 2025-02-09 | Stop reason: HOSPADM

## 2025-02-07 RX ORDER — IBUPROFEN 200 MG
24 TABLET ORAL
Status: DISCONTINUED | OUTPATIENT
Start: 2025-02-07 | End: 2025-02-09 | Stop reason: HOSPADM

## 2025-02-07 RX ORDER — METRONIDAZOLE 500 MG/1
500 TABLET ORAL 3 TIMES DAILY
Qty: 21 TABLET | Refills: 0 | Status: ON HOLD | OUTPATIENT
Start: 2025-02-07 | End: 2025-02-09 | Stop reason: HOSPADM

## 2025-02-07 RX ORDER — CIPROFLOXACIN 2 MG/ML
400 INJECTION, SOLUTION INTRAVENOUS
Status: DISCONTINUED | OUTPATIENT
Start: 2025-02-07 | End: 2025-02-09 | Stop reason: HOSPADM

## 2025-02-07 RX ORDER — ACETAMINOPHEN 325 MG/1
650 TABLET ORAL EVERY 4 HOURS PRN
Status: DISCONTINUED | OUTPATIENT
Start: 2025-02-07 | End: 2025-02-09 | Stop reason: HOSPADM

## 2025-02-07 RX ORDER — ENOXAPARIN SODIUM 100 MG/ML
40 INJECTION SUBCUTANEOUS EVERY 24 HOURS
Status: DISCONTINUED | OUTPATIENT
Start: 2025-02-07 | End: 2025-02-09 | Stop reason: HOSPADM

## 2025-02-07 RX ORDER — ATORVASTATIN CALCIUM 40 MG/1
80 TABLET, FILM COATED ORAL DAILY
Status: DISCONTINUED | OUTPATIENT
Start: 2025-02-08 | End: 2025-02-09 | Stop reason: HOSPADM

## 2025-02-07 RX ORDER — PROCHLORPERAZINE EDISYLATE 5 MG/ML
5 INJECTION INTRAMUSCULAR; INTRAVENOUS EVERY 6 HOURS PRN
Status: DISCONTINUED | OUTPATIENT
Start: 2025-02-07 | End: 2025-02-09 | Stop reason: HOSPADM

## 2025-02-07 RX ORDER — SODIUM CHLORIDE 9 MG/ML
INJECTION, SOLUTION INTRAVENOUS CONTINUOUS
Status: ACTIVE | OUTPATIENT
Start: 2025-02-07 | End: 2025-02-08

## 2025-02-07 RX ORDER — AMLODIPINE BESYLATE 2.5 MG/1
2.5 TABLET ORAL DAILY
Status: DISCONTINUED | OUTPATIENT
Start: 2025-02-08 | End: 2025-02-09 | Stop reason: HOSPADM

## 2025-02-07 RX ORDER — KETOROLAC TROMETHAMINE 30 MG/ML
15 INJECTION, SOLUTION INTRAMUSCULAR; INTRAVENOUS EVERY 6 HOURS PRN
Status: ACTIVE | OUTPATIENT
Start: 2025-02-07 | End: 2025-02-08

## 2025-02-07 RX ADMIN — ENOXAPARIN SODIUM 40 MG: 40 INJECTION SUBCUTANEOUS at 04:02

## 2025-02-07 RX ADMIN — METRONIDAZOLE 500 MG: 500 INJECTION, SOLUTION INTRAVENOUS at 04:02

## 2025-02-07 RX ADMIN — SODIUM CHLORIDE: 9 INJECTION, SOLUTION INTRAVENOUS at 06:02

## 2025-02-07 RX ADMIN — IOHEXOL 30 ML: 300 INJECTION, SOLUTION INTRAVENOUS at 01:02

## 2025-02-07 RX ADMIN — PANTOPRAZOLE SODIUM 40 MG: 40 INJECTION, POWDER, LYOPHILIZED, FOR SOLUTION INTRAVENOUS at 04:02

## 2025-02-07 RX ADMIN — CIPROFLOXACIN 400 MG: 2 INJECTION, SOLUTION INTRAVENOUS at 05:02

## 2025-02-07 NOTE — TELEPHONE ENCOUNTER
"Pt states he called last evening for abdominal pain and was advised to see an MD within 24 hrs of call. Pt states "I figured out what it is, and it's a kidney stone, so which Dr do I need to call".   Pt advised to call urology if he believes it is a kidney stone, if unable to get in to see them today, advised to call PCP. Pt VU.   Reason for Disposition   [1] Follow-up call to recent contact AND [2] information only call, no triage required    Protocols used: Information Only Call - No Triage-A-    "

## 2025-02-07 NOTE — PROGRESS NOTES
Patient ID: Toni Hyde is a 69 y.o. male.    Chief Complaint: Abdominal Pain (LLQ pain)    History of Present Illness    CHIEF COMPLAINT:  Patient presents today with left-sided lower abdominal pain    HISTORY OF PRESENT ILLNESS:  He reports left-sided lower abdominal pain that began a few nights ago. The pain was initially mild but became severe last night. He describes intermittent pain, with periods of excruciating pain alternating with less intense discomfort. The pain is exacerbated by movement, particularly when standing up. He also reports low-grade fever of 99.1 last night with mild feverish feeling and mild nausea without vomiting. He denies blood in stool or urine.    MEDICAL HISTORY:  He has a history of kidney stones, having experienced two fairly large stones, one in each kidney.    IMAGING:  CT from 2 months ago showed diverticulosis and multiple kidney stones. The left kidney contained multiple stones with the largest measuring 9mm, and the right kidney showed several stones as well. He notes the current episode is unusual compared to his previous kidney stone experiences as he denies hematuria.      ROS:  General: +fever, -chills, -fatigue, -weight gain, -weight loss  Eyes: -vision changes, -redness, -discharge  ENT: -ear pain, -nasal congestion, -sore throat  Cardiovascular: -chest pain, -palpitations, -lower extremity edema  Respiratory: -cough, -shortness of breath  Gastrointestinal: +abdominal pain, +nausea, -vomiting, -diarrhea, -constipation, -blood in stool  Genitourinary: -dysuria, -hematuria, -frequency  Musculoskeletal: -joint pain, -muscle pain  Skin: -rash, -lesion  Neurological: -headache, -dizziness, -numbness, -tingling  Psychiatric: -anxiety, -depression, -sleep difficulty         Physical Exam    General: No acute distress. Well-developed. Well-nourished.  CV: no tachycardia  Resp: Lungs CTAB  Abdomen: Soft. Tenderness diffusely, worst in lower left abdomen. Non-distended. No  rebound.   : No CVA tenderness  Musculoskeletal: No  obvious deformity.  Extremities: No lower extremity edema.  Neurological: Alert & oriented x3. No slurred speech. Normal gait.  Psychiatric: Normal mood. Normal affect. Good insight. Good judgment.  Skin: Warm. Dry. No rash.         Assessment & Plan    R10.32 Left lower quadrant pain  K57.92 Diverticulitis  N20.0 Calculus of kidney    IMPRESSION:  - Considered kidney stone vs. diverticulitis based on patient's history and symptoms  - Reviewed previous CT scan from 2 months ago showing diverticulosis and multiple kidney stones  - Determined diverticulitis more likely based on tenderness to touch  - Will start antibiotics empirically for suspected diverticulitis  - Ordered CT scan to confirm diagnosis  - Postponed scheduled colonoscopy to allow for healing    LEFT LOWER QUADRANT PAIN:  - Ordered a CT scan to evaluate the pain.    DIVERTICULITIS:  - Educated patient about diverticulosis and diverticulitis, explaining the formation of pouches in the colon wall and potential infection when stool gets trapped.  - Prescribed ciprofloxacin 500mg twice daily and metronidazole (Flagyl) 500mg 3 times daily, both for 1 week.  - Postponed scheduled colonoscopy due to current condition.    KIDNEY STONES:  - Patient has a history of kidney stones, with 2 large ones in each kidney.  - A CT scan from 2 months ago showed multiple stones on the left side (largest 9 mm) and a few on the right side.  - Patient experienced mild pain a few nights ago, which became severe last night.  - The pain is not constant and was excruciating last night but less severe during the visit.  - Discussed the possibility of an infected kidney stone being more serious than an uncomplicated stone.    POSSIBLE INFECTION:  - Ordered a urine culture to check for infection.  - Patient had a low-grade fever of 99.1°F last night and felt slightly feverish.    DIFFERENTIAL DIAGNOSIS:  - Considered both kidney  stones and diverticulitis as possibilities, but leaned towards diverticulitis based on the physical exam.  - Examination revealed tenderness to touch, noted as atypical for kidney stones.    URINALYSIS:  - Planned to obtain a urine sample to check for hematuria and infection, which could indicate a more serious condition if a kidney stone is infected.  - Patient reports no hematuria or hematochezia.          Further instructions pending scan results. Getting blood tests for risk stratification. Ensure you complicated renal stone with infection.     No follow-ups on file.    This note was generated with the assistance of ambient listening technology. Verbal consent was obtained by the patient and accompanying visitor(s) for the recording of patient appointment to facilitate this note. I attest to having reviewed and edited the generated note for accuracy, though some syntax or spelling errors may persist. Please contact the author of this note for any clarification.          Addendum:   WBC elevated  Mild Cr elevation  High bili.   ?air bubbles near colon. ?pneumatosis    I would like to admit him for IV abx and observation.     Discussed with hospital ist, okay to admit.   Discussed with patient, agreeable.       I spent a total of 50 minutes on the day of the visit.  This includes face-to-face time and non face-to-face time preparing to see the patient (e.g., review of tests) , obtaining and/or reviewing separately obtain history, documenting clinical information in the electronic or other health record, independently interpreting results and communicating results to the patient/family/caregiver, or care coordinator.

## 2025-02-07 NOTE — TELEPHONE ENCOUNTER
"Pt c/o intermitted Lower Left side ABD pain x 1 day that worsen tonight. Pt states pain is 6/10 at this time. Pt states that he may be constipated and have been passing small amounts of stool. appointment made per protocol.  Pt advised to see provider within 24 hours per protocol and verbalized understanding.   Reason for Disposition   [1] MODERATE pain (e.g., interferes with normal activities) AND [2] pain comes and goes (cramps) AND [3] present > 24 hours  (Exception: Pain with Vomiting or Diarrhea - see that Guideline.)    Additional Information   Negative: Shock suspected (e.g., cold/pale/clammy skin, too weak to stand, low BP, rapid pulse)   Negative: Difficult to awaken or acting confused (e.g., disoriented, slurred speech)   Negative: Passed out (e.g., fainted, lost consciousness, blacked out and was not responding)   Negative: Sounds like a life-threatening emergency to the triager   Negative: [1] SEVERE pain (e.g., excruciating) AND [2] present > 1 hour   Negative: [1] SEVERE pain AND [2] age > 60 years   Negative: [1] Vomiting AND [2] contains red blood or black ("coffee ground") material  (Exception: Few red streaks in vomit that only happened once.)   Negative: Blood in bowel movements  (Exception: Blood on surface of BM with constipation.)   Negative: Black or tarry bowel movements  (Exception: Chronic-unchanged black-grey BMs AND is taking iron pills or Pepto-Bismol.)   Negative: [1] Unable to urinate (or only a few drops) > 4 hours AND [2] bladder feels very full (e.g., palpable bladder or strong urge to urinate)   Negative: [1] Vomiting AND [2] contains bile (green color)   Negative: [1] Pain in the scrotum or testicle AND [2] present > 1 hour   Negative: Patient sounds very sick or weak to the triager   Negative: [1] MILD-MODERATE pain AND [2] constant AND [3] present > 2 hours   Negative: [1] MILD-MODERATE pain AND [2] constant AND [3] age > 60 years   Negative: [1] Vomiting AND [2] abdomen looks " much more swollen than usual   Negative: White of the eyes have turned yellow (i.e., jaundice)   Negative: Fever > 103 F (39.4 C)   Negative: [1] Fever > 101 F (38.3 C) AND [2] age > 60 years   Negative: [1] Fever > 100 F (37.8 C) AND [2] bedridden (e.g., CVA, chronic illness, recovering from surgery)   Negative: [1] Fever > 100 F (37.8 C) AND [2] diabetes mellitus or weak immune system (e.g., HIV positive, cancer chemo, splenectomy, organ transplant, chronic steroids)   Negative: [1] SEVERE pain AND [2] present < 1 hour    Protocols used: Abdominal Pain - Male-A-AH

## 2025-02-08 LAB
ALBUMIN SERPL BCP-MCNC: 3.3 G/DL (ref 3.5–5.2)
ALP SERPL-CCNC: 52 U/L (ref 40–150)
ALT SERPL W/O P-5'-P-CCNC: 10 U/L (ref 10–44)
ANION GAP SERPL CALC-SCNC: 11 MMOL/L (ref 8–16)
AST SERPL-CCNC: 13 U/L (ref 10–40)
BILIRUB SERPL-MCNC: 2.3 MG/DL (ref 0.1–1)
BUN SERPL-MCNC: 17 MG/DL (ref 8–23)
CALCIUM SERPL-MCNC: 8.4 MG/DL (ref 8.7–10.5)
CHLORIDE SERPL-SCNC: 102 MMOL/L (ref 95–110)
CO2 SERPL-SCNC: 21 MMOL/L (ref 23–29)
CREAT SERPL-MCNC: 1.2 MG/DL (ref 0.5–1.4)
ERYTHROCYTE [DISTWIDTH] IN BLOOD BY AUTOMATED COUNT: 12.5 % (ref 11.5–14.5)
EST. GFR  (NO RACE VARIABLE): >60 ML/MIN/1.73 M^2
GLUCOSE SERPL-MCNC: 127 MG/DL (ref 70–110)
GLUCOSE SERPL-MCNC: 132 MG/DL (ref 70–110)
HCT VFR BLD AUTO: 44.4 % (ref 40–54)
HGB BLD-MCNC: 14.7 G/DL (ref 14–18)
MAGNESIUM SERPL-MCNC: 1.8 MG/DL (ref 1.6–2.6)
MCH RBC QN AUTO: 31.5 PG (ref 27–31)
MCHC RBC AUTO-ENTMCNC: 33.1 G/DL (ref 32–36)
MCV RBC AUTO: 95 FL (ref 82–98)
PLATELET # BLD AUTO: 137 K/UL (ref 150–450)
PMV BLD AUTO: 10.8 FL (ref 9.2–12.9)
POCT GLUCOSE: 122 MG/DL (ref 70–110)
POCT GLUCOSE: 127 MG/DL (ref 70–110)
POCT GLUCOSE: 146 MG/DL (ref 70–110)
POCT GLUCOSE: 147 MG/DL (ref 70–110)
POTASSIUM SERPL-SCNC: 4.1 MMOL/L (ref 3.5–5.1)
PROT SERPL-MCNC: 6.7 G/DL (ref 6–8.4)
RBC # BLD AUTO: 4.67 M/UL (ref 4.6–6.2)
SODIUM SERPL-SCNC: 134 MMOL/L (ref 136–145)
WBC # BLD AUTO: 12.97 K/UL (ref 3.9–12.7)

## 2025-02-08 PROCEDURE — 25000003 PHARM REV CODE 250

## 2025-02-08 PROCEDURE — 94761 N-INVAS EAR/PLS OXIMETRY MLT: CPT

## 2025-02-08 PROCEDURE — 36415 COLL VENOUS BLD VENIPUNCTURE: CPT

## 2025-02-08 PROCEDURE — 99900035 HC TECH TIME PER 15 MIN (STAT)

## 2025-02-08 PROCEDURE — 11000001 HC ACUTE MED/SURG PRIVATE ROOM

## 2025-02-08 PROCEDURE — 99223 1ST HOSP IP/OBS HIGH 75: CPT | Mod: ,,, | Performed by: SURGERY

## 2025-02-08 PROCEDURE — 80053 COMPREHEN METABOLIC PANEL: CPT

## 2025-02-08 PROCEDURE — 63600175 PHARM REV CODE 636 W HCPCS

## 2025-02-08 PROCEDURE — 83735 ASSAY OF MAGNESIUM: CPT

## 2025-02-08 PROCEDURE — 85027 COMPLETE CBC AUTOMATED: CPT

## 2025-02-08 RX ORDER — SODIUM CHLORIDE 9 MG/ML
INJECTION, SOLUTION INTRAVENOUS ONCE
Status: COMPLETED | OUTPATIENT
Start: 2025-02-08 | End: 2025-02-08

## 2025-02-08 RX ADMIN — CIPROFLOXACIN 400 MG: 2 INJECTION, SOLUTION INTRAVENOUS at 04:02

## 2025-02-08 RX ADMIN — METRONIDAZOLE 500 MG: 500 INJECTION, SOLUTION INTRAVENOUS at 05:02

## 2025-02-08 RX ADMIN — ATORVASTATIN CALCIUM 80 MG: 40 TABLET, FILM COATED ORAL at 08:02

## 2025-02-08 RX ADMIN — VALSARTAN 320 MG: 80 TABLET, FILM COATED ORAL at 08:02

## 2025-02-08 RX ADMIN — AMLODIPINE BESYLATE 2.5 MG: 2.5 TABLET ORAL at 08:02

## 2025-02-08 RX ADMIN — Medication 800 MG: at 10:02

## 2025-02-08 RX ADMIN — METRONIDAZOLE 500 MG: 500 INJECTION, SOLUTION INTRAVENOUS at 11:02

## 2025-02-08 RX ADMIN — SODIUM CHLORIDE: 9 INJECTION, SOLUTION INTRAVENOUS at 10:02

## 2025-02-08 RX ADMIN — ENOXAPARIN SODIUM 40 MG: 40 INJECTION SUBCUTANEOUS at 04:02

## 2025-02-08 RX ADMIN — PANTOPRAZOLE SODIUM 40 MG: 40 INJECTION, POWDER, LYOPHILIZED, FOR SOLUTION INTRAVENOUS at 08:02

## 2025-02-08 RX ADMIN — METRONIDAZOLE 500 MG: 500 INJECTION, SOLUTION INTRAVENOUS at 08:02

## 2025-02-08 RX ADMIN — METRONIDAZOLE 500 MG: 500 INJECTION, SOLUTION INTRAVENOUS at 12:02

## 2025-02-08 NOTE — CONSULTS
[place]/Surg  General Surgery  Consult Note    Patient Name: Toni Hyde  MRN: [id number]  Code Status: Full Code  Admission Date: 2/7/2025  Hospital Length of Stay: 1 days  Attending Physician: Rhoda Hagan MD  Primary Care Provider: Sal Solares PA-C    Patient information was obtained from patient and ER records.     Inpatient consult to General Surgery  Consult performed by: Mihai Jackson MD  Consult ordered by: Joie Loo NP        Subjective:     Principal Problem: Diverticulitis    History of Present Illness: This is a pleasant 69-year-old gentleman who was admitted to the hospital yesterday directly from his primary care provider.  He presented to the primary care provider secondary to abdominal pain in the suprapubic and left lower quadrant.  An outpatient CT scan was done demonstrating findings consistent with diverticulitis and questionable microperforation.  He was admitted to the hospital in surgery was consulted.  Today patient notes his pain is much improved compared to yesterday.  He does still have some tenderness in the area but notes he is feeling much better.  He has had no fevers or chills.  Denies any nausea or vomiting.  Patient is otherwise relatively healthy with with past medical history significant for diabetes and hypertension He denies any significant abdominal surgical history.    Current Facility-Administered Medications on File Prior to Encounter   Medication    [COMPLETED] iohexoL (OMNIPAQUE 300) 300 mg iodine/mL injection    lidocaine (PF) 10 mg/ml (1%) injection 10 mg     Current Outpatient Medications on File Prior to Encounter   Medication Sig    acetaminophen (TYLENOL) 500 mg Cap     amLODIPine (NORVASC) 2.5 MG tablet Take 1 tablet (2.5 mg total) by mouth once daily.    cetirizine (ZYRTEC) 10 MG tablet Take 10 mg by mouth daily as needed. 1 Tablet(s) Oral PRN .    ciprofloxacin HCl (CIPRO) 500 MG tablet Take 1 tablet (500 mg total) by  mouth 2 (two) times daily.    diphenhydrAMINE (BENADRYL) 25 mg capsule Take 25 mg by mouth nightly as needed for Itching.    fluticasone (FLONASE) 50 mcg/actuation nasal spray 2 sprays by Each Nare route daily as needed.     guaifenesin (MUCINEX ORAL) Take 400 mg by mouth 2 (two) times daily as needed for Congestion.    ibuprofen (ADVIL,MOTRIN) 200 MG tablet Take 200 mg by mouth every 8 (eight) hours as needed for Pain.    metFORMIN (GLUCOPHAGE-XR) 750 MG ER 24hr tablet Take 1 tablet (750 mg total) by mouth 2 (two) times daily with meals.    metroNIDAZOLE (FLAGYL) 500 MG tablet Take 1 tablet (500 mg total) by mouth 3 (three) times daily.    potassium citrate (UROCIT-K 15) 15 mEq TbSR TAKE 1 TABLET TWICE A DAY    rosuvastatin (CRESTOR) 20 MG tablet Take 1 tablet (20 mg total) by mouth once daily.    valsartan (DIOVAN) 320 MG tablet TAKE 1 TABLET DAILY    [DISCONTINUED] triamcinolone acetonide 0.5% (KENALOG) 0.5 % Crea Apply topically 2 (two) times daily.       Review of patient's allergies indicates:   Allergen Reactions    Shrimp Itching and Swelling     Per testing   States no ivp or betadine allergies.        Past Medical History:   Diagnosis Date    Arthritis     Cataract     Cataract of left eye     Diabetes mellitus type II     on po meds    Hyperlipidemia     not on meds at present    Hypertension     Kidney stones 04/2017    Left ureteral stone     Plantar fasciitis of right foot 10/2017    Dr Colorado     Tendonitis 01/2018    right foot Dr Colorado    Wears glasses      Past Surgical History:   Procedure Laterality Date    ADENOIDECTOMY      BIOPSY OF MASS OF LUMBAR SPINE N/A 10/19/2022    Procedure: BIOPSY, MASS, SPINE, LUMBAR;  Surgeon: Marvel Matthews DO;  Location: [facility];  Service: Neurosurgery;  Laterality: N/A;  Right L5-S1 Resection of Extradural Mass Using Transfacet Approach    CATARACT EXTRACTION      COLONOSCOPY  03/23/2010    Dr. Blackburn, hyperplastic polyps, otherwise negative, 10 year recheck     COLONOSCOPY N/A 11/19/2021    Procedure: COLONOSCOPY;  Surgeon: Marcus Sexton MD;  Location: [facility] ENDO;  Service: Endoscopy;  Laterality: N/A;    CYSTOSCOPY Left 04/12/2017    with ureteral stent-Dr. Noel    CYSTOSCOPY Left 05/17/2022    Procedure: CYSTOSCOPY with stent removal;  Surgeon: Ronal Benitez MD;  Location: [facility] OR;  Service: Urology;  Laterality: Left;    CYSTOSCOPY N/A 7/9/2024    Procedure: CYSTOSCOPY;  Surgeon: Ronal Benitez MD;  [facility];  Service: Urology;  Laterality: N/A;    CYSTOSCOPY WITH INSERTION OF MINIMALLY INVASIVE IMPLANT TO ENLARGE PROSTATIC URETHRA N/A 8/22/2024    Procedure: CYSTOSCOPY, WITH INSERTION OF UROLIFT IMPLANT;  Surgeon: Ronal Benitez MD;  Location: [facility] OR;  Service: Urology;  Laterality: N/A;    CYSTOSCOPY WITH URETEROSCOPY, RETROGRADE PYELOGRAPHY, AND INSERTION OF STENT Left 05/05/2022    Procedure: CYSTOSCOPY, WITH RETROGRADE PYELOGRAM AND URETERAL STENT INSERTION;  Surgeon: Ronal Benitez MD;  Location: [facility] OR;  Service: Urology;  Laterality: Left;    EXTRACORPOREAL SHOCK WAVE LITHOTRIPSY Left 4/27/2023    Procedure: LITHOTRIPSY, ESWL;  Surgeon: Ronal Benitez MD;  Location: [facility] OR;  Service: Urology;  Laterality: Left;    EYE SURGERY  05/06/2013    Dr Nielsen    kidney stent   04/2017    KNEE ARTHROSCOPY W/ DEBRIDEMENT      right with lateral release     LITHOTRIPSY  04/19/2017    NASAL SINUS SURGERY      [facility]   05/2013    Dr Peralta    SPINE SURGERY      TONSILLECTOMY      TRANSFORAMINAL EPIDURAL INJECTION OF STEROID Right 12/07/2020    Procedure: Injection,steroid,epidural,transforaminal approach L5- S1;  Surgeon: Kermit Davalos MD;  Location: [facility] OR;  Service: Pain Management;  Laterality: Right;  L5-s1    TRANSFORAMINAL EPIDURAL INJECTION OF STEROID Right 06/23/2022    Procedure: Injection,steroid,epidural,transforaminal approach;  Surgeon: Christos Saul MD;  Location: [facility] OR;  Service: Pain Management;  Laterality:  Right;  L5-S1 and cyst aspiration L5-S1    TRANSRECTAL ULTRASOUND EXAMINATION N/A 2024    Procedure: ULTRASOUND, RECTAL APPROACH;  Surgeon: Ronal Benitez MD;  Location: [facility] OR;  Service: Urology;  Laterality: N/A;    URETEROSCOPIC REMOVAL OF URETERIC CALCULUS Left 2022    Procedure: REMOVAL, CALCULUS, URETER, URETEROSCOPIC;  Surgeon: Ronal Benitez MD;  Location: [facility] OR;  Service: Urology;  Laterality: Left;     Family History       Problem Relation (Age of Onset)    Cancer Paternal Grandfather    Cataracts Mother    Diabetes Maternal Grandmother    Hypertension Father    Lung cancer Paternal Grandfather    Neuropathy Father    No Known Problems Sister, Maternal Aunt    Stroke Paternal Grandmother          Tobacco Use    Smoking status: Former     Current packs/day: 0.00     Types: Cigarettes     Quit date: 1978     Years since quittin.8    Smokeless tobacco: Never    Tobacco comments:     Occ cigar   Substance and Sexual Activity    Alcohol use: Yes     Alcohol/week: 1.0 standard drink of alcohol     Types: 1 Standard drinks or equivalent per week     Comment: 1/week    Drug use: No    Sexual activity: Not Currently     Review of Systems   Constitutional:  Negative for activity change and appetite change.   Gastrointestinal:  Positive for abdominal pain.   Skin:  Negative for color change and pallor.   Hematological:  Negative for adenopathy.     Objective:     Vital Signs (Most Recent):  Temp: 98.3 °F (36.8 °C) (25 0800)  Pulse: 70 (25 0800)  Resp: 18 (25 0800)  BP: 123/71 (25 0800)  SpO2: (!) 94 % (25 08) Vital Signs (24h Range):  Temp:  [97.9 °F (36.6 °C)-99.8 °F (37.7 °C)] 98.3 °F (36.8 °C)  Pulse:  [70-87] 70  Resp:  [16-18] 18  SpO2:  [92 %-94 %] 94 %  BP: (102-127)/(58-74) 123/71     Weight: 87.5 kg (192 lb 14.4 oz)  Body mass index is 27.68 kg/m².     Physical Exam  Vitals reviewed.   Cardiovascular:      Rate and Rhythm: Normal rate.       Pulses: Normal pulses.   Pulmonary:      Effort: Pulmonary effort is normal.   Abdominal:      General: Abdomen is flat. There is no distension.      Tenderness: There is abdominal tenderness.      Hernia: No hernia is present.      Comments: Mild tenderness and guarding in the suprapubic and left lower quadrant   Musculoskeletal:      Cervical back: Normal range of motion.   Skin:     General: Skin is warm.   Neurological:      General: No focal deficit present.      Mental Status: He is alert.   Psychiatric:         Mood and Affect: Mood normal.            I have reviewed all pertinent lab results within the past 24 hours.  CBC:   Recent Labs   Lab 02/08/25  0507   WBC 12.97*   RBC 4.67   HGB 14.7   HCT 44.4   *   MCV 95   MCH 31.5*   MCHC 33.1     CMP:   Recent Labs   Lab 02/08/25  0507   *   CALCIUM 8.4*   ALBUMIN 3.3*   PROT 6.7   *   K 4.1   CO2 21*      BUN 17   CREATININE 1.2   ALKPHOS 52   ALT 10   AST 13   BILITOT 2.3*     Lab Results   Component Value Date    .5 (H) 02/07/2025         Significant Diagnostics:  CT scan reviewed.  Findings consistent with diverticulitis    Assessment/Plan:     * Diverticulitis  Lengthy discussion with the patient.  Does have findings consistent with diverticulitis.  He does appear to be responding with conservative management.  We will continue IV antibiotics for another 24 hours.  Okay to start clear liquid diet.  Assuming pain continues to improve can begin to transition to oral antibiotics and slowly advance diet beginning tomorrow.  Anticipate potential discharge tomorrow afternoon or evening.  On discharge he will need repeat CT scan in 4-6 weeks.  He will also need repeat colonoscopy.  He is scheduled to have a colonoscopy in the next 1-2 weeks.  I would advise him to hold off on colonoscopy until least 6 weeks.  Patient expresses understanding.  We will continue to follow      VTE Risk Mitigation (From admission, onward)            Ordered     enoxaparin injection 40 mg  Daily         02/07/25 1528     IP VTE HIGH RISK PATIENT  Once         02/07/25 1528     Place sequential compression device  Until discontinued         02/07/25 1528                    Thank you for your consult. I will follow-up with patient. Please contact us if you have any additional questions.    Mihai Jackson MD  General Surgery  [facility]/Surg

## 2025-02-08 NOTE — ASSESSMENT & PLAN NOTE
Patient's blood pressure range in the last 24 hours was: BP  Min: 102/64  Max: 127/69.The patient's inpatient anti-hypertensive regimen is listed below:  Current Antihypertensives  amLODIPine tablet 2.5 mg, Daily, Oral  valsartan tablet 320 mg, Daily, Oral    Plan  - BP is controlled, no changes needed to their regimen

## 2025-02-08 NOTE — HPI
This is a pleasant 69-year-old gentleman who was admitted to the hospital yesterday directly from his primary care provider.  He presented to the primary care provider secondary to abdominal pain in the suprapubic and left lower quadrant.  An outpatient CT scan was done demonstrating findings consistent with diverticulitis and questionable microperforation.  He was admitted to the hospital in surgery was consulted.  Today patient notes his pain is much improved compared to yesterday.  He does still have some tenderness in the area but notes he is feeling much better.  He has had no fevers or chills.  Denies any nausea or vomiting.  Patient is otherwise relatively healthy with with past medical history significant for diabetes and hypertension He denies any significant abdominal surgical history.

## 2025-02-08 NOTE — PROGRESS NOTES
"[facility] Medicine  Progress Note    Patient Name: Toni Hyde  MRN: [id number]  Patient Class: IP- Inpatient   Admission Date: 2/7/2025  Length of Stay: 1 days  Attending Physician: Rhoda Hagan MD  Primary Care Provider: Sal Solares PA-C        Subjective     Principal Problem:Diverticulitis        HPI:  Toni Hyde is a 69 year old male with a previous medical history of DMII, HTN, kidney stones, and arthritis who is a direct admit from PCP for diverticulitis. The patient began having left-sided lower abdominal pain that began a few nights ago. The pain was initially mild but became severe last night. He describes intermittent pain, with periods of excruciating pain alternating with less intense discomfort. The pain is exacerbated by movement, particularly when standing up. Dr. Villasenor ordered cbc, cmp and urinalysis. CBC showed a 17K WBC, CMP showed mild hyperglycemia and elevated bilirubin. Urine studies negative for infection. Outpatient CT read as "Acute diverticulitis of a short segment of the proximal sigmoid colon. A few small adjacent free air bubbles without an abscess or free fluid demonstrated."  Dr. Villasenor initally placed patient on outpatient ciprofloxacin and flagyl but after CT resulted he sent the patient for direct admit and general surgery consult  due to possible free air on CT read.  Lactic checked upon admit and normal. Dr. Jackson consulted and added CRP which resulted at 112.5. Patient continued on IV cipro and IV flagyl and admitted by  hospital medicine for further evaluation and management.     Overview/Hospital Course:  No notes on file    Interval History:  Patient was seen and examined.  Overnight notes reviewed.  She reports improvement in left lower quadrant pain.  CLD ordered per General surgery.  Continue Cipro Flagyl.  Leukocytosis improving.  General surgery following.    Review of Systems   Respiratory:  Negative for shortness " of breath.    Cardiovascular:  Negative for chest pain.   Gastrointestinal:  Positive for abdominal pain (LLQ, improving). Negative for nausea and vomiting.     Objective:     Vital Signs (Most Recent):  Temp: 98.2 °F (36.8 °C) (02/08/25 1138)  Pulse: 77 (02/08/25 1138)  Resp: 18 (02/08/25 1138)  BP: 125/73 (02/08/25 1138)  SpO2: (!) 94 % (02/08/25 1138) Vital Signs (24h Range):  Temp:  [97.9 °F (36.6 °C)-99.8 °F (37.7 °C)] 98.2 °F (36.8 °C)  Pulse:  [70-77] 77  Resp:  [16-18] 18  SpO2:  [92 %-94 %] 94 %  BP: (110-125)/(58-74) 125/73     Weight: 87.5 kg (192 lb 14.4 oz)  Body mass index is 27.68 kg/m².    Intake/Output Summary (Last 24 hours) at 2/8/2025 1550  Last data filed at 2/8/2025 0540  Gross per 24 hour   Intake 1400.2 ml   Output 0 ml   Net 1400.2 ml         Physical Exam  Vitals and nursing note reviewed.   Constitutional:       General: He is not in acute distress.     Appearance: Normal appearance. He is normal weight.   HENT:      Head: Normocephalic and atraumatic.      Mouth/Throat:      Mouth: Mucous membranes are moist.      Pharynx: Oropharynx is clear.   Eyes:      Extraocular Movements: Extraocular movements intact.   Cardiovascular:      Rate and Rhythm: Normal rate and regular rhythm.   Pulmonary:      Effort: Pulmonary effort is normal.      Breath sounds: Normal breath sounds.   Abdominal:      General: Abdomen is flat. Bowel sounds are normal.      Palpations: Abdomen is soft.      Tenderness: There is abdominal tenderness in the left lower quadrant. There is no guarding or rebound.   Musculoskeletal:         General: Normal range of motion.   Skin:     General: Skin is warm.   Neurological:      General: No focal deficit present.      Mental Status: He is alert and oriented to person, place, and time.   Psychiatric:         Mood and Affect: Mood normal.         Behavior: Behavior normal.             Significant Labs: All pertinent labs within the past 24 hours have been reviewed.  CBC:  "  Recent Labs   Lab 02/07/25  1125 02/08/25  0507   WBC 17.14* 12.97*   HGB 16.5 14.7   HCT 50.9 44.4    137*     CMP:   Recent Labs   Lab 02/07/25  1125 02/08/25  0507    134*   K 4.2 4.1    102   CO2 26 21*   * 132*   BUN 18 17   CREATININE 1.4 1.2   CALCIUM 9.5 8.4*   PROT 8.1 6.7   ALBUMIN 4.2 3.3*   BILITOT 2.6* 2.3*   ALKPHOS 63 52   AST 19 13   ALT 14 10   ANIONGAP 11 11       Significant Imaging: I have reviewed all pertinent imaging results/findings within the past 24 hours.    Assessment and Plan     * Diverticulitis  -clear liquid diet (no sugar) but made NPO at midnight out of caution  -IV cipro  -IV flagyl  - NS at 100ml hour   -PRN analgesia  -PRN antiemetic  -Dr. Jackson with general surgery consulted for possible "free air" on CT scan. He ordered a CRP which resulted as 112.5  2/8: surgery following; cipro/flagyl; CLD; LLQ pain improving; leukocytosis improving      Primary hypertension  Patient's blood pressure range in the last 24 hours was: BP  Min: 110/68  Max: 125/73.The patient's inpatient anti-hypertensive regimen is listed below:  Current Antihypertensives  amLODIPine tablet 2.5 mg, Daily, Oral  valsartan tablet 320 mg, Daily, Oral    Plan  - BP is controlled, no changes needed to their regimen      Diabetes mellitus, type II  Patient's FSGs are controlled on current medication regimen.  Last A1c reviewed-   Lab Results   Component Value Date    HGBA1C 6.8 (H) 12/30/2024     Most recent fingerstick glucose reviewed-   Recent Labs   Lab 02/07/25  2129 02/08/25  0729 02/08/25  1108   POCTGLUCOSE 126* 127* 122*       Current correctional scale  Low  Increase anti-hyperglycemic dose as follows-   Antihyperglycemics (From admission, onward)      Start     Stop Route Frequency Ordered    02/07/25 1627  insulin aspart U-100 pen 0-5 Units         -- SubQ Before meals & nightly PRN 02/07/25 1528          Hold Oral hypoglycemics while patient is in the hospital.      VTE Risk " Mitigation (From admission, onward)           Ordered     enoxaparin injection 40 mg  Daily         02/07/25 1528     IP VTE HIGH RISK PATIENT  Once         02/07/25 1528     Place sequential compression device  Until discontinued         02/07/25 1528                    Discharge Planning   ARACELIS:      Code Status: Full Code   Medical Readiness for Discharge Date:   Discharge Plan A: Home                        Nancie Wheatley PA-C  Department of Hospital Medicine   [facility]/Surg

## 2025-02-08 NOTE — HPI
"Toni Hyde is a 69 year old male with a previous medical history of DMII, HTN, kidney stones, and arthritis who is a direct admit from PCP for diverticulitis. The patient began having left-sided lower abdominal pain that began a few nights ago. The pain was initially mild but became severe last night. He describes intermittent pain, with periods of excruciating pain alternating with less intense discomfort. The pain is exacerbated by movement, particularly when standing up. Dr. Villasenor ordered cbc, cmp and urinalysis. CBC showed a 17K WBC, CMP showed mild hyperglycemia and elevated bilirubin. Urine studies negative for infection. Outpatient CT read as "Acute diverticulitis of a short segment of the proximal sigmoid colon. A few small adjacent free air bubbles without an abscess or free fluid demonstrated."  Dr. Villasenor initally placed patient on outpatient ciprofloxacin and flagyl but after CT resulted he sent the patient for direct admit and general surgery consult  due to possible free air on CT read.  Lactic checked upon admit and normal. Dr. Jackson consulted and added CRP which resulted at 112.5. Patient continued on IV cipro and IV flagyl and admitted by  hospital medicine for further evaluation and management.   "

## 2025-02-08 NOTE — PLAN OF CARE
POC reviewed with pt. Pt verbalized understanding.    Pain mgmt is controlled with PRN medication as ordered.  Pt tolerates IV ABX as ordered with no adverse reactions noted.   Pt ambulates independently in room and to bathroom.   Pt continues on NPO status, accuchecks, Lovenox, NS@100mL/hr as ordered with no adverse reactions noted at this time.        All fall precautions maintained. Frequent checks performed per protocol. Bed in lowest position, call light within reach, slip resistant socks maintained. Bed alarm on/functioning. POC ongoing.         Problem: Adult Inpatient Plan of Care  Goal: Plan of Care Review  Outcome: Progressing  Goal: Patient-Specific Goal (Individualized)  Outcome: Progressing  Goal: Absence of Hospital-Acquired Illness or Injury  Outcome: Progressing  Goal: Optimal Comfort and Wellbeing  Outcome: Progressing  Goal: Readiness for Transition of Care  Outcome: Progressing     Problem: Diabetes Comorbidity  Goal: Blood Glucose Level Within Targeted Range  Outcome: Progressing

## 2025-02-08 NOTE — ASSESSMENT & PLAN NOTE
"-clear liquid diet (no sugar) but made NPO at midnight out of caution  -IV cipro  -IV flagyl  - NS at 100ml hour   -PRN analgesia  -PRN antiemetic  -Dr. Jackson with general surgery consulted for possible "free air" on CT scan. He ordered a CRP which resulted as 112.5  2/8: surgery following; cipro/flagyl; CLD; LLQ pain improving; leukocytosis improving    "

## 2025-02-08 NOTE — ASSESSMENT & PLAN NOTE
Patient's FSGs are controlled on current medication regimen.  Last A1c reviewed-   Lab Results   Component Value Date    HGBA1C 6.8 (H) 12/30/2024     Most recent fingerstick glucose reviewed-   Recent Labs   Lab 02/07/25  2129   POCTGLUCOSE 126*     Current correctional scale  Low  Increase anti-hyperglycemic dose as follows-   Antihyperglycemics (From admission, onward)      Start     Stop Route Frequency Ordered    02/07/25 1627  insulin aspart U-100 pen 0-5 Units         -- SubQ Before meals & nightly PRN 02/07/25 1528          Hold Oral hypoglycemics while patient is in the hospital.

## 2025-02-08 NOTE — ASSESSMENT & PLAN NOTE
Patient's FSGs are controlled on current medication regimen.  Last A1c reviewed-   Lab Results   Component Value Date    HGBA1C 6.8 (H) 12/30/2024     Most recent fingerstick glucose reviewed-   Recent Labs   Lab 02/07/25 2129 02/08/25  0729 02/08/25  1108   POCTGLUCOSE 126* 127* 122*       Current correctional scale  Low  Increase anti-hyperglycemic dose as follows-   Antihyperglycemics (From admission, onward)    Start     Stop Route Frequency Ordered    02/07/25 1627  insulin aspart U-100 pen 0-5 Units         -- SubQ Before meals & nightly PRN 02/07/25 1528        Hold Oral hypoglycemics while patient is in the hospital.

## 2025-02-08 NOTE — H&P
"  [facility]  History & Physical    Patient Name: Toni Hyde  MRN: [id number]  Patient Class: IP- Inpatient  Admission Date: 2/7/2025  Attending Physician: Rhoda Hagan MD   Primary Care Provider: Sal Solares PA-C         Patient information was obtained from patient, past medical records, and ER records.     Subjective:     Principal Problem:Diverticulitis    Chief Complaint: No chief complaint on file.       HPI: Toni Hyde is a 69 year old male with a previous medical history of DMII, HTN, kidney stones, and arthritis who is a direct admit from PCP for diverticulitis. The patient began having left-sided lower abdominal pain that began a few nights ago. The pain was initially mild but became severe last night. He describes intermittent pain, with periods of excruciating pain alternating with less intense discomfort. The pain is exacerbated by movement, particularly when standing up. Dr. Villasenor ordered cbc, cmp and urinalysis. CBC showed a 17K WBC, CMP showed mild hyperglycemia and elevated bilirubin. Urine studies negative for infection. Outpatient CT read as "Acute diverticulitis of a short segment of the proximal sigmoid colon. A few small adjacent free air bubbles without an abscess or free fluid demonstrated."  Dr. Villasenor initally placed patient on outpatient ciprofloxacin and flagyl but after CT resulted he sent the patient for direct admit and general surgery consult  due to possible free air on CT read.  Lactic checked upon admit and normal. Dr. Jackson consulted and added CRP which resulted at 112.5. Patient continued on IV cipro and IV flagyl and admitted by  hospital medicine for further evaluation and management.     Past Medical History:   Diagnosis Date    Arthritis     Cataract     Cataract of left eye     Diabetes mellitus type II     on po meds    Hyperlipidemia     not on meds at present    Hypertension     Kidney stones 04/2017    Left " ureteral stone     Plantar fasciitis of right foot 10/2017    Dr [place]     Tendonitis 01/2018    right foot Dr Colorado    Wears glasses        Past Surgical History:   Procedure Laterality Date    ADENOIDECTOMY      BIOPSY OF MASS OF LUMBAR SPINE N/A 10/19/2022    Procedure: BIOPSY, MASS, SPINE, LUMBAR;  Surgeon: Marvel Matthews DO;  Location: [facility];  Service: Neurosurgery;  Laterality: N/A;  Right L5-S1 Resection of Extradural Mass Using Transfacet Approach    CATARACT EXTRACTION      COLONOSCOPY  03/23/2010    Dr. Blackburn, hyperplastic polyps, otherwise negative, 10 year recheck    COLONOSCOPY N/A 11/19/2021    Procedure: COLONOSCOPY;  Surgeon: Marcus Sexton MD;  Location: [facility];  Service: Endoscopy;  Laterality: N/A;    CYSTOSCOPY Left 04/12/2017    with ureteral stent-Dr. Noel    CYSTOSCOPY Left 05/17/2022    Procedure: CYSTOSCOPY with stent removal;  Surgeon: Ronal Benitez MD;  Location[facility] OR;  Service: Urology;  Laterality: Left;    CYSTOSCOPY N/A 7/9/2024    Procedure: CYSTOSCOPY;  Surgeon: Ronal Benitez MD;  [facility] OR;  Service: Urology;  Laterality: N/A;    CYSTOSCOPY WITH INSERTION OF MINIMALLY INVASIVE IMPLANT TO ENLARGE PROSTATIC URETHRA N/A 8/22/2024    Procedure: CYSTOSCOPY, WITH INSERTION OF UROLIFT IMPLANT;  Surgeon: Ronal Benitez MD;  Location: [facility] OR;  Service: Urology;  Laterality: N/A;    CYSTOSCOPY WITH URETEROSCOPY, RETROGRADE PYELOGRAPHY, AND INSERTION OF STENT Left 05/05/2022    Procedure: CYSTOSCOPY, WITH RETROGRADE PYELOGRAM AND URETERAL STENT INSERTION;  Surgeon: Ronal Benitez MD;  Location: [facility] OR;  Service: Urology;  Laterality: Left;    EXTRACORPOREAL SHOCK WAVE LITHOTRIPSY Left 4/27/2023    Procedure: LITHOTRIPSY, ESWL;  Surgeon: Ronal Benitez MD;  Location: [facility] OR;  Service: Urology;  Laterality: Left;    EYE SURGERY  05/06/2013    Dr Nielsen    kidney stent   04/2017    KNEE ARTHROSCOPY W/ DEBRIDEMENT      right with lateral  release     LITHOTRIPSY  04/19/2017    NASAL SINUS SURGERY      [place] surgery   05/2013    Dr Peralta    SPINE SURGERY      TONSILLECTOMY      TRANSFORAMINAL EPIDURAL INJECTION OF STEROID Right 12/07/2020    Procedure: Injection,steroid,epidural,transforaminal approach L5- S1;  Surgeon: Kermit Davalos MD;  Location: [facility] OR;  Service: Pain Management;  Laterality: Right;  L5-s1    TRANSFORAMINAL EPIDURAL INJECTION OF STEROID Right 06/23/2022    Procedure: Injection,steroid,epidural,transforaminal approach;  Surgeon: Christos Saul MD;  Location: [facility] OR;  Service: Pain Management;  Laterality: Right;  L5-S1 and cyst aspiration L5-S1    TRANSRECTAL ULTRASOUND EXAMINATION N/A 7/9/2024    Procedure: ULTRASOUND, RECTAL APPROACH;  Surgeon: Ronal Benitez MD;  Location: [facility] OR;  Service: Urology;  Laterality: N/A;    URETEROSCOPIC REMOVAL OF URETERIC CALCULUS Left 05/05/2022    Procedure: REMOVAL, CALCULUS, URETER, URETEROSCOPIC;  Surgeon: Ronal Benitez MD;  Location: [facility] OR;  Service: Urology;  Laterality: Left;       Review of patient's allergies indicates:   Allergen Reactions    Shrimp Itching and Swelling     Per testing   States no ivp or betadine allergies.        Current Facility-Administered Medications on File Prior to Encounter   Medication    [COMPLETED] iohexoL (OMNIPAQUE 300) 300 mg iodine/mL injection    lidocaine (PF) 10 mg/ml (1%) injection 10 mg     Current Outpatient Medications on File Prior to Encounter   Medication Sig    acetaminophen (TYLENOL) 500 mg Cap     amLODIPine (NORVASC) 2.5 MG tablet Take 1 tablet (2.5 mg total) by mouth once daily.    cetirizine (ZYRTEC) 10 MG tablet Take 10 mg by mouth daily as needed. 1 Tablet(s) Oral PRN .    ciprofloxacin HCl (CIPRO) 500 MG tablet Take 1 tablet (500 mg total) by mouth 2 (two) times daily.    diphenhydrAMINE (BENADRYL) 25 mg capsule Take 25 mg by mouth nightly as needed for Itching.    fluticasone (FLONASE) 50 mcg/actuation nasal spray 2  sprays by Each Nare route daily as needed.     guaifenesin (MUCINEX ORAL) Take 400 mg by mouth 2 (two) times daily as needed for Congestion.    ibuprofen (ADVIL,MOTRIN) 200 MG tablet Take 200 mg by mouth every 8 (eight) hours as needed for Pain.    metFORMIN (GLUCOPHAGE-XR) 750 MG ER 24hr tablet Take 1 tablet (750 mg total) by mouth 2 (two) times daily with meals.    metroNIDAZOLE (FLAGYL) 500 MG tablet Take 1 tablet (500 mg total) by mouth 3 (three) times daily.    potassium citrate (UROCIT-K 15) 15 mEq TbSR TAKE 1 TABLET TWICE A DAY    rosuvastatin (CRESTOR) 20 MG tablet Take 1 tablet (20 mg total) by mouth once daily.    valsartan (DIOVAN) 320 MG tablet TAKE 1 TABLET DAILY    [DISCONTINUED] triamcinolone acetonide 0.5% (KENALOG) 0.5 % Crea Apply topically 2 (two) times daily.     Family History       Problem Relation (Age of Onset)    Cancer Paternal Grandfather    Cataracts Mother    Diabetes Maternal Grandmother    Hypertension Father    Lung cancer Paternal Grandfather    Neuropathy Father    No Known Problems Sister, Maternal Aunt    Stroke Paternal Grandmother          Tobacco Use    Smoking status: Former     Current packs/day: 0.00     Types: Cigarettes     Quit date: 1978     Years since quittin.8    Smokeless tobacco: Never    Tobacco comments:     Occ cigar   Substance and Sexual Activity    Alcohol use: Yes     Alcohol/week: 1.0 standard drink of alcohol     Types: 1 Standard drinks or equivalent per week     Comment: 1/week    Drug use: No    Sexual activity: Not Currently     Review of Systems   Constitutional:  Positive for chills.   Gastrointestinal:  Positive for abdominal pain and nausea.   All other systems reviewed and are negative.    Objective:     Vital Signs (Most Recent):  Temp: 99.6 °F (37.6 °C) (25)  Pulse: 72 (25)  Resp: 18 (25)  BP: 116/74 (25)  SpO2: (!) 93 % (25) Vital Signs (24h Range):  Temp:  [97.9 °F (36.6 °C)-99.6  °F (37.6 °C)] 99.6 °F (37.6 °C)  Pulse:  [72-87] 72  Resp:  [16-18] 18  SpO2:  [92 %-94 %] 93 %  BP: (102-127)/(58-74) 116/74     Weight: 87.5 kg (192 lb 14.4 oz)  Body mass index is 27.68 kg/m².     Physical Exam  Vitals reviewed.   Constitutional:       Appearance: Normal appearance.   HENT:      Head: Normocephalic and atraumatic.      Nose: Nose normal.      Mouth/Throat:      Pharynx: Oropharynx is clear.   Eyes:      Conjunctiva/sclera: Conjunctivae normal.   Cardiovascular:      Rate and Rhythm: Normal rate and regular rhythm.      Pulses: Normal pulses.      Heart sounds: Normal heart sounds.   Pulmonary:      Effort: Pulmonary effort is normal.      Breath sounds: Normal breath sounds.   Abdominal:      General: Bowel sounds are normal.      Palpations: Abdomen is soft.      Tenderness: There is generalized abdominal tenderness.   Musculoskeletal:         General: Normal range of motion.      Cervical back: Normal range of motion.      Right lower leg: No edema.      Left lower leg: No edema.   Skin:     General: Skin is warm and dry.      Capillary Refill: Capillary refill takes less than 2 seconds.   Neurological:      General: No focal deficit present.      Mental Status: He is alert and oriented to person, place, and time. Mental status is at baseline.   Psychiatric:         Mood and Affect: Mood normal.         Behavior: Behavior normal.         Thought Content: Thought content normal.         Judgment: Judgment normal.                Significant Labs: All pertinent labs within the past 24 hours have been reviewed.  A1C:   Recent Labs   Lab 12/30/24  0817   HGBA1C 6.8*       Bilirubin:   Recent Labs   Lab 02/07/25  1125   BILITOT 2.6*       BMP:   Recent Labs   Lab 02/07/25  1125 02/07/25  1552   *  --      --    K 4.2  --      --    CO2 26  --    BUN 18  --    CREATININE 1.4  --    CALCIUM 9.5  --    MG  --  1.8     CBC:   Recent Labs   Lab 02/07/25  1125   WBC 17.14*   HGB 16.5    HCT 50.9        CMP:   Recent Labs   Lab 02/07/25  1125      K 4.2      CO2 26   *   BUN 18   CREATININE 1.4   CALCIUM 9.5   PROT 8.1   ALBUMIN 4.2   BILITOT 2.6*   ALKPHOS 63   AST 19   ALT 14   ANIONGAP 11       Lactic Acid:   Recent Labs   Lab 02/07/25  1552   LACTATE 1.1       Magnesium:   Recent Labs   Lab 02/07/25  1552   MG 1.8     POCT Glucose:   Recent Labs   Lab 02/07/25  2129   POCTGLUCOSE 126*       Urine Studies:   Recent Labs   Lab 02/07/25  1138   COLORU Yellow   APPEARANCEUA Clear   PHUR 6.0   SPECGRAV 1.020   PROTEINUA 2+*   GLUCUA Negative   KETONESU 1+*   BILIRUBINUA Negative   OCCULTUA 1+*   NITRITE Negative   UROBILINOGEN 2.0-3.0*   LEUKOCYTESUR Negative   RBCUA 0   WBCUA 1   BACTERIA Rare   SQUAMEPITHEL 0   HYALINECASTS 0       Significant Imaging: I have reviewed all pertinent imaging results/findings within the past 24 hours.  EXAMINATION:  CT ABDOMEN PELVIS WITHOUT CONTRAST     CLINICAL HISTORY:  LLQ abdominal pain;Evaluate for renal stone vs diverticulitis; Left lower quadrant pain     TECHNIQUE:  Low dose axial images, sagittal and coronal reformations were obtained from the lung bases to the pubic symphysis.  30 mL of oral Omnipaque 350 was administered.     COMPARISON:  CT abdomen pelvis of December 30, 2024., Renal ultrasound of December 16, 2024     FINDINGS:  There is strandy atelectasis at both lung bases.  The liver is of normal size contour and CT density without focal mass.  The gallbladder is of normal size without CT evidence of stone.  The pancreas is of normal contour and CT density without edema or mass.  The spleen is of normal size and CT density.     The adrenal glands are not enlarged.  On the right there is a 3 mm intrarenal stone and a 2 mm intrarenal stone without hydronephrosis.  The ureter follows a normal course down to the bladder without dilation or distal stone.  There are 4 cyst of the right kidney measuring 2.4 cm, 2.2 cm, 1.2 cm and  1.3 cm.  On the left there is a 4 mm, a 2 mm, a 3 mm and a  3 mm and  a 4 mm and a 9 mm intrarenal stone without hydronephrosis or ureteral stone demonstrated.  There is a 2.4 cm mass at the upper pole of the left kidney which may represent a bloody cyst or solid lesion.  There is a a 3.9 cm cyst at the midpole.  There is a 2.5 cm mass at the lower pole of the left kidney measuring 1.9 cm.  This may represent a bloody cyst as well.  Hydronephrosis or ureteral stone is not seen on the left.  The abdominal aorta is of normal caliber.     The stomach is of normal configuration.  Small bowel dilatation or air-fluid levels are not seen.  There is diverticulosis in the sigmoid colon and in the proximal sigmoid colon there is a segment of wall thickening and edema and Sydney cholecystic inflammatory changes consistent with acute diverticulitis.  There are few adjacent free air bubbles noted posteriorly.  Large amounts of free air or free fluid are not seen.  An abscess is not noted.     The bladder is of normal contour without mass or asymmetry.  The prostate measures 4.8 cm transversely.  There are metal devices within the prostate consistent with a prior Uro lift procedure.     Impression:     Acute diverticulitis of a short segment of the proximal sigmoid colon.  A few small adjacent free air bubbles without an abscess or free fluid demonstrated.  No obstruction is demonstrated.     Two right intrarenal stones and 6 left intrarenal stones without hydronephrosis or ureteral stone seen.  There are bilateral renal cysts.  Two of the cyst on the left are not water density.  These may represent bloody cysts as recent ultrasound did not show a solid mass.  Prior Uro lift procedure.     This report was flagged in Epic as abnormal.        Electronically signed by:Ramos Houston MD  Date:                                            02/07/2025  Time:                                           13:17  Assessment/Plan:     *  "Diverticulitis  -clear liquid diet (no sugar) but made NPO at midnight out of caution  -IV cipro  -IV flagyl  - NS at 100ml hour   -PRN analgesia  -PRN antiemetic  -Dr. Jackson with general surgery consulted for possible "free air" on CT scan. He ordered a CRP which resulted as 112.5      Primary hypertension  Patient's blood pressure range in the last 24 hours was: BP  Min: 102/64  Max: 127/69.The patient's inpatient anti-hypertensive regimen is listed below:  Current Antihypertensives  amLODIPine tablet 2.5 mg, Daily, Oral  valsartan tablet 320 mg, Daily, Oral    Plan  - BP is controlled, no changes needed to their regimen      Diabetes mellitus, type II  Patient's FSGs are controlled on current medication regimen.  Last A1c reviewed-   Lab Results   Component Value Date    HGBA1C 6.8 (H) 12/30/2024     Most recent fingerstick glucose reviewed-   Recent Labs   Lab 02/07/25  2129   POCTGLUCOSE 126*     Current correctional scale  Low  Increase anti-hyperglycemic dose as follows-   Antihyperglycemics (From admission, onward)      Start     Stop Route Frequency Ordered    02/07/25 1627  insulin aspart U-100 pen 0-5 Units         -- SubQ Before meals & nightly PRN 02/07/25 1528          Hold Oral hypoglycemics while patient is in the hospital.      VTE Risk Mitigation (From admission, onward)           Ordered     enoxaparin injection 40 mg  Daily         02/07/25 1528     IP VTE HIGH RISK PATIENT  Once         02/07/25 1528     Place sequential compression device  Until discontinued         02/07/25 1528                                    Joie Loo NP  Department of Hospital Medicine  [facility]/Surg          "

## 2025-02-08 NOTE — SUBJECTIVE & OBJECTIVE
Interval History:  Patient was seen and examined.  Overnight notes reviewed.  She reports improvement in left lower quadrant pain.  CLD ordered per General surgery.  Continue Cipro Flagyl.  Leukocytosis improving.  General surgery following.    Review of Systems   Respiratory:  Negative for shortness of breath.    Cardiovascular:  Negative for chest pain.   Gastrointestinal:  Positive for abdominal pain (LLQ, improving). Negative for nausea and vomiting.     Objective:     Vital Signs (Most Recent):  Temp: 98.2 °F (36.8 °C) (02/08/25 1138)  Pulse: 77 (02/08/25 1138)  Resp: 18 (02/08/25 1138)  BP: 125/73 (02/08/25 1138)  SpO2: (!) 94 % (02/08/25 1138) Vital Signs (24h Range):  Temp:  [97.9 °F (36.6 °C)-99.8 °F (37.7 °C)] 98.2 °F (36.8 °C)  Pulse:  [70-77] 77  Resp:  [16-18] 18  SpO2:  [92 %-94 %] 94 %  BP: (110-125)/(58-74) 125/73     Weight: 87.5 kg (192 lb 14.4 oz)  Body mass index is 27.68 kg/m².    Intake/Output Summary (Last 24 hours) at 2/8/2025 1550  Last data filed at 2/8/2025 0540  Gross per 24 hour   Intake 1400.2 ml   Output 0 ml   Net 1400.2 ml         Physical Exam  Vitals and nursing note reviewed.   Constitutional:       General: He is not in acute distress.     Appearance: Normal appearance. He is normal weight.   HENT:      Head: Normocephalic and atraumatic.      Mouth/Throat:      Mouth: Mucous membranes are moist.      Pharynx: Oropharynx is clear.   Eyes:      Extraocular Movements: Extraocular movements intact.   Cardiovascular:      Rate and Rhythm: Normal rate and regular rhythm.   Pulmonary:      Effort: Pulmonary effort is normal.      Breath sounds: Normal breath sounds.   Abdominal:      General: Abdomen is flat. Bowel sounds are normal.      Palpations: Abdomen is soft.      Tenderness: There is abdominal tenderness in the left lower quadrant. There is no guarding or rebound.   Musculoskeletal:         General: Normal range of motion.   Skin:     General: Skin is warm.   Neurological:       General: No focal deficit present.      Mental Status: He is alert and oriented to person, place, and time.   Psychiatric:         Mood and Affect: Mood normal.         Behavior: Behavior normal.             Significant Labs: All pertinent labs within the past 24 hours have been reviewed.  CBC:   Recent Labs   Lab 02/07/25  1125 02/08/25  0507   WBC 17.14* 12.97*   HGB 16.5 14.7   HCT 50.9 44.4    137*     CMP:   Recent Labs   Lab 02/07/25  1125 02/08/25  0507    134*   K 4.2 4.1    102   CO2 26 21*   * 132*   BUN 18 17   CREATININE 1.4 1.2   CALCIUM 9.5 8.4*   PROT 8.1 6.7   ALBUMIN 4.2 3.3*   BILITOT 2.6* 2.3*   ALKPHOS 63 52   AST 19 13   ALT 14 10   ANIONGAP 11 11       Significant Imaging: I have reviewed all pertinent imaging results/findings within the past 24 hours.

## 2025-02-08 NOTE — SUBJECTIVE & OBJECTIVE
Current Facility-Administered Medications on File Prior to Encounter   Medication    [COMPLETED] iohexoL (OMNIPAQUE 300) 300 mg iodine/mL injection    lidocaine (PF) 10 mg/ml (1%) injection 10 mg     Current Outpatient Medications on File Prior to Encounter   Medication Sig    acetaminophen (TYLENOL) 500 mg Cap     amLODIPine (NORVASC) 2.5 MG tablet Take 1 tablet (2.5 mg total) by mouth once daily.    cetirizine (ZYRTEC) 10 MG tablet Take 10 mg by mouth daily as needed. 1 Tablet(s) Oral PRN .    ciprofloxacin HCl (CIPRO) 500 MG tablet Take 1 tablet (500 mg total) by mouth 2 (two) times daily.    diphenhydrAMINE (BENADRYL) 25 mg capsule Take 25 mg by mouth nightly as needed for Itching.    fluticasone (FLONASE) 50 mcg/actuation nasal spray 2 sprays by Each Nare route daily as needed.     guaifenesin (MUCINEX ORAL) Take 400 mg by mouth 2 (two) times daily as needed for Congestion.    ibuprofen (ADVIL,MOTRIN) 200 MG tablet Take 200 mg by mouth every 8 (eight) hours as needed for Pain.    metFORMIN (GLUCOPHAGE-XR) 750 MG ER 24hr tablet Take 1 tablet (750 mg total) by mouth 2 (two) times daily with meals.    metroNIDAZOLE (FLAGYL) 500 MG tablet Take 1 tablet (500 mg total) by mouth 3 (three) times daily.    potassium citrate (UROCIT-K 15) 15 mEq TbSR TAKE 1 TABLET TWICE A DAY    rosuvastatin (CRESTOR) 20 MG tablet Take 1 tablet (20 mg total) by mouth once daily.    valsartan (DIOVAN) 320 MG tablet TAKE 1 TABLET DAILY    [DISCONTINUED] triamcinolone acetonide 0.5% (KENALOG) 0.5 % Crea Apply topically 2 (two) times daily.       Review of patient's allergies indicates:   Allergen Reactions    Shrimp Itching and Swelling     Per testing   States no ivp or betadine allergies.        Past Medical History:   Diagnosis Date    Arthritis     Cataract     Cataract of left eye     Diabetes mellitus type II     on po meds    Hyperlipidemia     not on meds at present    Hypertension     Kidney stones 04/2017    Left ureteral stone      Plantar fasciitis of right foot 10/2017    Dr Colorado     Tendonitis 01/2018    right foot Dr Colorado    Wears glasses      Past Surgical History:   Procedure Laterality Date    ADENOIDECTOMY      BIOPSY OF MASS OF LUMBAR SPINE N/A 10/19/2022    Procedure: BIOPSY, MASS, SPINE, LUMBAR;  Surgeon: Marvel Matthews DO;  Location: [facility] OR;  Service: Neurosurgery;  Laterality: N/A;  Right L5-S1 Resection of Extradural Mass Using Transfacet Approach    CATARACT EXTRACTION      COLONOSCOPY  03/23/2010    Dr. Blackburn, hyperplastic polyps, otherwise negative, 10 year recheck    COLONOSCOPY N/A 11/19/2021    Procedure: COLONOSCOPY;  Surgeon: Marcus Sexton MD;  Location: [facility];  Service: Endoscopy;  Laterality: N/A;    CYSTOSCOPY Left 04/12/2017    with ureteral stent-Dr. Noel    CYSTOSCOPY Left 05/17/2022    Procedure: CYSTOSCOPY with stent removal;  Surgeon: Ronal Benitez MD;  Location: [facility] OR;  Service: Urology;  Laterality: Left;    CYSTOSCOPY N/A 7/9/2024    Procedure: CYSTOSCOPY;  Surgeon: Ronal Benitez MD;  [facility];  Service: Urology;  Laterality: N/A;    CYSTOSCOPY WITH INSERTION OF MINIMALLY INVASIVE IMPLANT TO ENLARGE PROSTATIC URETHRA N/A 8/22/2024    Procedure: CYSTOSCOPY, WITH INSERTION OF UROLIFT IMPLANT;  Surgeon: Ronal Benitez MD;  Location: [facility] OR;  Service: Urology;  Laterality: N/A;    CYSTOSCOPY WITH URETEROSCOPY, RETROGRADE PYELOGRAPHY, AND INSERTION OF STENT Left 05/05/2022    Procedure: CYSTOSCOPY, WITH RETROGRADE PYELOGRAM AND URETERAL STENT INSERTION;  Surgeon: Ronal Benitez MD;  Location: [facility] OR;  Service: Urology;  Laterality: Left;    EXTRACORPOREAL SHOCK WAVE LITHOTRIPSY Left 4/27/2023    Procedure: LITHOTRIPSY, ESWL;  Surgeon: Ronal Benitez MD;  Location: [facility] OR;  Service: Urology;  Laterality: Left;    EYE SURGERY  05/06/2013    Dr Nielsen    kidney stent   04/2017    KNEE ARTHROSCOPY W/ DEBRIDEMENT      right with lateral release     LITHOTRIPSY   2017    NASAL SINUS SURGERY      retna surgery   2013    Dr Peralta    SPINE SURGERY      TONSILLECTOMY      TRANSFORAMINAL EPIDURAL INJECTION OF STEROID Right 2020    Procedure: Injection,steroid,epidural,transforaminal approach L5- S1;  Surgeon: Kermit Davalos MD;  Location: [facility] OR;  Service: Pain Management;  Laterality: Right;  L5-s1    TRANSFORAMINAL EPIDURAL INJECTION OF STEROID Right 2022    Procedure: Injection,steroid,epidural,transforaminal approach;  Surgeon: Christos Saul MD;  Location: [facility] OR;  Service: Pain Management;  Laterality: Right;  L5-S1 and cyst aspiration L5-S1    TRANSRECTAL ULTRASOUND EXAMINATION N/A 2024    Procedure: ULTRASOUND, RECTAL APPROACH;  Surgeon: Ronal Benitez MD;  Location: [facility] OR;  Service: Urology;  Laterality: N/A;    URETEROSCOPIC REMOVAL OF URETERIC CALCULUS Left 2022    Procedure: REMOVAL, CALCULUS, URETER, URETEROSCOPIC;  Surgeon: Ronal Benitez MD;  Location: [facility] OR;  Service: Urology;  Laterality: Left;     Family History       Problem Relation (Age of Onset)    Cancer Paternal Grandfather    Cataracts Mother    Diabetes Maternal Grandmother    Hypertension Father    Lung cancer Paternal Grandfather    Neuropathy Father    No Known Problems Sister, Maternal Aunt    Stroke Paternal Grandmother          Tobacco Use    Smoking status: Former     Current packs/day: 0.00     Types: Cigarettes     Quit date: 1978     Years since quittin.8    Smokeless tobacco: Never    Tobacco comments:     Occ cigar   Substance and Sexual Activity    Alcohol use: Yes     Alcohol/week: 1.0 standard drink of alcohol     Types: 1 Standard drinks or equivalent per week     Comment: 1/week    Drug use: No    Sexual activity: Not Currently     Review of Systems   Constitutional:  Negative for activity change and appetite change.   Gastrointestinal:  Positive for abdominal pain.   Skin:  Negative for color change and pallor.   Hematological:   Negative for adenopathy.     Objective:     Vital Signs (Most Recent):  Temp: 98.3 °F (36.8 °C) (02/08/25 0800)  Pulse: 70 (02/08/25 0800)  Resp: 18 (02/08/25 0800)  BP: 123/71 (02/08/25 0800)  SpO2: (!) 94 % (02/08/25 0800) Vital Signs (24h Range):  Temp:  [97.9 °F (36.6 °C)-99.8 °F (37.7 °C)] 98.3 °F (36.8 °C)  Pulse:  [70-87] 70  Resp:  [16-18] 18  SpO2:  [92 %-94 %] 94 %  BP: (102-127)/(58-74) 123/71     Weight: 87.5 kg (192 lb 14.4 oz)  Body mass index is 27.68 kg/m².     Physical Exam  Vitals reviewed.   Cardiovascular:      Rate and Rhythm: Normal rate.      Pulses: Normal pulses.   Pulmonary:      Effort: Pulmonary effort is normal.   Abdominal:      General: Abdomen is flat. There is no distension.      Tenderness: There is abdominal tenderness.      Hernia: No hernia is present.      Comments: Mild tenderness and guarding in the suprapubic and left lower quadrant   Musculoskeletal:      Cervical back: Normal range of motion.   Skin:     General: Skin is warm.   Neurological:      General: No focal deficit present.      Mental Status: He is alert.   Psychiatric:         Mood and Affect: Mood normal.            I have reviewed all pertinent lab results within the past 24 hours.  CBC:   Recent Labs   Lab 02/08/25  0507   WBC 12.97*   RBC 4.67   HGB 14.7   HCT 44.4   *   MCV 95   MCH 31.5*   MCHC 33.1     CMP:   Recent Labs   Lab 02/08/25  0507   *   CALCIUM 8.4*   ALBUMIN 3.3*   PROT 6.7   *   K 4.1   CO2 21*      BUN 17   CREATININE 1.2   ALKPHOS 52   ALT 10   AST 13   BILITOT 2.3*     Lab Results   Component Value Date    .5 (H) 02/07/2025         Significant Diagnostics:  CT scan reviewed.  Findings consistent with diverticulitis

## 2025-02-08 NOTE — ASSESSMENT & PLAN NOTE
Lengthy discussion with the patient.  Does have findings consistent with diverticulitis.  He does appear to be responding with conservative management.  We will continue IV antibiotics for another 24 hours.  Okay to start clear liquid diet.  Assuming pain continues to improve can begin to transition to oral antibiotics and slowly advance diet beginning tomorrow.  Anticipate potential discharge tomorrow afternoon or evening.  On discharge he will need repeat CT scan in 4-6 weeks.  He will also need repeat colonoscopy.  He is scheduled to have a colonoscopy in the next 1-2 weeks.  I would advise him to hold off on colonoscopy until least 6 weeks.  Patient expresses understanding.  We will continue to follow

## 2025-02-08 NOTE — ASSESSMENT & PLAN NOTE
Patient's blood pressure range in the last 24 hours was: BP  Min: 110/68  Max: 125/73.The patient's inpatient anti-hypertensive regimen is listed below:  Current Antihypertensives  amLODIPine tablet 2.5 mg, Daily, Oral  valsartan tablet 320 mg, Daily, Oral    Plan  - BP is controlled, no changes needed to their regimen

## 2025-02-08 NOTE — ASSESSMENT & PLAN NOTE
"-clear liquid diet (no sugar) but made NPO at midnight out of caution  -IV cipro  -IV flagyl  - NS at 100ml hour   -PRN analgesia  -PRN antiemetic  -Dr. Jackson with general surgery consulted for possible "free air" on CT scan. He ordered a CRP which resulted as 112.5    "

## 2025-02-08 NOTE — SUBJECTIVE & OBJECTIVE
Past Medical History:   Diagnosis Date    Arthritis     Cataract     Cataract of left eye     Diabetes mellitus type II     on po meds    Hyperlipidemia     not on meds at present    Hypertension     Kidney stones 04/2017    Left ureteral stone     Plantar fasciitis of right foot 10/2017    Dr Colorado     Tendonitis 01/2018    right foot Dr Colorado    Wears glasses        Past Surgical History:   Procedure Laterality Date    ADENOIDECTOMY      BIOPSY OF MASS OF LUMBAR SPINE N/A 10/19/2022    Procedure: BIOPSY, MASS, SPINE, LUMBAR;  Surgeon: Marvel Matthews DO;  Location: [facility] OR;  Service: Neurosurgery;  Laterality: N/A;  Right L5-S1 Resection of Extradural Mass Using Transfacet Approach    CATARACT EXTRACTION      COLONOSCOPY  03/23/2010    Dr. Blackburn, hyperplastic polyps, otherwise negative, 10 year recheck    COLONOSCOPY N/A 11/19/2021    Procedure: COLONOSCOPY;  Surgeon: Marcus Sexton MD;  Location: [facility];  Service: Endoscopy;  Laterality: N/A;    CYSTOSCOPY Left 04/12/2017    with ureteral stent-Dr. Noel    CYSTOSCOPY Left 05/17/2022    Procedure: CYSTOSCOPY with stent removal;  Surgeon: Ronal Benitez MD;  Location: [facility] OR;  Service: Urology;  Laterality: Left;    CYSTOSCOPY N/A 7/9/2024    Procedure: CYSTOSCOPY;  Surgeon: Ronal Benitez MD;  Location: [facility] OR;  Service: Urology;  Laterality: N/A;    CYSTOSCOPY WITH INSERTION OF MINIMALLY INVASIVE IMPLANT TO ENLARGE PROSTATIC URETHRA N/A 8/22/2024    Procedure: CYSTOSCOPY, WITH INSERTION OF UROLIFT IMPLANT;  Surgeon: Ronal Benitez MD;  Location: [facility] OR;  Service: Urology;  Laterality: N/A;    CYSTOSCOPY WITH URETEROSCOPY, RETROGRADE PYELOGRAPHY, AND INSERTION OF STENT Left 05/05/2022    Procedure: CYSTOSCOPY, WITH RETROGRADE PYELOGRAM AND URETERAL STENT INSERTION;  Surgeon: Ronal Benitez MD;  Location: [facility] OR;  Service: Urology;  Laterality: Left;    EXTRACORPOREAL SHOCK WAVE LITHOTRIPSY Left 4/27/2023    Procedure:  LITHOTRIPSY, ESWL;  Surgeon: Ronal Benitez MD;  Location: [facility] OR;  Service: Urology;  Laterality: Left;    EYE SURGERY  05/06/2013    Dr Nielsen    kidney stent   04/2017    KNEE ARTHROSCOPY W/ DEBRIDEMENT      right with lateral release     LITHOTRIPSY  04/19/2017    NASAL SINUS SURGERY      retna surgery   05/2013    Dr Peralta    SPINE SURGERY      TONSILLECTOMY      TRANSFORAMINAL EPIDURAL INJECTION OF STEROID Right 12/07/2020    Procedure: Injection,steroid,epidural,transforaminal approach L5- S1;  Surgeon: Kermit Davalos MD;  Location: [facility] OR;  Service: Pain Management;  Laterality: Right;  L5-s1    TRANSFORAMINAL EPIDURAL INJECTION OF STEROID Right 06/23/2022    Procedure: Injection,steroid,epidural,transforaminal approach;  Surgeon: Christos Saul MD;  Location: [facility] OR;  Service: Pain Management;  Laterality: Right;  L5-S1 and cyst aspiration L5-S1    TRANSRECTAL ULTRASOUND EXAMINATION N/A 7/9/2024    Procedure: ULTRASOUND, RECTAL APPROACH;  Surgeon: Ronal Benitez MD;  [facility]: [facility];  Service: Urology;  Laterality: N/A;    URETEROSCOPIC REMOVAL OF URETERIC CALCULUS Left 05/05/2022    Procedure: REMOVAL, CALCULUS, URETER, URETEROSCOPIC;  Surgeon: Ronal Benitez MD;  Location: [facility] OR;  Service: Urology;  Laterality: Left;       Review of patient's allergies indicates:   Allergen Reactions    Shrimp Itching and Swelling     Per testing   States no ivp or betadine allergies.        Current Facility-Administered Medications on File Prior to Encounter   Medication    [COMPLETED] iohexoL (OMNIPAQUE 300) 300 mg iodine/mL injection    lidocaine (PF) 10 mg/ml (1%) injection 10 mg     Current Outpatient Medications on File Prior to Encounter   Medication Sig    acetaminophen (TYLENOL) 500 mg Cap     amLODIPine (NORVASC) 2.5 MG tablet Take 1 tablet (2.5 mg total) by mouth once daily.    cetirizine (ZYRTEC) 10 MG tablet Take 10 mg by mouth daily as needed. 1 Tablet(s) Oral PRN .    ciprofloxacin HCl  (CIPRO) 500 MG tablet Take 1 tablet (500 mg total) by mouth 2 (two) times daily.    diphenhydrAMINE (BENADRYL) 25 mg capsule Take 25 mg by mouth nightly as needed for Itching.    fluticasone (FLONASE) 50 mcg/actuation nasal spray 2 sprays by Each Nare route daily as needed.     guaifenesin (MUCINEX ORAL) Take 400 mg by mouth 2 (two) times daily as needed for Congestion.    ibuprofen (ADVIL,MOTRIN) 200 MG tablet Take 200 mg by mouth every 8 (eight) hours as needed for Pain.    metFORMIN (GLUCOPHAGE-XR) 750 MG ER 24hr tablet Take 1 tablet (750 mg total) by mouth 2 (two) times daily with meals.    metroNIDAZOLE (FLAGYL) 500 MG tablet Take 1 tablet (500 mg total) by mouth 3 (three) times daily.    potassium citrate (UROCIT-K 15) 15 mEq TbSR TAKE 1 TABLET TWICE A DAY    rosuvastatin (CRESTOR) 20 MG tablet Take 1 tablet (20 mg total) by mouth once daily.    valsartan (DIOVAN) 320 MG tablet TAKE 1 TABLET DAILY    [DISCONTINUED] triamcinolone acetonide 0.5% (KENALOG) 0.5 % Crea Apply topically 2 (two) times daily.     Family History       Problem Relation (Age of Onset)    Cancer Paternal Grandfather    Cataracts Mother    Diabetes Maternal Grandmother    Hypertension Father    Lung cancer Paternal Grandfather    Neuropathy Father    No Known Problems Sister, Maternal Aunt    Stroke Paternal Grandmother          Tobacco Use    Smoking status: Former     Current packs/day: 0.00     Types: Cigarettes     Quit date: 1978     Years since quittin.8    Smokeless tobacco: Never    Tobacco comments:     Occ cigar   Substance and Sexual Activity    Alcohol use: Yes     Alcohol/week: 1.0 standard drink of alcohol     Types: 1 Standard drinks or equivalent per week     Comment: 1/week    Drug use: No    Sexual activity: Not Currently     Review of Systems   Constitutional:  Positive for chills.   Gastrointestinal:  Positive for abdominal pain and nausea.   All other systems reviewed and are negative.    Objective:     Vital  Signs (Most Recent):  Temp: 99.6 °F (37.6 °C) (02/07/25 2015)  Pulse: 72 (02/07/25 2015)  Resp: 18 (02/07/25 2015)  BP: 116/74 (02/07/25 2015)  SpO2: (!) 93 % (02/07/25 2015) Vital Signs (24h Range):  Temp:  [97.9 °F (36.6 °C)-99.6 °F (37.6 °C)] 99.6 °F (37.6 °C)  Pulse:  [72-87] 72  Resp:  [16-18] 18  SpO2:  [92 %-94 %] 93 %  BP: (102-127)/(58-74) 116/74     Weight: 87.5 kg (192 lb 14.4 oz)  Body mass index is 27.68 kg/m².     Physical Exam  Vitals reviewed.   Constitutional:       Appearance: Normal appearance.   HENT:      Head: Normocephalic and atraumatic.      Nose: Nose normal.      Mouth/Throat:      Pharynx: Oropharynx is clear.   Eyes:      Conjunctiva/sclera: Conjunctivae normal.   Cardiovascular:      Rate and Rhythm: Normal rate and regular rhythm.      Pulses: Normal pulses.      Heart sounds: Normal heart sounds.   Pulmonary:      Effort: Pulmonary effort is normal.      Breath sounds: Normal breath sounds.   Abdominal:      General: Bowel sounds are normal.      Palpations: Abdomen is soft.      Tenderness: There is generalized abdominal tenderness.   Musculoskeletal:         General: Normal range of motion.      Cervical back: Normal range of motion.      Right lower leg: No edema.      Left lower leg: No edema.   Skin:     General: Skin is warm and dry.      Capillary Refill: Capillary refill takes less than 2 seconds.   Neurological:      General: No focal deficit present.      Mental Status: He is alert and oriented to person, place, and time. Mental status is at baseline.   Psychiatric:         Mood and Affect: Mood normal.         Behavior: Behavior normal.         Thought Content: Thought content normal.         Judgment: Judgment normal.                Significant Labs: All pertinent labs within the past 24 hours have been reviewed.  A1C:   Recent Labs   Lab 12/30/24  0817   HGBA1C 6.8*       Bilirubin:   Recent Labs   Lab 02/07/25  1125   BILITOT 2.6*       BMP:   Recent Labs   Lab  02/07/25  1125 02/07/25  1552   *  --      --    K 4.2  --      --    CO2 26  --    BUN 18  --    CREATININE 1.4  --    CALCIUM 9.5  --    MG  --  1.8     CBC:   Recent Labs   Lab 02/07/25  1125   WBC 17.14*   HGB 16.5   HCT 50.9        CMP:   Recent Labs   Lab 02/07/25  1125      K 4.2      CO2 26   *   BUN 18   CREATININE 1.4   CALCIUM 9.5   PROT 8.1   ALBUMIN 4.2   BILITOT 2.6*   ALKPHOS 63   AST 19   ALT 14   ANIONGAP 11       Lactic Acid:   Recent Labs   Lab 02/07/25  1552   LACTATE 1.1       Magnesium:   Recent Labs   Lab 02/07/25  1552   MG 1.8     POCT Glucose:   Recent Labs   Lab 02/07/25  2129   POCTGLUCOSE 126*       Urine Studies:   Recent Labs   Lab 02/07/25  1138   COLORU Yellow   APPEARANCEUA Clear   PHUR 6.0   SPECGRAV 1.020   PROTEINUA 2+*   GLUCUA Negative   KETONESU 1+*   BILIRUBINUA Negative   OCCULTUA 1+*   NITRITE Negative   UROBILINOGEN 2.0-3.0*   LEUKOCYTESUR Negative   RBCUA 0   WBCUA 1   BACTERIA Rare   SQUAMEPITHEL 0   HYALINECASTS 0       Significant Imaging: I have reviewed all pertinent imaging results/findings within the past 24 hours.  EXAMINATION:  CT ABDOMEN PELVIS WITHOUT CONTRAST     CLINICAL HISTORY:  LLQ abdominal pain;Evaluate for renal stone vs diverticulitis; Left lower quadrant pain     TECHNIQUE:  Low dose axial images, sagittal and coronal reformations were obtained from the lung bases to the pubic symphysis.  30 mL of oral Omnipaque 350 was administered.     COMPARISON:  CT abdomen pelvis of December 30, 2024., Renal ultrasound of December 16, 2024     FINDINGS:  There is strandy atelectasis at both lung bases.  The liver is of normal size contour and CT density without focal mass.  The gallbladder is of normal size without CT evidence of stone.  The pancreas is of normal contour and CT density without edema or mass.  The spleen is of normal size and CT density.     The adrenal glands are not enlarged.  On the right there is a  3 mm intrarenal stone and a 2 mm intrarenal stone without hydronephrosis.  The ureter follows a normal course down to the bladder without dilation or distal stone.  There are 4 cyst of the right kidney measuring 2.4 cm, 2.2 cm, 1.2 cm and 1.3 cm.  On the left there is a 4 mm, a 2 mm, a 3 mm and a  3 mm and  a 4 mm and a 9 mm intrarenal stone without hydronephrosis or ureteral stone demonstrated.  There is a 2.4 cm mass at the upper pole of the left kidney which may represent a bloody cyst or solid lesion.  There is a a 3.9 cm cyst at the midpole.  There is a 2.5 cm mass at the lower pole of the left kidney measuring 1.9 cm.  This may represent a bloody cyst as well.  Hydronephrosis or ureteral stone is not seen on the left.  The abdominal aorta is of normal caliber.     The stomach is of normal configuration.  Small bowel dilatation or air-fluid levels are not seen.  There is diverticulosis in the sigmoid colon and in the proximal sigmoid colon there is a segment of wall thickening and edema and Sydney cholecystic inflammatory changes consistent with acute diverticulitis.  There are few adjacent free air bubbles noted posteriorly.  Large amounts of free air or free fluid are not seen.  An abscess is not noted.     The bladder is of normal contour without mass or asymmetry.  The prostate measures 4.8 cm transversely.  There are metal devices within the prostate consistent with a prior Uro lift procedure.     Impression:     Acute diverticulitis of a short segment of the proximal sigmoid colon.  A few small adjacent free air bubbles without an abscess or free fluid demonstrated.  No obstruction is demonstrated.     Two right intrarenal stones and 6 left intrarenal stones without hydronephrosis or ureteral stone seen.  There are bilateral renal cysts.  Two of the cyst on the left are not water density.  These may represent bloody cysts as recent ultrasound did not show a solid mass.  Prior Uro lift procedure.     This  report was flagged in Epic as abnormal.        Electronically signed by:Ramos Houston MD  Date:                                            02/07/2025  Time:                                           13:17

## 2025-02-08 NOTE — PLAN OF CARE
DC assessment completed with patient at bedside. Verified information on facesheet as correct. Pt lives at listed address with .... Reports he has help if needed from sister ...... Reports POA as NONE .PCP is [facility] ....- reports last apt was a few days ago  ..... [facility] ..... Denies hh/hd/outpt services. DME NONE .... Reports being independent with activities. Drives himself to apts. Reports he will drive himself home Reports taking home medications as prescribed and can currently afford them. Verified insurance on file. Denies recent inpt stay in last 30 days.  DC plan is home    02/08/25 7239   Discharge Assessment   Assessment Type Discharge Planning Assessment   Confirmed/corrected address, phone number and insurance Yes   Confirmed Demographics Correct on Facesheet   Source of Information patient   When was your last doctors appointment?   (few days ago)   People in Home alone   Do you expect to return to your current living situation? Yes   Current cognitive status: Alert/Oriented   Walking or Climbing Stairs Difficulty no   Dressing/Bathing Difficulty no   Equipment Currently Used at Home none   Readmission within 30 days? No   Are you on dialysis? No   Do you take coumadin? No   Discharge Plan A Home   Discharge Plan B Home   DME Needed Upon Discharge  none   Discharge Plan discussed with: Patient   Transition of Care Barriers None

## 2025-02-09 VITALS
WEIGHT: 192.88 LBS | HEART RATE: 66 BPM | RESPIRATION RATE: 17 BRPM | SYSTOLIC BLOOD PRESSURE: 111 MMHG | HEIGHT: 70 IN | TEMPERATURE: 99 F | DIASTOLIC BLOOD PRESSURE: 78 MMHG | BODY MASS INDEX: 27.61 KG/M2 | OXYGEN SATURATION: 94 %

## 2025-02-09 LAB
ALBUMIN SERPL BCP-MCNC: 3.1 G/DL (ref 3.5–5.2)
ALP SERPL-CCNC: 49 U/L (ref 40–150)
ALT SERPL W/O P-5'-P-CCNC: 10 U/L (ref 10–44)
ANION GAP SERPL CALC-SCNC: 11 MMOL/L (ref 8–16)
AST SERPL-CCNC: 14 U/L (ref 10–40)
BILIRUB SERPL-MCNC: 1.6 MG/DL (ref 0.1–1)
BUN SERPL-MCNC: 17 MG/DL (ref 8–23)
CALCIUM SERPL-MCNC: 8.6 MG/DL (ref 8.7–10.5)
CHLORIDE SERPL-SCNC: 103 MMOL/L (ref 95–110)
CO2 SERPL-SCNC: 23 MMOL/L (ref 23–29)
CREAT SERPL-MCNC: 1 MG/DL (ref 0.5–1.4)
ERYTHROCYTE [DISTWIDTH] IN BLOOD BY AUTOMATED COUNT: 12.2 % (ref 11.5–14.5)
EST. GFR  (NO RACE VARIABLE): >60 ML/MIN/1.73 M^2
GLUCOSE SERPL-MCNC: 112 MG/DL (ref 70–110)
HCT VFR BLD AUTO: 45.1 % (ref 40–54)
HGB BLD-MCNC: 14.5 G/DL (ref 14–18)
MAGNESIUM SERPL-MCNC: 1.9 MG/DL (ref 1.6–2.6)
MCH RBC QN AUTO: 30.5 PG (ref 27–31)
MCHC RBC AUTO-ENTMCNC: 32.2 G/DL (ref 32–36)
MCV RBC AUTO: 95 FL (ref 82–98)
PLATELET # BLD AUTO: 146 K/UL (ref 150–450)
PMV BLD AUTO: 10.7 FL (ref 9.2–12.9)
POCT GLUCOSE: 118 MG/DL (ref 70–110)
POTASSIUM SERPL-SCNC: 4.2 MMOL/L (ref 3.5–5.1)
PROT SERPL-MCNC: 6.7 G/DL (ref 6–8.4)
RBC # BLD AUTO: 4.75 M/UL (ref 4.6–6.2)
SODIUM SERPL-SCNC: 137 MMOL/L (ref 136–145)
WBC # BLD AUTO: 10.97 K/UL (ref 3.9–12.7)

## 2025-02-09 PROCEDURE — 63600175 PHARM REV CODE 636 W HCPCS

## 2025-02-09 PROCEDURE — 94761 N-INVAS EAR/PLS OXIMETRY MLT: CPT

## 2025-02-09 PROCEDURE — 36415 COLL VENOUS BLD VENIPUNCTURE: CPT

## 2025-02-09 PROCEDURE — 94760 N-INVAS EAR/PLS OXIMETRY 1: CPT

## 2025-02-09 PROCEDURE — 83735 ASSAY OF MAGNESIUM: CPT

## 2025-02-09 PROCEDURE — 25000003 PHARM REV CODE 250

## 2025-02-09 PROCEDURE — 80053 COMPREHEN METABOLIC PANEL: CPT

## 2025-02-09 PROCEDURE — 99900035 HC TECH TIME PER 15 MIN (STAT)

## 2025-02-09 PROCEDURE — 85027 COMPLETE CBC AUTOMATED: CPT

## 2025-02-09 RX ORDER — METRONIDAZOLE 500 MG/1
500 TABLET ORAL 3 TIMES DAILY
Qty: 30 TABLET | Refills: 0 | Status: SHIPPED | OUTPATIENT
Start: 2025-02-09 | End: 2025-02-19

## 2025-02-09 RX ORDER — CIPROFLOXACIN 500 MG/1
500 TABLET ORAL 2 TIMES DAILY
Qty: 20 TABLET | Refills: 0 | Status: SHIPPED | OUTPATIENT
Start: 2025-02-09 | End: 2025-02-19

## 2025-02-09 RX ADMIN — ATORVASTATIN CALCIUM 80 MG: 40 TABLET, FILM COATED ORAL at 08:02

## 2025-02-09 RX ADMIN — VALSARTAN 320 MG: 80 TABLET, FILM COATED ORAL at 08:02

## 2025-02-09 RX ADMIN — PANTOPRAZOLE SODIUM 40 MG: 40 INJECTION, POWDER, LYOPHILIZED, FOR SOLUTION INTRAVENOUS at 08:02

## 2025-02-09 RX ADMIN — AMLODIPINE BESYLATE 2.5 MG: 2.5 TABLET ORAL at 08:02

## 2025-02-09 RX ADMIN — CIPROFLOXACIN 400 MG: 2 INJECTION, SOLUTION INTRAVENOUS at 04:02

## 2025-02-09 RX ADMIN — METRONIDAZOLE 500 MG: 500 INJECTION, SOLUTION INTRAVENOUS at 08:02

## 2025-02-09 NOTE — PLAN OF CARE
POC reviewed with pt. Pt verbalized understanding.    NS@75mL/hr order was completed this shift.  Pt has not c/o or had any episodes of n/v this shift. He has also been afebrile.  Pt is able to move around more easily r/t LLQ pain improving. Pt has not c/o pain this shift.    Pt is tolerating IV ABX as ordered with no adverse reactions noted.  Pt ambulates independently in room and to bathroom.       All fall precautions maintained. Frequent checks performed per protocol. Bed in lowest position, call light within reach, slip resistant socks maintained. Bed alarm on/functioning. POC ongoing.       Problem: Adult Inpatient Plan of Care  Goal: Plan of Care Review  Outcome: Progressing  Goal: Patient-Specific Goal (Individualized)  Outcome: Progressing  Goal: Absence of Hospital-Acquired Illness or Injury  Outcome: Progressing  Goal: Optimal Comfort and Wellbeing  Outcome: Progressing  Goal: Readiness for Transition of Care  Outcome: Progressing     Problem: Diabetes Comorbidity  Goal: Blood Glucose Level Within Targeted Range  Outcome: Progressing

## 2025-02-09 NOTE — DISCHARGE INSTRUCTIONS
Discharge Instructions, [facility] Medicine    Thank you for choosing [facility] for your medical care. The primary doctor who is taking care of you at the time of your discharge is Rhoda Hagan MD.     You were admitted to the hospital with Diverticulitis.     Please note your discharge instructions, including diet/activity restrictions, follow-up appointments, and medication changes.  If you have any questions about your medical issues, prescriptions, or any other questions, please feel free to contact the [facility] Medicine Dept at [phone] and we will help.    If you are previously with Home health, outpatient PT/OT or under a therapy program, you are cleared to return to those programs.    Please direct all long term medication refills and follow up to your primary care provider, Sal Solares PA-C. Thank you again for letting us take care of your health care needs.    Please note the following discharge instructions per your discharging physician-  Nancie Wheatley PA-C

## 2025-02-09 NOTE — HOSPITAL COURSE
Patient was admitted to the hospital directly from PCP for acute diverticulitis.  The patient was monitored closely throughout his hospital stay.  General surgery was consulted admission.  Outpatient CT was concerning for acute diverticulitis of a short segment of the proximal sigmoid colon and a few small adjacent free air bubbles without an abscess or free fluid demonstrated.  Patient was made NPO was started on IV Cipro Flagyl. Pain rapidly improved throughout his stay.  Diet was slowly advanced and tolerated.  Leukocytosis normalized.  Patient was seen on day of discharge.  Patient was cleared for discharge by General surgery.  Patient was follow up with PCP and General surgery.  Patient was instructed to notify GI of admission for acute diverticulitis as he was previously scheduled upcoming colonoscopy.  Verbalized understanding.  Patient was prescribed Cipro and Flagyl on discharge.  He declined narcotics at discharge.  Return precautions discussed and patient was voiced understanding.  All questions answered.

## 2025-02-09 NOTE — DISCHARGE SUMMARY
"[facility] Medicine  Discharge Summary      Patient Name: Toni Hyde  MRN: [id number]  NIESHA: [id number]  Patient Class: IP- Inpatient  Admission Date: 2/7/2025  Hospital Length of Stay: 2 days  Discharge Date and Time:  02/09/2025 1:48 PM  Attending Physician: Taylor att. providers found   Discharging Provider: Nancie Wheatley PA-C  Primary Care Provider: Sal Solares PA-C    Primary Care Team: Networked reference to record PCT     HPI:   Toni Hyde is a 69 year old male with a previous medical history of DMII, HTN, kidney stones, and arthritis who is a direct admit from PCP for diverticulitis. The patient began having left-sided lower abdominal pain that began a few nights ago. The pain was initially mild but became severe last night. He describes intermittent pain, with periods of excruciating pain alternating with less intense discomfort. The pain is exacerbated by movement, particularly when standing up. Dr. Villasenor ordered cbc, cmp and urinalysis. CBC showed a 17K WBC, CMP showed mild hyperglycemia and elevated bilirubin. Urine studies negative for infection. Outpatient CT read as "Acute diverticulitis of a short segment of the proximal sigmoid colon. A few small adjacent free air bubbles without an abscess or free fluid demonstrated."  Dr. Villasenor initally placed patient on outpatient ciprofloxacin and flagyl but after CT resulted he sent the patient for direct admit and general surgery consult  due to possible free air on CT read.  Lactic checked upon admit and normal. Dr. Jackson consulted and added CRP which resulted at 112.5. Patient continued on IV cipro and IV flagyl and admitted by  hospital medicine for further evaluation and management.     * No surgery found *      Hospital Course:   Patient was admitted to the hospital directly from PCP for acute diverticulitis.  The patient was monitored closely throughout his hospital stay.  General surgery was consulted " "admission.  Outpatient CT was concerning for acute diverticulitis of a short segment of the proximal sigmoid colon and a few small adjacent free air bubbles without an abscess or free fluid demonstrated.  Patient was made NPO was started on IV Cipro Flagyl. Pain rapidly improved throughout his stay.  Diet was slowly advanced and tolerated.  Leukocytosis normalized.  Patient was seen on day of discharge.  Patient was cleared for discharge by General surgery.  Patient was follow up with PCP and General surgery.  Patient was instructed to notify GI of admission for acute diverticulitis as he was previously scheduled upcoming colonoscopy.  Verbalized understanding.  Patient was prescribed Cipro and Flagyl on discharge.  He declined narcotics at discharge.  Return precautions discussed and patient was voiced understanding.  All questions answered.     Goals of Care Treatment Preferences:  Code Status: Full Code      SDOH Screening:  The patient declined to be screened for utility difficulties, food insecurity, transport difficulties, housing insecurity, and interpersonal safety, so no concerns could be identified this admission.     Consults:   Consults (From admission, onward)          Status Ordering Provider     Inpatient consult to General Surgery  Once        Provider:  Mihai Jackson MD    Completed SAMARIA RICK N            * Diverticulitis  -clear liquid diet (no sugar) but made NPO at midnight out of caution  -IV cipro  -IV flagyl  - NS at 100ml hour   -PRN analgesia  -PRN antiemetic  -Dr. Jackson with general surgery consulted for possible "free air" on CT scan. He ordered a CRP which resulted as 112.5  2/8: surgery following; cipro/flagyl; CLD; LLQ pain improving; leukocytosis improving      Primary hypertension  Patient's blood pressure range in the last 24 hours was: BP  Min: 110/68  Max: 125/73.The patient's inpatient anti-hypertensive regimen is listed below:  Current Antihypertensives  amLODIPine " tablet 2.5 mg, Daily, Oral  valsartan tablet 320 mg, Daily, Oral    Plan  - BP is controlled, no changes needed to their regimen      Diabetes mellitus, type II  Patient's FSGs are controlled on current medication regimen.  Last A1c reviewed-   Lab Results   Component Value Date    HGBA1C 6.8 (H) 12/30/2024     Most recent fingerstick glucose reviewed-   Recent Labs   Lab 02/07/25  2129 02/08/25  0729 02/08/25  1108   POCTGLUCOSE 126* 127* 122*       Current correctional scale  Low  Increase anti-hyperglycemic dose as follows-   Antihyperglycemics (From admission, onward)      Start     Stop Route Frequency Ordered    02/07/25 1627  insulin aspart U-100 pen 0-5 Units         -- SubQ Before meals & nightly PRN 02/07/25 1528          Hold Oral hypoglycemics while patient is in the hospital.      Final Active Diagnoses:    Diagnosis Date Noted POA    PRINCIPAL PROBLEM:  Diverticulitis [K57.92] 02/07/2025 Yes    Primary hypertension [I10] 02/07/2025 Yes    Diabetes mellitus, type II [E11.9]  Yes      Problems Resolved During this Admission:       Discharged Condition: good    Disposition: Home or Self Care    Follow Up:   Follow-up Information       Sal Solares PA-C Follow up.    Specialty: Family Medicine  Contact information:  [address] [facility] [address]  [phone]               Mihai Jackson MD Follow up.    Specialties: General Surgery, Colon and Rectal Surgery, Surgery  Contact information:  [address] [facility]  [address]  [place] [address]  [phone]               Marcus Sexton MD Follow up.    Specialty: Gastroenterology  Contact information:  [address] [facility]  [address]  [place] [address]  [phone]                           Patient Instructions:      Notify your health care provider if you experience any of the following:  temperature >100.4     Notify your health care provider if you experience any of the following:  persistent nausea and vomiting or diarrhea     Notify your health  care provider if you experience any of the following:  severe uncontrolled pain     Notify your health care provider if you experience any of the following:  increased confusion or weakness     Activity as tolerated       Significant Diagnostic Studies: Labs: CMP   Recent Labs   Lab 02/08/25  0507 02/09/25 0432   * 137   K 4.1 4.2    103   CO2 21* 23   * 112*   BUN 17 17   CREATININE 1.2 1.0   CALCIUM 8.4* 8.6*   PROT 6.7 6.7   ALBUMIN 3.3* 3.1*   BILITOT 2.3* 1.6*   ALKPHOS 52 49   AST 13 14   ALT 10 10   ANIONGAP 11 11    and CBC   Recent Labs   Lab 02/08/25  0507 02/09/25 0432   WBC 12.97* 10.97   HGB 14.7 14.5   HCT 44.4 45.1   * 146*       Pending Diagnostic Studies:       None           Medications:  Reconciled Home Medications:      Medication List        START taking these medications      ciprofloxacin HCl 500 MG tablet  Commonly known as: CIPRO  Take 1 tablet (500 mg total) by mouth 2 (two) times daily. for 10 days     metroNIDAZOLE 500 MG tablet  Commonly known as: FLAGYL  Take 1 tablet (500 mg total) by mouth 3 (three) times daily. for 10 days            CONTINUE taking these medications      acetaminophen 500 mg Cap  Commonly known as: TYLENOL     amLODIPine 2.5 MG tablet  Commonly known as: NORVASC  Take 1 tablet (2.5 mg total) by mouth once daily.     cetirizine 10 MG tablet  Commonly known as: ZYRTEC  Take 10 mg by mouth daily as needed. 1 Tablet(s) Oral PRN .     diphenhydrAMINE 25 mg capsule  Commonly known as: BENADRYL  Take 25 mg by mouth nightly as needed for Itching.     fluticasone propionate 50 mcg/actuation nasal spray  Commonly known as: FLONASE  2 sprays by Each Nare route daily as needed.     ibuprofen 200 MG tablet  Commonly known as: ADVIL,MOTRIN  Take 200 mg by mouth every 8 (eight) hours as needed for Pain.     metFORMIN 750 MG ER 24hr tablet  Commonly known as: GLUCOPHAGE-XR  Take 1 tablet (750 mg total) by mouth 2 (two) times daily with meals.     MUCINEX  ORAL  Take 400 mg by mouth 2 (two) times daily as needed for Congestion.     potassium citrate 15 mEq Tbsr  Commonly known as: UROCIT-K 15  TAKE 1 TABLET TWICE A DAY     rosuvastatin 20 MG tablet  Commonly known as: CRESTOR  Take 1 tablet (20 mg total) by mouth once daily.     valsartan 320 MG tablet  Commonly known as: DIOVAN  TAKE 1 TABLET DAILY              Indwelling Lines/Drains at time of discharge:   Lines/Drains/Airways       None                   Time spent on the discharge of patient: 35 minutes         Nancie Wheatley PA-C  Department of Hospital Medicine  [place]/Surg

## 2025-02-09 NOTE — PLAN OF CARE
Discharge orders and chart reviewed with no further post-acute discharge needs identified at this time.  At this time, patient is cleared for discharge from Case Management standpoint.        02/09/25 1009   Final Note   Assessment Type Final Discharge Note   Anticipated Discharge Disposition Home   What phone number can be called within the next 1-3 days to see how you are doing after discharge? [phone]   Hospital Resources/Appts/Education Provided Post-Acute resouces added to AVS   Post-Acute Status   Discharge Delays None known at this time

## 2025-02-09 NOTE — PLAN OF CARE
02/08/25 2102   Patient Assessment/Suction   Level of Consciousness (AVPU) alert   Respiratory Effort Normal;Unlabored   Rhythm/Pattern, Respiratory pattern regular   Cough Frequency no cough   PRE-TX-O2   Device (Oxygen Therapy) room air   SpO2 95 %   Pulse Oximetry Type Intermittent   Aerosol Therapy   $ Aerosol Therapy Charges PRN treatment not required

## 2025-02-10 ENCOUNTER — PATIENT MESSAGE (OUTPATIENT)
Dept: GASTROENTEROLOGY | Facility: CLINIC | Age: 70
End: 2025-02-10
Payer: MEDICARE

## 2025-02-10 ENCOUNTER — TELEPHONE (OUTPATIENT)
Dept: FAMILY MEDICINE | Facility: CLINIC | Age: 70
End: 2025-02-10
Payer: MEDICARE

## 2025-02-10 ENCOUNTER — TELEPHONE (OUTPATIENT)
Dept: SURGERY | Facility: CLINIC | Age: 70
End: 2025-02-10
Payer: MEDICARE

## 2025-02-10 ENCOUNTER — TELEPHONE (OUTPATIENT)
Dept: GASTROENTEROLOGY | Facility: CLINIC | Age: 70
End: 2025-02-10
Payer: MEDICARE

## 2025-02-10 NOTE — TELEPHONE ENCOUNTER
Patient requesting to schedule hospital follow up appointment.  Specifically requesting to schedule appointment on 2/17/25 as he has another appointment scheduled on this date and would like to take care of both appointments on the same day.   Appointment scheduled as per patient's request; patient agreed to appointment date, time, and location.

## 2025-02-10 NOTE — TELEPHONE ENCOUNTER
Call placed to Mr. Muñoz in regards to rescheduling his colonoscopy. No answer, left message to return call.

## 2025-02-10 NOTE — TELEPHONE ENCOUNTER
Jason Leonardo Staff     ----- Message from Jason sent at 2/10/2025  9:53 AM CST -----  Regarding: return call  Contact: patient  Type:  Patient Returning Call    Who Called:patient  Who Left Message for Patient:nurse  Does the patient know what this is regarding?:patient canceled his upcoming appointment by mistake/ Can staff get it back: 02/17/ @ 3pm  Would the patient rather a call back or a response via [facility]? Please call to reschedule  Best Call Back Number:[phone]  Additional Information:

## 2025-02-10 NOTE — TELEPHONE ENCOUNTER
Patricia Buenoon Staff     ----- Message from Patricia sent at 2/10/2025  8:21 AM CST -----  Type:  Sooner Appointment Request    Caller is requesting a sooner appointment.  Caller declined first available appointment listed below.  Caller will not accept being placed on the waitlist and is requesting a message be sent to doctor.        Name of Caller:pt         When is the first available appointment?03/19         Symptoms:hospital f/u       Would the patient rather a call back or a response via [facility]? Callback         Best Call Back Number:[phone]           Additional Information: pt would like to be seen on 03/17/2025 if possible.     Please call back to advise. Thank you.

## 2025-02-10 NOTE — TELEPHONE ENCOUNTER
----- Message from Marcus Sexton MD sent at 2/8/2025  5:09 PM CST -----    ----- Message -----  From: Marcus Sexton MD  Sent: 2/8/2025   5:08 PM CST  To: Jamal Villasenor MD    Thanks,    Will loop my office staff in so we can push it back.    B    --Please push patient's procedure back 4-6 weeks.  ----- Message -----  From: Jamal Villasenor MD  Sent: 2/7/2025   3:00 PM CST  To: Marcus Sexton MD    Hi Dr. Sexton,     This patient has upcoming c-scope with you. Active diverticulitis, procedure probably needs to be pushed back to let heal.     Thanks,   Jamal

## 2025-02-10 NOTE — TELEPHONE ENCOUNTER
----- Message -----  From: Romero Paul MA  Sent: 2/10/2025   9:29 AM CST  To: Patricia Bueno    Good Patricia Howe,  What is the patients name and MRN please?  Romero Lopes  ----- Message -----  From: Patricia Bueno  Sent: 2/10/2025   8:22 AM CST  To: Manuel ABEL Staff    Type:  Sooner Appointment Request    Caller is requesting a sooner appointment.  Caller declined first available appointment listed below.  Caller will not accept being placed on the waitlist and is requesting a message be sent to doctor.        Name of Caller:pt        When is the first available appointment?03/19        Symptoms:hospital f/u      Would the patient rather a call back or a response via [facility]? Callback        Best Call Back Number:[phone]          Additional Information: pt would like to be seen on 03/17/2025 if possible.     Please call back to advise. Thank you.

## 2025-02-10 NOTE — TELEPHONE ENCOUNTER
Appointment rescheduled.  Call placed to patient for notification. Patient verbalized understanding.

## 2025-02-11 ENCOUNTER — PATIENT OUTREACH (OUTPATIENT)
Dept: ADMINISTRATIVE | Facility: CLINIC | Age: 70
End: 2025-02-11
Payer: MEDICARE

## 2025-02-11 NOTE — TELEPHONE ENCOUNTER
I called patient and scheduled him per Dr. Jackson for a 4 week hospital f/u on Tuesday, 3/11/25 @ 1:30pm in [place].  Romero

## 2025-02-11 NOTE — PROGRESS NOTES
C3 nurse spoke with Toni Hyde for a TCC post hospital discharge follow up call. The patient has a scheduled HOSFU appointment with Sal Solares on 02/17/25 @ 1500.

## 2025-02-17 ENCOUNTER — OFFICE VISIT (OUTPATIENT)
Dept: FAMILY MEDICINE | Facility: CLINIC | Age: 70
End: 2025-02-17
Payer: MEDICARE

## 2025-02-17 ENCOUNTER — TELEPHONE (OUTPATIENT)
Dept: FAMILY MEDICINE | Facility: CLINIC | Age: 70
End: 2025-02-17
Payer: MEDICARE

## 2025-02-17 VITALS
HEIGHT: 70 IN | DIASTOLIC BLOOD PRESSURE: 84 MMHG | HEART RATE: 83 BPM | WEIGHT: 187.63 LBS | SYSTOLIC BLOOD PRESSURE: 124 MMHG | BODY MASS INDEX: 26.86 KG/M2 | OXYGEN SATURATION: 96 % | RESPIRATION RATE: 18 BRPM

## 2025-02-17 DIAGNOSIS — E11.69 HYPERLIPIDEMIA ASSOCIATED WITH TYPE 2 DIABETES MELLITUS: ICD-10-CM

## 2025-02-17 DIAGNOSIS — E11.65 TYPE 2 DIABETES MELLITUS WITH HYPERGLYCEMIA, WITHOUT LONG-TERM CURRENT USE OF INSULIN: ICD-10-CM

## 2025-02-17 DIAGNOSIS — R10.32 LEFT LOWER QUADRANT PAIN: Primary | ICD-10-CM

## 2025-02-17 DIAGNOSIS — E78.5 HYPERLIPIDEMIA ASSOCIATED WITH TYPE 2 DIABETES MELLITUS: ICD-10-CM

## 2025-02-17 DIAGNOSIS — Z09 HOSPITAL DISCHARGE FOLLOW-UP: Primary | ICD-10-CM

## 2025-02-17 DIAGNOSIS — K57.92 DIVERTICULITIS: ICD-10-CM

## 2025-02-17 DIAGNOSIS — I15.2 HYPERTENSION ASSOCIATED WITH DIABETES: ICD-10-CM

## 2025-02-17 DIAGNOSIS — D69.6 THROMBOCYTOPENIA, UNSPECIFIED: ICD-10-CM

## 2025-02-17 DIAGNOSIS — I70.0 AORTIC ATHEROSCLEROSIS: ICD-10-CM

## 2025-02-17 DIAGNOSIS — E11.59 HYPERTENSION ASSOCIATED WITH DIABETES: ICD-10-CM

## 2025-02-17 PROCEDURE — 99215 OFFICE O/P EST HI 40 MIN: CPT | Mod: PBBFAC,PO

## 2025-02-17 NOTE — PROGRESS NOTES
Transitional Care Note  Admit date: 02/07/2025  Discharge date: 02/09/2025  Date of interactive contact (2 business days post D/C): 02/11/2025  Hospitalized at: [facility]  Discharge diagnoses: Diverticulitis, DM2, HTN    Family and/or Caretaker present at visit?  No.  Diagnostic tests reviewed/disposition: I have reviewed all completed as well as pending diagnostic tests at the time of discharge.  Disease/illness education: As below  Medication changes: None today  Home health/community services discussion/referrals: Patient does not have home health established from hospital visit.  They do not need home health.  If needed, we will set up home health for the patient.   Establishment or re-establishment of referral orders for community resources: No other necessary community resources.   Discussion with other health care providers: No discussion with other health care providers necessary.                       Subjective:       Patient ID: Toni Hyde is a 69 y.o. male.    Chief Complaint: Hospital Follow Up    HPI    History of Present Illness    CHIEF COMPLAINT:  Patient presents today for follow up after recent hospitalization for diverticulitis    CURRENT SYMPTOMS:  He reports slight stomach ache attributed to antibiotic use and stomach upset from eating oranges. He experiences increased fatigue with exercise, specifically noting quicker exhaustion while using the elliptical. He denies fever or chills.    MEDICAL HISTORY:  He has a history of thrombocytopenia, initially observed 6 months ago and most recently during hospitalization, which has remained stable. He has hyperlipidemia associated with type 2 diabetes and aortic atherosclerosis.    DIET:  He currently limits caloric intake to 1200 calories per day for weight control. His diet includes half a serving of Mini-Wheats.      ROS:  General: -fever, -chills, positive fatigue  Gastrointestinal: positive abdominal pain          Past Medical History:  "  Diagnosis Date    Arthritis     Cataract     Cataract of left eye     Diabetes mellitus type II     on po meds    Hyperlipidemia     not on meds at present    Hypertension     Kidney stones 04/2017    Left ureteral stone     Plantar fasciitis of right foot 10/2017    Dr [place]     Tendonitis 01/2018    right foot Dr Colorado    Wears glasses        Review of patient's allergies indicates:   Allergen Reactions    Shrimp Itching and Swelling     Per testing   States no ivp or betadine allergies.        Current Medications[1]    Review of Systems    Objective:      /84 (BP Location: Right arm, Patient Position: Sitting)   Pulse 83   Resp 18   Ht 5' 10" (1.778 m)   Wt 85.1 kg (187 lb 9.8 oz)   SpO2 96%   BMI 26.92 kg/m²   Physical Exam  Vitals reviewed.   Constitutional:       General: He is not in acute distress.     Appearance: Normal appearance. He is normal weight. He is not ill-appearing, toxic-appearing or diaphoretic.   HENT:      Head: Normocephalic.      Right Ear: External ear normal.      Left Ear: External ear normal.      Nose: Nose normal. No congestion or rhinorrhea.      Mouth/Throat:      Mouth: Mucous membranes are moist.      Pharynx: Oropharynx is clear.   Eyes:      General: No scleral icterus.        Right eye: No discharge.         Left eye: No discharge.      Extraocular Movements: Extraocular movements intact.      Conjunctiva/sclera: Conjunctivae normal.   Cardiovascular:      Rate and Rhythm: Normal rate and regular rhythm.      Pulses: Normal pulses.      Heart sounds: Normal heart sounds. No murmur heard.     No friction rub. No gallop.   Pulmonary:      Effort: Pulmonary effort is normal. No respiratory distress.      Breath sounds: Normal breath sounds. No wheezing, rhonchi or rales.   Chest:      Chest wall: No tenderness.   Abdominal:      Palpations: Abdomen is soft.      Tenderness: There is no abdominal tenderness.   Musculoskeletal:         General: No swelling, " tenderness or deformity. Normal range of motion.      Cervical back: Normal range of motion.      Right lower leg: No edema.      Left lower leg: No edema.   Skin:     General: Skin is warm and dry.      Capillary Refill: Capillary refill takes less than 2 seconds.      Coloration: Skin is not jaundiced.      Findings: No bruising, erythema, lesion or rash.   Neurological:      Mental Status: He is alert and oriented to person, place, and time.      Gait: Gait normal.   Psychiatric:         Mood and Affect: Mood normal.         Behavior: Behavior normal.         Thought Content: Thought content normal.         Judgment: Judgment normal.             Assessment:       1. Hospital discharge follow-up    2. Diverticulitis    3. Thrombocytopenia, unspecified    4. Hypertension associated with diabetes    5. Hyperlipidemia associated with type 2 diabetes mellitus    6. Type 2 diabetes mellitus with hyperglycemia, without long-term current use of insulin    7. Aortic atherosclerosis          Assessment & Plan    IMPRESSION:  - Assessed patient's recovery from recent diverticulitis episode  - Recommend gradual transition from low-fiber to high-fiber diet as patient recovers  - Monitored stable thrombocytopenia, blood pressure, hyperlipidemia, and aortic atherosclerosis  - Considered follow-up CT   - Will coordinate with Dr. Jackson regarding timing of follow-up CT    PLAN SUMMARY:  - Physical exam of abdomen performed  - Avoid citrus fruits while recovering from antibiotics  - Use ibuprofen sparingly, alternate with other pain relievers if needed  - Continue low-fiber diet temporarily, then gradually advance to high-fiber diet (target 35g/day)  - Start antibiotics early if abdominal pain recurs  - Follow-up with Dr. Jackson (surgeon) in March  - Resume exercise gradually  - Continue Valsartan and Amlodipine as prescribed for blood pressure management  - Consider incorporating murtaza seeds into meals for added fiber  - Continue  statin therapy as prescribed  - Consider probiotic in the future for gut health  - Coordinate with Dr. Jackson regarding follow-up CT     DIVERTICULITIS:  - Assessed the patient's recent hospital visit due to diverticulitis.  - Patient reports a mild stomach ache, possibly due to antibiotics, but no fever or chills in recent days.  - Performed a physical exam of the patient's abdomen.  - Explained that diverticulitis creates hypersensitivity in the stomach for a while after the episode.  - Educated the patient on the myth regarding nuts and popcorn avoidance in diverticulitis.  - Advised to continue a low-fiber diet temporarily, then gradually advance to a high-fiber diet (target 35g/day) once recovered.  - Recommend avoiding citrus fruits while recovering from antibiotics.  - Suggested considering a probiotic in the future to maintain gut health.  - Instructed to start antibiotics early if abdominal pain recurs to prevent hospitalization.  - Scheduled follow-up with Dr. Jackson (surgeon) in March.  - Plan to coordinate with Dr. Jackson regarding a follow-up CT  - Advised to contact the office if any issues arise or if abdominal pain returns.  - Noted the patient's report of mild stomach ache, possibly due to antibiotics and diverticulitis.  - Explained that the pain might take a few weeks to completely resolve.  - Recommend a low-fiber diet and gradual advancement to manage abdominal discomfort.  - Advised to use ibuprofen sparingly if needed, alternating with other pain relievers.  - Discussed high-fiber food options: oats, raspberries, kidney beans, brussels sprouts, corn, pears, almonds, whole wheat pasta, carrots.  - Discussed risks of alcohol consumption while on certain antibiotics.  - Recommend considering incorporation of murtaza seeds into meals for added fiber.  - Patient to resume exercise gradually, acknowledging potential fatigue from antibiotics.    THROMBOCYTOPENIA:  - Noted the patient's history of  thrombocytopenia, which has been stable.  - Observed no concerning features related to thrombocytopenia.  - Plan to continue monitoring with routine CBCs.    HYPERTENSION:  - Noted that the patient's blood pressure is stable.  - Continued Valsartan as prescribed for blood pressure management.  - Continued Amlodipine as prescribed for blood pressure management.  - Plan to continue monitoring blood pressu  re.    HYPERLIPIDEMIA:  - Noted that the patient's hyperlipidemia is associated with type 2 diabetes.  - Continued statin therapy as prescribed.    AORTIC ATHEROSCLEROSIS:  - Noted that the patient has aortic atherosclerosis with no concerning features.  - Continued statin therapy as prescribed.  - Plan to continue monitoring.    FOLLOW UP:  - Advised to contact the office if any issues arise or if abdominal pain returns.          Plan:       Hospital discharge follow-up    Diverticulitis    Thrombocytopenia, unspecified    Hypertension associated with diabetes    Hyperlipidemia associated with type 2 diabetes mellitus    Type 2 diabetes mellitus with hyperglycemia, without long-term current use of insulin    Aortic atherosclerosis                   Sal Solares PA-C  Family Medicine Physician Assistant       Future Appointments       Date Provider Specialty Appt Notes    3/11/2025 Mihai Jackson MD Surgery 4wk f/u hospital f/u-    7/1/2025  Lab     7/8/2025 Sal Solares PA-C Family Medicine 6 month f/u               I spent a total of 20 minutes on the day of the visit.This includes face to face time and non-face to face time preparing to see the patient (eg, review of tests), obtaining and/or reviewing separately obtained history, documenting clinical information in the electronic or other health record, independently interpreting results and communicating results to the patient/family/caregiver, or care coordinator.      We have addressed [4] Moderate: 1 or more chronic illnesses with exacerbation,  progression, or side effects of treatment / 2 or more stable chronic illnesses / 1 undiagnosed new problem with uncertain prognosis / 1 acute illness with systemic symptoms / 1 acute complicated injury  The complexity of the data reviewed and analyzed for this visit was [3] Limited (Reviewed prior external note, ordered unique testing or reviewed the results of each unique test)   The risk of complications and/or morbidity or mortality are [4] Moderate risk (I.e. prescription drug management / decision regarding minor surgery with identified pt or procedure risk factors / decision regarding elective major surgery without identified pt or procedure risk factors / diagnosis or treatment significantly limited by social determinants of health)   The level of Medical Decision Making for this visit is [4] Moderate     This note may have been generated with the assistance of ambient listening technology. If used, verbal consent was obtained by the patient and accompanying visitor(s) for the recording of patient appointment to facilitate this note. I attest to having reviewed and edited the generated note for accuracy, though some syntax or spelling errors may persist. Please contact the author of this note for any clarification.         [1]   Current Outpatient Medications:     acetaminophen (TYLENOL) 500 mg Cap, , Disp: , Rfl:     amLODIPine (NORVASC) 2.5 MG tablet, Take 1 tablet (2.5 mg total) by mouth once daily., Disp: 90 tablet, Rfl: 3    cetirizine (ZYRTEC) 10 MG tablet, Take 10 mg by mouth daily as needed. 1 Tablet(s) Oral PRN ., Disp: , Rfl:     ciprofloxacin HCl (CIPRO) 500 MG tablet, Take 1 tablet (500 mg total) by mouth 2 (two) times daily. for 10 days, Disp: 20 tablet, Rfl: 0    diphenhydrAMINE (BENADRYL) 25 mg capsule, Take 25 mg by mouth nightly as needed for Itching., Disp: , Rfl:     fluticasone (FLONASE) 50 mcg/actuation nasal spray, 2 sprays by Each Nare route daily as needed. , Disp: , Rfl:      guaifenesin (MUCINEX ORAL), Take 400 mg by mouth 2 (two) times daily as needed for Congestion., Disp: , Rfl:     ibuprofen (ADVIL,MOTRIN) 200 MG tablet, Take 200 mg by mouth every 8 (eight) hours as needed for Pain., Disp: , Rfl:     metFORMIN (GLUCOPHAGE-XR) 750 MG ER 24hr tablet, Take 1 tablet (750 mg total) by mouth 2 (two) times daily with meals., Disp: 180 tablet, Rfl: 3    metroNIDAZOLE (FLAGYL) 500 MG tablet, Take 1 tablet (500 mg total) by mouth 3 (three) times daily. for 10 days, Disp: 30 tablet, Rfl: 0    potassium citrate (UROCIT-K 15) 15 mEq TbSR, TAKE 1 TABLET TWICE A DAY, Disp: 180 tablet, Rfl: 3    rosuvastatin (CRESTOR) 20 MG tablet, Take 1 tablet (20 mg total) by mouth once daily., Disp: 90 tablet, Rfl: 3    valsartan (DIOVAN) 320 MG tablet, TAKE 1 TABLET DAILY, Disp: 90 tablet, Rfl: 3  No current facility-administered medications for this visit.    Facility-Administered Medications Ordered in Other Visits:     lidocaine (PF) 10 mg/ml (1%) injection 10 mg, 1 mL, Intradermal, Once, Kermit Davalos MD

## 2025-02-24 DIAGNOSIS — N40.1 BPH WITH OBSTRUCTION/LOWER URINARY TRACT SYMPTOMS: Primary | ICD-10-CM

## 2025-02-24 DIAGNOSIS — N13.8 BPH WITH OBSTRUCTION/LOWER URINARY TRACT SYMPTOMS: Primary | ICD-10-CM

## 2025-02-24 NOTE — PROGRESS NOTES
Procedure Order to Urology [[id number]]    Electronically signed by: Ronal Benitez MD on 02/24/25 0650 Status: Active   Ordering user: Ronal Benitez MD 02/24/25 0650 Authorized by: Ronal Benitez MD   Ordering mode: Standard   Frequency:  02/24/25 -     Diagnoses  BPH with obstruction/lower urinary tract symptoms [N40.1, N13.8]   Questionnaire    Question Answer   Procedure Cystoscopy Comment - 4/1   Facility Name: [facility], Please order Urine POCT without micro . Local sedation (no urine Dr Will or Dr lee)

## 2025-03-10 ENCOUNTER — HOSPITAL ENCOUNTER (OUTPATIENT)
Dept: RADIOLOGY | Facility: HOSPITAL | Age: 70
Discharge: HOME | End: 2025-03-10
Payer: MEDICARE

## 2025-03-10 ENCOUNTER — RESULTS FOLLOW-UP (OUTPATIENT)
Dept: FAMILY MEDICINE | Facility: CLINIC | Age: 70
End: 2025-03-10

## 2025-03-10 DIAGNOSIS — R10.32 LEFT LOWER QUADRANT PAIN: ICD-10-CM

## 2025-03-10 PROCEDURE — 74176 CT ABD & PELVIS W/O CONTRAST: CPT | Mod: TC

## 2025-03-10 PROCEDURE — 74176 CT ABD & PELVIS W/O CONTRAST: CPT | Mod: 26,,, | Performed by: RADIOLOGY

## 2025-03-11 ENCOUNTER — OFFICE VISIT (OUTPATIENT)
Dept: SURGERY | Facility: CLINIC | Age: 70
End: 2025-03-11
Payer: MEDICARE

## 2025-03-11 VITALS
HEIGHT: 70 IN | HEART RATE: 64 BPM | BODY MASS INDEX: 26.41 KG/M2 | TEMPERATURE: 99 F | SYSTOLIC BLOOD PRESSURE: 118 MMHG | WEIGHT: 184.5 LBS | DIASTOLIC BLOOD PRESSURE: 71 MMHG | RESPIRATION RATE: 16 BRPM

## 2025-03-11 DIAGNOSIS — Z87.19 HISTORY OF DIVERTICULITIS: Primary | ICD-10-CM

## 2025-03-11 PROCEDURE — 99999 PR PBB SHADOW E&M-EST. PATIENT-LVL III: CPT | Mod: PBBFAC,,, | Performed by: SURGERY

## 2025-03-11 PROCEDURE — 99213 OFFICE O/P EST LOW 20 MIN: CPT | Mod: PBBFAC,PN | Performed by: SURGERY

## 2025-03-11 NOTE — PROGRESS NOTES
70 yo M whom I saw while in hospital for diverticulitis.  Pt admitted on 2/7/25 with diverticulitis with suspected microperofation.  He has been doing well at home.  Denies n/v.  No fever/chills.  Pain controlled.      AFVSS  AAOx3  Soft/nd/nt  No resp distress    CT scan from yesterday reviewed and compared to previous.  Significant improvement in inflammation about the sigmoid colon.      A/P: Diverticulitis    Symptoms have resolved.    Ok to proceed with cscope.   Will arrange for cscope in April

## 2025-03-13 NOTE — PROGRESS NOTES
"ALLERGY & IMMUNOLOGY CLINIC - INITIAL CONSULTATION      HISTORY OF PRESENT ILLNESS     Patient ID: Toni Hyde is a 64 y.o. male    CC: query shrimp allergy    HPI: 63 yo man referred by Dr. Mon for possible shrimp allergy presents for initial evaluation.     About one year ago, he experienced hand swelling, facial redness, hand itching after eating shrimp. Symptoms come on about 30 minutes after ingestion. Sometimes he has had to peel the shrimp, but most occasions, he does not peel the shrimp. He takes a benadryl and symptoms take 2-3 hours to go away. He has never felt that his symptoms were severe or life-threatening, and he does avoid shrimp most of the time. Immunocaps are positive to shrimp. Notably, there were certain occasions when he ate shrimp without any symptoms at all. He sometimes takes cetirizine, so he is now wondering whether the days he eats shrimp without issues are days when he takes his cetirizine.     He is interested to know whether or not he can eat molluscs. He has not eaten them at all recently.     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      REVIEW OF SYSTEMS     CONST: no fevers or malaise  NEURO:  no weakness, no seizures  EYES: no discharge, no erythema  NOSE: no congestion, no rhinorrhea, no itching, no sneezing  PULM: no SOB,  no cough  CV: no cyanosis, no leg swelling  GI: no N/  DERM: no rashes, no skin breaks     MEDICAL HISTORY     MedHx: active problems reviewed     PHYSICAL EXAM     VS: Pulse 62   Ht 5' 10" (1.778 m)   Wt 81.8 kg (180 lb 5.4 oz)   SpO2 97%   BMI 25.88 kg/m²   GENERAL: alert, NAD, well-appearing, cooperative  EYES: EOMI, no conjunctival injection, no discharge, no infraorbital shiners  LUNGS:  no increased WOB  EXTREMITIES: no c/c/e  DERM: no rashes, no skin breaks  NEURO:  no facial asymmetry     ALLERGEN TESTING     Immunocaps: 3/2019: Shrimp 9.52     ASSESSMENT & PLAN     Toni Hyde is a 64 y.o. 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   Wondering about oyster allergy (not interested in eating mussels, scallops, clams), wanting testing for that today.     Plan:   IgE to oyster.   I reviewed mild vs severe symptoms of food allergy and asked him to update me if he ever has a severe symptom or multiple mild symptoms - if this happens, he would need an epinephrine autoinjector.       Follow up: javier Araya MD  Allergy/Immunology     yes

## 2025-03-24 DIAGNOSIS — Z00.00 ENCOUNTER FOR MEDICARE ANNUAL WELLNESS EXAM: ICD-10-CM

## 2025-04-24 NOTE — DISCHARGE INSTRUCTIONS
Your urologist is: Dr.Jennifer Phillips  Office number: [phone]  Address: [address] [place] [address], [address], [facility] [address]      PLEASE READ the following directions and contact our office with any questions via phone or the portal. If you were told that you need an appointment and no appointment was made, call the office the day after surgery and ask to speak with 's nurse to make an appointment.      Assesment: Toni Hyde is a 70 y.o. male with   1. BPH with obstruction/lower urinary tract symptoms        Plan:  Primary complain is overactive bladder - urinates q2 hours day and night with urgency. Can try an oab med. Sending in gemtesa but if not covered should see if myrbetriq covered. If myrbetriq not covered can try oxybutynin 10mg once daily  Bph s/p urolift with no recurrent b lobe hypertrophy - do not recommend flomax (causes dizziness) and not obstructed except possibly small bladder neck. If oab med  not helping consider TUIP in future (recent uroflow showed peak flow 9.2 with voided volume of 195)  Kidney stones- continue urocit K twice a day and rbus and kub in September.   Screening psa due in may 2025    Fu with urology np in June for aua ssx and pvr to see how doing on oab med with psa prior    Fu with me in 3 months to see if he may benefit from tuip if still c/o intermittent flow. Aua ssx and pvr       If your overactive bladder medication is too expensive, do the following:   Please call your  pharmacy or insurance and find out exactly what your  copay is for each overactive bladder medicine (list below) and call us or more preferablly write us back with which medicine you would like to try (that she has not tried already) and where you want it sent     List of Available Overactive Bladder medications- find out which is cheapest from pharmacist or your insurance and contact 's office with this information        BEST for older patients but expensive  usually (no constipation or confusion)  Myrbetriq/Mirbegron 25mg/50mg - takes 4 weeks to see any changes  Gemtesa/Vibegron 75mg- works quickly. Newest drug.     Better for older patients - less chance of causing confusion they have chemical properties that make them unlikely to reach the brain, and therefore less likely to cause cognitive side effect/confusion but can cause dry mouth or constipation still.  May take a month to see any changes in frequency and urgency or leakage  Sanctura/trospium   Enablex/darifenacin     These are cheaper but have more side effects (dry mouth/constipation.confusion/increased risk of dementia):    Works the quickest (within a few days)  Ditropan/oxybutynin Immediate release 5mg/10mg/15mg- this will be the cheapest but have the most side effects. Also works the fastest (Within a few days)  Ditropan/oxbutynin Extended release  5mg/10mg/15mg-  this will be the second cheapest but have the most side effects. Also works the fastest.     May take a month to see any changes in frequency and urgency or leakage  Toviaz/Fesoterodine   Vesicare/solfenacin 5mg or 10mg    Important information about the medications:  Myrbetriq and Vibegron are the two that should not cause dry mouth and constipation. The other ones listed may or may not cause dry mouth or constipation.  Immediate release (IR) formulations also have more side effects than the extended release (ER) formulations. '  Gemtesa/Vibegron and Ditropan/oxybutynin are the only medications which should work fairly quickly (within a week or so). The rest can take 4 weeks to see effects  Some medications require that you fail the generic versions first- for example, no improvement of symptoms after 4 weeks or side effects resulting in discontinuation.     Goal of medication: decrease frequency of urination, decrease urgency associated with urination and decrease urinary incontinence episodes associated with urge (ie less pads)    When to try  another medication or discontinue medication: If you see no improvement in your symptoms after 4 weeks or if you have side effects that prevent you from take medicine, we can try another medicine.     Contact us if no follow-up appointment has been made.      If still no improvement after that 2nd medicine then can discuss other treatment options for overactive bladder.      Post Cystoscopy Instructions    What to Expect After a Cystoscopy  For the next 24-48 hours, you may feel a mild burning when you urinate. This burning is normal and expected. Drink plenty of water to dilute the urine to help relieve the burning sensation. You may also see a small amount of blood in your urine after the procedure.    Unless you are already taking antibiotics, you may be given an antibiotic after the test to prevent infection.    Signs and Symptoms to Report  Call the [facility] at [phone] if you develop any of the following   Fever of 101 degrees or higher  Chills or persistent bleeding  Inability to urinate       After the procedure    Drink plenty of fluids.  You may have burning or light bleeding when you urinate--this is normal.  Medications may be prescribed to ease any discomfort or prevent infection. Take these as directed.  Call your doctor if you have heavy bleeding or blood clots, burning that lasts more than a day, a fever over 100°F  (38° C), or trouble urinating.    After Surgery:  Always be aware that any surgery can cause these symptoms:    Pain- Medication can be prescribed for pain to decrease your pain but may not completely take your pain away.  Over the Counter pain medicine my be enough and you can always use Ice and rest to help ease pain.    Bleeding- a little bleeding after a surgery is usually within normal.  If there is a lot of blood you need to notify your MD.  Emergency treatments of bleeding are cold application, elevation of the bleeding site and compression.    Infection-  Infection after surgery is NOT a normal occurrence.  Signs of infection are fever, swelling, hot to touch the incision.  If this occurs notify your MD immediately.    Nausea- this can be common after a surgery especially if you have had anesthesia medicine or are taking pain medicine.  Staying on clear liquids, bland foods, gingerale, or over the counter anti nausea medicines can help.  If you vomit more than once, notify your MD.  Anti Nausea medicines can be prescribed.

## 2025-04-28 ENCOUNTER — HOSPITAL ENCOUNTER (OUTPATIENT)
Facility: HOSPITAL | Age: 70
Discharge: HOME | End: 2025-04-28
Attending: UROLOGY | Admitting: UROLOGY
Payer: MEDICARE

## 2025-04-28 ENCOUNTER — TELEPHONE (OUTPATIENT)
Dept: UROLOGY | Facility: CLINIC | Age: 70
End: 2025-04-28
Payer: MEDICARE

## 2025-04-28 DIAGNOSIS — N40.1 BPH WITH OBSTRUCTION/LOWER URINARY TRACT SYMPTOMS: ICD-10-CM

## 2025-04-28 DIAGNOSIS — N32.81 OAB (OVERACTIVE BLADDER): Primary | ICD-10-CM

## 2025-04-28 DIAGNOSIS — N13.8 BPH WITH OBSTRUCTION/LOWER URINARY TRACT SYMPTOMS: ICD-10-CM

## 2025-04-28 DIAGNOSIS — I44.1 SECOND DEGREE HEART BLOCK: Primary | ICD-10-CM

## 2025-04-28 LAB
BILIRUBIN, POC UA: NEGATIVE
BLOOD, POC UA: NEGATIVE
CLARITY, UA: CLEAR
COLOR, UA: YELLOW
GLUCOSE, POC UA: 250 MG/DL
KETONES, POC UA: NEGATIVE
LEUKOCYTE EST, POC UA: NEGATIVE
NITRITE, POC UA: NEGATIVE
PH UR STRIP: 5.5 [PH] (ref 5–8)
PROTEIN, POC UA: 30 MG/DL
SPECIFIC GRAVITY, POC UA: >=1.03 (ref 1–1.03)
UROBILINOGEN, POC UA: 0.2 E.U./DL

## 2025-04-28 PROCEDURE — 52000 CYSTOURETHROSCOPY: CPT | Performed by: UROLOGY

## 2025-04-28 PROCEDURE — 25000003 PHARM REV CODE 250: Performed by: UROLOGY

## 2025-04-28 PROCEDURE — 52000 CYSTOURETHROSCOPY: CPT | Mod: ,,, | Performed by: UROLOGY

## 2025-04-28 PROCEDURE — A4217 STERILE WATER/SALINE, 500 ML: HCPCS | Performed by: UROLOGY

## 2025-04-28 RX ORDER — WATER 1 ML/ML
INJECTION IRRIGATION
Status: DISCONTINUED | OUTPATIENT
Start: 2025-04-28 | End: 2025-04-28 | Stop reason: HOSPADM

## 2025-04-28 RX ORDER — LIDOCAINE HYDROCHLORIDE 20 MG/ML
JELLY TOPICAL
Status: DISCONTINUED | OUTPATIENT
Start: 2025-04-28 | End: 2025-04-28 | Stop reason: HOSPADM

## 2025-04-28 NOTE — H&P
Formerly [facility], formerly known as [facility]  Group MD's:Alan/Suman/Emmanuel/Sarah  Group NP's: Yulia Pitts, Tequila Foster    PCP: Sal Solares PA-C  Date of Service: 04/28/2025  Today's note written by: MD Alan          Subjective:     's last note 8/22/24  Last saw NP 11/2021 noting: He has been known to have uric acid stones he also underwent ESWL in 2017 by Dr. Noel.  He has not experienced any recent stone episodes and no complaints.  He does continue to take allopurinol 100 mg p.o. q.d. and potassium citrate 15meq BID with no problems.  CT 1/12/21:  Right lower pole calyx with 4 mm stone and a 1 mm stone.  Left kidney has a 2 mm, a 2 mm, a 3 mm and a 11 mm stone seen from top to bottom. Bilateral renal cysts, larger on left with some peripheral calc, diverticulosis, 4.2cm prostate  Correltaing KUB 1/12/21: There are least 2 rounded calcifications left mid abdomen centered at the L2 level largest 9 mm.  Vascular phleboliths left pelvic sidewall. Impression notes   4/25/17 L ureteral ESWL and stent removal. For 9mm mid ureteral stone. Cysto noted 4 cm with mild PEREIRA   Patient reports previously having calcium oxalate stones analyzed 20 years prior, and only started potassium citrate with findings of 100% uric acid stone on stone analysis from his ESWL past fragments in 2017. On chart review, I did find his stone analysis confirming 100% uric acid, as well as a 24 hour urine 1 month later on 5/22/17 which had good urine volume of 2 L and normal urine uric acid but high saturation of uric acid 3.4, and high saturation of calcium oxalate at 6.63 increasing the risk of both types of stones, with a very low pH of 4.981 despite good citrate at 1698, and high dietary oxalate at 46.   He denies any history of gout, or prior known uric acid issues prior to 2017.   Uric acid, serum, on file with primary care is 4.9 in October 2021, normal.  Creatinine 1.4/EGFR  52.7 in October 2021 as well.  On review, has had mild CKD 3 for quite some time with baseline GFR around 55-58  Not following low purine or low uric acid diet. Was told previously to reduce meat intake previously  Last PSA on file 0.6 in 2020.  Stable in the 0.40.6 range for all years prior.  No family history of prostate cancer.   Personal review of CT scan from January 2020 to consistent with report above with large left lower pole stone burden about 1-1.1 cm and scattered other small punctate stones throughout the left collecting system upper and lower pole.  In the right kidney there is a 4 mm lower pole stone and a smaller upper pole stone.  The left kidney has a central midpole cyst extending towards the collecting system as well as an upper pole cyst.  Reported as cyst stable from previous imaging, the upper pole lesion looks less fluid density on the non contrasted imaging to me   He does report intermittent stone fragment passage. More so dust/small fragments. Only know bc gets hung up in urethra.  A few times a year.  No significant pain or stone episodes.  Occasional flank or back pain which may or may not be related.  No blood in the urine.  Urinalysis is dipstick negative today.  Has been taking 100 mg allopurinol and 15 mEq daily, not b.i.d., of potassium citrate.   AUA SS: 15/3 (4 intermittency, 3: freq, urgency; 2 sleeping, 1: emptying, weak, strain)  Stops and starts a lot. If occupied easy to postpone, but there is some urgency when just sitting around. Occ NTF 2-3x/night, but if no fluids after 8pm can make it through the night    5/5/22 Procedure(s) Performed:   Left ureteroscopy with holmium laser lithotripsy and stone basket extraction  Cystoscopy with placement of left double-J ureteral stent 6 Polish by 26 cm  Left retrograde pyelogram including injection of contrast  Fluoroscopy less than 1 hour including interpretation of fluoroscopic images  Findings:  scattered left stones in upper  middle and lower pole with larger burden in lower pole, all removed  22 Procedure(s) Performed:   Cystoscopy with left ureteral stent removal  Findings: ureteral stent, removed     22 patient presents to clinic for f/u kidney stones. He has been doing well since procedure. He increased urocit K to 1 tablet BID as instructed. He drinks ~ 1 L of water with crystal light lemonade per day. He is trying to increase water intake. Denies dysuria, gross hematuria, flank pain, f/c/n/v. He is taking alfusozin for BPH/LUTS. He reports his symptoms have mildly improved. Flomax worked better on LUTS but he was unable to tolerate d/t dizziness.       3/13/23  Patient presents today for f/u kidney stones. He has been doing well since last clinic visit. He is taking urocit K 1 tablet BID and drinks 1-1.5 L water and lemonade per day. Also drinks 1-2 cups coffee per day. Denies dysuria, gross hematuria, flank pain, fever, chills, nausea or vomiting.   He is taking alfuzosin for BPH. He reports nocturia x 2 and occasional daytime frequency. Flomax worked better but he could not tolerate d/t dizziness.       23  S/p ESWL, left 23 creatinine 1.1 / GFR>60  KUB today  23 Stone analysis: 100% Calcium oxalate monohydrate  Drinks 1 L crystal light lemonade plus 2-3 true lime packets daily and ~ 0.5 L water per day.  Takes urocit K 15 mEq BID  He is taking alfuzosin for BPH. Voids every 2 hours during the day and nocturia x 1-2. Moderate urinary stream. Denies dysuria, gross hematuria, flank pain, fever, chills, nausea or vomiting. Flomax did work better but unable to tolerate d/t dizziness.      24 Hour Urine Results From [facility] Date: 10/11/23    Urine volume: 1.09    24  AUA SS: Feeling of incomplete Emptyin, Frequency: 4, Intermittency: 3, Urgency: 4, Weak Stream: 2, Strainin, Sleepin, Total SS: 20 , Quality of Life: 3  UA negative  PVR 17 ml  On uroxatral  Denies dysuria, gross  "hematuria, flank pain, fever, chills, nausea or vomiting     Taking urocit K as ordered for kidney stones  24 K 3.9, creatinine 1.2, GFR >60    Procedure(s) Performed 24  Urolift: cystoscopy with prostatic urethral lift/transprostatic tissue retraction (90103) with delivery of 7 total implants ([id number] x 6)     24 appt with Tequila ALVARES/demetris urolift 24  cystoscopy with prostatic urethral lift/transprostatic tissue retraction with delivery of 7 total implants   AUJENNIFER SS: Feeling of incomplete Emptyin, Frequency: 2, Intermittency: 1, Urgency: 2, Weak Stream: 1, Strainin, Sleepin, Total SS: 7 , Quality of Life: 2  Stopped uroxatral as directed  Denies dysuria, gross hematuria, flank pain, fever, chills, nausea or vomiting  PVR 4 ml  Pt is pleased with results of urolift and improvement to LUTS  24 PSA 0.53    24 by Tequila   AUJENNIFER SS: Feeling of incomplete Emptying: 3, Frequency: 2, Intermittency: 4, Urgency: 2, Weak Stream: 2, Strainin, Sleepin, Total SS: 13 , Quality of Life: 3  PVR 8 ml  UA neg blood, leuk or nitrite  Denies dysuria, gross hematuria, flank pain, fever, chills, nausea or vomiting  Pt feels "like something is inside bladder" if he does anything strenuous or on elliptical. No pain or pressure.  Taking urocit-K 15 meq 1 tablet BID for kidney stones  24 K 4.6  24 ANDREA: possible bladder stone/calcification.   CT RSS today but not read by radiology yet  -LUTS has worsened since last visit, mainly feeling of incomplete bladder emptying and intermittent stream. Will send message to MD alvares/p urolift 24     Uroflow results   Peak flowrate: 9.2 mL/s,   Average flowrate: 3.5mL/s,   Intervals: 7,   Voided volume: 195 mL,      Note by  25  Uroflow reviewed with parameters documented in nursing note  Consistent with his post urolift concerns at NP visit, does have pattern concerning for obstruction  Small initial low peaking curve that largely is flat " intermittent  Concerning for stricture  WOULD recommend cystoscopy      Interval history/H&P Update by me/ prior to cysto to eval for stricture sp urolift on 4/28/25:    Ctrss 2/17/25  Multiple renal calculi, with the largest at the lower pole the left kidney measuring 10 mm. Numerous additional left renal calculi, mostly 3-4 mm. Three renal calculi on the right including 4 mm calculus at the lower pole, as well as 2 and 5 mm calculi at the upper pole.   Multiple bilateral renal lesions, most likely cysts, including hemorrhagic and proteinaceous, which could be more fully characterized with retroperitoneal sonography.   Previously seen inflammatory changes associated with diverticulitis involving the sigmoid colon have improved, with tiny residual bubble of extraluminal gas in the left lower quadrant.         Ua today: 250 glucose      Urine history:   4/28/25 250 glucose    PSA history:    Lab Results   Component Value Date    PSA 0.53 05/09/2024    PSA 0.56 03/13/2023    PSA 0.56 03/11/2022         Current REVIEW OF SYSTEMS:  Negative for the remaining 12 points of ROS except for as stated above       PMHx:  Past Medical History:   Diagnosis Date    Arthritis     Cataract     Cataract of left eye     Diabetes mellitus type II     on po meds    Hyperlipidemia     not on meds at present    Hypertension     Kidney stones 04/2017    Left ureteral stone     Plantar fasciitis of right foot 10/2017    Dr Colorado     Tendonitis 01/2018    right foot Dr Colorado    Wears glasses        PSHx:  Past Surgical History:   Procedure Laterality Date    ADENOIDECTOMY      BIOPSY OF MASS OF LUMBAR SPINE N/A 10/19/2022    Procedure: BIOPSY, MASS, SPINE, LUMBAR;  Surgeon: Marvel Matthews DO;  Location: [facility];  Service: Neurosurgery;  Laterality: N/A;  Right L5-S1 Resection of Extradural Mass Using Transfacet Approach    CATARACT EXTRACTION      COLONOSCOPY  03/23/2010    Dr. Blackburn, hyperplastic polyps, otherwise  negative, 10 year recheck    COLONOSCOPY N/A 11/19/2021    Procedure: COLONOSCOPY;  Surgeon: Marcus Sexton MD;  Location: [facility];  Service: Endoscopy;  Laterality: N/A;    CYSTOSCOPY Left 04/12/2017    with ureteral stent-Dr. Noel    CYSTOSCOPY Left 05/17/2022    Procedure: CYSTOSCOPY with stent removal;  Surgeon: Ronal Benitez MD;  Location: [facility] OR;  Service: Urology;  Laterality: Left;    CYSTOSCOPY N/A 7/9/2024    Procedure: CYSTOSCOPY;  Surgeon: Ronal Benitez MD;  Location: [facility];  Service: Urology;  Laterality: N/A;    CYSTOSCOPY WITH INSERTION OF MINIMALLY INVASIVE IMPLANT TO ENLARGE PROSTATIC URETHRA N/A 8/22/2024    Procedure: CYSTOSCOPY, WITH INSERTION OF UROLIFT IMPLANT;  Surgeon: Ronal Benitez MD;  Location: [facility] OR;  Service: Urology;  Laterality: N/A;    CYSTOSCOPY WITH URETEROSCOPY, RETROGRADE PYELOGRAPHY, AND INSERTION OF STENT Left 05/05/2022    Procedure: CYSTOSCOPY, WITH RETROGRADE PYELOGRAM AND URETERAL STENT INSERTION;  Surgeon: Ronal Benitez MD;  Location: [facility] OR;  Service: Urology;  Laterality: Left;    EXTRACORPOREAL SHOCK WAVE LITHOTRIPSY Left 4/27/2023    Procedure: LITHOTRIPSY, ESWL;  Surgeon: Ronal Benitez MD;  Location: [facility] OR;  Service: Urology;  Laterality: Left;    EYE SURGERY  05/06/2013    Dr Nielsen    kidney stent   04/2017    KNEE ARTHROSCOPY W/ DEBRIDEMENT      right with lateral release     LITHOTRIPSY  04/19/2017    NASAL SINUS SURGERY      retna surgery   05/2013    Dr Peralta    SPINE SURGERY      TONSILLECTOMY      TRANSFORAMINAL EPIDURAL INJECTION OF STEROID Right 12/07/2020    Procedure: Injection,steroid,epidural,transforaminal approach L5- S1;  Surgeon: Kermit Davalos MD;  Location: [facility] OR;  Service: Pain Management;  Laterality: Right;  L5-s1    TRANSFORAMINAL EPIDURAL INJECTION OF STEROID Right 06/23/2022    Procedure: Injection,steroid,epidural,transforaminal approach;  Surgeon: Christos Saul MD;  Location: [facility] OR;  Service: Pain  Management;  Laterality: Right;  L5-S1 and cyst aspiration L5-S1    TRANSRECTAL ULTRASOUND EXAMINATION N/A 7/9/2024    Procedure: ULTRASOUND, RECTAL APPROACH;  Surgeon: Ronal Benitez MD;  Location: [facility] OR;  Service: Urology;  Laterality: N/A;    URETEROSCOPIC REMOVAL OF URETERIC CALCULUS Left 05/05/2022    Procedure: REMOVAL, CALCULUS, URETER, URETEROSCOPIC;  Surgeon: Ronal Benitez MD;  Location: [facility] OR;  Service: Urology;  Laterality: Left;       Fam Hx:  Reviewed- pertinent family hx as above    Soc Hx:  Social History[1]    Allergies:  Shrimp    Anticoagulation/Aspirin: none    Objective:     Vitals:    04/28/25 1354   BP: (!) 142/75   Pulse: (!) 53   Resp: 17   Temp: 98.2 °F (36.8 °C)         General:wdwn  in NAD  Neurologic: CN grossly normal. Normal sensation.   Psychiatric: awake, alert and oriented x 3. Mood and affect Normal. Cooperative.  Eyes: PERRLA, normal conjunctiva  Respiratory: no increased work on breathing. No wheezing.   Cardiovascular: No obvious extremity edema. Warm and well perfused.  GI: no obvious stomach distension  Musculoskeletal: normal  range of motion of bilateral upper extremities. Normal muscle strength and tone.  Skin: no obvious rashes or lesions. No tightening of skin noted.    Pertinent  exam in HPI    Recent Labs   Lab 02/07/25  1125 02/08/25  0507 02/09/25  0432   WBC 17.14 H 12.97 H 10.97   Hemoglobin 16.5 14.7 14.5   Hematocrit 50.9 44.4 45.1   Platelets 170 137 L 146 L   ]  Recent Labs   Lab 02/07/25  1125 02/07/25  1552 02/08/25  0507 02/09/25  0432   Sodium 137  --  134 L 137   Potassium 4.2  --  4.1 4.2   Chloride 100  --  102 103   CO2 26  --  21 L 23   BUN 18  --  17 17   Creatinine 1.4  --  1.2 1.0   Glucose 160 H  --  132 H 112 H   Calcium 9.5  --  8.4 L 8.6 L   Magnesium  --  1.8 1.8 1.9   Alkaline Phosphatase 63  --  52 49   Total Protein 8.1  --  6.7 6.7   Albumin 4.2  --  3.3 L 3.1 L   Total Bilirubin 2.6 H  --  2.3 H 1.6 H   AST 19  --  13 14    ALT 14  --  10 10   ]    Lab Results   Component Value Date    HGBA1C 6.8 (H) 2024         Assessment:     Toni Hyde is a 70 y.o. male with No diagnosis found.    Kd stones  Luts s/purolift       Plan:     Here today for cysto to eval for stricture     I have explained the risks including hematuria and UTI, benefits, and alternatives of the procedure in detail.  The patient voices understanding and all questions have been answered.  A verbal and written consent was obtained. The patient agrees to proceed as planned.    This patient has been cleared for surgery in ambulatory surgical facility.       Still needs:  Monitoring/tx of stones  Psa due may 2025        Monika Phillips MD          [1]   Social History  Tobacco Use    Smoking status: Former     Current packs/day: 0.00     Types: Cigarettes     Quit date: 1978     Years since quittin.0    Smokeless tobacco: Never    Tobacco comments:     Occ cigar   Substance Use Topics    Alcohol use: Yes     Alcohol/week: 1.0 standard drink of alcohol     Types: 1 Standard drinks or equivalent per week     Comment: 1/week    Drug use: No

## 2025-04-28 NOTE — TELEPHONE ENCOUNTER
----- Message from Monika Phillips MD sent at 4/28/2025  2:38 PM CDT -----  Fu with urology np in June for aua ssx and pvr to see how doing on oab med with psa priorFu with me in 3 months to see if he may benefit from tuip if still c/o intermittent flow. Aua ssx and pvr

## 2025-04-28 NOTE — PLAN OF CARE
Upon arrival to recovery room patiens HR is 38. Pulse correlates with HR on monitor. Leads placed on patient to check rhythm. Pt appears to by dropping beats and may have a heart block. EKG strip and report given to Dr. Phillips who then contacted pt primary care to discuss findings.

## 2025-04-28 NOTE — DISCHARGE SUMMARY
[facility] - Periop Services  Urology  Discharge Note - Short Stay      Patient Name: Toni Hyde  MRN: [id number]  Discharge Date and Time: 04/28/2025 2:39 PM  Attending Physician: Monika Phillips.*   Discharging Provider: Monika Phillips MD  Primary Care Physician: Sal Solares PA-C    There are no hospital problems to display for this patient.      Final Diagnoses: Same as principal problem.    Hospital Course: Patient was admitted for an outpatient procedure and tolerated the procedure well with no complications.*    Procedure(s) (LRB):  CYSTOSCOPY (N/A)     Indwelling Lines/Drains at time of discharge:   Lines/Drains/Airways       None                   Discharged Condition: good    Disposition: home    Follow Up:      Patient Instructions:   No discharge procedures on file.    Medications:  Reconciled Home Medications:      Medication List        START taking these medications      vibegron 75 mg Tab  Take 1 tablet (75 mg total) by mouth every morning. Take 1 by mouth every morning for overactive bladder            CONTINUE taking these medications      acetaminophen 500 mg Cap  Commonly known as: TYLENOL     amLODIPine 2.5 MG tablet  Commonly known as: NORVASC  Take 1 tablet (2.5 mg total) by mouth once daily.     cetirizine 10 MG tablet  Commonly known as: ZYRTEC  Take 10 mg by mouth daily as needed. 1 Tablet(s) Oral PRN .     diphenhydrAMINE 25 mg capsule  Commonly known as: BENADRYL  Take 25 mg by mouth nightly as needed for Itching.     fluticasone propionate 50 mcg/actuation nasal spray  Commonly known as: FLONASE  2 sprays by Each Nare route daily as needed.     ibuprofen 200 MG tablet  Commonly known as: ADVIL,MOTRIN  Take 200 mg by mouth every 8 (eight) hours as needed for Pain.     metFORMIN 750 MG ER 24hr tablet  Commonly known as: GLUCOPHAGE-XR  Take 1 tablet (750 mg total) by mouth 2 (two) times daily with meals.     MUCINEX ORAL  Take 400 mg by mouth 2  (two) times daily as needed for Congestion.     potassium citrate 15 mEq Tbsr  Commonly known as: UROCIT-K 15  TAKE 1 TABLET TWICE A DAY     rosuvastatin 20 MG tablet  Commonly known as: CRESTOR  Take 1 tablet (20 mg total) by mouth once daily.     valsartan 320 MG tablet  Commonly known as: DIOVAN  TAKE 1 TABLET DAILY              Discharge Procedure Orders (must include Diet, Follow-up, Activity):  No discharge procedures on file.       Assesment: Toni Hyde is a 70 y.o. male with   1. BPH with obstruction/lower urinary tract symptoms        Plan:  Primary complain is overactive bladder - urinates q2 hours day and night with urgency. Can try an oab med. Sending in gemtesa but if not covered should see if myrbetriq covered. If myrbetriq not covered can try oxybutynin 10mg once daily  Bph s/p urolift with no recurrent b lobe hypertrophy - do not recommend flomax (causes dizziness) and not obstructed except possibly small bladder neck. If oab med  not helping consider TUIP in future (recent uroflow showed peak flow 9.2 with voided volume of 195)  Kidney stones- continue urocit K twice a day and rbus and kub in September.   Screening psa due in may 2025    Fu with urology np in June for aua ssx and pvr to see how doing on oab med with psa prior    Fu with me in 3 months to see if he may benefit from tuip if still c/o intermittent flow. Aua ssx and pvr     Monika Phillips MD  Urology  [facility] - Periop Services

## 2025-04-28 NOTE — OP NOTE
Urology [place] [place] Procedure Note- [facility]  Date: 04/28/2025    Procedure:    Flexible cystourethroscopy 4/28/25    Pre Procedure Diagnosis:  1. BPH with obstruction/lower urinary tract symptoms        Post Procedure Diagnosis: same, see below for findings    Surgeon: Monika Phillips MD    Indications: Toni Hyde is a 70 y.o. male with above pre-procedure diagnosis. Urine from today reviewed. H&P reviewed.     Specimen: none    Anesthesia: 2% uro-jet lidocaine jelly for local analgesia    Procedure in detail:   Flexible cysto-urethroscopy was performed after consent was obtained.  The risks and benefits were explained.    2% lidocaine urojet was used for local analgesia.  The genitalia was prepped and draped in the sterile fashion with betadine.    The flexible scope was advanced into the urethra and into the bladder.  Bilateral ureteral orifice were evaluated and noted to be normal with clear efflux.  The bladder was completely surveyed in a systematic fashion and the cytoscope was retroflexed.  Cystoscopy findings as listed below.     The patient tolerated the procedure well without complication.    Date: 4/28/25, Procedure:  Flexible cystourethroscopy   Cystoscopy Findings:  Bladder neck narrow. Not high riding. Easily able to pass scope. Previous urolifts seen. No obstruction from lateral lobes. Grade 2 trabeculations with one large wide mouth diverticula. Minimal intravesical component seen.                                   Assesment: Toni Hyde is a 70 y.o. male with   1. BPH with obstruction/lower urinary tract symptoms        Plan:  Primary complain is overactive bladder - urinates q2 hours day and night with urgency. Can try an oab med. Sending in gemtesa but if not covered should see if myrbetriq covered. If myrbetriq not covered can try oxybutynin 10mg once daily  Bph s/p urolift with no recurrent b lobe hypertrophy - do not recommend flomax (causes dizziness) and not obstructed  except possibly small bladder neck. If oab med  not helping consider TUIP in future (recent uroflow showed peak flow 9.2 with voided volume of 195)  Kidney stones- continue urocit K twice a day and rbus and kub in September.   Screening psa due in may 2025    Fu with urology np in June for aua ssx and pvr to see how doing on oab med with psa prior    Fu with me in 3 months to see if he may benefit from tuip if still c/o intermittent flow. Aua ssx and pvr   Monika Phillips MD

## 2025-04-28 NOTE — PLAN OF CARE
Notified py that primary care is sending a referral to consult cardiology for follow up related to low HR. Reviewed AVS. Verbalized understanding. Denies further questions , needs or complaints. Denies pain, able to void without difficulty. Home via private vehicle. Ambulates well.

## 2025-04-29 ENCOUNTER — TELEPHONE (OUTPATIENT)
Dept: GASTROENTEROLOGY | Facility: CLINIC | Age: 70
End: 2025-04-29
Payer: MEDICARE

## 2025-04-29 VITALS
TEMPERATURE: 98 F | OXYGEN SATURATION: 97 % | SYSTOLIC BLOOD PRESSURE: 148 MMHG | HEART RATE: 48 BPM | BODY MASS INDEX: 26.41 KG/M2 | HEIGHT: 70 IN | RESPIRATION RATE: 18 BRPM | DIASTOLIC BLOOD PRESSURE: 80 MMHG | WEIGHT: 184.5 LBS

## 2025-04-29 DIAGNOSIS — Z86.0100 HISTORY OF COLON POLYPS: ICD-10-CM

## 2025-04-29 DIAGNOSIS — Z12.11 SCREENING FOR COLON CANCER: Primary | ICD-10-CM

## 2025-04-29 NOTE — TELEPHONE ENCOUNTER
----- Message from Yvonne sent at 4/29/2025 10:55 AM CDT -----  Name of Who is Calling:PT What is the request in detail:Pt would like a callback from the office to madelin colonoscopy.Please advise thank you Can the clinic reply by [facility]:NO What Number to Call Back if not in [facility]:Telephone Information:Mobile          [phone]

## 2025-05-02 NOTE — ANESTHESIA PREPROCEDURE EVALUATION
08/22/2024  Toni Hyde is a 69 y.o., male.      Pre-op Assessment    I have reviewed the Patient Summary Reports.     I have reviewed the Nursing Notes. I have reviewed the NPO Status.   I have reviewed the Medications.     Review of Systems  Anesthesia Hx:  No problems with previous Anesthesia             Denies Family Hx of Anesthesia complications.    Denies Personal Hx of Anesthesia complications.                    Social:  Former Smoker       Cardiovascular:  Exercise tolerance: good   Hypertension                                        Renal/:  Chronic Renal Disease renal calculi               Musculoskeletal:  Arthritis          Spine Disorders: lumbar            Neurological:    Neuromuscular Disease,             Peripheral Neuropathy                          Endocrine:  Diabetes, type 2               Physical Exam  General: Well nourished    Airway:  Mallampati: II   Mouth Opening: Normal  TM Distance: Normal  Tongue: Normal  Neck ROM: Normal ROM    Dental:  Intact    Chest/Lungs:  Normal Respiratory Rate    Heart:  Rate: Normal  Rhythm: Regular Rhythm        Anesthesia Plan  Type of Anesthesia, risks & benefits discussed:    Anesthesia Type: Gen Supraglottic Airway  Intra-op Monitoring Plan: Standard ASA Monitors  Post Op Pain Control Plan: multimodal analgesia  Induction:  IV  Airway Plan: , Post-Induction  Informed Consent: Informed consent signed with the Patient and all parties understand the risks and agree with anesthesia plan.  All questions answered. Patient consented to blood products? Yes  ASA Score: 2    Ready For Surgery From Anesthesia Perspective.     .      
n/a

## 2025-05-22 ENCOUNTER — PATIENT MESSAGE (OUTPATIENT)
Dept: UROLOGY | Facility: CLINIC | Age: 70
End: 2025-05-22
Payer: MEDICARE

## 2025-05-30 ENCOUNTER — ANESTHESIA EVENT (OUTPATIENT)
Dept: ENDOSCOPY | Facility: HOSPITAL | Age: 70
End: 2025-05-30
Payer: MEDICARE

## 2025-05-30 ENCOUNTER — ANESTHESIA (OUTPATIENT)
Dept: ENDOSCOPY | Facility: HOSPITAL | Age: 70
End: 2025-05-30
Payer: MEDICARE

## 2025-05-30 ENCOUNTER — HOSPITAL ENCOUNTER (OUTPATIENT)
Facility: HOSPITAL | Age: 70
Discharge: HOME | End: 2025-05-30
Attending: INTERNAL MEDICINE | Admitting: INTERNAL MEDICINE
Payer: MEDICARE

## 2025-05-30 DIAGNOSIS — Z86.0100 HISTORY OF COLON POLYPS: ICD-10-CM

## 2025-05-30 PROCEDURE — 45380 COLONOSCOPY AND BIOPSY: CPT | Mod: PT,XS | Performed by: INTERNAL MEDICINE

## 2025-05-30 PROCEDURE — 63600175 PHARM REV CODE 636 W HCPCS: Performed by: NURSE ANESTHETIST, CERTIFIED REGISTERED

## 2025-05-30 PROCEDURE — 88305 TISSUE EXAM BY PATHOLOGIST: CPT | Mod: TC,59 | Performed by: PATHOLOGY

## 2025-05-30 PROCEDURE — 27201089 HC SNARE, DISP (ANY): Performed by: INTERNAL MEDICINE

## 2025-05-30 PROCEDURE — 37000009 HC ANESTHESIA EA ADD 15 MINS: Performed by: INTERNAL MEDICINE

## 2025-05-30 PROCEDURE — 37000008 HC ANESTHESIA 1ST 15 MINUTES: Performed by: INTERNAL MEDICINE

## 2025-05-30 PROCEDURE — 25000003 PHARM REV CODE 250: Performed by: INTERNAL MEDICINE

## 2025-05-30 PROCEDURE — 45385 COLONOSCOPY W/LESION REMOVAL: CPT | Mod: PT | Performed by: INTERNAL MEDICINE

## 2025-05-30 PROCEDURE — 45380 COLONOSCOPY AND BIOPSY: CPT | Mod: PT,XS,, | Performed by: INTERNAL MEDICINE

## 2025-05-30 PROCEDURE — 27201012 HC FORCEPS, HOT/COLD, DISP: Performed by: INTERNAL MEDICINE

## 2025-05-30 PROCEDURE — 45385 COLONOSCOPY W/LESION REMOVAL: CPT | Mod: PT,,, | Performed by: INTERNAL MEDICINE

## 2025-05-30 RX ORDER — SODIUM CHLORIDE 9 MG/ML
INJECTION, SOLUTION INTRAVENOUS CONTINUOUS
Status: DISCONTINUED | OUTPATIENT
Start: 2025-05-30 | End: 2025-05-30 | Stop reason: HOSPADM

## 2025-05-30 RX ORDER — LIDOCAINE HYDROCHLORIDE 20 MG/ML
INJECTION INTRAVENOUS
Status: DISCONTINUED | OUTPATIENT
Start: 2025-05-30 | End: 2025-05-30

## 2025-05-30 RX ORDER — PROPOFOL 10 MG/ML
VIAL (ML) INTRAVENOUS
Status: DISCONTINUED | OUTPATIENT
Start: 2025-05-30 | End: 2025-05-30

## 2025-05-30 RX ADMIN — PROPOFOL 30 MG: 10 INJECTION, EMULSION INTRAVENOUS at 07:05

## 2025-05-30 RX ADMIN — PROPOFOL 100 MG: 10 INJECTION, EMULSION INTRAVENOUS at 07:05

## 2025-05-30 RX ADMIN — SODIUM CHLORIDE: 9 INJECTION, SOLUTION INTRAVENOUS at 07:05

## 2025-05-30 RX ADMIN — LIDOCAINE HYDROCHLORIDE 80 MG: 20 INJECTION, SOLUTION INTRAVENOUS at 07:05

## 2025-05-30 NOTE — TRANSFER OF CARE
Anesthesia Transfer of Care Note    Patient: Toni Hyde    Procedure(s) Performed: Procedure(s) (LRB):  COLONOSCOPY, SCREENING, HIGH RISK PATIENT(Ok to move earlier) (N/A)    Patient location: GI    Anesthesia Type: general    Transport from OR: Transported from OR on room air with adequate spontaneous ventilation    Post pain: adequate analgesia    Post assessment: no apparent anesthetic complications and tolerated procedure well    Post vital signs: stable    Level of consciousness: sedated and responds to stimulation    Nausea/Vomiting: no nausea/vomiting    Complications: none    Transfer of care protocol was followed      Last vitals: Visit Vitals  BP (!) 103/55   Pulse 60   Temp 36.7 °C (98 °F)   Resp 16   SpO2 95%

## 2025-05-30 NOTE — ANESTHESIA PREPROCEDURE EVALUATION
05/30/2025  Toni Hyde is a 70 y.o., male.      Pre-op Assessment    I have reviewed the Patient Summary Reports.     I have reviewed the Nursing Notes. I have reviewed the NPO Status.   I have reviewed the Medications.     Review of Systems  Anesthesia Hx:  No problems with previous Anesthesia             Denies Family Hx of Anesthesia complications.    Denies Personal Hx of Anesthesia complications.                    Social:  Former Smoker       Hematology/Oncology:                   Hematology Comments: Plt 146k                    Cardiovascular:  Exercise tolerance: good   Hypertension              ECG has been reviewed.                            Pulmonary:  Pulmonary Normal                       Renal/:  Chronic Renal Disease renal calculi               Hepatic/GI:  Bowel Prep.                   Musculoskeletal:  Arthritis          Spine Disorders: lumbar            Neurological:    Neuromuscular Disease,             Peripheral Neuropathy                          Endocrine:  Diabetes, type 2               Physical Exam  General: Well nourished    Airway:  Mallampati: II   Mouth Opening: Normal  TM Distance: Normal  Tongue: Normal  Neck ROM: Normal ROM    Dental:  Intact    Chest/Lungs:  Normal Respiratory Rate    Heart:  Rate: Normal  Rhythm: Regular Rhythm        Anesthesia Plan  Type of Anesthesia, risks & benefits discussed:    Anesthesia Type: Gen Natural Airway  Intra-op Monitoring Plan: Standard ASA Monitors  Induction:  IV  Airway Plan: , Post-Induction  Informed Consent: Informed consent signed with the Patient and all parties understand the risks and agree with anesthesia plan.  All questions answered. Patient consented to blood products? Yes  ASA Score: 2    Ready For Surgery From Anesthesia Perspective.     .

## 2025-05-30 NOTE — ANESTHESIA POSTPROCEDURE EVALUATION
Anesthesia Post Evaluation    Patient: Toni Hyde    Procedure(s) Performed: Procedure(s) (LRB):  COLONOSCOPY, SCREENING, HIGH RISK PATIENT(Ok to move earlier) (N/A)    Final Anesthesia Type: general      Patient location during evaluation: PACU  Patient participation: Yes- Able to Participate  Level of consciousness: sedated and awake  Post-procedure vital signs: reviewed and stable  Pain management: adequate  Airway patency: patent    PONV status at discharge: No PONV  Anesthetic complications: no      Cardiovascular status: blood pressure returned to baseline and hemodynamically stable  Respiratory status: spontaneous ventilation  Hydration status: euvolemic  Follow-up not needed.              Vitals Value Taken Time   /68 05/30/25 09:03   Temp 36.5 °C (97.7 °F) 05/30/25 07:58   Pulse 68 05/30/25 09:03   Resp 11 05/30/25 09:03   SpO2 95 % 05/30/25 09:03   Vitals shown include unfiled device data.      Event Time   Out of Recovery 08:30:00         Pain/Mallika Score: Mallika Score: 9 (5/30/2025  8:20 AM)

## 2025-06-02 VITALS
HEART RATE: 48 BPM | OXYGEN SATURATION: 97 % | TEMPERATURE: 98 F | DIASTOLIC BLOOD PRESSURE: 75 MMHG | SYSTOLIC BLOOD PRESSURE: 123 MMHG | RESPIRATION RATE: 23 BRPM

## 2025-06-03 ENCOUNTER — RESULTS FOLLOW-UP (OUTPATIENT)
Dept: GASTROENTEROLOGY | Facility: CLINIC | Age: 70
End: 2025-06-03

## 2025-06-17 ENCOUNTER — OFFICE VISIT (OUTPATIENT)
Dept: CARDIOLOGY | Facility: CLINIC | Age: 70
End: 2025-06-17
Payer: MEDICARE

## 2025-06-17 VITALS
WEIGHT: 191.81 LBS | HEART RATE: 60 BPM | SYSTOLIC BLOOD PRESSURE: 140 MMHG | BODY MASS INDEX: 27.46 KG/M2 | HEIGHT: 70 IN | DIASTOLIC BLOOD PRESSURE: 92 MMHG

## 2025-06-17 DIAGNOSIS — E11.59 HYPERTENSION ASSOCIATED WITH DIABETES: ICD-10-CM

## 2025-06-17 DIAGNOSIS — I44.1 SECOND DEGREE HEART BLOCK: ICD-10-CM

## 2025-06-17 DIAGNOSIS — E78.5 HYPERLIPIDEMIA, UNSPECIFIED HYPERLIPIDEMIA TYPE: ICD-10-CM

## 2025-06-17 DIAGNOSIS — I15.2 HYPERTENSION ASSOCIATED WITH DIABETES: ICD-10-CM

## 2025-06-17 DIAGNOSIS — I45.3 TRIFASCICULAR BLOCK: ICD-10-CM

## 2025-06-17 DIAGNOSIS — R00.1 BRADYCARDIA: Primary | ICD-10-CM

## 2025-06-17 PROCEDURE — 99214 OFFICE O/P EST MOD 30 MIN: CPT | Mod: PBBFAC,PN | Performed by: INTERNAL MEDICINE

## 2025-06-17 PROCEDURE — 99999 PR PBB SHADOW E&M-EST. PATIENT-LVL IV: CPT | Mod: PBBFAC,,, | Performed by: INTERNAL MEDICINE

## 2025-06-17 PROCEDURE — 99204 OFFICE O/P NEW MOD 45 MIN: CPT | Mod: S$PBB,,, | Performed by: INTERNAL MEDICINE

## 2025-06-18 LAB
OHS QRS DURATION: 134 MS
OHS QTC CALCULATION: 415 MS

## 2025-06-27 ENCOUNTER — RESULTS FOLLOW-UP (OUTPATIENT)
Dept: FAMILY MEDICINE | Facility: CLINIC | Age: 70
End: 2025-06-27

## 2025-06-27 ENCOUNTER — OFFICE VISIT (OUTPATIENT)
Dept: UROLOGY | Facility: CLINIC | Age: 70
End: 2025-06-27
Payer: MEDICARE

## 2025-06-27 ENCOUNTER — RESULTS FOLLOW-UP (OUTPATIENT)
Dept: UROLOGY | Facility: CLINIC | Age: 70
End: 2025-06-27

## 2025-06-27 ENCOUNTER — LAB VISIT (OUTPATIENT)
Dept: LAB | Facility: HOSPITAL | Age: 70
End: 2025-06-27
Payer: MEDICARE

## 2025-06-27 VITALS — BODY MASS INDEX: 27.46 KG/M2 | WEIGHT: 191.81 LBS | HEIGHT: 70 IN

## 2025-06-27 DIAGNOSIS — E11.65 TYPE 2 DIABETES MELLITUS WITH HYPERGLYCEMIA, WITHOUT LONG-TERM CURRENT USE OF INSULIN: ICD-10-CM

## 2025-06-27 DIAGNOSIS — I15.2 HYPERTENSION ASSOCIATED WITH DIABETES: ICD-10-CM

## 2025-06-27 DIAGNOSIS — E11.69 HYPERLIPIDEMIA ASSOCIATED WITH TYPE 2 DIABETES MELLITUS: ICD-10-CM

## 2025-06-27 DIAGNOSIS — N13.8 BPH WITH OBSTRUCTION/LOWER URINARY TRACT SYMPTOMS: Primary | ICD-10-CM

## 2025-06-27 DIAGNOSIS — N40.1 BPH WITH OBSTRUCTION/LOWER URINARY TRACT SYMPTOMS: Primary | ICD-10-CM

## 2025-06-27 DIAGNOSIS — E78.5 HYPERLIPIDEMIA ASSOCIATED WITH TYPE 2 DIABETES MELLITUS: ICD-10-CM

## 2025-06-27 DIAGNOSIS — E11.59 HYPERTENSION ASSOCIATED WITH DIABETES: ICD-10-CM

## 2025-06-27 DIAGNOSIS — Z12.5 SCREENING FOR PROSTATE CANCER: ICD-10-CM

## 2025-06-27 LAB
ABSOLUTE EOSINOPHIL (SMH): 0.1 K/UL
ABSOLUTE MONOCYTE (SMH): 0.61 K/UL (ref 0.3–1)
ABSOLUTE NEUTROPHIL COUNT (SMH): 4.4 K/UL (ref 1.8–7.7)
ALBUMIN SERPL-MCNC: 4.4 G/DL (ref 3.5–5.2)
ALP SERPL-CCNC: 53 UNIT/L (ref 40–150)
ALT SERPL-CCNC: 20 UNIT/L (ref 10–44)
ANION GAP (SMH): 10 MMOL/L (ref 8–16)
AST SERPL-CCNC: 28 UNIT/L (ref 11–45)
BASOPHILS # BLD AUTO: 0.07 K/UL
BASOPHILS NFR BLD AUTO: 1.1 %
BILIRUB SERPL-MCNC: 1.2 MG/DL (ref 0.1–1)
BUN SERPL-MCNC: 19 MG/DL (ref 8–23)
CALCIUM SERPL-MCNC: 9.7 MG/DL (ref 8.7–10.5)
CHLORIDE SERPL-SCNC: 104 MMOL/L (ref 95–110)
CHOLEST SERPL-MCNC: 116 MG/DL (ref 120–199)
CHOLEST/HDLC SERPL: 2.7 {RATIO} (ref 2–5)
CO2 SERPL-SCNC: 28 MMOL/L (ref 23–29)
CREAT SERPL-MCNC: 1.3 MG/DL (ref 0.5–1.4)
EAG (SMH): 166 MG/DL (ref 68–131)
ERYTHROCYTE [DISTWIDTH] IN BLOOD BY AUTOMATED COUNT: 12.2 % (ref 11.5–14.5)
GFR SERPLBLD CREATININE-BSD FMLA CKD-EPI: 59 ML/MIN/1.73/M2
GLUCOSE SERPL-MCNC: 136 MG/DL (ref 70–110)
HBA1C MFR BLD: 7.4 % (ref 4–5.6)
HCT VFR BLD AUTO: 50.2 % (ref 40–54)
HDLC SERPL-MCNC: 43 MG/DL (ref 40–75)
HDLC SERPL: 37.1 % (ref 20–50)
HGB BLD-MCNC: 16.3 GM/DL (ref 14–18)
IMM GRANULOCYTES # BLD AUTO: 0.01 K/UL (ref 0–0.04)
IMM GRANULOCYTES NFR BLD AUTO: 0.2 % (ref 0–0.5)
LDLC SERPL CALC-MCNC: 58 MG/DL (ref 63–159)
LYMPHOCYTES # BLD AUTO: 1.22 K/UL (ref 1–4.8)
MCH RBC QN AUTO: 30.6 PG (ref 27–31)
MCHC RBC AUTO-ENTMCNC: 32.5 G/DL (ref 32–36)
MCV RBC AUTO: 94 FL (ref 82–98)
NONHDLC SERPL-MCNC: 73 MG/DL
NUCLEATED RBC (/100WBC) (SMH): 0 /100 WBC
PLATELET # BLD AUTO: 175 K/UL (ref 150–450)
PMV BLD AUTO: 10.4 FL (ref 9.2–12.9)
POC RESIDUAL URINE VOLUME: 7 ML (ref 0–100)
POTASSIUM SERPL-SCNC: 4.6 MMOL/L (ref 3.5–5.1)
PROT SERPL-MCNC: 7.4 GM/DL (ref 6–8.4)
PSA SERPL-MCNC: 0.82 NG/ML (ref ?–4)
RBC # BLD AUTO: 5.33 M/UL (ref 4.6–6.2)
RELATIVE EOSINOPHIL (SMH): 1.6 % (ref 0–8)
RELATIVE LYMPHOCYTE (SMH): 19 % (ref 18–48)
RELATIVE MONOCYTE (SMH): 9.5 % (ref 4–15)
RELATIVE NEUTROPHIL (SMH): 68.6 % (ref 38–73)
SODIUM SERPL-SCNC: 142 MMOL/L (ref 136–145)
TRIGL SERPL-MCNC: 75 MG/DL (ref 30–150)
WBC # BLD AUTO: 6.41 K/UL (ref 3.9–12.7)

## 2025-06-27 PROCEDURE — 82465 ASSAY BLD/SERUM CHOLESTEROL: CPT

## 2025-06-27 PROCEDURE — 82040 ASSAY OF SERUM ALBUMIN: CPT

## 2025-06-27 PROCEDURE — 36415 COLL VENOUS BLD VENIPUNCTURE: CPT

## 2025-06-27 PROCEDURE — 83036 HEMOGLOBIN GLYCOSYLATED A1C: CPT

## 2025-06-27 PROCEDURE — 99999 PR PBB SHADOW E&M-EST. PATIENT-LVL III: CPT | Mod: PBBFAC,,, | Performed by: NURSE PRACTITIONER

## 2025-06-27 PROCEDURE — 85025 COMPLETE CBC W/AUTO DIFF WBC: CPT

## 2025-06-27 PROCEDURE — 84153 ASSAY OF PSA TOTAL: CPT

## 2025-06-27 PROCEDURE — 99213 OFFICE O/P EST LOW 20 MIN: CPT | Mod: PBBFAC,PO | Performed by: NURSE PRACTITIONER

## 2025-06-27 NOTE — PROGRESS NOTES
CHIEF COMPLAINT:    Mr. Hyde is a 70 y.o. male presenting for f/u BPH    PRESENTING ILLNESS:    Toni Hyde is a 70 y.o. male who presents for f/u BPH. Last clinic visit was 12/30/24    Last visit with Dr. Benitez  Last saw NP 11/2021 noting: He has been known to have uric acid stones he also underwent ESWL in 2017 by Dr. Noel.  He has not experienced any recent stone episodes and no complaints.  He does continue to take allopurinol 100 mg p.o. q.d. and potassium citrate 15meq BID with no problems.  CT 1/12/21:  Right lower pole calyx with 4 mm stone and a 1 mm stone.  Left kidney has a 2 mm, a 2 mm, a 3 mm and a 11 mm stone seen from top to bottom. Bilateral renal cysts, larger on left with some peripheral calc, diverticulosis, 4.2cm prostate  Correltaing KUB 1/12/21: There are least 2 rounded calcifications left mid abdomen centered at the L2 level largest 9 mm.  Vascular phleboliths left pelvic sidewall. Impression notes   4/25/17 L ureteral ESWL and stent removal. For 9mm mid ureteral stone. Cysto noted 4 cm with mild PEREIRA   Patient reports previously having calcium oxalate stones analyzed 20 years prior, and only started potassium citrate with findings of 100% uric acid stone on stone analysis from his ESWL past fragments in 2017. On chart review, I did find his stone analysis confirming 100% uric acid, as well as a 24 hour urine 1 month later on 5/22/17 which had good urine volume of 2 L and normal urine uric acid but high saturation of uric acid 3.4, and high saturation of calcium oxalate at 6.63 increasing the risk of both types of stones, with a very low pH of 4.981 despite good citrate at 1698, and high dietary oxalate at 46.   He denies any history of gout, or prior known uric acid issues prior to 2017.   Uric acid, serum, on file with primary care is 4.9 in October 2021, normal.  Creatinine 1.4/EGFR 52.7 in October 2021 as well.  On review, has had mild CKD 3 for quite some time with baseline  GFR around 55-58  Not following low purine or low uric acid diet. Was told previously to reduce meat intake previously  Last PSA on file 0.6 in 2020.  Stable in the 0.40.6 range for all years prior.  No family history of prostate cancer.   Personal review of CT scan from January 2020 to consistent with report above with large left lower pole stone burden about 1-1.1 cm and scattered other small punctate stones throughout the left collecting system upper and lower pole.  In the right kidney there is a 4 mm lower pole stone and a smaller upper pole stone.  The left kidney has a central midpole cyst extending towards the collecting system as well as an upper pole cyst.  Reported as cyst stable from previous imaging, the upper pole lesion looks less fluid density on the non contrasted imaging to me   He does report intermittent stone fragment passage. More so dust/small fragments. Only know bc gets hung up in urethra.  A few times a year.  No significant pain or stone episodes.  Occasional flank or back pain which may or may not be related.  No blood in the urine.  Urinalysis is dipstick negative today.  Has been taking 100 mg allopurinol and 15 mEq daily, not b.i.d., of potassium citrate.   AUA SS: 15/3 (4 intermittency, 3: freq, urgency; 2 sleeping, 1: emptying, weak, strain)  Stops and starts a lot. If occupied easy to postpone, but there is some urgency when just sitting around. Occ NTF 2-3x/night, but if no fluids after 8pm can make it through the night  5/5/22 Procedure(s) Performed:   Left ureteroscopy with holmium laser lithotripsy and stone basket extraction  Cystoscopy with placement of left double-J ureteral stent 6 Taiwanese by 26 cm  Left retrograde pyelogram including injection of contrast  Fluoroscopy less than 1 hour including interpretation of fluoroscopic images  Findings:  scattered left stones in upper middle and lower pole with larger burden in lower pole, all removed  5/17/22 Procedure(s) Performed:    Cystoscopy with left ureteral stent removal  Findings: ureteral stent, removed    9/12/22 patient presents to clinic for f/u kidney stones. He has been doing well since procedure. He increased urocit K to 1 tablet BID as instructed. He drinks ~ 1 L of water with crystal light lemonade per day. He is trying to increase water intake. Denies dysuria, gross hematuria, flank pain, f/c/n/v. He is taking alfusozin for BPH/LUTS. He reports his symptoms have mildly improved. Flomax worked better on LUTS but he was unable to tolerate d/t dizziness.    8/17/22   BMP: K 4.3, creatinine 1.2 / GFR >60  PTH intact 55.3  Uric acid 6.4    8/17/22 ANDREA  Impression:   Multiple bilateral intrarenal stones.  No hydronephrosis is noted.  Three cyst identified of each kidney.  A solid mass is not noted    3/13/23  Patient presents today for f/u kidney stones. He has been doing well since last clinic visit. He is taking urocit K 1 tablet BID and drinks 1-1.5 L water and lemonade per day. Also drinks 1-2 cups coffee per day. Denies dysuria, gross hematuria, flank pain, fever, chills, nausea or vomiting.   He is taking alfuzosin for BPH. He reports nocturia x 2 and occasional daytime frequency. Flomax worked better but he could not tolerate d/t dizziness.    12/2/22   K 4.0  Creatinine 1.2  GFR >60    3/10/23  ANDREA Impression:   1. Bilateral simple renal cysts.  2. Bilateral nephrolithiasis.  KUB: left renal stones    11/9/23  S/p ESWL, left 4/27/23 6/16/23 creatinine 1.1 / GFR>60  KUB today  5/11/23 Stone analysis: 100% Calcium oxalate monohydrate    Drinks 1 L crystal light lemonade plus 2-3 true lime packets daily and ~ 0.5 L water per day.    Takes urocit K 15 mEq BID    He is taking alfuzosin for BPH. Voids every 2 hours during the day and nocturia x 1-2. Moderate urinary stream. Denies dysuria, gross hematuria, flank pain, fever, chills, nausea or vomiting. Flomax did work better but unable to tolerate d/t dizziness.     24 Hour Urine  [facility]  [facility] 10/11/23   Urine volume: 1.09  SS CaOx: 11.01  Ref 5-10  Urine Calcium (mg/day): 199  Ref M <250, F <200  Urine Oxalate (mg/day): 32  Ref 20-40  Urine Citrate (mg/day): 986  Ref M >450, F>550  SS CaP: 1.42  Ref 0.5-2  24 Hour Urine pH:5.930  Ref 5.8-6.2  SS Uric Acid: 1.21  Ref 0-1  Urine Uric Acid (g/day): 0.587  Ref M<0.8, F<0.75    24  AUA SS: Feeling of incomplete Emptyin, Frequency: 4, Intermittency: 3, Urgency: 4, Weak Stream: 2, Strainin, Sleepin, Total SS: 20 , Quality of Life: 3  UA negative  PVR 17 ml  On uroxatral  Denies dysuria, gross hematuria, flank pain, fever, chills, nausea or vomiting    Taking urocit K as ordered for kidney stones  24 K 3.9, creatinine 1.2, GFR >60    24 Procedure(s) Performed:   Cystoscopy, flexible  Transrectal ultrasound with volumetric measurement of prostate     24  S/p urolift 24  cystoscopy with prostatic urethral lift/transprostatic tissue retraction with delivery of 7 total implants     AUA SS: Feeling of incomplete Emptyin, Frequency: 2, Intermittency: 1, Urgency: 2, Weak Stream: 1, Strainin, Sleepin, Total SS: 7 , Quality of Life: 2  Stopped uroxatral as directed  Denies dysuria, gross hematuria, flank pain, fever, chills, nausea or vomiting  PVR 4 ml  Pt is pleased with results of urolift and improvement to LUTS    24 PSA 0.53    24  AUA SS: Feeling of incomplete Emptying: 3, Frequency: 2, Intermittency: 4, Urgency: 2, Weak Stream: 2, Strainin, Sleepin, Total SS: 13 , Quality of Life: 3  PVR 8 ml  UA neg blood, leuk or nitrite  Denies dysuria, gross hematuria, flank pain, fever, chills, nausea or vomiting  Pt feels "like something is inside bladder" if he does anything strenuous or on elliptical. No pain or pressure.    Taking urocit-K 15 meq 1 tablet BID for kidney stones  24 K 4.6    24 ANDREA: possible bladder stone/calcification.   CT RSS today but not read by " radiology yet    25  Procedure with Dr Phillips:    Flexible cystourethroscopy 25  Date: 25, Procedure:  Flexible cystourethroscopy   Cystoscopy Findings:  Bladder neck narrow. Not high riding. Easily able to pass scope. Previous urolifts seen. No obstruction from lateral lobes. Grade 2 trabeculations with one large wide mouth diverticula. Minimal intravesical component seen.   Plan:  Primary complain is overactive bladder - urinates q2 hours day and night with urgency. Can try an oab med. Sending in gemtesa but if not covered should see if myrbetriq covered. If myrbetriq not covered can try oxybutynin 10mg once daily  Bph s/p urolift with no recurrent b lobe hypertrophy - do not recommend flomax (causes dizziness) and not obstructed except possibly small bladder neck. If oab med  not helping consider TUIP in future (recent uroflow showed peak flow 9.2 with voided volume of 195)  Kidney stones- continue urocit K twice a day and rbus and kub in September.   Screening psa due in may 2025    6/27/25  AUA SS: Feeling of incomplete Emptying: 3, Frequency: 3, Intermittency: 3, Urgency: 3, Weak Stream: 3, Strainin, Sleepin, Total SS: 19 , Quality of Life: 4  PVR 7 ml  Started gemtesa about 3 weeks ago.  Pt states urinary frequency and urgency are better on gemtesa.   Denies dysuria, gross hematuria or flank pain    Urine cultures:   Lab Results   Component Value Date    LABURIN No growth 2024    LABURIN No growth 2023    LABURIN No growth 2022    LABURIN No growth 2017    LABURIN NO GROWTH AFTER 48 HOURS. 2012           REVIEW OF SYSTEMS:    Review of Systems    Constitutional: Negative for fever and chills.   Gastrointestinal: Negative for nausea, vomiting  Genitourinary:  See above  Psychiatric/Behavioral: Negative for confusion.     PATIENT HISTORY:    Past Medical History:   Diagnosis Date    Arthritis     Cataract     Cataract of left eye     Diabetes mellitus  type II     on po meds    Hyperlipidemia     not on meds at present    Hypertension     Kidney stones 04/2017    Left ureteral stone     Plantar fasciitis of right foot 10/2017    Dr Colorado     Tendonitis 01/2018    right foot Dr Colorado    Wears glasses        Past Surgical History:   Procedure Laterality Date    ADENOIDECTOMY      BIOPSY OF MASS OF LUMBAR SPINE N/A 10/19/2022    Procedure: BIOPSY, MASS, SPINE, LUMBAR;  Surgeon: Marvel Matthews DO;  Location: [facility];  Service: Neurosurgery;  Laterality: N/A;  Right L5-S1 Resection of Extradural Mass Using Transfacet Approach    CATARACT EXTRACTION      COLONOSCOPY  03/23/2010    Dr. Blackburn, hyperplastic polyps, otherwise negative, 10 year recheck    COLONOSCOPY N/A 11/19/2021    Procedure: COLONOSCOPY;  Surgeon: Marcus Sexton MD;  Location: [facility];  Service: Endoscopy;  Laterality: N/A;    COLONOSCOPY, SCREENING, HIGH RISK PATIENT N/A 5/30/2025    Procedure: COLONOSCOPY, SCREENING, HIGH RISK PATIENT(Ok to move earlier);  Surgeon: Marcus Sexton MD;  Location: [facility];  Service: Endoscopy;  Laterality: N/A;    CYSTOSCOPY Left 04/12/2017    with ureteral stent-Dr. Noel    CYSTOSCOPY Left 05/17/2022    Procedure: CYSTOSCOPY with stent removal;  Surgeon: Ronal Benitez MD;  Location: [facility] OR;  Service: Urology;  Laterality: Left;    CYSTOSCOPY N/A 7/9/2024    Procedure: CYSTOSCOPY;  Surgeon: Ronal Benitez MD;  Location: [facility] OR;  Service: Urology;  Laterality: N/A;    CYSTOSCOPY N/A 4/28/2025    Procedure: CYSTOSCOPY;  Surgeon: Monika Phillips MD;  Location: [facility] OR;  Service: Urology;  Laterality: N/A;    CYSTOSCOPY WITH INSERTION OF MINIMALLY INVASIVE IMPLANT TO ENLARGE PROSTATIC URETHRA N/A 8/22/2024    Procedure: CYSTOSCOPY, WITH INSERTION OF UROLIFT IMPLANT;  Surgeon: Ronal Benitez MD;  Location: [facility];  Service: Urology;  Laterality: N/A;    CYSTOSCOPY WITH URETEROSCOPY, RETROGRADE PYELOGRAPHY, AND INSERTION OF  STENT Left 05/05/2022    Procedure: CYSTOSCOPY, WITH RETROGRADE PYELOGRAM AND URETERAL STENT INSERTION;  Surgeon: Ronal Benitez MD;  Location: [facility] OR;  Service: Urology;  Laterality: Left;    EXTRACORPOREAL SHOCK WAVE LITHOTRIPSY Left 4/27/2023    Procedure: LITHOTRIPSY, ESWL;  Surgeon: Ronal Benitez MD;  Location: [facility] OR;  Service: Urology;  Laterality: Left;    EYE SURGERY  05/06/2013    Dr Nielsen    kidney stent   04/2017    KNEE ARTHROSCOPY W/ DEBRIDEMENT      right with lateral release     LITHOTRIPSY  04/19/2017    NASAL SINUS SURGERY      retna surgery   05/2013    Dr Peralta    SPINE SURGERY      TONSILLECTOMY      TRANSFORAMINAL EPIDURAL INJECTION OF STEROID Right 12/07/2020    Procedure: Injection,steroid,epidural,transforaminal approach L5- S1;  Surgeon: Kermit Davalos MD;  Location: [facility] OR;  Service: Pain Management;  Laterality: Right;  L5-s1    TRANSFORAMINAL EPIDURAL INJECTION OF STEROID Right 06/23/2022    Procedure: Injection,steroid,epidural,transforaminal approach;  Surgeon: Christos Saul MD;  Location: [facility] OR;  Service: Pain Management;  Laterality: Right;  L5-S1 and cyst aspiration L5-S1    TRANSRECTAL ULTRASOUND EXAMINATION N/A 7/9/2024    Procedure: ULTRASOUND, RECTAL APPROACH;  Surgeon: Ronal Benitez MD;  Location: [facility] OR;  Service: Urology;  Laterality: N/A;    URETEROSCOPIC REMOVAL OF URETERIC CALCULUS Left 05/05/2022    Procedure: REMOVAL, CALCULUS, URETER, URETEROSCOPIC;  Surgeon: Ronal Benitez MD;  Location: [facility] OR;  Service: Urology;  Laterality: Left;       PHYSICAL EXAMINATION:    Constitutional: He is oriented to person, place, and time. He appears well-developed and well-nourished.  He is in no apparent distress.    Abdominal:  He exhibits no distension.  There is no CVA tenderness.     Neurological: He is alert and oriented to person, place, and time.     Psych: Cooperative with normal affect.      Physical Exam      LABS:        Lab Results   Component Value  Date    PSA 0.53 05/09/2024    PSA 0.56 03/13/2023    PSA 0.56 03/11/2022    PSADIAG 0.51 06/15/2018    PSADIAG 0.54 05/18/2017     Lab Results   Component Value Date    CREATININE 1.0 02/09/2025       IMPRESSION:    Encounter Diagnoses   Name Primary?    BPH with obstruction/lower urinary tract symptoms Yes    Screening for prostate cancer        PLAN:  -PSA scheduled for today. Will call with results    -Continue gemtesa for urinary frequency, urgency. Pt is considering TUIP as recommended after cysto with Dr Phillips. No refills needed.  -Conservative measures to control urgency and frequency including   1. Avoiding/minimizing bladder irritants (see below), especially in afternoon and evening hours  Discussed bladder irritants include coffee (even decaf), tea, alcohol, soda, spicy foods, acidic juices (orange, tomato), vinegar, and artificial sweeteners/sugary beverages.  2. timed voiding - empty on a schedule (approx ~2-3 hours) in spite of need to urinate, to get ahead of urge  3. dont postponing voiding - dont hold it on purpose   4. bowel regimen as distended bowel has extrinsic compressive effect on bladder.   - any or all of the following in any combination, titrate to soft daily bowel movement without pushing or straining  - colace/stool softener capsule - once to twice daily  - miralax - 1 capful daily to start, can increase to 2x daily (or decrease to 1/2 cap daily)  - increase dietary fibery  - fiber supplements, such as metamucil  - prunes, prune juice  5. INCREASE water intake  6. Stop fluids 2 hours before bed, and urinate just before bed    -F/u as scheduled with MD to discuss TUIP and schedule    I encouraged him or any of his family members to call or email me with questions and/or concerns.    Visit today is associated with current or anticipated ongoing medical care related to this patient's single serious condition/complex condition, BPH      30 minutes of total time spent on the encounter,  which includes face to face time and non-face to face time preparing to see the patient (eg, review of tests), Obtaining and/or reviewing separately obtained history, Documenting clinical information in the electronic or other health record, Independently interpreting results (not separately reported) and communicating results to the patient/family/caregiver, or Care coordination (not separately reported).

## 2025-07-08 ENCOUNTER — OFFICE VISIT (OUTPATIENT)
Dept: FAMILY MEDICINE | Facility: CLINIC | Age: 70
End: 2025-07-08
Payer: MEDICARE

## 2025-07-08 VITALS
SYSTOLIC BLOOD PRESSURE: 120 MMHG | DIASTOLIC BLOOD PRESSURE: 78 MMHG | HEART RATE: 68 BPM | HEIGHT: 70 IN | OXYGEN SATURATION: 98 % | RESPIRATION RATE: 19 BRPM | BODY MASS INDEX: 27.4 KG/M2 | WEIGHT: 191.38 LBS

## 2025-07-08 DIAGNOSIS — I15.2 HYPERTENSION ASSOCIATED WITH DIABETES: ICD-10-CM

## 2025-07-08 DIAGNOSIS — E11.69 HYPERLIPIDEMIA ASSOCIATED WITH TYPE 2 DIABETES MELLITUS: ICD-10-CM

## 2025-07-08 DIAGNOSIS — F17.290 CIGAR SMOKER: ICD-10-CM

## 2025-07-08 DIAGNOSIS — D69.6 THROMBOCYTOPENIA, UNSPECIFIED: ICD-10-CM

## 2025-07-08 DIAGNOSIS — E78.5 HYPERLIPIDEMIA ASSOCIATED WITH TYPE 2 DIABETES MELLITUS: ICD-10-CM

## 2025-07-08 DIAGNOSIS — Z00.00 ROUTINE GENERAL MEDICAL EXAMINATION AT HEALTH CARE FACILITY: Primary | ICD-10-CM

## 2025-07-08 DIAGNOSIS — E11.65 TYPE 2 DIABETES MELLITUS WITH HYPERGLYCEMIA, WITHOUT LONG-TERM CURRENT USE OF INSULIN: ICD-10-CM
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
## 2025-07-08 DIAGNOSIS — I70.0 AORTIC ATHEROSCLEROSIS: ICD-10-CM

## 2025-07-08 DIAGNOSIS — S30.861A INSECT BITE OF ABDOMINAL WALL, INITIAL ENCOUNTER: ICD-10-CM

## 2025-07-08 DIAGNOSIS — E11.59 HYPERTENSION ASSOCIATED WITH DIABETES: ICD-10-CM

## 2025-07-08 PROBLEM — K57.92 DIVERTICULITIS: Status: RESOLVED | Noted: 2025-02-07 | Resolved: 2025-07-08

## 2025-07-08 PROBLEM — N13.2 URETERAL STONE WITH HYDRONEPHROSIS: Status: RESOLVED | Noted: 2017-04-12 | Resolved: 2025-07-08

## 2025-07-08 PROCEDURE — 99214 OFFICE O/P EST MOD 30 MIN: CPT | Mod: PBBFAC,PO

## 2025-07-08 PROCEDURE — 99215 OFFICE O/P EST HI 40 MIN: CPT | Mod: S$PBB,,,

## 2025-07-08 PROCEDURE — 99999 PR PBB SHADOW E&M-EST. PATIENT-LVL IV: CPT | Mod: PBBFAC,,,

## 2025-07-08 PROCEDURE — G2211 COMPLEX E/M VISIT ADD ON: HCPCS | Mod: ,,,

## 2025-07-08 RX ORDER — TRIAMCINOLONE ACETONIDE 1 MG/G
OINTMENT TOPICAL 2 TIMES DAILY
Qty: 80 G | Refills: 0 | Status: SHIPPED | OUTPATIENT
Start: 2025-07-08

## 2025-07-08 NOTE — Clinical Note
Patient reports seeing Dr. Nielsen for retina specialist and Dr. Guerrier across the street for routine eye exams. If one of these eye exams suffices for the eye exam care gap please update patient's chart. Thank you!

## 2025-07-08 NOTE — PROGRESS NOTES
Subjective:       Patient ID: Toni Hyde is a 70 y.o. male.    Chief Complaint: Follow-up    Follow-up        History of Present Illness    CHIEF COMPLAINT:  Patient presents today for follow up of diabetes management.    DIABETES AND WEIGHT MANAGEMENT:  He reports elevated A1C level. Currently consuming approximately 12 ounces of Coca-Cola daily in the evening. He acknowledges weight gain due to dietary non-adherence. When maintaining a 1200-calorie diet, his A1C and glucose levels improve. He has a history of successful weight loss, typically losing around 20 lbs at a time. He engages in 2.5 hours of fairly intense aerobic exercise weekly, occasionally modifying intensity during workouts.    DERMATOLOGIC:  He reports four confluent insect bites with intense itching but no pain. Currently treating with Silvadene cream, which he has used for several years, and Benadryl stick which provides temporary itch relief.    CARDIOVASCULAR:  He is scheduled for a 3-day heart monitor for baseline cardiac evaluation. He has two electrical signal conduction abnormalities in his heart that may potentially require a pacemaker if they worsen or consistently impact heart function. He denies current cardiac symptoms. During a previous cystoscopy, his blood pressure dropped to approximately 38.    OPHTHALMOLOGIC HISTORY:  He underwent retina surgery in March and cataract surgery in the past. He continues annual follow-up with a retina specialist and routine eye exams.      ROS:  General: positive weight gain  Skin: positive itching, positive insect bites          Past Medical History:   Diagnosis Date    Arthritis     Cataract of left eye     Plantar fasciitis of right foot 10/2017    Dr [place]     Tendonitis 01/2018    right foot Dr [place]    Wears glasses        Review of patient's allergies indicates:   Allergen Reactions    Shrimp Itching and Swelling     Per testing   States no ivp or betadine allergies.   "      Current Medications[1]    Review of Systems    Objective:      /78 (BP Location: Right arm)   Pulse 68   Resp 19   Ht 5' 10" (1.778 m)   Wt 86.8 kg (191 lb 5.8 oz)   SpO2 98%   BMI 27.46 kg/m²   Physical Exam  Vitals reviewed.   Constitutional:       General: He is not in acute distress.     Appearance: Normal appearance. He is normal weight. He is not ill-appearing, toxic-appearing or diaphoretic.   HENT:      Head: Normocephalic.      Right Ear: External ear normal.      Left Ear: External ear normal.      Nose: Nose normal. No congestion or rhinorrhea.      Mouth/Throat:      Mouth: Mucous membranes are moist.      Pharynx: Oropharynx is clear.   Eyes:      General: No scleral icterus.        Right eye: No discharge.         Left eye: No discharge.      Extraocular Movements: Extraocular movements intact.      Conjunctiva/sclera: Conjunctivae normal.   Cardiovascular:      Rate and Rhythm: Normal rate and regular rhythm.      Pulses: Normal pulses.      Heart sounds: Normal heart sounds. No murmur heard.     No friction rub. No gallop.   Pulmonary:      Effort: Pulmonary effort is normal. No respiratory distress.      Breath sounds: Normal breath sounds. No wheezing, rhonchi or rales.   Chest:      Chest wall: No tenderness.   Musculoskeletal:         General: No swelling, tenderness or deformity. Normal range of motion.      Cervical back: Normal range of motion.      Right lower leg: No edema.      Left lower leg: No edema.   Skin:     General: Skin is warm and dry.      Capillary Refill: Capillary refill takes less than 2 seconds.      Coloration: Skin is not jaundiced.      Findings: Rash present. No bruising, erythema or lesion.   Neurological:      Mental Status: He is alert and oriented to person, place, and time.      Gait: Gait normal.   Psychiatric:         Mood and Affect: Mood normal.         Behavior: Behavior normal.         Thought Content: Thought content normal.         " Judgment: Judgment normal.             Assessment:       1. Routine general medical examination at health care facility    2. Cigar smoker    3. Hypertension associated with diabetes    4. Type 2 diabetes mellitus with hyperglycemia, without long-term current use of insulin    5. Hyperlipidemia associated with type 2 diabetes mellitus    6. Aortic atherosclerosis    7. Thrombocytopenia, unspecified    8. Insect bite of abdominal wall, initial encounter          Assessment & Plan    IMPRESSION:  - A1C levels currently in low 7 to high 6 range.  - Elevated glucose attributed to increased Coca-Cola intake (2-3 12 oz servings daily).  - Decided against starting new medication for diabetes management at this time.  - Assessed insect bite reaction and started Kenalog (topical steroid) for treatment.  - Reviewed lab results: glucose slightly elevated, bilirubin down, cholesterol within acceptable range.  - BP stable; continued current antihypertensive regimen.  - Monitored mild thrombocytopenia with routine CBCs.  - Acknowledged upcoming cardiology evaluation for baseline assessment.  - Discussed that electrical blockages in the heart are different from flow blockages and may not require immediate intervention.    PLAN SUMMARY:  - Order labs in 3 months to reassess glucose levels  - Continue metformin as prescribed  - Prescribed Kenalog (topical steroid) for insect bite reaction  - Advised to reduce intake of sugary beverages, particularly in the evening  - Follow up in 6 months  - Contact the office if any concerns arise before next scheduled appointment    TYPE 2 DIABETES MELLITUS WITH HYPERGLYCEMIA:  - Monitoring the patient's diabetes which is slightly worse compared to previous checks with glucose levels higher than last time.  - Patient admits to consuming Coca-Cola and acknowledges weight gain due to dietary non-adherence.  - Discussed the impact of sugary beverages on glucose levels and the importance of dietary  control.  - Advised to reduce intake, particularly in the evening.  - Continue metformin as prescribed.  - Ordered labs in 3 months to reassess glucose levels and ensure they don't continue to rise.    OTHER SPECIFIED CONDUCTION DISORDERS:  - Monitoring the patient's two electrical blockages, which have been present for a while.  - Evaluated that these cause the electrical signal to fire a few milliseconds off.  - Discussed with the patient that these are electrical blockages, not flow blockages.    ALLERGIC CONTACT DERMATITIS:  - Noted the patient had insect bites that were itchy and not painful, which became severe and confluent.  - Condition has improved significantly, but erythema and pruritus persist.  - Prescribed Kenalog (topical steroid) for treatment of the insect bite reaction.  - Patient also uses Benadryl sticks for pruritus relief.    FOLLOW-UP:  - Follow up in 6 months.  - Contact the office if any concerns arise before the next scheduled appointment.          Plan:       Routine general medical examination at health care facility    Cigar smoker      -    Practice moderation     Hypertension associated with diabetes        -    Stable. Continue valsartan, amlodipine. Will continue to monitor.     Type 2 diabetes mellitus with hyperglycemia, without long-term current use of insulin  -     Comprehensive Metabolic Panel; Future; Expected date: 07/08/2025  -     Hemoglobin A1C; Future; Expected date: 07/08/2025  -     Microalbumin/Creatinine Ratio, Urine; Future; Expected date: 07/08/2025    Hyperlipidemia associated with type 2 diabetes mellitus        -    Stable. Continue statin. Will continue to monitor.     Aortic atherosclerosis        -    Stable. Continue statin. Will continue to monitor.     Thrombocytopenia, unspecified  -     CBC Auto Differential; Future; Expected date: 07/08/2025        -    Stable. Will continue to monitor.     Insect bite of abdominal wall, initial encounter  -      triamcinolone acetonide 0.1% (KENALOG) 0.1 % ointment; Apply topically 2 (two) times daily.  Dispense: 80 g; Refill: 0                   Sal Solares PA-C  Family Medicine Physician Assistant       Future Appointments       Date Provider Specialty Appt Notes    7/14/2025  Cardiology anugu    7/31/2025 Yrn Jones MD Cardiology f/u test    8/26/2025 Monika Phillips MD Urology 3 month f/u with auass and pvr    10/10/2025  Lab Type 2 diabetes mellitus with hyperglycemia, without long-term current use of insulin [E11.65]    10/10/2025  Lab     1/9/2026 Sal Solares PA-C Family Medicine 6 month f/u               I spent a total of 40 minutes on the day of the visit.This includes face to face time and non-face to face time preparing to see the patient (eg, review of tests), obtaining and/or reviewing separately obtained history, documenting clinical information in the electronic or other health record, independently interpreting results and communicating results to the patient/family/caregiver, or care coordinator.      We have addressed [4] Moderate: 1 or more chronic illnesses with exacerbation, progression, or side effects of treatment / 2 or more stable chronic illnesses / 1 undiagnosed new problem with uncertain prognosis / 1 acute illness with systemic symptoms / 1 acute complicated injury  The complexity of the data reviewed and analyzed for this visit was [3] Limited (Reviewed prior external note, ordered unique testing or reviewed the results of each unique test)   The risk of complications and/or morbidity or mortality are [4] Moderate risk (I.e. prescription drug management / decision regarding minor surgery with identified pt or procedure risk factors / decision regarding elective major surgery without identified pt or procedure risk factors / diagnosis or treatment significantly limited by social determinants of health)   The level of Medical Decision Making for this visit is [4]  Moderate     This note may have been generated with the assistance of ambient listening technology. If used, verbal consent was obtained by the patient and accompanying visitor(s) for the recording of patient appointment to facilitate this note. I attest to having reviewed and edited the generated note for accuracy, though some syntax or spelling errors may persist. Please contact the author of this note for any clarification.       [1]   Current Outpatient Medications:     amLODIPine (NORVASC) 2.5 MG tablet, Take 1 tablet (2.5 mg total) by mouth once daily., Disp: 90 tablet, Rfl: 3    cetirizine (ZYRTEC) 10 MG tablet, Take 10 mg by mouth daily as needed. 1 Tablet(s) Oral PRN ., Disp: , Rfl:     fluticasone (FLONASE) 50 mcg/actuation nasal spray, 2 sprays by Each Nare route daily as needed. , Disp: , Rfl:     ibuprofen (ADVIL,MOTRIN) 200 MG tablet, Take 200 mg by mouth every 8 (eight) hours as needed for Pain., Disp: , Rfl:     metFORMIN (GLUCOPHAGE-XR) 750 MG ER 24hr tablet, Take 1 tablet (750 mg total) by mouth 2 (two) times daily with meals., Disp: 180 tablet, Rfl: 3    potassium citrate (UROCIT-K 15) 15 mEq TbSR, TAKE 1 TABLET TWICE A DAY, Disp: 180 tablet, Rfl: 3    rosuvastatin (CRESTOR) 20 MG tablet, Take 1 tablet (20 mg total) by mouth once daily., Disp: 90 tablet, Rfl: 3    valsartan (DIOVAN) 320 MG tablet, TAKE 1 TABLET DAILY, Disp: 90 tablet, Rfl: 3    vibegron 75 mg Tab, Take 1 tablet (75 mg total) by mouth every morning. Take 1 by mouth every morning for overactive bladder, Disp: 90 tablet, Rfl: 3    triamcinolone acetonide 0.1% (KENALOG) 0.1 % ointment, Apply topically 2 (two) times daily., Disp: 80 g, Rfl: 0  No current facility-administered medications for this visit.    Facility-Administered Medications Ordered in Other Visits:     lidocaine (PF) 10 mg/ml (1%) injection 10 mg, 1 mL, Intradermal, Once, Kermit Davalos MD

## 2025-07-14 ENCOUNTER — HOSPITAL ENCOUNTER (OUTPATIENT)
Dept: CARDIOLOGY | Facility: CLINIC | Age: 70
Discharge: HOME | End: 2025-07-14
Attending: INTERNAL MEDICINE
Payer: MEDICARE

## 2025-07-14 ENCOUNTER — HOSPITAL ENCOUNTER (OUTPATIENT)
Dept: CARDIOLOGY | Facility: HOSPITAL | Age: 70
Discharge: HOME | End: 2025-07-14
Attending: INTERNAL MEDICINE
Payer: MEDICARE

## 2025-07-14 VITALS — WEIGHT: 191.38 LBS | HEIGHT: 70 IN | BODY MASS INDEX: 27.4 KG/M2

## 2025-07-14 DIAGNOSIS — I44.1 SECOND DEGREE HEART BLOCK: ICD-10-CM

## 2025-07-14 DIAGNOSIS — R00.1 BRADYCARDIA: ICD-10-CM

## 2025-07-14 DIAGNOSIS — I45.3 TRIFASCICULAR BLOCK: ICD-10-CM

## 2025-07-14 PROCEDURE — 93306 TTE W/DOPPLER COMPLETE: CPT | Mod: 26,,, | Performed by: GENERAL PRACTICE

## 2025-07-14 PROCEDURE — 93306 TTE W/DOPPLER COMPLETE: CPT

## 2025-07-15 LAB
AORTIC ROOT ANNULUS: 4.4 CM
AORTIC VALVE CUSP SEPERATION: 2.4 CM
APICAL FOUR CHAMBER EJECTION FRACTION: 62 %
APICAL TWO CHAMBER EJECTION FRACTION: 59 %
AV INDEX (PROSTH): 0.91
AV MEAN GRADIENT: 3 MMHG
AV PEAK GRADIENT: 6 MMHG
AV VALVE AREA BY VELOCITY RATIO: 3.8 CM²
AV VALVE AREA: 3.5 CM²
AV VELOCITY RATIO: 1
BSA FOR ECHO PROCEDURE: 2.07 M2
CV ECHO LV RWT: 0.45 CM
DOP CALC AO PEAK VEL: 1.2 M/S
DOP CALC AO VTI: 25.5 CM
DOP CALC LVOT AREA: 3.8 CM2
DOP CALC LVOT DIAMETER: 2.2 CM
DOP CALC LVOT PEAK VEL: 1.2 M/S
DOP CALC LVOT STROKE VOLUME: 88.1 CM3
DOP CALC MV VTI: 19.9 CM
DOP CALCLVOT PEAK VEL VTI: 23.2 CM
E WAVE DECELERATION TIME: 299 MSEC
E/A RATIO: 0.77
E/E' RATIO: 7 M/S
ECHO LV POSTERIOR WALL: 1 CM (ref 0.6–1.1)
FRACTIONAL SHORTENING: 31.8 % (ref 28–44)
INTERVENTRICULAR SEPTUM: 1 CM (ref 0.6–1.1)
IVC DIAMETER: 1.1 CM
IVRT: 77 MSEC
LEFT ATRIUM AREA SYSTOLIC (APICAL 2 CHAMBER): 20.1 CM2
LEFT ATRIUM AREA SYSTOLIC (APICAL 4 CHAMBER): 17.7 CM2
LEFT ATRIUM SIZE: 3.6 CM
LEFT ATRIUM VOLUME INDEX MOD: 25 ML/M2
LEFT ATRIUM VOLUME MOD: 51 ML
LEFT INTERNAL DIMENSION IN SYSTOLE: 3 CM (ref 2.1–4)
LEFT VENTRICLE DIASTOLIC VOLUME INDEX: 42.93 ML/M2
LEFT VENTRICLE DIASTOLIC VOLUME: 88 ML
LEFT VENTRICLE END DIASTOLIC VOLUME APICAL 2 CHAMBER: 111 ML
LEFT VENTRICLE END DIASTOLIC VOLUME APICAL 4 CHAMBER: 132 ML
LEFT VENTRICLE END SYSTOLIC VOLUME APICAL 2 CHAMBER: 59.2 ML
LEFT VENTRICLE END SYSTOLIC VOLUME APICAL 4 CHAMBER: 43.4 ML
LEFT VENTRICLE MASS INDEX: 72.1 G/M2
LEFT VENTRICLE SYSTOLIC VOLUME INDEX: 17.1 ML/M2
LEFT VENTRICLE SYSTOLIC VOLUME: 35 ML
LEFT VENTRICULAR INTERNAL DIMENSION IN DIASTOLE: 4.4 CM (ref 3.5–6)
LEFT VENTRICULAR MASS: 147.8 G
LV LATERAL E/E' RATIO: 5.3 M/S
LV SEPTAL E/E' RATIO: 9.6 M/S
LVED V (TEICH): 87.69 ML
LVES V (TEICH): 35 ML
LVOT MG: 3 MMHG
LVOT MV: 0.74 CM/S
MV MEAN GRADIENT: 1 MMHG
MV PEAK A VEL: 0.62 M/S
MV PEAK E VEL: 0.48 M/S
MV PEAK GRADIENT: 1 MMHG
MV VALVE AREA BY CONTINUITY EQUATION: 4.43 CM2
OHS CV RV/LV RATIO: 0.5 CM
OHS LV EJECTION FRACTION SIMPSONS BIPLANE MOD: 62 %
PISA TR MAX VEL: 2.2 M/S
PV MV: 0.56 M/S
PV PEAK GRADIENT: 3 MMHG
PV PEAK VELOCITY: 0.8 M/S
RA PRESSURE ESTIMATED: 3 MMHG
RIGHT VENTRICLE DIASTOLIC BASEL DIMENSION: 2.2 CM
RIGHT VENTRICULAR END-DIASTOLIC DIMENSION: 2.2 CM
RV TB RVSP: 5 MMHG
RV TISSUE DOPPLER FREE WALL SYSTOLIC VELOCITY 1 (APICAL 4 CHAMBER VIEW): 10.3 CM/S
TDI LATERAL: 0.09 M/S
TDI SEPTAL: 0.05 M/S
TDI: 0.07 M/S
TR MAX PG: 18 MMHG
TRICUSPID ANNULAR PLANE SYSTOLIC EXCURSION: 1.5 CM
TV REST PULMONARY ARTERY PRESSURE: 22 MMHG
Z-SCORE OF LEFT VENTRICULAR DIMENSION IN END DIASTOLE: -3.35
Z-SCORE OF LEFT VENTRICULAR DIMENSION IN END SYSTOLE: -1.8

## 2025-07-16 ENCOUNTER — PATIENT OUTREACH (OUTPATIENT)
Dept: ADMINISTRATIVE | Facility: HOSPITAL | Age: 70
End: 2025-07-16
Payer: MEDICARE

## 2025-07-16 NOTE — LETTER
AUTHORIZATION FOR RELEASE OF   CONFIDENTIAL INFORMATION    Dear Adrian Guerrier MD,    We are seeing Toni Hyde, date of birth 1955, in the clinic at [facility]. Sal Solares PA-C is the patient's PCP. Toni Hyde has an outstanding lab/procedure at the time we reviewed his chart. In order to help keep his health information updated, he has authorized us to request the following medical record(s):        (  )  MAMMOGRAM                                      (  )  COLONOSCOPY      (  )  PAP SMEAR                                          (  )  OUTSIDE LAB RESULTS     (  )  DEXA SCAN                                          (X)  DIABETIC EYE EXAM            (  )  FOOT EXAM                                          (  )  ENTIRE RECORD     (  )  OUTSIDE IMMUNIZATIONS                 (  )  _______________         Please fax records to [facility], [place], [place], PA-C, [phone] or email to [email or web address].    If you have any questions, please contact Fernando Graf LPN Care Coordinator  at [phone].            Patient Name: Toni Hyde  : 1955  Patient Phone #: [phone]                       Toni Hyde  MRN: [id number]  : 1955  Age: 69 y.o.  Sex: male         Patient/Legal Guardian Signature  This signature was collected at 2024           _______________________________   Printed Name/Relationship to Patient      Consent for Examination and Treatment: I hereby authorize the providers and employees of [facility] ([facility]) to provide medical treatment/services which includes, but is not limited to, performing and administering tests and diagnostic procedures that are deemed necessary, including, but not limited to, imaging examinations, blood tests and other laboratory procedures as may be required by the hospital, clinic, or may be ordered by my physician(s) or persons working under the general and/or special  instructions of my physician(s).      I understand and agree that this consent covers all authorized persons, including but not limited to physicians, residents, nurse practitioners, physicians' assistants, specialists, consultants, student nurses, and independently contracted physicians, who are called upon by the physician in charge, to carry out the diagnostic procedures and medical or surgical treatment.     I hereby authorize [facility] to retain or dispose of any specimens or tissue, should there be such remaining from any test or procedure.     I hereby authorize and give consent for [facility] providers and employees to take photographs, images or videotapes of such diagnostic, surgical or treatment procedures of Patient as may be required by [facility] or as may be ordered by a physician. I further acknowledge and agree that [facility] may use cameras or other devices for patient monitoring.     I am aware that the practice of medicine is not an exact science, and I acknowledge that no guarantees have been made to me as to the outcome of any tests, procedures or treatment.     Authorization for Release of Information: I understand that my insurance company and/or their agents may need information necessary to make determinations about payment/reimbursement. I hereby provide authorization to release to all insurance companies, their successors, assignees, other parties with whom they may have contracted, or others acting on their behalf, that are involved with payment for any hospital and/or clinic charges incurred by the patient, any information that they request and deem necessary for payment/reimbursement, and/or quality review.  I further authorize the release of my health information to physicians or other health care practitioners on staff who are involved in my health care now and in the future, and to other health care providers, entities, or institutions for the purpose of my continued care and treatment,  including referrals.     REGISTRATION AUTHORIZATION  Form No. 84007 (Rev. 3/25/2024)    Page 1 of 3                       Medicare Patient's Certification and Authorization to Release Information and Payment Request:  I certify that the information given by me in applying for payment under Title XVIII of the Social Security Act is correct. I authorize any quezada of medical or other information about me to release to the [facility], or its intermediaries or carriers, any information needed for this or a related Medicare claim. I request that payment of authorized benefits be made on my behalf.     Assignment of Insurance Benefits:   I hereby authorize any and all insurance companies, health plans, defined   benefit plans, health insurers or any entity that is or may be responsible for payment of my medical expenses to pay all hospital and medical benefits now due, and to become due and payable to me under any hospital benefits, sick benefits, injury benefits or any other benefit for services rendered to me, including Major Medical Benefits, direct to [facility] and all independently contracted physicians. I assign any and all rights that I may have against any and all insurance companies, health plans, defined benefit plans, health insurers or any entity that is or may be responsible for payment of my medical expenses, including, but not limited to any right to appeal a denial of a claim, any right to bring any action, lawsuit, administrative proceeding, or other cause of action on my behalf. I specifically assign my right to pursue litigation against any and all insurance companies, health plans, defined benefit plans, health insurers or any entity that is or may be responsible for payment of my medical expenses based upon a refusal to pay charges.            E. Valuables: It is understood and agreed that [facility] is not liable for the damage to or loss of any money, jewelry,   documents, dentures,  eye glasses, hearing aids, prosthetics, or other property of value.     F. Computer Equipment: I understand and agree that should I choose to use computer equipment owned by [facility] or if I choose to access the Internet via [facility]s network, I do so at my own risk. [facility] is not responsible for any damage to my computer equipment or to any damages of any type that might arise from my loss of equipment or data.     G. Acceptance of Financial Responsibility:  I agree that in consideration of the services and   supplies that have been   or will be furnished to the patient, I am hereby obligated to pay all charges made for or on the account of the patient according to the standard rates (in effect at the time the services and supplies are delivered) established by [facility], including its Patient Financial Assistance Policy to the extent it is applicable. I understand that I am responsible for all charges, or portions thereof, not covered by insurance or other sources. Patient refunds will be distributed only after balances at all [facility] facilities are paid.     H. Communication Authorization:  I hereby authorize [facility] and its representatives, along with any billing service   or  who may work on their behalf, to contact me on   my cell phone and/or home phone using pre- recorded messages, artificial voice messages, automatic telephone dialing devices or other computer assisted technology, or by electronic      mail, text messaging, or by any other form of electronic communication. This includes, but is not limited to, appointment reminders, yearly physical exam reminders, preventive care reminders, patient campaigns, welcome calls, and calls about account balances on my account or any account on which I am listed as a guarantor. I understand I have the right to opt out of these communications at any time.      Relationship  Between  Facility and  Provider:      I understand that some, but not all,  providers furnishing services to the patient are not employees or agents of [facility]. The patient is under the care and supervision of his/her attending physician, and it is the responsibility of the facility and its nursing staff to carry out the instructions of such physicians. It is the responsibility of the patient's physician/designee to obtain the patient's informed consent, when required, for medical or surgical treatment, special diagnostic or therapeutic procedures, or hospital services rendered for the patient under the special instructions of the physician/designee.           REGISTRATION AUTHORIZATION  Form No. 59972 (Rev. 3/25/2024)    Page 2 of 3                       Immunizations: [facility] shares immunization information with state sponsored health departments to help you and your doctor keep track of your immunization records. By signing, you consent to have this information shared with the health department in your state:                                [place] - LINKS ([place] Immunization Network for Kids Statewide)                                [place] - MIIX ([place] Immunization Information eXchange)                                [place] - ImmPRINT (Immunization Patient Registry with Integrated Technology)     TERM: This authorization is valid for this and subsequent care/treatment I receive at [facility] and will remain valid unless/until revoked in writing by me.     [facility]: As used in this document, [facility] means all [facility] owned and managed facilities, including, but not limited to, all health centers, surgery centers, clinics, urgent care centers, and hospitals.         [facility] complies with applicable Federal civil rights laws and does not discriminate on the basis of race, color, national origin, age, disability, or sex.  ATENCIÓN: si habla español, tiene a linton disposición servicios gratuitos de asistencia lingüística. Llame al  [phone].  AIDEN Ý: N?u b?n nói Ti?ng Vi?t, có các d?ch v? h? tr? ngôn ng? mi?n phí dành cho b?n. G?i s? [phone].        REGISTRATION AUTHORIZATION  Form No. 77489 (Rev. 3/25/2024)   Page 3 of 3

## 2025-07-16 NOTE — LETTER
AUTHORIZATION FOR RELEASE OF   CONFIDENTIAL INFORMATION    Dear Christos Nielsen MD,    We are seeing Toni Hyde, date of birth 1955, in the clinic at [facility]. Sal Solares PA-C is the patient's PCP. Toni Hyde has an outstanding lab/procedure at the time we reviewed his chart. In order to help keep his health information updated, he has authorized us to request the following medical record(s):        (  )  MAMMOGRAM                                      (  )  COLONOSCOPY      (  )  PAP SMEAR                                          (  )  OUTSIDE LAB RESULTS     (  )  DEXA SCAN                                          (X) DIABETIC EYE EXAM            (  )  FOOT EXAM                                          (  )  ENTIRE RECORD     (  )  OUTSIDE IMMUNIZATIONS                 (  )  _______________         Please fax records to [facility], [place], [place], PA-C, [phone] or email to [email or web address].    If you have any questions, please contact Fernando Graf LPN Care Coordinator  at [phone].            Patient Name: Toni Hyde  : 1955  Patient Phone #: [phone]                       Toni Hyde  MRN: [id number]  : 1955  Age: 69 y.o.  Sex: male         Patient/Legal Guardian Signature  This signature was collected at 2024           _______________________________   Printed Name/Relationship to Patient      Consent for Examination and Treatment: I hereby authorize the providers and employees of [facility] ([facility]) to provide medical treatment/services which includes, but is not limited to, performing and administering tests and diagnostic procedures that are deemed necessary, including, but not limited to, imaging examinations, blood tests and other laboratory procedures as may be required by the hospital, clinic, or may be ordered by my physician(s) or persons working under the general and/or special instructions  of my physician(s).      I understand and agree that this consent covers all authorized persons, including but not limited to physicians, residents, nurse practitioners, physicians' assistants, specialists, consultants, student nurses, and independently contracted physicians, who are called upon by the physician in charge, to carry out the diagnostic procedures and medical or surgical treatment.     I hereby authorize [facility] to retain or dispose of any specimens or tissue, should there be such remaining from any test or procedure.     I hereby authorize and give consent for [facility] providers and employees to take photographs, images or videotapes of such diagnostic, surgical or treatment procedures of Patient as may be required by [facility] or as may be ordered by a physician. I further acknowledge and agree that [facility] may use cameras or other devices for patient monitoring.     I am aware that the practice of medicine is not an exact science, and I acknowledge that no guarantees have been made to me as to the outcome of any tests, procedures or treatment.     Authorization for Release of Information: I understand that my insurance company and/or their agents may need information necessary to make determinations about payment/reimbursement. I hereby provide authorization to release to all insurance companies, their successors, assignees, other parties with whom they may have contracted, or others acting on their behalf, that are involved with payment for any hospital and/or clinic charges incurred by the patient, any information that they request and deem necessary for payment/reimbursement, and/or quality review.  I further authorize the release of my health information to physicians or other health care practitioners on staff who are involved in my health care now and in the future, and to other health care providers, entities, or institutions for the purpose of my continued care and treatment, including  referrals.     REGISTRATION AUTHORIZATION  Form No. 27018 (Rev. 3/25/2024)    Page 1 of 3                       Medicare Patient's Certification and Authorization to Release Information and Payment Request:  I certify that the information given by me in applying for payment under Title XVIII of the Social Security Act is correct. I authorize any quezada of medical or other information about me to release to the [facility], or its intermediaries or carriers, any information needed for this or a related Medicare claim. I request that payment of authorized benefits be made on my behalf.     Assignment of Insurance Benefits:   I hereby authorize any and all insurance companies, health plans, defined   benefit plans, health insurers or any entity that is or may be responsible for payment of my medical expenses to pay all hospital and medical benefits now due, and to become due and payable to me under any hospital benefits, sick benefits, injury benefits or any other benefit for services rendered to me, including Major Medical Benefits, direct to [facility] and all independently contracted physicians. I assign any and all rights that I may have against any and all insurance companies, health plans, defined benefit plans, health insurers or any entity that is or may be responsible for payment of my medical expenses, including, but not limited to any right to appeal a denial of a claim, any right to bring any action, lawsuit, administrative proceeding, or other cause of action on my behalf. I specifically assign my right to pursue litigation against any and all insurance companies, health plans, defined benefit plans, health insurers or any entity that is or may be responsible for payment of my medical expenses based upon a refusal to pay charges.            E. Valuables: It is understood and agreed that [facility] is not liable for the damage to or loss of any money, jewelry,   documents, dentures, eye glasses,  hearing aids, prosthetics, or other property of value.     F. Computer Equipment: I understand and agree that should I choose to use computer equipment owned by [facility] or if I choose to access the Internet via [facility]s network, I do so at my own risk. [facility] is not responsible for any damage to my computer equipment or to any damages of any type that might arise from my loss of equipment or data.     G. Acceptance of Financial Responsibility:  I agree that in consideration of the services and   supplies that have been   or will be furnished to the patient, I am hereby obligated to pay all charges made for or on the account of the patient according to the standard rates (in effect at the time the services and supplies are delivered) established by [facility], including its Patient Financial Assistance Policy to the extent it is applicable. I understand that I am responsible for all charges, or portions thereof, not covered by insurance or other sources. Patient refunds will be distributed only after balances at all [facility] facilities are paid.     H. Communication Authorization:  I hereby authorize [facility] and its representatives, along with any billing service   or  who may work on their behalf, to contact me on   my cell phone and/or home phone using pre- recorded messages, artificial voice messages, automatic telephone dialing devices or other computer assisted technology, or by electronic      mail, text messaging, or by any other form of electronic communication. This includes, but is not limited to, appointment reminders, yearly physical exam reminders, preventive care reminders, patient campaigns, welcome calls, and calls about account balances on my account or any account on which I am listed as a guarantor. I understand I have the right to opt out of these communications at any time.      Relationship  Between  Facility and  Provider:      I understand that some, but not all, providers  furnishing services to the patient are not employees or agents of [facility]. The patient is under the care and supervision of his/her attending physician, and it is the responsibility of the facility and its nursing staff to carry out the instructions of such physicians. It is the responsibility of the patient's physician/designee to obtain the patient's informed consent, when required, for medical or surgical treatment, special diagnostic or therapeutic procedures, or hospital services rendered for the patient under the special instructions of the physician/designee.           REGISTRATION AUTHORIZATION  Form No. 49996 (Rev. 3/25/2024)    Page 2 of 3                       Immunizations: [facility] shares immunization information with state sponsored health departments to help you and your doctor keep track of your immunization records. By signing, you consent to have this information shared with the health department in your state:                                [place] - LINKS ([place] Immunization Network for Kids Statewide)                                [place] - MIIX ([place] Immunization Information eXchange)                                [place] - ImmPRINT (Immunization Patient Registry with Integrated Technology)     TERM: This authorization is valid for this and subsequent care/treatment I receive at [facility] and will remain valid unless/until revoked in writing by me.     [facility]: As used in this document, [facility] means all [facility] owned and managed facilities, including, but not limited to, all health centers, surgery centers, clinics, urgent care centers, and hospitals.         [facility] complies with applicable Federal civil rights laws and does not discriminate on the basis of race, color, national origin, age, disability, or sex.  ATENCIÓN: si habla marissa, tiene a linton disposición servicios gratuitos de asistencia lingüística. Deidre christina [phone].  CHÚ Ý:  N?u b?n nói Ti?ng Vi?t, có các d?ch v? h? tr? ngôn ng? mi?n phí dành cho b?n. G?i s? [phone].        REGISTRATION AUTHORIZATION  Form No. 13676 (Rev. 3/25/2024)   Page 3 of 3

## 2025-07-31 ENCOUNTER — OFFICE VISIT (OUTPATIENT)
Dept: CARDIOLOGY | Facility: CLINIC | Age: 70
End: 2025-07-31
Payer: MEDICARE

## 2025-07-31 VITALS
DIASTOLIC BLOOD PRESSURE: 76 MMHG | SYSTOLIC BLOOD PRESSURE: 119 MMHG | OXYGEN SATURATION: 95 % | HEART RATE: 65 BPM | BODY MASS INDEX: 26.76 KG/M2 | HEIGHT: 70 IN | WEIGHT: 186.94 LBS

## 2025-07-31 DIAGNOSIS — E78.5 HYPERLIPIDEMIA, UNSPECIFIED HYPERLIPIDEMIA TYPE: ICD-10-CM

## 2025-07-31 DIAGNOSIS — E11.59 HYPERTENSION ASSOCIATED WITH DIABETES: ICD-10-CM

## 2025-07-31 DIAGNOSIS — I44.1 SECOND DEGREE HEART BLOCK: ICD-10-CM

## 2025-07-31 DIAGNOSIS — R00.1 BRADYCARDIA: Primary | ICD-10-CM

## 2025-07-31 DIAGNOSIS — I15.2 HYPERTENSION ASSOCIATED WITH DIABETES: ICD-10-CM

## 2025-07-31 DIAGNOSIS — I45.3 TRIFASCICULAR BLOCK: ICD-10-CM

## 2025-07-31 PROCEDURE — 99999 PR PBB SHADOW E&M-EST. PATIENT-LVL III: CPT | Mod: PBBFAC,,, | Performed by: INTERNAL MEDICINE

## 2025-07-31 PROCEDURE — 99213 OFFICE O/P EST LOW 20 MIN: CPT | Mod: PBBFAC,PN | Performed by: INTERNAL MEDICINE

## 2025-07-31 PROCEDURE — 99214 OFFICE O/P EST MOD 30 MIN: CPT | Mod: S$PBB,,, | Performed by: INTERNAL MEDICINE

## 2025-07-31 NOTE — PROGRESS NOTES
Subjective:    Patient ID:  Toni Hyde is a 70 y.o. male patient here for evaluation bradycardia, second-degree AV block Results (F/u after testing completed)    07/31/2025:   Denies any symptoms.  Came for follow up of test results    History of Present Illness initial clinic visit:   70-year-old male here for evaluation of bradycardia and second-degree AV block on EKG.  Patient is physically active at baseline with regular aerobic exercises and denies any exertional anginal symptoms.  Denies any dizziness or syncope.  Compliant with the medications.          Review of patient's allergies indicates:   Allergen Reactions    Shrimp Itching and Swelling     Per testing   States no ivp or betadine allergies.        Past Medical History:   Diagnosis Date    Arthritis     Cataract of left eye     Plantar fasciitis of right foot 10/2017    Dr Colorado     Tendonitis 01/2018    right foot Dr Colorado    Wears glasses      Past Surgical History:   Procedure Laterality Date    ADENOIDECTOMY      BIOPSY OF MASS OF LUMBAR SPINE N/A 10/19/2022    Procedure: BIOPSY, MASS, SPINE, LUMBAR;  Surgeon: Marvel Matthews DO;  Location: [facility];  Service: Neurosurgery;  Laterality: N/A;  Right L5-S1 Resection of Extradural Mass Using Transfacet Approach    CATARACT EXTRACTION      COLONOSCOPY  03/23/2010    Dr. Blackburn, hyperplastic polyps, otherwise negative, 10 year recheck    COLONOSCOPY N/A 11/19/2021    Procedure: COLONOSCOPY;  Surgeon: Marcus Sexton MD;  Location: [facility];  Service: Endoscopy;  Laterality: N/A;    COLONOSCOPY, SCREENING, HIGH RISK PATIENT N/A 5/30/2025    Procedure: COLONOSCOPY, SCREENING, HIGH RISK PATIENT(Ok to move earlier);  Surgeon: Marcus Sexton MD;  Location: [facility];  Service: Endoscopy;  Laterality: N/A;    CYSTOSCOPY Left 04/12/2017    with ureteral stent-Dr. Noel    CYSTOSCOPY Left 05/17/2022    Procedure: CYSTOSCOPY with stent removal;  Surgeon: Ronal Benitez MD;  [facility]  OR;  Service: Urology;  Laterality: Left;    CYSTOSCOPY N/A 7/9/2024    Procedure: CYSTOSCOPY;  Surgeon: Ronal Benitez MD;  Location: [facility] OR;  Service: Urology;  Laterality: N/A;    CYSTOSCOPY N/A 4/28/2025    Procedure: CYSTOSCOPY;  Surgeon: Monika Phillips MD;  Location: [facility];  Service: Urology;  Laterality: N/A;    CYSTOSCOPY WITH INSERTION OF MINIMALLY INVASIVE IMPLANT TO ENLARGE PROSTATIC URETHRA N/A 8/22/2024    Procedure: CYSTOSCOPY, WITH INSERTION OF UROLIFT IMPLANT;  Surgeon: Ronal Benitez MD;  Location: [facility] OR;  Service: Urology;  Laterality: N/A;    CYSTOSCOPY WITH URETEROSCOPY, RETROGRADE PYELOGRAPHY, AND INSERTION OF STENT Left 05/05/2022    Procedure: CYSTOSCOPY, WITH RETROGRADE PYELOGRAM AND URETERAL STENT INSERTION;  Surgeon: Ronal Benitez MD;  Location: [facility] OR;  Service: Urology;  Laterality: Left;    EXTRACORPOREAL SHOCK WAVE LITHOTRIPSY Left 4/27/2023    Procedure: LITHOTRIPSY, ESWL;  Surgeon: Ronal Benitez MD;  Location: [facility] OR;  Service: Urology;  Laterality: Left;    EYE SURGERY  05/06/2013    Dr Nielsen    kidney stent   04/2017    KNEE ARTHROSCOPY W/ DEBRIDEMENT      right with lateral release     LITHOTRIPSY  04/19/2017    NASAL SINUS SURGERY      retna surgery   05/2013    Dr Peralta    SPINE SURGERY      TONSILLECTOMY      TRANSFORAMINAL EPIDURAL INJECTION OF STEROID Right 12/07/2020    Procedure: Injection,steroid,epidural,transforaminal approach L5- S1;  Surgeon: Kermit Davalos MD;  Location: [facility] OR;  Service: Pain Management;  Laterality: Right;  L5-s1    TRANSFORAMINAL EPIDURAL INJECTION OF STEROID Right 06/23/2022    Procedure: Injection,steroid,epidural,transforaminal approach;  Surgeon: Christos Saul MD;  Location: [facility] OR;  Service: Pain Management;  Laterality: Right;  L5-S1 and cyst aspiration L5-S1    TRANSRECTAL ULTRASOUND EXAMINATION N/A 7/9/2024    Procedure: ULTRASOUND, RECTAL APPROACH;  Surgeon: Ronal Benitez MD;  Location: [facility];  Service: Urology;  Laterality: N/A;    URETEROSCOPIC REMOVAL OF URETERIC CALCULUS Left 05/05/2022    Procedure: REMOVAL, CALCULUS, URETER, URETEROSCOPIC;  Surgeon: Ronal Benitez MD;  Location: [facility] OR;  Service: Urology;  Laterality: Left;     Social History[1]     Review of Systems   Negative except as mentioned in HPI         Objective        Vitals:    07/31/25 1443   BP: 119/76   Pulse: 65       LIPIDS - LAST 2   Lab Results   Component Value Date    CHOL 116 (L) 06/27/2025    CHOL 109 (L) 01/07/2025    HDL 43 06/27/2025    HDL 37 (L) 01/07/2025    LDLCALC 58.0 (L) 06/27/2025    LDLCALC 55.8 (L) 01/07/2025    TRIG 75 06/27/2025    TRIG 81 01/07/2025    CHOLHDL 37.1 06/27/2025    CHOLHDL 33.9 01/07/2025       CBC - LAST 2  Lab Results   Component Value Date    WBC 6.41 06/27/2025    WBC 10.97 02/09/2025    RBC 5.33 06/27/2025    RBC 4.75 02/09/2025    HGB 16.3 06/27/2025    HGB 14.5 02/09/2025    HCT 50.2 06/27/2025    HCT 45.1 02/09/2025    MCV 94 06/27/2025    MCV 95 02/09/2025    MCH 30.6 06/27/2025    MCH 30.5 02/09/2025    MCHC 32.5 06/27/2025    MCHC 32.2 02/09/2025    RDW 12.2 06/27/2025    RDW 12.2 02/09/2025     06/27/2025     (L) 02/09/2025    MPV 10.4 06/27/2025    MPV 10.7 02/09/2025    GRAN 14.5 (H) 02/07/2025    GRAN 84.8 (H) 02/07/2025    LYMPH 0.9 (L) 02/07/2025    LYMPH 5.5 (L) 02/07/2025    MONO 1.5 (H) 02/07/2025    MONO 9.0 02/07/2025    BASO 0.04 02/07/2025    BASO 0.07 01/07/2025    NRBC 0 06/27/2025    NRBC 0 02/07/2025       CHEMISTRY & LIVER FUNCTION - LAST 2  Lab Results   Component Value Date     06/27/2025     02/09/2025    K 4.6 06/27/2025    K 4.2 02/09/2025     06/27/2025     02/09/2025    CO2 28 06/27/2025    CO2 23 02/09/2025    ANIONGAP 10 06/27/2025    ANIONGAP 11 02/09/2025    BUN 19 06/27/2025    BUN 17 02/09/2025    CREATININE 1.3 06/27/2025    CREATININE 1.0 02/09/2025     (H) 06/27/2025     (H) 02/09/2025     CALCIUM 9.7 06/27/2025    CALCIUM 8.6 (L) 02/09/2025    MG 1.9 02/09/2025    MG 1.8 02/08/2025    ALBUMIN 4.4 06/27/2025    ALBUMIN 3.1 (L) 02/09/2025    PROT 7.4 06/27/2025    PROT 6.7 02/09/2025    ALKPHOS 53 06/27/2025    ALKPHOS 49 02/09/2025    ALT 20 06/27/2025    ALT 10 02/09/2025    AST 28 06/27/2025    AST 14 02/09/2025    BILITOT 1.2 (H) 06/27/2025    BILITOT 1.6 (H) 02/09/2025        CARDIAC PROFILE - LAST 2  Lab Results   Component Value Date    CPK 93 04/28/2012    CPK 92 06/15/2011        COAGULATION - LAST 2  Lab Results   Component Value Date    INR 1.0 04/14/2023    INR 1.1 10/07/2022    APTT 25.9 10/07/2022       ENDOCRINE & PSA - LAST 2  Lab Results   Component Value Date    HGBA1C 7.4 (H) 06/27/2025    HGBA1C 6.8 (H) 12/30/2024    PSA 0.82 06/27/2025    PSA 0.53 05/09/2024        ECHOCARDIOGRAM RESULTS  No results found for this or any previous visit.      CURRENT/PREVIOUS VISIT EKG  Results for orders placed or performed in visit on 06/17/25   IN OFFICE EKG 12-LEAD (to [place])    Collection Time: 06/17/25  2:13 PM   Result Value Ref Range    QRS Duration 134 ms    [facility] QTC Calculation 415 ms    Narrative    Test Reason : I44.1,R00.1,I45.3,    Vent. Rate :  50 BPM     Atrial Rate :  64 BPM     P-R Int :    ms          QRS Dur : 134 ms      QT Int : 456 ms       P-R-T Axes :    -30 -13 degrees    QTcB Int : 415 ms    Sinus rhythm with 2nd degree A-V block (Mobitz I)  Left axis deviation  Right bundle branch block  Moderate voltage criteria for LVH, may be normal variant  Abnormal ECG  When compared with ECG of 12-Aug-2024 10:41,  Previous ECG has undetermined rhythm, needs review  Confirmed by Raghav Poe ([id number]) on 7/16/2025 9:51:21 PM    Referred By: ELISABETH PENNY           Confirmed By: Raghav Poe     No valid procedures specified.   No results found for this or any previous visit.    No valid procedures specified.          PREVIOUS STRESS TEST              PREVIOUS  ANGIOGRAM        PHYSICAL EXAM    CONSTITUTIONAL: Well built, well nourished in no apparent distress  HEENT: No pallor  NECK: no JVD  LUNGS: CTA b/l  HEART: regular rate and rhythm, S1, S2 normal, no murmur   ABDOMEN:  Nondistended  EXTREMITIES: No edema  NEURO: AAO X 3   SKIN:  No rash  Psych:  Normal affect    I HAVE REVIEWED :    The vital signs, nurses notes, and all the pertinent recent radiology studies, cardiovascular studies and labs.  I have independently reviewed the patient's most recent EKG.        Current Outpatient Medications   Medication Instructions    amLODIPine (NORVASC) 2.5 mg, Oral, Daily    cetirizine (ZYRTEC) 10 mg, Daily PRN    fluticasone (FLONASE) 50 mcg/actuation nasal spray 2 sprays, Daily PRN    ibuprofen (ADVIL,MOTRIN) 200 mg, Every 8 hours PRN    metFORMIN (GLUCOPHAGE-XR) 750 mg, Oral, 2 times daily with meals    potassium citrate (UROCIT-K 15) 15 mEq TbSR 1 tablet, Oral, 2 times daily    rosuvastatin (CRESTOR) 20 mg, Oral, Daily    triamcinolone acetonide 0.1% (KENALOG) 0.1 % ointment Topical (Top), 2 times daily    valsartan (DIOVAN) 320 MG tablet TAKE 1 TABLET DAILY    vibegron 75 mg, Oral, Every morning, Take 1 by mouth every morning for overactive bladder          Assessment and Plan     70-year-old male here for evaluation of bradycardia and second-degree AV block on EKG.  Patient is physically active at baseline with regular aerobic exercises and denies any exertional anginal symptoms.  Denies any dizziness or syncope.  Compliant with the medications.    Sinus bradycardia with Mobitz type 1 av block and borderline trifascicular block on EKG.  Changes appears to be chronic and patient is asymptomatic.  Three day event monitor reviewed.  Brief pause noted during hours of sleep.  Possible sleep apnea related.  Brief nonsustained SVT.  Echocardiogram- preserved ejection fraction   Discussed the test results and Patient is not interested in electrophysiology referral and sleep study  referral at this time.  Continue amlodipine 2.5 mg daily, valsartan 320 mg, rosuvastatin 20 mg, metformin  LDL well controlled.  Hypertension well controlled   Check TSH - pending       Follow up in about 6 months (around 2026).     Bradycardia    Second degree heart block    Trifascicular block    Hyperlipidemia, unspecified hyperlipidemia type    Hypertension associated with diabetes                [1]   Social History  Tobacco Use    Smoking status: Former     Current packs/day: 0.00     Types: Cigarettes     Quit date: 1978     Years since quittin.3    Smokeless tobacco: Never    Tobacco comments:     Occ cigar   Substance Use Topics    Alcohol use: Yes     Alcohol/week: 1.0 standard drink of alcohol     Types: 1 Standard drinks or equivalent per week     Comment: 1/week    Drug use: No

## 2025-08-26 ENCOUNTER — PATIENT MESSAGE (OUTPATIENT)
Dept: UROLOGY | Facility: CLINIC | Age: 70
End: 2025-08-26

## 2025-08-26 ENCOUNTER — OFFICE VISIT (OUTPATIENT)
Dept: UROLOGY | Facility: CLINIC | Age: 70
End: 2025-08-26
Payer: MEDICARE

## 2025-08-26 DIAGNOSIS — N40.1 BPH WITH OBSTRUCTION/LOWER URINARY TRACT SYMPTOMS: Primary | ICD-10-CM

## 2025-08-26 DIAGNOSIS — N13.8 BPH WITH OBSTRUCTION/LOWER URINARY TRACT SYMPTOMS: Primary | ICD-10-CM

## 2025-08-26 DIAGNOSIS — N20.0 NEPHROLITHIASIS: ICD-10-CM

## 2025-08-26 DIAGNOSIS — N32.81 OAB (OVERACTIVE BLADDER): ICD-10-CM

## 2025-08-26 LAB
BILIRUBIN, UA POC OHS: NEGATIVE
BLOOD, UA POC OHS: NEGATIVE
CLARITY, UA POC OHS: CLEAR
COLOR, UA POC OHS: YELLOW
GLUCOSE, UA POC OHS: 100
KETONES, UA POC OHS: NEGATIVE
LEUKOCYTES, UA POC OHS: NEGATIVE
NITRITE, UA POC OHS: NEGATIVE
PH, UA POC OHS: 6
POC RESIDUAL URINE VOLUME: 16 ML (ref 0–100)
PROTEIN, UA POC OHS: 30
SPECIFIC GRAVITY, UA POC OHS: 1.01
UROBILINOGEN, UA POC OHS: 0.2

## 2025-08-26 PROCEDURE — 99999PBSHW POCT URINALYSIS(INSTRUMENT): Mod: PBBFAC,,,

## 2025-08-26 PROCEDURE — G2211 COMPLEX E/M VISIT ADD ON: HCPCS | Mod: ,,, | Performed by: UROLOGY

## 2025-08-26 PROCEDURE — 99999 PR PBB SHADOW E&M-EST. PATIENT-LVL III: CPT | Mod: PBBFAC,,, | Performed by: UROLOGY

## 2025-08-26 PROCEDURE — 99999PBSHW POCT BLADDER SCAN: Mod: PBBFAC,,,

## 2025-08-26 PROCEDURE — 99214 OFFICE O/P EST MOD 30 MIN: CPT | Mod: S$PBB,,, | Performed by: UROLOGY

## 2025-08-26 PROCEDURE — 81003 URINALYSIS AUTO W/O SCOPE: CPT | Mod: PBBFAC,PO | Performed by: UROLOGY

## 2025-08-26 PROCEDURE — 51798 US URINE CAPACITY MEASURE: CPT | Mod: PBBFAC,PO | Performed by: UROLOGY

## 2025-08-26 PROCEDURE — 99213 OFFICE O/P EST LOW 20 MIN: CPT | Mod: PBBFAC,PO | Performed by: UROLOGY

## 2025-08-26 RX ORDER — CEFAZOLIN SODIUM 2 G/50ML
2 SOLUTION INTRAVENOUS
OUTPATIENT
Start: 2025-08-26

## 2025-08-30 ENCOUNTER — ON-DEMAND VIRTUAL (OUTPATIENT)
Dept: URGENT CARE | Facility: CLINIC | Age: 70
End: 2025-08-30
Payer: MEDICARE

## 2025-09-02 ENCOUNTER — OFFICE VISIT (OUTPATIENT)
Dept: OTOLARYNGOLOGY | Facility: CLINIC | Age: 70
End: 2025-09-02
Payer: MEDICARE

## 2025-09-02 VITALS
DIASTOLIC BLOOD PRESSURE: 88 MMHG | SYSTOLIC BLOOD PRESSURE: 131 MMHG | BODY MASS INDEX: 26.99 KG/M2 | WEIGHT: 188.5 LBS | HEIGHT: 70 IN | HEART RATE: 58 BPM

## 2025-09-02 DIAGNOSIS — J34.89 NASAL CRUSTING: ICD-10-CM

## 2025-09-02 DIAGNOSIS — J31.0 CHRONIC RHINITIS: Primary | ICD-10-CM

## 2025-09-02 DIAGNOSIS — R04.0 EPISTAXIS: ICD-10-CM

## 2025-09-02 PROCEDURE — 99999 PR PBB SHADOW E&M-EST. PATIENT-LVL IV: CPT | Mod: PBBFAC,,, | Performed by: OTOLARYNGOLOGY

## 2025-09-02 PROCEDURE — 99214 OFFICE O/P EST MOD 30 MIN: CPT | Mod: PBBFAC,PO | Performed by: OTOLARYNGOLOGY

## 2025-09-02 PROCEDURE — 30901 CONTROL OF NOSEBLEED: CPT | Mod: PBBFAC,PO | Performed by: OTOLARYNGOLOGY

## 2025-09-02 RX ORDER — HYDROCORTISONE 1 %
OINTMENT (GRAM) TOPICAL 3 TIMES DAILY
Start: 2025-09-02

## 2025-09-02 RX ORDER — MUPIROCIN 20 MG/G
OINTMENT TOPICAL
Qty: 22 G | Refills: 0 | Status: SHIPPED | OUTPATIENT
Start: 2025-09-02

## (undated) DEVICE — SYS LABEL CORRECT MED

## (undated) DEVICE — SOL POVIDONE PREP IODINE 4 OZ

## (undated) DEVICE — SYR ONLY LUER LOCK 20CC

## (undated) DEVICE — SEE MEDLINE ITEM 157116

## (undated) DEVICE — SEE MEDLINE ITEM 157185

## (undated) DEVICE — SCRUB HIBICLENS 4% CHG 4OZ

## (undated) DEVICE — CATH CONE TIP

## (undated) DEVICE — TOWEL OR DISP STRL BLUE 4/PK

## (undated) DEVICE — COVER PROXIMA MAYO STAND

## (undated) DEVICE — NDL SPINAL 22GX5

## (undated) DEVICE — DRAPE OPMI STERILE

## (undated) DEVICE — SOL 9P NACL IRR PIC IL

## (undated) DEVICE — DRAPE THREE-QTR REINF 53X77IN

## (undated) DEVICE — SOL IRR WATER STRL 3000 ML

## (undated) DEVICE — BLADE SURG CARBON STEEL #10

## (undated) DEVICE — TUBING SUC UNIV W/CONN 12FT

## (undated) DEVICE — FIBER LASER OPTILITE MH 273

## (undated) DEVICE — DRAPE C ARM 42 X 120 10/BX

## (undated) DEVICE — SUT CTD VICRYL CT-1 27

## (undated) DEVICE — COVER TABLE 44X90 STERILE

## (undated) DEVICE — GLOVE SURG ULTRA TOUCH 7

## (undated) DEVICE — TRAY DRY SPONGE SCRUB W FOAM

## (undated) DEVICE — SLEEVE SCD EXPRESS CALF MEDIUM

## (undated) DEVICE — NDL HYPODERMIC BLUNT 18G 1.5IN

## (undated) DEVICE — UNDERGLOVES BIOGEL PI SIZE 8

## (undated) DEVICE — UNDERGLOVE BIOGEL PI SZ 6.5 LF

## (undated) DEVICE — GOWN POLY REINF BRTH SLV XL

## (undated) DEVICE — SEE MEDLINE ITEM 152487

## (undated) DEVICE — GUIDE BIOPSY BIPLANAR 18G

## (undated) DEVICE — SOL WATER STRL IRR 1000ML

## (undated) DEVICE — DEVICE ANC SW STAT FOLEY 6-24

## (undated) DEVICE — GLOVE SURGEONS ULTRA TOUCH 6.5

## (undated) DEVICE — CHLORAPREP 10.5 ML APPLICATOR

## (undated) DEVICE — SET CYSTO IRR DRP CHMBR 84IN

## (undated) DEVICE — CLOSURE SKIN 1X5 STERI-STRIP

## (undated) DEVICE — STENT SET URETERAL 6FR 30CM
Type: IMPLANTABLE DEVICE | Site: URETER | Status: NON-FUNCTIONAL
Removed: 2022-05-05

## (undated) DEVICE — SEE MEDLINE ITEM 157117

## (undated) DEVICE — DRAPE STERI INSTRUMENT 1018

## (undated) DEVICE — SYR 10CC LUER LOCK

## (undated) DEVICE — SPONGE BULKEE II ABSRB 6X6.75

## (undated) DEVICE — NDL BOX COUNTER

## (undated) DEVICE — SLEEVE SCD EXPRESS KNEE MEDIUM

## (undated) DEVICE — DRAPE UINDERBUT GRAD PCH

## (undated) DEVICE — GUIDEWIRE AMPLATZ .035X145 STR

## (undated) DEVICE — SET IRR URLGY 2LINE UNIV SPIKE

## (undated) DEVICE — GUIDEWIRE NITINOL HYBRID 150CM

## (undated) DEVICE — SPONGE COTTON TRAY 4X4IN

## (undated) DEVICE — GLOVE SURG ULTRA TOUCH 8

## (undated) DEVICE — EVACUATOR WOUND BULB 100CC

## (undated) DEVICE — LEGGING CLEAR POLY 2/PACK

## (undated) DEVICE — TOOL MR8 TELESCOPE 12CM 2.5MM

## (undated) DEVICE — GOWN POLY REINF BRTH SLV LG

## (undated) DEVICE — UNDERPAD ULTRASORB 300LB 30X36

## (undated) DEVICE — GLOVE SURG ULTRA TOUCH 7.5

## (undated) DEVICE — SOL IRR NACL .9% 3000ML

## (undated) DEVICE — SUT 0 VICRYL / CT-1

## (undated) DEVICE — GLOVE SURG ULTRA TOUCH 6

## (undated) DEVICE — COVER MAYO STND XL 30X57IN

## (undated) DEVICE — SUT BONE WAX 2.5 GRMS 12/BX

## (undated) DEVICE — BAG LINGEMAN DRAIN UROLOGY

## (undated) DEVICE — LUBRICANT SURGILUBE 2 OZ

## (undated) DEVICE — SCRUB 10% POVIDONE IODINE 4OZ

## (undated) DEVICE — SCALPEL #11 BLADE STRL DISP

## (undated) DEVICE — BLADE 4IN EDGE INSULATED

## (undated) DEVICE — SYR 50ML CATH TIP

## (undated) DEVICE — FIBER LASER HOLMIUM 273 MICRON

## (undated) DEVICE — DRAPE CYSTOSCOPY LARGE

## (undated) DEVICE — MANIFOLD 4 PORT

## (undated) DEVICE — KIT PT CARE FRME POS JACK

## (undated) DEVICE — COVER SURG LIGHT HANDLE

## (undated) DEVICE — SYR LUER LOCK STERILE 10ML

## (undated) DEVICE — BOWL STERILE LARGE 32OZ

## (undated) DEVICE — DRAPE MINOR PROCEDURE

## (undated) DEVICE — APPLICATOR CHLORAPREP ORN 26ML

## (undated) DEVICE — ADHESIVE MASTISOL VIAL 48/BX

## (undated) DEVICE — PACK CUSTOM LUMBAR LAM SLI

## (undated) DEVICE — SOL NACL IRR 1000ML BTL

## (undated) DEVICE — SHEET DRAPE MEDIUM

## (undated) DEVICE — NDL SPINAL 25GX3.5 SPINOCAN

## (undated) DEVICE — CATH POLLACK OPEN-END FLEXI-TI

## (undated) DEVICE — DRAPE STERI-DRAPE 1000 17X11IN

## (undated) DEVICE — JELLY SURGILUBE LUBE TUBE 2OZ

## (undated) DEVICE — ELECTRODE REM PLYHSV RETURN 9

## (undated) DEVICE — TUBING MINIBORE EXTENSION

## (undated) DEVICE — SOL NS 1000CC

## (undated) DEVICE — GLOVE SENSICARE PI ALOE 7

## (undated) DEVICE — CONTAINER SPECIMEN OR STER 4OZ

## (undated) DEVICE — Device

## (undated) DEVICE — YANKAUER OPEN TIP W/O VENT

## (undated) DEVICE — SOL PVP-I SCRUB 7.5% 4OZ

## (undated) DEVICE — CORD BIPOLAR 12 FOOT

## (undated) DEVICE — WATER STERILE INJ 500ML BAG

## (undated) DEVICE — PACK SIRUS BASIC V SURG STRL

## (undated) DEVICE — SHEATH URET ACCESS 14FRX45CM

## (undated) DEVICE — GLOVE SENSICARE PI ALOE 6

## (undated) DEVICE — GAUZE SPONGE BULKEE 6X6.75IN

## (undated) DEVICE — GUIDE WIRE MOTION .035 X 150CM

## (undated) DEVICE — SYR 30CC LUER LOCK

## (undated) DEVICE — SPONGE GAUZE 16PLY 4X4

## (undated) DEVICE — CATH IV INTROCAN 18G X 1 1/4

## (undated) DEVICE — NDL SAFETY 25G X 1.5 ECLIPSE

## (undated) DEVICE — CONTAINER SPECIMEN STRL 4OZ

## (undated) DEVICE — STRAP OR TABLE 5IN X 72IN

## (undated) DEVICE — NDL SPINAL 18GX3.5 SPINOCAN

## (undated) DEVICE — SYR DISP LL 5CC

## (undated) DEVICE — SET CYSTO IRRIGATION UNIV SPIK

## (undated) DEVICE — DRAIN SINGLE ROUND 1/8 10F

## (undated) DEVICE — NDL 27G X 1 1/4

## (undated) DEVICE — SOL NACL IRR 3000ML

## (undated) DEVICE — ELECTRODE BLADE INSULATED 1 IN

## (undated) DEVICE — COVER TRANSDUCER LATEX N/STERI

## (undated) DEVICE — KIT UROLIFT 2 CARTRIDGE HANDLE

## (undated) DEVICE — EXTRACTOR STONE 4WR NIT 2.2F 1

## (undated) DEVICE — CATH 2 LUMEN URET 6/10FX50CM

## (undated) DEVICE — SCRUB DYNA-HEX LIQ 4% CHG 4OZ

## (undated) DEVICE — SUT ETHILON 2-0 FSLX 30 BLK

## (undated) DEVICE — STAPLER SKIN ROTATING HEAD

## (undated) DEVICE — GOWN POLY REINF X-LONG XL

## (undated) DEVICE — ALCOHOL 70% ISOP RUBBING 4OZ

## (undated) DEVICE — BLADE ELECTRO EDGE INSULATED

## (undated) DEVICE — KIT ENDOKIT EGD/COLON/ERCP

## (undated) DEVICE — BAG DRAIN ANTI REFLUX 2000ML